# Patient Record
Sex: FEMALE | Race: WHITE | Employment: OTHER | URBAN - METROPOLITAN AREA
[De-identification: names, ages, dates, MRNs, and addresses within clinical notes are randomized per-mention and may not be internally consistent; named-entity substitution may affect disease eponyms.]

---

## 2017-04-11 PROBLEM — M48.062 LUMBAR STENOSIS WITH NEUROGENIC CLAUDICATION: Status: ACTIVE | Noted: 2017-04-11

## 2017-04-11 PROBLEM — F17.200 SMOKER: Status: ACTIVE | Noted: 2017-04-11

## 2017-04-11 PROBLEM — M43.16 SPONDYLOLISTHESIS AT L4-L5 LEVEL: Status: ACTIVE | Noted: 2017-04-11

## 2017-07-25 PROBLEM — M43.16 SPONDYLOLISTHESIS OF LUMBAR REGION: Status: ACTIVE | Noted: 2017-07-25

## 2020-07-02 PROBLEM — M43.17 SPONDYLOLISTHESIS AT L5-S1 LEVEL: Status: ACTIVE | Noted: 2020-07-02

## 2020-08-03 ENCOUNTER — PREP FOR PROCEDURE (OUTPATIENT)
Dept: SURGERY | Age: 65
End: 2020-08-03

## 2020-08-03 ENCOUNTER — OFFICE VISIT (OUTPATIENT)
Dept: SURGERY | Age: 65
End: 2020-08-03
Payer: MEDICARE

## 2020-08-03 VITALS
WEIGHT: 175 LBS | HEIGHT: 61 IN | TEMPERATURE: 98.3 F | DIASTOLIC BLOOD PRESSURE: 78 MMHG | SYSTOLIC BLOOD PRESSURE: 112 MMHG | BODY MASS INDEX: 33.04 KG/M2

## 2020-08-03 PROCEDURE — 99203 OFFICE O/P NEW LOW 30 MIN: CPT | Performed by: SURGERY

## 2020-08-03 PROCEDURE — 4040F PNEUMOC VAC/ADMIN/RCVD: CPT | Performed by: SURGERY

## 2020-08-03 PROCEDURE — G8427 DOCREV CUR MEDS BY ELIG CLIN: HCPCS | Performed by: SURGERY

## 2020-08-03 PROCEDURE — 1123F ACP DISCUSS/DSCN MKR DOCD: CPT | Performed by: SURGERY

## 2020-08-03 PROCEDURE — G8417 CALC BMI ABV UP PARAM F/U: HCPCS | Performed by: SURGERY

## 2020-08-03 PROCEDURE — 1090F PRES/ABSN URINE INCON ASSESS: CPT | Performed by: SURGERY

## 2020-08-03 PROCEDURE — 3017F COLORECTAL CA SCREEN DOC REV: CPT | Performed by: SURGERY

## 2020-08-03 PROCEDURE — G8400 PT W/DXA NO RESULTS DOC: HCPCS | Performed by: SURGERY

## 2020-08-03 PROCEDURE — 1036F TOBACCO NON-USER: CPT | Performed by: SURGERY

## 2020-08-03 RX ORDER — IPRATROPIUM BROMIDE 21 UG/1
SPRAY, METERED NASAL
COMMUNITY
Start: 2020-07-29

## 2020-08-03 RX ORDER — BUSPIRONE HYDROCHLORIDE 7.5 MG/1
15 TABLET ORAL 2 TIMES DAILY
COMMUNITY
Start: 2020-07-09

## 2020-08-03 RX ORDER — PRAMIPEXOLE DIHYDROCHLORIDE 0.25 MG/1
TABLET ORAL
COMMUNITY
Start: 2020-05-21

## 2020-08-03 RX ORDER — ALPRAZOLAM 0.25 MG/1
TABLET ORAL
COMMUNITY
Start: 2020-06-03 | End: 2022-01-12

## 2020-08-03 RX ORDER — SIMVASTATIN 40 MG
40 TABLET ORAL NIGHTLY
COMMUNITY
End: 2022-10-20

## 2020-08-03 ASSESSMENT — ENCOUNTER SYMPTOMS
RHINORRHEA: 0
BLOOD IN STOOL: 0
SHORTNESS OF BREATH: 0
ABDOMINAL DISTENTION: 0
CHEST TIGHTNESS: 0
ABDOMINAL PAIN: 0
ALLERGIC/IMMUNOLOGIC NEGATIVE: 1
COLOR CHANGE: 0
NAUSEA: 0
RECTAL PAIN: 0
BACK PAIN: 1

## 2020-08-03 NOTE — H&P (VIEW-ONLY)
RICARDO Gamez is a 76 y.o. female seen at the request of Dr. Laurent Ceballos for a anterior abdominal exposure for a L5-S1 spinal surgery. The patient has had chronic back pain for 3+ years. The patient has not had previous abdominal surgery. The patient is not on anticoagulants. All testing was done by the neurosurgeons. Review of Systems   Constitutional: Negative for activity change, appetite change and unexpected weight change. HENT: Negative for congestion, nosebleeds, rhinorrhea and sneezing. Eyes: Negative for visual disturbance. Respiratory: Negative for chest tightness and shortness of breath. Cardiovascular: Negative for chest pain and leg swelling. Gastrointestinal: Negative for abdominal distention, abdominal pain, blood in stool, nausea and rectal pain. Endocrine: Negative. Genitourinary: Negative for difficulty urinating. Musculoskeletal: Positive for back pain. Skin: Negative for color change. Allergic/Immunologic: Negative. Neurological: Negative for seizures, light-headedness, numbness and headaches. Hematological: Does not bruise/bleed easily. Psychiatric/Behavioral: Negative for sleep disturbance. Past Medical History:   Diagnosis Date    Anxiety     Hypertension     Osteoarthritis      No past surgical history on file. Social History     Tobacco Use    Smoking status: Former Smoker     Packs/day: 0.50     Types: Cigarettes    Smokeless tobacco: Never Used   Substance Use Topics    Alcohol use: No     Alcohol/week: 0.0 standard drinks    Drug use: Not on file     No family history on file. Patient has no known allergies.   No Known Allergies  Current Outpatient Medications   Medication Sig Dispense Refill    busPIRone (BUSPAR) 7.5 MG tablet TK 1 T PO BID      pramipexole (MIRAPEX) 0.25 MG tablet TK ONE OR TWO TS PO QHS      simvastatin (ZOCOR) 40 MG tablet Take by mouth      ipratropium (ATROVENT) 0.03 % nasal spray U 2 SPRAYS IEN BID      place, and time. Psychiatric:         Mood and Affect: Mood normal.         Judgment: Judgment normal.         Assessment:     L5-S1 spondylolisthesis     Plan:     Anterior exposure for L5-S1 anterior spinal fusion with Dr Dayday Escamilla    The exposure was discussed and the patient agrees to the above procedure. The procedure  as well as potential risks and complications including but not exclusive to infection, blood loss, damage to surrounding structures including blood vessels, nerves, the urteter and the intestine,  and that could require further surgery  were discussed. All questions are answered . The patient appears to understands and is agreeable to the surgery. The patient was counseled at length about the risks of maggi Covid-19 in the perioperative period and any recovery window from their procedure. The patient was made aware that maggi Covid-19  may worsen their prognosis for recovering from their procedure  and lend to a higher morbidity and/or mortality risk. The patient was given the options of postponing their procedure. All material risks, benefits, and alternatives were discussed. The patient does wish to proceed with the procedure at this time.         Zuri Majano MD

## 2020-08-03 NOTE — PROGRESS NOTES
(ATROVENT) 0.03 % nasal spray U 2 SPRAYS IEN BID      oxyCODONE-acetaminophen (PERCOCET) 7.5-325 MG per tablet Take 1 tablet by mouth every 8 hours as needed for Pain for up to 30 days. Intended supply: 30 days 90 tablet 0    lidocaine (LIDODERM) 5 % Place 1 patch onto the skin daily 12 hours on, 12 hours off. 30 patch 0    gabapentin (NEURONTIN) 300 MG capsule Take 1 capsule by mouth 3 times daily for 30 days. 90 capsule 1    diclofenac (VOLTAREN) 50 MG EC tablet Take 1 tablet by mouth 2 times daily 60 tablet 3    tiZANidine (ZANAFLEX) 4 MG tablet Take 1 tablet by mouth 2 times daily 180 tablet 2    sertraline (ZOLOFT) 25 MG tablet   1    ALPRAZolam (XANAX) 0.25 MG tablet TK 1 T PO BID PRN      naloxone (NARCAN) 4 MG/0.1ML LIQD nasal spray 1 spray by Nasal route as needed for Opioid Reversal (Patient not taking: Reported on 8/3/2020) 1 each 0     No current facility-administered medications for this visit. /78   Temp 98.3 °F (36.8 °C) (Temporal)   Ht 5' 1\" (1.549 m)   Wt 175 lb (79.4 kg)   LMP  (LMP Unknown)   BMI 33.07 kg/m²     Objective:   Physical Exam  Constitutional:       General: She is not in acute distress. Appearance: Normal appearance. HENT:      Mouth/Throat:      Mouth: Mucous membranes are moist.      Pharynx: Oropharynx is clear. Eyes:      Pupils: Pupils are equal, round, and reactive to light. Neck:      Comments: Neck is supple with out masses, no thyromegaly, trachea midline  Cardiovascular:      Rate and Rhythm: Normal rate and regular rhythm. Heart sounds: No murmur. Pulmonary:      Effort: Pulmonary effort is normal. No respiratory distress. Breath sounds: Normal breath sounds. Abdominal:      Palpations: There is no hepatomegaly or splenomegaly. Tenderness: There is no abdominal tenderness. Hernia: No hernia is present. Musculoskeletal:      Comments: Normal gait   Skin:     Findings: No bruising, lesion or rash.    Neurological: Mental Status: She is alert and oriented to person, place, and time. Psychiatric:         Mood and Affect: Mood normal.         Judgment: Judgment normal.         Assessment:     L5-S1 spondylolisthesis     Plan:     Anterior exposure for L5-S1 anterior spinal fusion with Dr Ángela Burton    The exposure was discussed and the patient agrees to the above procedure. The procedure  as well as potential risks and complications including but not exclusive to infection, blood loss, damage to surrounding structures including blood vessels, nerves, the urteter and the intestine,  and that could require further surgery  were discussed. All questions are answered . The patient appears to understands and is agreeable to the surgery. The patient was counseled at length about the risks of maggi Covid-19 in the perioperative period and any recovery window from their procedure. The patient was made aware that maggi Covid-19  may worsen their prognosis for recovering from their procedure  and lend to a higher morbidity and/or mortality risk. The patient was given the options of postponing their procedure. All material risks, benefits, and alternatives were discussed. The patient does wish to proceed with the procedure at this time.         Tu Schmitz MD

## 2020-08-06 ENCOUNTER — NURSE ONLY (OUTPATIENT)
Dept: PRIMARY CARE CLINIC | Age: 65
End: 2020-08-06

## 2020-08-06 ENCOUNTER — HOSPITAL ENCOUNTER (OUTPATIENT)
Dept: PREADMISSION TESTING | Age: 65
Discharge: HOME | End: 2020-08-10
Payer: MEDICARE

## 2020-08-06 VITALS
TEMPERATURE: 98.6 F | BODY MASS INDEX: 32.66 KG/M2 | RESPIRATION RATE: 16 BRPM | DIASTOLIC BLOOD PRESSURE: 75 MMHG | OXYGEN SATURATION: 99 % | HEIGHT: 61 IN | HEART RATE: 73 BPM | WEIGHT: 173 LBS | SYSTOLIC BLOOD PRESSURE: 153 MMHG

## 2020-08-06 PROBLEM — F41.0 PANIC ATTACKS: Chronic | Status: ACTIVE | Noted: 2020-08-06

## 2020-08-06 PROBLEM — M53.2X7 LUMBOSACRAL SPINE INSTABILITY: Status: ACTIVE | Noted: 2020-08-06

## 2020-08-06 PROBLEM — G25.81 RLS (RESTLESS LEGS SYNDROME): Chronic | Status: ACTIVE | Noted: 2020-08-06

## 2020-08-06 LAB
ABO/RH: NORMAL
ANION GAP SERPL CALCULATED.3IONS-SCNC: 12 MEQ/L (ref 9–15)
ANTIBODY SCREEN: NORMAL
BUN BLDV-MCNC: 24 MG/DL (ref 8–23)
CALCIUM SERPL-MCNC: 10 MG/DL (ref 8.5–9.9)
CHLORIDE BLD-SCNC: 105 MEQ/L (ref 95–107)
CO2: 26 MEQ/L (ref 20–31)
CREAT SERPL-MCNC: 0.5 MG/DL (ref 0.5–0.9)
EKG ATRIAL RATE: 70 BPM
EKG P AXIS: 18 DEGREES
EKG P-R INTERVAL: 154 MS
EKG Q-T INTERVAL: 388 MS
EKG QRS DURATION: 86 MS
EKG QTC CALCULATION (BAZETT): 419 MS
EKG R AXIS: 25 DEGREES
EKG T AXIS: 46 DEGREES
EKG VENTRICULAR RATE: 70 BPM
GFR AFRICAN AMERICAN: >60
GFR NON-AFRICAN AMERICAN: >60
GLUCOSE BLD-MCNC: 98 MG/DL (ref 70–99)
HCT VFR BLD CALC: 37.6 % (ref 37–47)
HEMOGLOBIN: 12.2 G/DL (ref 12–16)
INR BLD: 1
MCH RBC QN AUTO: 30.4 PG (ref 27–31.3)
MCHC RBC AUTO-ENTMCNC: 32.3 % (ref 33–37)
MCV RBC AUTO: 94 FL (ref 82–100)
PDW BLD-RTO: 13.5 % (ref 11.5–14.5)
PLATELET # BLD: 325 K/UL (ref 130–400)
POTASSIUM SERPL-SCNC: 5.2 MEQ/L (ref 3.4–4.9)
PROTHROMBIN TIME: 12.8 SEC (ref 12.3–14.9)
RBC # BLD: 4 M/UL (ref 4.2–5.4)
SODIUM BLD-SCNC: 143 MEQ/L (ref 135–144)
WBC # BLD: 5.3 K/UL (ref 4.8–10.8)

## 2020-08-06 PROCEDURE — 86900 BLOOD TYPING SEROLOGIC ABO: CPT

## 2020-08-06 PROCEDURE — 80048 BASIC METABOLIC PNL TOTAL CA: CPT

## 2020-08-06 PROCEDURE — 93005 ELECTROCARDIOGRAM TRACING: CPT | Performed by: NURSE PRACTITIONER

## 2020-08-06 PROCEDURE — 86850 RBC ANTIBODY SCREEN: CPT

## 2020-08-06 PROCEDURE — 85027 COMPLETE CBC AUTOMATED: CPT

## 2020-08-06 PROCEDURE — 85610 PROTHROMBIN TIME: CPT

## 2020-08-06 PROCEDURE — 86901 BLOOD TYPING SEROLOGIC RH(D): CPT

## 2020-08-06 RX ORDER — CALCIUM CARBONATE 500(1250)
500 TABLET ORAL DAILY
COMMUNITY

## 2020-08-06 RX ORDER — MILK THISTLE 150 MG
CAPSULE ORAL DAILY
COMMUNITY

## 2020-08-06 RX ORDER — LIDOCAINE HYDROCHLORIDE 10 MG/ML
1 INJECTION, SOLUTION EPIDURAL; INFILTRATION; INTRACAUDAL; PERINEURAL
Status: CANCELLED | OUTPATIENT
Start: 2020-08-12 | End: 2020-08-12

## 2020-08-06 RX ORDER — SODIUM CHLORIDE 0.9 % (FLUSH) 0.9 %
10 SYRINGE (ML) INJECTION PRN
Status: CANCELLED | OUTPATIENT
Start: 2020-08-12

## 2020-08-06 RX ORDER — PSEUDOEPHEDRINE HCL 60 MG/1
60 TABLET ORAL EVERY 4 HOURS PRN
COMMUNITY

## 2020-08-06 RX ORDER — ASCORBIC ACID 500 MG
500 TABLET ORAL DAILY
COMMUNITY

## 2020-08-06 RX ORDER — YOHIMBE BARK 500 MG
CAPSULE ORAL PRN
COMMUNITY

## 2020-08-06 RX ORDER — M-VIT,TX,IRON,MINS/CALC/FOLIC 27MG-0.4MG
1 TABLET ORAL DAILY
COMMUNITY

## 2020-08-06 RX ORDER — SODIUM CHLORIDE 0.9 % (FLUSH) 0.9 %
10 SYRINGE (ML) INJECTION EVERY 12 HOURS SCHEDULED
Status: CANCELLED | OUTPATIENT
Start: 2020-08-12

## 2020-08-06 RX ORDER — SODIUM CHLORIDE, SODIUM LACTATE, POTASSIUM CHLORIDE, CALCIUM CHLORIDE 600; 310; 30; 20 MG/100ML; MG/100ML; MG/100ML; MG/100ML
INJECTION, SOLUTION INTRAVENOUS CONTINUOUS
Status: CANCELLED | OUTPATIENT
Start: 2020-08-12

## 2020-08-06 RX ORDER — MAGNESIUM OXIDE 400 MG/1
400 TABLET ORAL DAILY
COMMUNITY

## 2020-08-06 RX ORDER — CEFAZOLIN SODIUM 2 G/50ML
2 SOLUTION INTRAVENOUS ONCE
Status: CANCELLED | OUTPATIENT
Start: 2020-08-12

## 2020-08-06 ASSESSMENT — ENCOUNTER SYMPTOMS
CONSTIPATION: 0
SORE THROAT: 0
ABDOMINAL PAIN: 0
EYES NEGATIVE: 1
DIARRHEA: 0
NAUSEA: 0
STRIDOR: 0
TROUBLE SWALLOWING: 0
SHORTNESS OF BREATH: 0
VOMITING: 0
WHEEZING: 0
ALLERGIC/IMMUNOLOGIC NEGATIVE: 1
BACK PAIN: 1
COUGH: 0
CHEST TIGHTNESS: 0

## 2020-08-06 NOTE — H&P (VIEW-ONLY)
Nurse Practitioner History and Physical      CHIEF COMPLAINT:  Back pain    HISTORY OF PRESENT ILLNESS:      The patient is a 76 y.o. female with significant past medical history of lumbar 5 - sacral 1 instability who presents for anterior left retro peritoneal lumbar 5 sacral 1 diskectomy interbody cage fusion. Chronic low back pain. S/p lumbar disc OR 2017. States low back pain radiates to bilateral thighs with left worse than right. Pain of thighs with numbness & radiates to bilateral calves. C/o cramps of both lower extremities. Has limited ambulation due to the severe low back pain. PT has worsened sx. Does do [place] ( lays flat on back ) which does alleviate sx. Scheduled for OR.     Past Medical History:        Diagnosis Date    Anxiety     Hyperlipidemia     meds > 5 yrs    Osteoarthritis      Past Surgical History:    Past Surgical History:   Procedure Laterality Date    BACK SURGERY  2017    lumbar disc OR    COLONOSCOPY      DILATION AND CURETTAGE OF UTERUS      at age 29s--miscarriage    TONSILLECTOMY      as child   [address]  2010    in [place]--EMT attempted to intubate patient due to trouble breathing & then needed repair         Medications Prior to Admission:    Current Outpatient Medications   Medication Sig Dispense Refill    Multiple Vitamins-Minerals (THERAPEUTIC MULTIVITAMIN-MINERALS) tablet Take 1 tablet by mouth daily      magnesium oxide (MAG-OX) 400 MG tablet Take 400 mg by mouth daily      Lactobacillus (ACIDOPHILUS) 100 MG CAPS Take by mouth as needed      calcium carbonate (OSCAL) 500 MG TABS tablet Take 500 mg by mouth daily      vitamin C (ASCORBIC ACID) 500 MG tablet Take 500 mg by mouth daily      Milk Thistle 150 MG CAPS Take by mouth daily      busPIRone (BUSPAR) 7.5 MG tablet Take 15 mg by mouth 2 times daily       pramipexole (MIRAPEX) 0.25 MG tablet TK ONE OR TWO TS PO QHS      simvastatin (ZOCOR) 40 MG tablet Take 40 mg by mouth nightly       ipratropium (ATROVENT) 0.03 % nasal spray U 2 SPRAYS IEN BID      ALPRAZolam (XANAX) 0.25 MG tablet TK 1 T PO BID PRN      oxyCODONE-acetaminophen (PERCOCET) 7.5-325 MG per tablet Take 1 tablet by mouth every 8 hours as needed for Pain for up to 30 days. Intended supply: 30 days 90 tablet 0    lidocaine (LIDODERM) 5 % Place 1 patch onto the skin daily 12 hours on, 12 hours off. 30 patch 0    gabapentin (NEURONTIN) 300 MG capsule Take 1 capsule by mouth 3 times daily for 30 days. 90 capsule 1    diclofenac (VOLTAREN) 50 MG EC tablet Take 1 tablet by mouth 2 times daily 60 tablet 3    tiZANidine (ZANAFLEX) 4 MG tablet Take 1 tablet by mouth 2 times daily (Patient taking differently: Take 4 mg by mouth nightly ) 180 tablet 2    naloxone (NARCAN) 4 MG/0.1ML LIQD nasal spray 1 spray by Nasal route as needed for Opioid Reversal (Patient not taking: Reported on 8/3/2020) 1 each 0    sertraline (ZOLOFT) 25 MG tablet   1     No current facility-administered medications for this encounter. Allergies:  Patient has no known allergies. Social History:   Social History     Socioeconomic History    Marital status: Unknown     Spouse name: Not on file    Number of children: Not on file    Years of education: Not on file    Highest education level: Not on file   Occupational History    Not on file   Social Needs    Financial resource strain: Not on file    Food insecurity     Worry: Not on file     Inability: Not on file    Transportation needs     Medical: Not on file     Non-medical: Not on file   Tobacco Use    Smoking status: Former Smoker     Packs/day: 1.00     Years: 30.00     Pack years: 30.00     Types: Cigarettes     Last attempt to quit: 2019     Years since quittin.3    Smokeless tobacco: Never Used    Tobacco comment: intermittent habit   Substance and Sexual Activity    Alcohol use:  Yes     Alcohol/week: 0.0 standard drinks     Comment: social    Drug use: Not on file    Sexual activity: Not on file   Lifestyle    Physical activity     Days per week: Not on file     Minutes per session: Not on file    Stress: Not on file   Relationships    Social connections     Talks on phone: Not on file     Gets together: Not on file     Attends [employer] service: Not on file     Active member of club or organization: Not on file     Attends meetings of clubs or organizations: Not on file     Relationship status: Not on file    Intimate partner violence     Fear of current or ex partner: Not on file     Emotionally abused: Not on file     Physically abused: Not on file     Forced sexual activity: Not on file   Other Topics Concern    Not on file   Social History Narrative    Not on file       Family History:       Problem Relation Age of Onset    Diabetes Mother     Obesity Mother     High Cholesterol Mother     High Cholesterol Father     Other Sister         GSW to spine & paralysis    No Known Problems Brother     No Known Problems Son     No Known Problems Daughter     Other Brother         as infant       Review of Systems   Constitutional: Negative. HENT: Positive for congestion (nasal). Negative for sore throat, tinnitus and trouble swallowing. Eyes: Negative. Negative for visual disturbance. Respiratory: Negative for cough, chest tightness, shortness of breath, wheezing and stridor. Cardiovascular: Negative for chest pain and palpitations. Gastrointestinal: Negative for abdominal pain, constipation, diarrhea, nausea and vomiting. Endocrine: Negative. Genitourinary: Negative for dysuria and frequency. Musculoskeletal: Positive for back pain. Negative for myalgias and neck pain. Skin: Negative. Allergic/Immunologic: Negative. Neurological: Negative. Negative for seizures and headaches. Hematological: Negative. Psychiatric/Behavioral: Negative.          Panic attacks       Vitals:  BP (!) 153/75   Pulse 73   Temp 98.6 °F (37 °C) (Temporal)   Resp 16   Ht 5' 0.5\" (1.537 m)   Wt 173 lb (78.5 kg)   LMP 08/06/2004   SpO2 99%   BMI 33.23 kg/m²     Physical Exam  Constitutional:       Appearance: She is well-developed. HENT:      Head: Normocephalic and atraumatic. Right Ear: Tympanic membrane, ear canal and external ear normal.      Left Ear: Tympanic membrane, ear canal and external ear normal.      Nose: No congestion. Mouth/Throat:      Mouth: Mucous membranes are moist.      Comments: Upper partial.  Eyes:      General: No scleral icterus. Extraocular Movements: Extraocular movements intact. Conjunctiva/sclera: Conjunctivae normal.      Pupils: Pupils are equal, round, and reactive to light. Neck:      Musculoskeletal: Normal range of motion. Thyroid: No thyromegaly. Vascular: No JVD. Trachea: No tracheal deviation. Cardiovascular:      Rate and Rhythm: Normal rate and regular rhythm. Heart sounds: Normal heart sounds. No murmur. No gallop. Pulmonary:      Effort: Pulmonary effort is normal. No respiratory distress. Breath sounds: Normal breath sounds. No wheezing or rales. Abdominal:      General: Bowel sounds are normal.      Palpations: Abdomen is soft. Tenderness: There is no abdominal tenderness. Comments: Obese abdomen. Genitourinary:     Comments: Deferred. Musculoskeletal: Normal range of motion. Right lower leg: No edema. Left lower leg: No edema. Comments: Lumbar spine OR scar. Lymphadenopathy:      Cervical: No cervical adenopathy. Skin:     General: Skin is warm and dry. Findings: No erythema. Neurological:      Mental Status: She is alert and oriented to person, place, and time. Gait: Gait abnormal (right leg limp.). Psychiatric:         Mood and Affect: Mood normal.         Behavior: Behavior normal.         Thought Content:  Thought content normal.         Judgment: Judgment normal.         [unfilled]    Assessment:  Patient Active Problem List Diagnosis    Lumbar radiculopathy    Other chronic pain    Sacroiliitis, not elsewhere classified ([facility] [address]    Spinal stenosis, lumbar region, without neurogenic claudication    Lumbar disc herniation    Lumbar stenosis with neurogenic claudication    Smoker    Spondylolisthesis at L4-L5 level    Spondylolisthesis of lumbar region    Spondylolisthesis at L5-S1 level    Lumbosacral spine instability    Hyperlipidemia         Plan:  Scheduled for anterior left retro peritoneal lumbar 5 sacral 1 diskectomy interbody cage fusion.     ABENA Nieves - CNP  8/6/2020  11:18 AM

## 2020-08-06 NOTE — H&P
(ATROVENT) 0.03 % nasal spray U 2 SPRAYS IEN BID      ALPRAZolam (XANAX) 0.25 MG tablet TK 1 T PO BID PRN      oxyCODONE-acetaminophen (PERCOCET) 7.5-325 MG per tablet Take 1 tablet by mouth every 8 hours as needed for Pain for up to 30 days. Intended supply: 30 days 90 tablet 0    lidocaine (LIDODERM) 5 % Place 1 patch onto the skin daily 12 hours on, 12 hours off. 30 patch 0    gabapentin (NEURONTIN) 300 MG capsule Take 1 capsule by mouth 3 times daily for 30 days. 90 capsule 1    diclofenac (VOLTAREN) 50 MG EC tablet Take 1 tablet by mouth 2 times daily 60 tablet 3    tiZANidine (ZANAFLEX) 4 MG tablet Take 1 tablet by mouth 2 times daily (Patient taking differently: Take 4 mg by mouth nightly ) 180 tablet 2    naloxone (NARCAN) 4 MG/0.1ML LIQD nasal spray 1 spray by Nasal route as needed for Opioid Reversal (Patient not taking: Reported on 8/3/2020) 1 each 0    sertraline (ZOLOFT) 25 MG tablet   1     No current facility-administered medications for this encounter. Allergies:  Patient has no known allergies. Social History:   Social History     Socioeconomic History    Marital status: Unknown     Spouse name: Not on file    Number of children: Not on file    Years of education: Not on file    Highest education level: Not on file   Occupational History    Not on file   Social Needs    Financial resource strain: Not on file    Food insecurity     Worry: Not on file     Inability: Not on file    Transportation needs     Medical: Not on file     Non-medical: Not on file   Tobacco Use    Smoking status: Former Smoker     Packs/day: 1.00     Years: 30.00     Pack years: 30.00     Types: Cigarettes     Last attempt to quit: 2019     Years since quittin.3    Smokeless tobacco: Never Used    Tobacco comment: intermittent habit   Substance and Sexual Activity    Alcohol use:  Yes     Alcohol/week: 0.0 standard drinks     Comment: social    Drug use: Not on file    Sexual activity: Not on file   Lifestyle    Physical activity     Days per week: Not on file     Minutes per session: Not on file    Stress: Not on file   Relationships    Social connections     Talks on phone: Not on file     Gets together: Not on file     Attends [employer] service: Not on file     Active member of club or organization: Not on file     Attends meetings of clubs or organizations: Not on file     Relationship status: Not on file    Intimate partner violence     Fear of current or ex partner: Not on file     Emotionally abused: Not on file     Physically abused: Not on file     Forced sexual activity: Not on file   Other Topics Concern    Not on file   Social History Narrative    Not on file       Family History:       Problem Relation Age of Onset    Diabetes Mother     Obesity Mother     High Cholesterol Mother     High Cholesterol Father     Other Sister         GSW to spine & paralysis    No Known Problems Brother     No Known Problems Son     No Known Problems Daughter     Other Brother         as infant       Review of Systems   Constitutional: Negative. HENT: Positive for congestion (nasal). Negative for sore throat, tinnitus and trouble swallowing. Eyes: Negative. Negative for visual disturbance. Respiratory: Negative for cough, chest tightness, shortness of breath, wheezing and stridor. Cardiovascular: Negative for chest pain and palpitations. Gastrointestinal: Negative for abdominal pain, constipation, diarrhea, nausea and vomiting. Endocrine: Negative. Genitourinary: Negative for dysuria and frequency. Musculoskeletal: Positive for back pain. Negative for myalgias and neck pain. Skin: Negative. Allergic/Immunologic: Negative. Neurological: Negative. Negative for seizures and headaches. Hematological: Negative. Psychiatric/Behavioral: Negative.          Panic attacks       Vitals:  BP (!) 153/75   Pulse 73   Temp 98.6 °F (37 °C) (Temporal)   Resp 16   Ht 5' Diagnosis    Lumbar radiculopathy    Other chronic pain    Sacroiliitis, not elsewhere classified ([facility] [address]    Spinal stenosis, lumbar region, without neurogenic claudication    Lumbar disc herniation    Lumbar stenosis with neurogenic claudication    Smoker    Spondylolisthesis at L4-L5 level    Spondylolisthesis of lumbar region    Spondylolisthesis at L5-S1 level    Lumbosacral spine instability    Hyperlipidemia         Plan:  Scheduled for anterior left retro peritoneal lumbar 5 sacral 1 diskectomy interbody cage fusion.     Aissatou Forrest, ABENA - CNP  8/6/2020  11:18 AM

## 2020-08-07 PROCEDURE — U0003 INFECTIOUS AGENT DETECTION BY NUCLEIC ACID (DNA OR RNA); SEVERE ACUTE RESPIRATORY SYNDROME CORONAVIRUS 2 (SARS-COV-2) (CORONAVIRUS DISEASE [COVID-19]), AMPLIFIED PROBE TECHNIQUE, MAKING USE OF HIGH THROUGHPUT TECHNOLOGIES AS DESCRIBED BY CMS-2020-01-R: HCPCS

## 2020-08-09 PROCEDURE — 93010 ELECTROCARDIOGRAM REPORT: CPT | Performed by: INTERNAL MEDICINE

## 2020-08-12 ENCOUNTER — HOSPITAL ENCOUNTER (INPATIENT)
Age: 65
LOS: 1 days | Discharge: NURSING, REHAB OR LONG-TERM CARE | DRG: 460 | End: 2020-08-13
Attending: NEUROLOGICAL SURGERY | Admitting: NEUROLOGICAL SURGERY
Payer: MEDICARE

## 2020-08-12 ENCOUNTER — ANESTHESIA EVENT (OUTPATIENT)
Dept: OPERATING ROOM | Age: 65
DRG: 460 | End: 2020-08-12
Payer: MEDICARE

## 2020-08-12 ENCOUNTER — APPOINTMENT (OUTPATIENT)
Dept: GENERAL RADIOLOGY | Age: 65
DRG: 460 | End: 2020-08-12
Attending: NEUROLOGICAL SURGERY
Payer: MEDICARE

## 2020-08-12 ENCOUNTER — ANESTHESIA (OUTPATIENT)
Dept: OPERATING ROOM | Age: 65
DRG: 460 | End: 2020-08-12
Payer: MEDICARE

## 2020-08-12 VITALS — DIASTOLIC BLOOD PRESSURE: 88 MMHG | SYSTOLIC BLOOD PRESSURE: 128 MMHG | OXYGEN SATURATION: 78 % | TEMPERATURE: 98.4 F

## 2020-08-12 PROCEDURE — 6360000002 HC RX W HCPCS: Performed by: NEUROLOGICAL SURGERY

## 2020-08-12 PROCEDURE — 1210000000 HC MED SURG R&B

## 2020-08-12 PROCEDURE — 7100000001 HC PACU RECOVERY - ADDTL 15 MIN: Performed by: NEUROLOGICAL SURGERY

## 2020-08-12 PROCEDURE — 6360000002 HC RX W HCPCS: Performed by: NURSE ANESTHETIST, CERTIFIED REGISTERED

## 2020-08-12 PROCEDURE — 2709999900 HC NON-CHARGEABLE SUPPLY: Performed by: NEUROLOGICAL SURGERY

## 2020-08-12 PROCEDURE — 2500000003 HC RX 250 WO HCPCS: Performed by: NURSE ANESTHETIST, CERTIFIED REGISTERED

## 2020-08-12 PROCEDURE — 2500000003 HC RX 250 WO HCPCS: Performed by: NEUROLOGICAL SURGERY

## 2020-08-12 PROCEDURE — 6370000000 HC RX 637 (ALT 250 FOR IP): Performed by: INTERNAL MEDICINE

## 2020-08-12 PROCEDURE — 0SB40ZZ EXCISION OF LUMBOSACRAL DISC, OPEN APPROACH: ICD-10-PCS | Performed by: NEUROLOGICAL SURGERY

## 2020-08-12 PROCEDURE — 2580000003 HC RX 258: Performed by: NURSE PRACTITIONER

## 2020-08-12 PROCEDURE — 3700000001 HC ADD 15 MINUTES (ANESTHESIA): Performed by: NEUROLOGICAL SURGERY

## 2020-08-12 PROCEDURE — 3600000014 HC SURGERY LEVEL 4 ADDTL 15MIN: Performed by: NEUROLOGICAL SURGERY

## 2020-08-12 PROCEDURE — 2780000010 HC IMPLANT OTHER: Performed by: NEUROLOGICAL SURGERY

## 2020-08-12 PROCEDURE — 6360000002 HC RX W HCPCS: Performed by: NURSE PRACTITIONER

## 2020-08-12 PROCEDURE — 6370000000 HC RX 637 (ALT 250 FOR IP): Performed by: NEUROLOGICAL SURGERY

## 2020-08-12 PROCEDURE — 22558 ARTHRD ANT NTRBD MIN DSC LUM: CPT | Performed by: SURGERY

## 2020-08-12 PROCEDURE — 2580000003 HC RX 258: Performed by: SURGERY

## 2020-08-12 PROCEDURE — 2720000010 HC SURG SUPPLY STERILE: Performed by: NEUROLOGICAL SURGERY

## 2020-08-12 PROCEDURE — 2580000003 HC RX 258: Performed by: NEUROLOGICAL SURGERY

## 2020-08-12 PROCEDURE — 3600000004 HC SURGERY LEVEL 4 BASE: Performed by: NEUROLOGICAL SURGERY

## 2020-08-12 PROCEDURE — C1713 ANCHOR/SCREW BN/BN,TIS/BN: HCPCS | Performed by: NEUROLOGICAL SURGERY

## 2020-08-12 PROCEDURE — 3209999900 FLUORO FOR SURGICAL PROCEDURES

## 2020-08-12 PROCEDURE — 7100000000 HC PACU RECOVERY - FIRST 15 MIN: Performed by: NEUROLOGICAL SURGERY

## 2020-08-12 PROCEDURE — 0SG30A0 FUSION OF LUMBOSACRAL JOINT WITH INTERBODY FUSION DEVICE, ANTERIOR APPROACH, ANTERIOR COLUMN, OPEN APPROACH: ICD-10-PCS | Performed by: NEUROLOGICAL SURGERY

## 2020-08-12 PROCEDURE — 6360000002 HC RX W HCPCS: Performed by: ANESTHESIOLOGY

## 2020-08-12 PROCEDURE — 3700000000 HC ANESTHESIA ATTENDED CARE: Performed by: NEUROLOGICAL SURGERY

## 2020-08-12 DEVICE — GRAFT BNE SUB 5ML MTRX CELLULAR OSTEOCEL +: Type: IMPLANTABLE DEVICE | Site: SPINE LUMBAR | Status: FUNCTIONAL

## 2020-08-12 DEVICE — IMPLANTABLE DEVICE: Type: IMPLANTABLE DEVICE | Site: SPINE LUMBAR | Status: FUNCTIONAL

## 2020-08-12 RX ORDER — PRAMIPEXOLE DIHYDROCHLORIDE 0.5 MG/1
0.25 TABLET ORAL NIGHTLY
Status: DISCONTINUED | OUTPATIENT
Start: 2020-08-12 | End: 2020-08-13 | Stop reason: HOSPADM

## 2020-08-12 RX ORDER — ONDANSETRON 2 MG/ML
4 INJECTION INTRAMUSCULAR; INTRAVENOUS
Status: DISCONTINUED | OUTPATIENT
Start: 2020-08-12 | End: 2020-08-12 | Stop reason: HOSPADM

## 2020-08-12 RX ORDER — SODIUM CHLORIDE 0.9 % (FLUSH) 0.9 %
10 SYRINGE (ML) INJECTION EVERY 12 HOURS SCHEDULED
Status: DISCONTINUED | OUTPATIENT
Start: 2020-08-12 | End: 2020-08-12 | Stop reason: HOSPADM

## 2020-08-12 RX ORDER — HYDROCODONE BITARTRATE AND ACETAMINOPHEN 5; 325 MG/1; MG/1
2 TABLET ORAL PRN
Status: DISCONTINUED | OUTPATIENT
Start: 2020-08-12 | End: 2020-08-12 | Stop reason: HOSPADM

## 2020-08-12 RX ORDER — VANCOMYCIN HYDROCHLORIDE 1 G/20ML
INJECTION, POWDER, LYOPHILIZED, FOR SOLUTION INTRAVENOUS PRN
Status: DISCONTINUED | OUTPATIENT
Start: 2020-08-12 | End: 2020-08-12 | Stop reason: ALTCHOICE

## 2020-08-12 RX ORDER — ROCURONIUM BROMIDE 10 MG/ML
INJECTION, SOLUTION INTRAVENOUS PRN
Status: DISCONTINUED | OUTPATIENT
Start: 2020-08-12 | End: 2020-08-12 | Stop reason: SDUPTHER

## 2020-08-12 RX ORDER — CEFAZOLIN SODIUM 2 G/50ML
2 SOLUTION INTRAVENOUS ONCE
Status: COMPLETED | OUTPATIENT
Start: 2020-08-12 | End: 2020-08-12

## 2020-08-12 RX ORDER — DEXAMETHASONE SODIUM PHOSPHATE 10 MG/ML
INJECTION INTRAMUSCULAR; INTRAVENOUS PRN
Status: DISCONTINUED | OUTPATIENT
Start: 2020-08-12 | End: 2020-08-12 | Stop reason: SDUPTHER

## 2020-08-12 RX ORDER — OXYCODONE HYDROCHLORIDE 5 MG/1
5 TABLET ORAL EVERY 4 HOURS PRN
Status: DISCONTINUED | OUTPATIENT
Start: 2020-08-12 | End: 2020-08-13 | Stop reason: HOSPADM

## 2020-08-12 RX ORDER — FENTANYL CITRATE 50 UG/ML
INJECTION, SOLUTION INTRAMUSCULAR; INTRAVENOUS PRN
Status: DISCONTINUED | OUTPATIENT
Start: 2020-08-12 | End: 2020-08-12 | Stop reason: SDUPTHER

## 2020-08-12 RX ORDER — MEPERIDINE HYDROCHLORIDE 25 MG/ML
12.5 INJECTION INTRAMUSCULAR; INTRAVENOUS; SUBCUTANEOUS EVERY 5 MIN PRN
Status: DISCONTINUED | OUTPATIENT
Start: 2020-08-12 | End: 2020-08-12 | Stop reason: HOSPADM

## 2020-08-12 RX ORDER — TIZANIDINE 4 MG/1
4 TABLET ORAL NIGHTLY
Status: DISCONTINUED | OUTPATIENT
Start: 2020-08-12 | End: 2020-08-13 | Stop reason: HOSPADM

## 2020-08-12 RX ORDER — ONDANSETRON 4 MG/1
4 TABLET, ORALLY DISINTEGRATING ORAL EVERY 8 HOURS PRN
Status: DISCONTINUED | OUTPATIENT
Start: 2020-08-12 | End: 2020-08-13 | Stop reason: HOSPADM

## 2020-08-12 RX ORDER — PROPOFOL 10 MG/ML
INJECTION, EMULSION INTRAVENOUS PRN
Status: DISCONTINUED | OUTPATIENT
Start: 2020-08-12 | End: 2020-08-12 | Stop reason: SDUPTHER

## 2020-08-12 RX ORDER — CEFAZOLIN SODIUM 2 G/50ML
2 SOLUTION INTRAVENOUS EVERY 8 HOURS
Status: COMPLETED | OUTPATIENT
Start: 2020-08-12 | End: 2020-08-12

## 2020-08-12 RX ORDER — METOCLOPRAMIDE HYDROCHLORIDE 5 MG/ML
10 INJECTION INTRAMUSCULAR; INTRAVENOUS
Status: DISCONTINUED | OUTPATIENT
Start: 2020-08-12 | End: 2020-08-12 | Stop reason: HOSPADM

## 2020-08-12 RX ORDER — DIPHENHYDRAMINE HYDROCHLORIDE 50 MG/ML
12.5 INJECTION INTRAMUSCULAR; INTRAVENOUS
Status: DISCONTINUED | OUTPATIENT
Start: 2020-08-12 | End: 2020-08-12 | Stop reason: HOSPADM

## 2020-08-12 RX ORDER — LABETALOL HYDROCHLORIDE 5 MG/ML
INJECTION, SOLUTION INTRAVENOUS PRN
Status: DISCONTINUED | OUTPATIENT
Start: 2020-08-12 | End: 2020-08-12 | Stop reason: SDUPTHER

## 2020-08-12 RX ORDER — ACETAMINOPHEN 325 MG/1
650 TABLET ORAL EVERY 4 HOURS PRN
Status: DISCONTINUED | OUTPATIENT
Start: 2020-08-12 | End: 2020-08-13 | Stop reason: HOSPADM

## 2020-08-12 RX ORDER — ONDANSETRON 2 MG/ML
4 INJECTION INTRAMUSCULAR; INTRAVENOUS EVERY 6 HOURS PRN
Status: DISCONTINUED | OUTPATIENT
Start: 2020-08-12 | End: 2020-08-13 | Stop reason: HOSPADM

## 2020-08-12 RX ORDER — HYDROCODONE BITARTRATE AND ACETAMINOPHEN 5; 325 MG/1; MG/1
1 TABLET ORAL PRN
Status: DISCONTINUED | OUTPATIENT
Start: 2020-08-12 | End: 2020-08-12 | Stop reason: HOSPADM

## 2020-08-12 RX ORDER — SODIUM CHLORIDE 0.9 % (FLUSH) 0.9 %
10 SYRINGE (ML) INJECTION PRN
Status: DISCONTINUED | OUTPATIENT
Start: 2020-08-12 | End: 2020-08-12 | Stop reason: HOSPADM

## 2020-08-12 RX ORDER — SODIUM CHLORIDE 9 MG/ML
INJECTION, SOLUTION INTRAVENOUS CONTINUOUS
Status: DISCONTINUED | OUTPATIENT
Start: 2020-08-12 | End: 2020-08-13 | Stop reason: HOSPADM

## 2020-08-12 RX ORDER — FENTANYL CITRATE 50 UG/ML
50 INJECTION, SOLUTION INTRAMUSCULAR; INTRAVENOUS EVERY 10 MIN PRN
Status: DISCONTINUED | OUTPATIENT
Start: 2020-08-12 | End: 2020-08-12 | Stop reason: HOSPADM

## 2020-08-12 RX ORDER — LIDOCAINE HYDROCHLORIDE 10 MG/ML
1 INJECTION, SOLUTION EPIDURAL; INFILTRATION; INTRACAUDAL; PERINEURAL
Status: DISCONTINUED | OUTPATIENT
Start: 2020-08-12 | End: 2020-08-12 | Stop reason: HOSPADM

## 2020-08-12 RX ORDER — BUSPIRONE HYDROCHLORIDE 7.5 MG/1
15 TABLET ORAL 2 TIMES DAILY
Status: DISCONTINUED | OUTPATIENT
Start: 2020-08-12 | End: 2020-08-13 | Stop reason: HOSPADM

## 2020-08-12 RX ORDER — GABAPENTIN 300 MG/1
300 CAPSULE ORAL 3 TIMES DAILY
Status: DISCONTINUED | OUTPATIENT
Start: 2020-08-12 | End: 2020-08-13 | Stop reason: HOSPADM

## 2020-08-12 RX ORDER — ALPRAZOLAM 0.5 MG/1
0.25 TABLET ORAL 2 TIMES DAILY PRN
Status: DISCONTINUED | OUTPATIENT
Start: 2020-08-12 | End: 2020-08-13 | Stop reason: HOSPADM

## 2020-08-12 RX ORDER — ATORVASTATIN CALCIUM 20 MG/1
20 TABLET, FILM COATED ORAL DAILY
Status: DISCONTINUED | OUTPATIENT
Start: 2020-08-12 | End: 2020-08-13 | Stop reason: HOSPADM

## 2020-08-12 RX ORDER — SODIUM CHLORIDE, SODIUM LACTATE, POTASSIUM CHLORIDE, CALCIUM CHLORIDE 600; 310; 30; 20 MG/100ML; MG/100ML; MG/100ML; MG/100ML
INJECTION, SOLUTION INTRAVENOUS CONTINUOUS
Status: DISCONTINUED | OUTPATIENT
Start: 2020-08-12 | End: 2020-08-12

## 2020-08-12 RX ORDER — BUPIVACAINE HYDROCHLORIDE AND EPINEPHRINE 2.5; 5 MG/ML; UG/ML
INJECTION, SOLUTION EPIDURAL; INFILTRATION; INTRACAUDAL; PERINEURAL PRN
Status: DISCONTINUED | OUTPATIENT
Start: 2020-08-12 | End: 2020-08-12 | Stop reason: ALTCHOICE

## 2020-08-12 RX ORDER — SODIUM CHLORIDE 0.9 % (FLUSH) 0.9 %
10 SYRINGE (ML) INJECTION PRN
Status: DISCONTINUED | OUTPATIENT
Start: 2020-08-12 | End: 2020-08-13 | Stop reason: HOSPADM

## 2020-08-12 RX ORDER — LIDOCAINE HYDROCHLORIDE 20 MG/ML
INJECTION, SOLUTION INTRAVENOUS PRN
Status: DISCONTINUED | OUTPATIENT
Start: 2020-08-12 | End: 2020-08-12 | Stop reason: SDUPTHER

## 2020-08-12 RX ORDER — MIDAZOLAM HYDROCHLORIDE 1 MG/ML
INJECTION INTRAMUSCULAR; INTRAVENOUS PRN
Status: DISCONTINUED | OUTPATIENT
Start: 2020-08-12 | End: 2020-08-12 | Stop reason: SDUPTHER

## 2020-08-12 RX ORDER — MAGNESIUM HYDROXIDE 1200 MG/15ML
LIQUID ORAL CONTINUOUS PRN
Status: COMPLETED | OUTPATIENT
Start: 2020-08-12 | End: 2020-08-12

## 2020-08-12 RX ORDER — ONDANSETRON 2 MG/ML
INJECTION INTRAMUSCULAR; INTRAVENOUS PRN
Status: DISCONTINUED | OUTPATIENT
Start: 2020-08-12 | End: 2020-08-12 | Stop reason: SDUPTHER

## 2020-08-12 RX ORDER — SODIUM CHLORIDE 0.9 % (FLUSH) 0.9 %
10 SYRINGE (ML) INJECTION EVERY 12 HOURS SCHEDULED
Status: DISCONTINUED | OUTPATIENT
Start: 2020-08-12 | End: 2020-08-13 | Stop reason: HOSPADM

## 2020-08-12 RX ADMIN — ROCURONIUM BROMIDE 10 MG: 10 INJECTION INTRAVENOUS at 09:03

## 2020-08-12 RX ADMIN — ATORVASTATIN CALCIUM 20 MG: 20 TABLET, FILM COATED ORAL at 21:35

## 2020-08-12 RX ADMIN — ALPRAZOLAM 0.25 MG: 0.5 TABLET ORAL at 15:33

## 2020-08-12 RX ADMIN — SUGAMMADEX 200 MG: 100 INJECTION, SOLUTION INTRAVENOUS at 11:00

## 2020-08-12 RX ADMIN — TIZANIDINE 4 MG: 4 TABLET ORAL at 21:27

## 2020-08-12 RX ADMIN — SODIUM CHLORIDE, POTASSIUM CHLORIDE, SODIUM LACTATE AND CALCIUM CHLORIDE: 600; 310; 30; 20 INJECTION, SOLUTION INTRAVENOUS at 11:09

## 2020-08-12 RX ADMIN — OXYCODONE 5 MG: 5 TABLET ORAL at 21:27

## 2020-08-12 RX ADMIN — HYDROMORPHONE HYDROCHLORIDE 0.5 MG: 1 INJECTION, SOLUTION INTRAMUSCULAR; INTRAVENOUS; SUBCUTANEOUS at 11:14

## 2020-08-12 RX ADMIN — FENTANYL CITRATE 25 MCG: 50 INJECTION, SOLUTION INTRAMUSCULAR; INTRAVENOUS at 11:10

## 2020-08-12 RX ADMIN — GABAPENTIN 300 MG: 300 CAPSULE ORAL at 15:32

## 2020-08-12 RX ADMIN — SODIUM CHLORIDE, POTASSIUM CHLORIDE, SODIUM LACTATE AND CALCIUM CHLORIDE: 600; 310; 30; 20 INJECTION, SOLUTION INTRAVENOUS at 08:35

## 2020-08-12 RX ADMIN — ROCURONIUM BROMIDE 30 MG: 10 INJECTION INTRAVENOUS at 10:04

## 2020-08-12 RX ADMIN — OXYCODONE 5 MG: 5 TABLET ORAL at 17:17

## 2020-08-12 RX ADMIN — ROCURONIUM BROMIDE 10 MG: 10 INJECTION INTRAVENOUS at 10:22

## 2020-08-12 RX ADMIN — HYDROMORPHONE HYDROCHLORIDE 0.5 MG: 1 INJECTION, SOLUTION INTRAMUSCULAR; INTRAVENOUS; SUBCUTANEOUS at 12:18

## 2020-08-12 RX ADMIN — HYDROMORPHONE HYDROCHLORIDE 0.5 MG: 1 INJECTION, SOLUTION INTRAMUSCULAR; INTRAVENOUS; SUBCUTANEOUS at 11:24

## 2020-08-12 RX ADMIN — HYDROMORPHONE HYDROCHLORIDE 0.5 MG: 1 INJECTION, SOLUTION INTRAMUSCULAR; INTRAVENOUS; SUBCUTANEOUS at 23:06

## 2020-08-12 RX ADMIN — ROCURONIUM BROMIDE 20 MG: 10 INJECTION INTRAVENOUS at 08:06

## 2020-08-12 RX ADMIN — FENTANYL CITRATE 50 MCG: 50 INJECTION, SOLUTION INTRAMUSCULAR; INTRAVENOUS at 08:18

## 2020-08-12 RX ADMIN — MIDAZOLAM HYDROCHLORIDE 2 MG: 2 INJECTION, SOLUTION INTRAMUSCULAR; INTRAVENOUS at 07:29

## 2020-08-12 RX ADMIN — LIDOCAINE HYDROCHLORIDE 60 MG: 20 INJECTION, SOLUTION INTRAVENOUS at 07:35

## 2020-08-12 RX ADMIN — SODIUM CHLORIDE, POTASSIUM CHLORIDE, SODIUM LACTATE AND CALCIUM CHLORIDE: 600; 310; 30; 20 INJECTION, SOLUTION INTRAVENOUS at 06:07

## 2020-08-12 RX ADMIN — HYDROMORPHONE HYDROCHLORIDE 0.5 MG: 1 INJECTION, SOLUTION INTRAMUSCULAR; INTRAVENOUS; SUBCUTANEOUS at 11:34

## 2020-08-12 RX ADMIN — ROCURONIUM BROMIDE 10 MG: 10 INJECTION INTRAVENOUS at 09:20

## 2020-08-12 RX ADMIN — DEXAMETHASONE SODIUM PHOSPHATE 10 MG: 10 INJECTION INTRAMUSCULAR; INTRAVENOUS at 08:08

## 2020-08-12 RX ADMIN — CEFAZOLIN SODIUM 2 G: 2 SOLUTION INTRAVENOUS at 15:33

## 2020-08-12 RX ADMIN — FENTANYL CITRATE 50 MCG: 50 INJECTION, SOLUTION INTRAMUSCULAR; INTRAVENOUS at 08:43

## 2020-08-12 RX ADMIN — SODIUM CHLORIDE: 9 INJECTION, SOLUTION INTRAVENOUS at 13:15

## 2020-08-12 RX ADMIN — OXYCODONE 5 MG: 5 TABLET ORAL at 13:14

## 2020-08-12 RX ADMIN — ACETAMINOPHEN 650 MG: 325 TABLET, FILM COATED ORAL at 15:32

## 2020-08-12 RX ADMIN — CEFAZOLIN SODIUM 2 G: 2 SOLUTION INTRAVENOUS at 07:40

## 2020-08-12 RX ADMIN — PROPOFOL 180 MG: 10 INJECTION, EMULSION INTRAVENOUS at 07:35

## 2020-08-12 RX ADMIN — LABETALOL HYDROCHLORIDE 5 MG: 5 INJECTION INTRAVENOUS at 08:55

## 2020-08-12 RX ADMIN — ROCURONIUM BROMIDE 30 MG: 10 INJECTION INTRAVENOUS at 08:36

## 2020-08-12 RX ADMIN — SODIUM CHLORIDE, POTASSIUM CHLORIDE, SODIUM LACTATE AND CALCIUM CHLORIDE: 600; 310; 30; 20 INJECTION, SOLUTION INTRAVENOUS at 11:44

## 2020-08-12 RX ADMIN — PRAMIPEXOLE DIHYDROCHLORIDE 0.25 MG: 0.5 TABLET ORAL at 21:27

## 2020-08-12 RX ADMIN — FENTANYL CITRATE 50 MCG: 50 INJECTION, SOLUTION INTRAMUSCULAR; INTRAVENOUS at 12:10

## 2020-08-12 RX ADMIN — BISACODYL 5 MG: 5 TABLET, COATED ORAL at 13:15

## 2020-08-12 RX ADMIN — SODIUM CHLORIDE, POTASSIUM CHLORIDE, SODIUM LACTATE AND CALCIUM CHLORIDE: 600; 310; 30; 20 INJECTION, SOLUTION INTRAVENOUS at 07:29

## 2020-08-12 RX ADMIN — LABETALOL HYDROCHLORIDE 2.5 MG: 5 INJECTION INTRAVENOUS at 08:32

## 2020-08-12 RX ADMIN — FENTANYL CITRATE 50 MCG: 50 INJECTION, SOLUTION INTRAMUSCULAR; INTRAVENOUS at 11:33

## 2020-08-12 RX ADMIN — FENTANYL CITRATE 25 MCG: 50 INJECTION, SOLUTION INTRAMUSCULAR; INTRAVENOUS at 11:12

## 2020-08-12 RX ADMIN — ROCURONIUM BROMIDE 10 MG: 10 INJECTION INTRAVENOUS at 10:31

## 2020-08-12 RX ADMIN — LABETALOL HYDROCHLORIDE 2.5 MG: 5 INJECTION INTRAVENOUS at 08:23

## 2020-08-12 RX ADMIN — GABAPENTIN 300 MG: 300 CAPSULE ORAL at 21:27

## 2020-08-12 RX ADMIN — FENTANYL CITRATE 50 MCG: 50 INJECTION, SOLUTION INTRAMUSCULAR; INTRAVENOUS at 08:13

## 2020-08-12 RX ADMIN — ROCURONIUM BROMIDE 50 MG: 10 INJECTION INTRAVENOUS at 07:35

## 2020-08-12 RX ADMIN — HYDROMORPHONE HYDROCHLORIDE 0.5 MG: 1 INJECTION, SOLUTION INTRAMUSCULAR; INTRAVENOUS; SUBCUTANEOUS at 18:26

## 2020-08-12 RX ADMIN — BUSPIRONE HYDROCHLORIDE 15 MG: 7.5 TABLET ORAL at 21:26

## 2020-08-12 RX ADMIN — FENTANYL CITRATE 50 MCG: 50 INJECTION, SOLUTION INTRAMUSCULAR; INTRAVENOUS at 07:53

## 2020-08-12 RX ADMIN — FENTANYL CITRATE 50 MCG: 50 INJECTION, SOLUTION INTRAMUSCULAR; INTRAVENOUS at 07:35

## 2020-08-12 RX ADMIN — SODIUM CHLORIDE: 9 INJECTION, SOLUTION INTRAVENOUS at 23:18

## 2020-08-12 RX ADMIN — CEFAZOLIN SODIUM 2 G: 2 SOLUTION INTRAVENOUS at 23:18

## 2020-08-12 RX ADMIN — ONDANSETRON 4 MG: 2 INJECTION INTRAMUSCULAR; INTRAVENOUS at 10:32

## 2020-08-12 ASSESSMENT — PULMONARY FUNCTION TESTS
PIF_VALUE: 23
PIF_VALUE: 22
PIF_VALUE: 20
PIF_VALUE: 23
PIF_VALUE: 21
PIF_VALUE: 22
PIF_VALUE: 24
PIF_VALUE: 23
PIF_VALUE: 20
PIF_VALUE: 23
PIF_VALUE: 18
PIF_VALUE: 0
PIF_VALUE: 21
PIF_VALUE: 23
PIF_VALUE: 23
PIF_VALUE: 21
PIF_VALUE: 21
PIF_VALUE: 22
PIF_VALUE: 23
PIF_VALUE: 24
PIF_VALUE: 23
PIF_VALUE: 22
PIF_VALUE: 21
PIF_VALUE: 24
PIF_VALUE: 23
PIF_VALUE: 23
PIF_VALUE: 11
PIF_VALUE: 3
PIF_VALUE: 27
PIF_VALUE: 21
PIF_VALUE: 24
PIF_VALUE: 22
PIF_VALUE: 15
PIF_VALUE: 0
PIF_VALUE: 21
PIF_VALUE: 24
PIF_VALUE: 23
PIF_VALUE: 23
PIF_VALUE: 24
PIF_VALUE: 23
PIF_VALUE: 24
PIF_VALUE: 1
PIF_VALUE: 23
PIF_VALUE: 23
PIF_VALUE: 0
PIF_VALUE: 23
PIF_VALUE: 24
PIF_VALUE: 23
PIF_VALUE: 24
PIF_VALUE: 23
PIF_VALUE: 21
PIF_VALUE: 22
PIF_VALUE: 23
PIF_VALUE: 22
PIF_VALUE: 21
PIF_VALUE: 23
PIF_VALUE: 21
PIF_VALUE: 21
PIF_VALUE: 24
PIF_VALUE: 23
PIF_VALUE: 3
PIF_VALUE: 21
PIF_VALUE: 21
PIF_VALUE: 7
PIF_VALUE: 23
PIF_VALUE: 24
PIF_VALUE: 21
PIF_VALUE: 24
PIF_VALUE: 21
PIF_VALUE: 21
PIF_VALUE: 24
PIF_VALUE: 4
PIF_VALUE: 20
PIF_VALUE: 24
PIF_VALUE: 24
PIF_VALUE: 22
PIF_VALUE: 23
PIF_VALUE: 23
PIF_VALUE: 21
PIF_VALUE: 27
PIF_VALUE: 23
PIF_VALUE: 27
PIF_VALUE: 18
PIF_VALUE: 22
PIF_VALUE: 23
PIF_VALUE: 24
PIF_VALUE: 24
PIF_VALUE: 23
PIF_VALUE: 23
PIF_VALUE: 19
PIF_VALUE: 24
PIF_VALUE: 21
PIF_VALUE: 24
PIF_VALUE: 21
PIF_VALUE: 25
PIF_VALUE: 23
PIF_VALUE: 21
PIF_VALUE: 23
PIF_VALUE: 25
PIF_VALUE: 22
PIF_VALUE: 17
PIF_VALUE: 24
PIF_VALUE: 23
PIF_VALUE: 23
PIF_VALUE: 22
PIF_VALUE: 20
PIF_VALUE: 22
PIF_VALUE: 22
PIF_VALUE: 23
PIF_VALUE: 22
PIF_VALUE: 24
PIF_VALUE: 23
PIF_VALUE: 21
PIF_VALUE: 23
PIF_VALUE: 24
PIF_VALUE: 27
PIF_VALUE: 23
PIF_VALUE: 23
PIF_VALUE: 24
PIF_VALUE: 23
PIF_VALUE: 21
PIF_VALUE: 22
PIF_VALUE: 21
PIF_VALUE: 18
PIF_VALUE: 23
PIF_VALUE: 24
PIF_VALUE: 21
PIF_VALUE: 24
PIF_VALUE: 23
PIF_VALUE: 21
PIF_VALUE: 21
PIF_VALUE: 20
PIF_VALUE: 23
PIF_VALUE: 23
PIF_VALUE: 24
PIF_VALUE: 23
PIF_VALUE: 24
PIF_VALUE: 23
PIF_VALUE: 21
PIF_VALUE: 23
PIF_VALUE: 3
PIF_VALUE: 24
PIF_VALUE: 21
PIF_VALUE: 22
PIF_VALUE: 23
PIF_VALUE: 23
PIF_VALUE: 21
PIF_VALUE: 23
PIF_VALUE: 23
PIF_VALUE: 22
PIF_VALUE: 22
PIF_VALUE: 23
PIF_VALUE: 25
PIF_VALUE: 21
PIF_VALUE: 1
PIF_VALUE: 26
PIF_VALUE: 0
PIF_VALUE: 24
PIF_VALUE: 23
PIF_VALUE: 19
PIF_VALUE: 23
PIF_VALUE: 24
PIF_VALUE: 21
PIF_VALUE: 23
PIF_VALUE: 21
PIF_VALUE: 26
PIF_VALUE: 23
PIF_VALUE: 21
PIF_VALUE: 23
PIF_VALUE: 19
PIF_VALUE: 23
PIF_VALUE: 22
PIF_VALUE: 23
PIF_VALUE: 24
PIF_VALUE: 23
PIF_VALUE: 25
PIF_VALUE: 23
PIF_VALUE: 18
PIF_VALUE: 21
PIF_VALUE: 18
PIF_VALUE: 23
PIF_VALUE: 21
PIF_VALUE: 22
PIF_VALUE: 17
PIF_VALUE: 24
PIF_VALUE: 22
PIF_VALUE: 23
PIF_VALUE: 21
PIF_VALUE: 23
PIF_VALUE: 24
PIF_VALUE: 21
PIF_VALUE: 23
PIF_VALUE: 5
PIF_VALUE: 20
PIF_VALUE: 21
PIF_VALUE: 21
PIF_VALUE: 24
PIF_VALUE: 22
PIF_VALUE: 23
PIF_VALUE: 22
PIF_VALUE: 4
PIF_VALUE: 21
PIF_VALUE: 19
PIF_VALUE: 24
PIF_VALUE: 21

## 2020-08-12 ASSESSMENT — PAIN SCALES - GENERAL
PAINLEVEL_OUTOF10: 7
PAINLEVEL_OUTOF10: 7
PAINLEVEL_OUTOF10: 6
PAINLEVEL_OUTOF10: 9
PAINLEVEL_OUTOF10: 9
PAINLEVEL_OUTOF10: 10
PAINLEVEL_OUTOF10: 5
PAINLEVEL_OUTOF10: 8
PAINLEVEL_OUTOF10: 10
PAINLEVEL_OUTOF10: 9
PAINLEVEL_OUTOF10: 9
PAINLEVEL_OUTOF10: 2
PAINLEVEL_OUTOF10: 8
PAINLEVEL_OUTOF10: 6
PAINLEVEL_OUTOF10: 8
PAINLEVEL_OUTOF10: 8
PAINLEVEL_OUTOF10: 6

## 2020-08-12 ASSESSMENT — PAIN DESCRIPTION - LOCATION
LOCATION: BACK;ABDOMEN;INCISION
LOCATION: BACK

## 2020-08-12 ASSESSMENT — PAIN DESCRIPTION - ORIENTATION: ORIENTATION: LEFT

## 2020-08-12 ASSESSMENT — PAIN DESCRIPTION - PAIN TYPE: TYPE: CHRONIC PAIN;ACUTE PAIN

## 2020-08-12 ASSESSMENT — PAIN - FUNCTIONAL ASSESSMENT: PAIN_FUNCTIONAL_ASSESSMENT: 0-10

## 2020-08-12 NOTE — CONSULTS
Department of Internal Medicine  General Internal Medicine  Attending Consult Note      Reason for Consult:  Medical management  Requesting Physician:  Dr. Greta Cherry:  Sp L5-S1 diskectomy and fusion    History Obtained From:  patient    HISTORY OF PRESENT ILLNESS:                The patient is a 76 y.o. female with significant past medical history of HLD and OA who is POD#0 sp L5-S1 diskectomy and fusion. Pt only complaint is pain. Denies nausea, vomiting, sob, cp. Past Medical History:        Diagnosis Date    Anxiety     Hyperlipidemia     meds > 5 yrs    Osteoarthritis      Past Surgical History:        Procedure Laterality Date    BACK SURGERY  2017    lumbar disc OR    COLONOSCOPY      DILATION AND CURETTAGE OF UTERUS      at age 29s--miscarriage    TONSILLECTOMY      as child    TRACHEAL SURGERY  2010    in [place]--EMT attempted to intubate patient due to trouble breathing & then needed repair     Current Medications:    Current Facility-Administered Medications: 0.9 % sodium chloride infusion, , Intravenous, Continuous  sodium chloride flush 0.9 % injection 10 mL, 10 mL, Intravenous, 2 times per day  sodium chloride flush 0.9 % injection 10 mL, 10 mL, Intravenous, PRN  ceFAZolin (ANCEF) 2 g in dextrose 3 % 50 mL IVPB (duplex), 2 g, Intravenous, Q8H  HYDROmorphone (DILAUDID) injection 0.25 mg, 0.25 mg, Intravenous, Q3H PRN **OR** HYDROmorphone (DILAUDID) injection 0.5 mg, 0.5 mg, Intravenous, Q3H PRN  acetaminophen (TYLENOL) tablet 650 mg, 650 mg, Oral, Q4H PRN  oxyCODONE (ROXICODONE) immediate release tablet 5 mg, 5 mg, Oral, Q4H PRN  bisacodyl (DULCOLAX) EC tablet 5 mg, 5 mg, Oral, Daily  ondansetron (ZOFRAN-ODT) disintegrating tablet 4 mg, 4 mg, Oral, Q8H PRN **OR** ondansetron (ZOFRAN) injection 4 mg, 4 mg, Intravenous, Q6H PRN  Allergies:  Patient has no known allergies.       Family History:       Problem Relation Age of Onset    Diabetes Mother     Obesity Mother    Olam Marcie High Cholesterol Mother     High Cholesterol Father     Other Sister         GSW to spine & paralysis    No Known Problems Brother     No Known Problems Son     No Known Problems Daughter     Other Brother         as infant     REVIEW OF SYSTEMS:    12 point ROS was negative unless otherwise noted in the HPI   PHYSICAL EXAM:      Vitals:    /80   Pulse 104   Temp 97.7 °F (36.5 °C) (Oral)   Resp 18   Ht 5' 0.5\" (1.537 m)   Wt 170 lb (77.1 kg)   LMP  (LMP Unknown)   SpO2 95%   BMI 32.65 kg/m²     Constitutional: Awake and alert in pain.  Lying in bed   Head: Normocephalic, atraumatic  Eyes: EOMI, PERRLA  ENT: moist mucous membranes  Neck: neck supple, trachea midline  Lungs: Good inspiratory effort, no wheeze, bibasilar rhonchi, no rales  Heart: RRR, normal S1 and S2, no murmurs  GI:  Diminished BS  MSK: no edema noted  Skin: warm, dry  Psych: appropriate affect       IMPRESSION/RECOMMENDATIONS:      # Sp L5-S1 diskectomy and fusion  - neurosurgery managing  - pain and nausea control  - PT/OT - anticipate will likely need rehab    # HLD, depression/anxiety  - cont home meds    Jesús Daly,   Internal Medicine

## 2020-08-12 NOTE — INTERVAL H&P NOTE
Update History & Physical    The patient's History and Physical of August 6, 2020 was reviewed with the patient and I examined the patient. There was no change. The surgical site was confirmed by the patient and me. Plan: The risks, benefits, expected outcome, and alternative to the recommended procedure have been discussed with the patient. Patient understands and wants to proceed with the procedure.      Electronically signed by Abhishek Morris MD on 8/12/2020 at 7:18 AM

## 2020-08-12 NOTE — OP NOTE
Alexandra Townsend [address]                      [address], [address] [facility]                                OPERATIVE REPORT    PATIENT NAME: Miranda Rivers                     :        1955  MED REC NO:   [id number]                            ROOM:  ACCOUNT NO:   [de-identified]                           ADMIT DATE: 2020  PROVIDER:     Dana Medrano MD    DATE OF PROCEDURE:  2020    PREOPERATIVE DIAGNOSIS:  L5-S1 acquired spondylolisthesis. POSTOPERATIVE DIAGNOSIS:  L5-S1 acquired spondylolisthesis. OPERATION PERFORMED:  Left anterior retroperitoneal L5-S1 diskectomy,  interbody cage fusion. SURGEONS:  Dana Medrano and Dr. Ruben Macedo. DESCRIPTION OF PROCEDURE:  The patient was given general endotracheal  anesthesia in the supine position. Bolster placed under the posterior  iliac wings of the back. Arms were externally rotated in anatomic  position. The anterior aspect of the abdomen was then prepped and  draped. Dr. Ruben aMcedo proceeded with the beginning of the procedure,  please see his dictation for details. A left inferior abdominal  retroperitoneal approach was made with excellent exposure by Dr. Ruben Macedo. With exposure of the anterior aspect of L5-S1, I proceeded  with the neurosurgical portion. I identified the anterior annulus of L5-S1. A marker was placed. AP  lateral x-rays obtained to confirm levels. This area was marked. With  very careful attention to gentle retraction of the bifurcation of the  vein, I was able to remove the anterior annulus on the inferior rim of  L5, superior rim of S1. Entered into the disc space removing the  degenerated disc material using a variety of Laureano, punches, curettes. The endplates were prepared with the high-speed bone dissector. The  posterior ridge was then removed with the high-speed bone dissector and  Kerrison rongeurs. The wound was thoroughly irrigated several times.    The intradiscal implant was then measured with trials and subsequently  dilated with increasing size of the trials. Once this was done, I was  then placed the final implant which was an 8 x 34 x 24 cage, number  [id number] from [facility] titanium MRI compatible filled with Osteocel  reorder [id number]. The implant was in excellent position. The three bone  screws were then placed, two inferiorly and one superiorly to keep  minimal retraction on the vein. These were placed under x-ray control. The awl was used. The screws were then placed in excellent position and  then the top portion was expanded to hold in place. The implant was in  excellent position. Vancomycin powder had been placed intradiscal  before the implant was placed. Once it was in satisfactory position,  the wound was thoroughly irrigated. Dr. Leyda Ramirez then proceeded with  closure of the wound. EBL was less than 50 mL. No blood transfused. Construct is MRI compatible.         Meryle Coombs, MD    D: 08/12/2020 10:43:56       T: 08/12/2020 10:51:17     [id number]  Job#: [id number]     Doc#: [id number]    CC:

## 2020-08-12 NOTE — ANESTHESIA PRE PROCEDURE
simvastatin (ZOCOR) 40 MG tablet Take 40 mg by mouth nightly     Historical Provider, MD   oxyCODONE-acetaminophen (PERCOCET) 7.5-325 MG per tablet Take 1 tablet by mouth every 8 hours as needed for Pain for up to 30 days. Intended supply: 30 days 7/29/20 8/28/20  ABENA Mckay CNP   gabapentin (NEURONTIN) 300 MG capsule Take 1 capsule by mouth 3 times daily for 30 days.  6/1/20 8/6/20  Boyd Morrow MD   diclofenac (VOLTAREN) 50 MG EC tablet Take 1 tablet by mouth 2 times daily 6/1/20   Boyd Morrow MD   naloxone [facility]) 4 MG/0.1ML LIQD nasal spray 1 spray by Nasal route as needed for Opioid Reversal  Patient not taking: Reported on 8/3/2020 6/13/19   Lizy Pettit MD       Current medications:    Current Facility-Administered Medications   Medication Dose Route Frequency Provider Last Rate Last Dose    lactated ringers infusion   Intravenous Continuous Ravi ABENA Anderson CNP        lidocaine PF 1 % injection 1 mL  1 mL Intradermal Once PRN Ravi ABENA Anderson - CNP        sodium chloride flush 0.9 % injection 10 mL  10 mL Intravenous 2 times per day Ravi ABENA Anderson - CNP        sodium chloride flush 0.9 % injection 10 mL  10 mL Intravenous PRN ABENA Ernandez CNP        ceFAZolin (ANCEF) 2 g in dextrose 3 % 50 mL IVPB (duplex)  2 g Intravenous Once Ravi ABENA Anderson CNP        lactated ringers infusion   Intravenous Continuous ABENA Ernandez CNP 50 mL/hr at 08/12/20 8297      fentaNYL (SUBLIMAZE) injection 50 mcg  50 mcg Intravenous Q10 Min PRN Demi Elizalde MD        HYDROmorphone (DILAUDID) injection 0.5 mg  0.5 mg Intravenous Q10 Min PRN Demi Elizalde MD        HYDROcodone-acetaminophen [facility]) 5-325 MG per tablet 1 tablet  1 tablet Oral PRN Demi Elizalde MD        Or    HYDROcodone-acetaminophen [facility]) 5-325 MG per tablet 2 tablet  2 tablet Oral PRN Demi Elizalde MD       [facility] diphenhydrAMINE (BENADRYL) injection 12.5 mg  12.5 mg Intravenous Once PRN Gema Buenrostro MD        ondansetron [facility]) injection 4 mg  4 mg Intravenous Once PRN Gema Buenrostro MD        metoclopramide [facility]) injection 10 mg  10 mg Intravenous Once PRN Gema Buenrostro MD        meperidine (DEMEROL) injection 12.5 mg  12.5 mg Intravenous Q5 Min PRN Gema Buenrostro MD           Allergies:  No Known Allergies    Problem List:    Patient Active Problem List   Diagnosis Code    Lumbar radiculopathy M54.16    Other chronic pain G89.29    Sacroiliitis, not elsewhere classified ([facility] [address] M46.1    Spinal stenosis, lumbar region, without neurogenic claudication M48.061    Lumbar disc herniation M51.26    Lumbar stenosis with neurogenic claudication M48.062    Smoker F17.200    Spondylolisthesis at L4-L5 level M43.16    Spondylolisthesis of lumbar region M43.16    Spondylolisthesis at L5-S1 level M43.17    Lumbosacral spine instability M53.2X7    Hyperlipidemia E78.5    Panic attacks F41.0    RLS (restless legs syndrome) G25.81       Past Medical History:        Diagnosis Date    Anxiety     Hyperlipidemia     meds > 5 yrs    Osteoarthritis        Past Surgical History:        Procedure Laterality Date    BACK SURGERY      lumbar disc OR    COLONOSCOPY      DILATION AND CURETTAGE OF UTERUS      at age 30s--miscarriage    TONSILLECTOMY      as child    TRACHEAL SURGERY      in [place]--EMT attempted to intubate patient due to trouble breathing & then needed repair       Social History:    Social History     Tobacco Use    Smoking status: Former Smoker     Packs/day: 1.00     Years: 30.00     Pack years: 30.00     Types: Cigarettes     Last attempt to quit: 2019     Years since quittin.3    Smokeless tobacco: Never Used    Tobacco comment: intermittent habit   Substance Use Topics    Alcohol use:  Yes     Alcohol/week: 0.0 standard drinks     Comment: social                                Counseling given: Not Answered  Comment: intermittent habit      Vital Signs (Current):   Vitals:    08/12/20 0554   BP: 120/73   Pulse: 90   Resp: 18   Temp: 98.7 °F (37.1 °C)   TempSrc: Temporal   SpO2: 95%   Weight: 170 lb (77.1 kg)   Height: 5' 0.5\" (1.537 m)                                              BP Readings from Last 3 Encounters:   08/12/20 120/73   08/06/20 (!) 153/75   08/03/20 112/78       NPO Status: Time of last liquid consumption: 2100                        Time of last solid consumption: 1800                        Date of last liquid consumption: 08/11/20                        Date of last solid food consumption: 08/11/20    BMI:   Wt Readings from Last 3 Encounters:   08/12/20 170 lb (77.1 kg)   08/06/20 173 lb (78.5 kg)   08/03/20 175 lb (79.4 kg)     Body mass index is 32.65 kg/m². CBC:   Lab Results   Component Value Date    WBC 5.3 08/06/2020    RBC 4.00 08/06/2020    HGB 12.2 08/06/2020    HCT 37.6 08/06/2020    MCV 94.0 08/06/2020    RDW 13.5 08/06/2020     08/06/2020       CMP:   Lab Results   Component Value Date     08/06/2020    K 5.2 08/06/2020     08/06/2020    CO2 26 08/06/2020    BUN 24 08/06/2020    CREATININE 0.50 08/06/2020    GFRAA >60.0 08/06/2020    LABGLOM >60.0 08/06/2020    GLUCOSE 98 08/06/2020    CALCIUM 10.0 08/06/2020       POC Tests: No results for input(s): POCGLU, POCNA, POCK, POCCL, POCBUN, POCHEMO, POCHCT in the last 72 hours.     Coags:   Lab Results   Component Value Date    PROTIME 12.8 08/06/2020    INR 1.0 08/06/2020       HCG (If Applicable): No results found for: PREGTESTUR, PREGSERUM, HCG, HCGQUANT     ABGs: No results found for: PHART, PO2ART, TGI8RVU, ZHY2QUY, BEART, G4GKBLDD     Type & Screen (If Applicable):  No results found for: LABABO, LABRH    Drug/Infectious Status (If Applicable):  No results found for: HIV, HEPCAB    COVID-19 Screening (If Applicable):   Lab Results   Component Value Date    COVID19 Not Detected 08/07/2020         Anesthesia Evaluation  Patient summary reviewed and Nursing notes reviewed no history of anesthetic complications:   Airway: Mallampati: II  TM distance: >3 FB   Neck ROM: full  Mouth opening: > = 3 FB Dental: normal exam         Pulmonary:Negative Pulmonary ROS and normal exam                               Cardiovascular:Negative CV ROS                      Neuro/Psych:   (+) neuromuscular disease:,             GI/Hepatic/Renal: Neg GI/Hepatic/Renal ROS            Endo/Other: Negative Endo/Other ROS                    Abdominal:           Vascular: negative vascular ROS. Anesthesia Plan      general     ASA 3       Induction: intravenous. MIPS: Prophylactic antiemetics administered. Anesthetic plan and risks discussed with patient. Plan discussed with CRNA.     Attending anesthesiologist reviewed and agrees with Pre Eval content              Wardell Essex, MD   8/12/2020

## 2020-08-12 NOTE — DISCHARGE INSTR - COC
Continuity of Care Form    Patient Name: Liv Milan   :  1955  MRN:  [id number]    [address] [facility] date:  2020  Discharge date:  2020    Code Status Order: No Order   Advance Directives:     Admitting Physician:  Fatuma Horne MD  PCP: Feliciano Carr MD    Discharging Nurse: WYATT ANDREW Unit/Room#: [address]  Discharging Unit Phone Number: [phone]    Emergency Contact:   Extended Emergency Contact Information  Primary Emergency Contact: Chris Ackerman [address] Phone: [phone]  Mobile Phone: [phone]  Relation: Parent    Past Surgical History:  Past Surgical History:   Procedure Laterality Date    BACK SURGERY  2017    lumbar disc OR    COLONOSCOPY      DILATION AND CURETTAGE OF UTERUS      at age 29s--miscarriage    TONSILLECTOMY      as child   [address]      in [place]--EMT attempted to intubate patient due to trouble breathing & then needed repair       Immunization History: There is no immunization history on file for this patient.     Active Problems:  Patient Active Problem List   Diagnosis Code    Lumbar radiculopathy M54.16    Other chronic pain G89.29    Sacroiliitis, not elsewhere classified ([facility] [address] M46.1    Spinal stenosis, lumbar region, without neurogenic claudication M48.061    Lumbar disc herniation M51.26    Lumbar stenosis with neurogenic claudication M48.062    Smoker F17.200    Spondylolisthesis at L4-L5 level M43.16    Spondylolisthesis of lumbar region M43.16    Spondylolisthesis at L5-S1 level M43.17    Lumbosacral spine instability M53.2X7    Hyperlipidemia E78.5    Panic attacks F41.0    RLS (restless legs syndrome) G25.81       Isolation/Infection:   Isolation          No Isolation        Patient Infection Status     Infection Onset Added Last Indicated Last Indicated By Review Planned Expiration Resolved Resolved By    None active    Resolved    COVID-19 Rule Out 20 08/06/20 COVID-19 (Ordered)   08/09/20 Rule-Out Test Resulted          Nurse Assessment:  Last Vital Signs: /73   Pulse 90   Temp 98.7 °F (37.1 °C) (Temporal)   Resp 18   Ht 5' 0.5\" (1.537 m)   Wt 170 lb (77.1 kg)   LMP  (LMP Unknown)   SpO2 95%   BMI 32.65 kg/m²     Last documented pain score (0-10 scale): Pain Level: 6  Last Weight:   Wt Readings from Last 1 Encounters:   08/12/20 170 lb (77.1 kg)     Mental Status:  oriented, alert, coherent, logical, thought processes intact and able to concentrate and follow conversation    IV Access:  - Peripheral IV - site  #20 right wrist, insertion date: 8/12/2020    Nursing Mobility/ADLs:  Walking   Assisted  Transfer  Assisted  Bathing  Assisted  Dressing  Assisted  Toileting  Assisted  Feeding  [address]  Independent  Med Delivery   whole    Wound Care Documentation and Therapy:        Elimination:  Continence:   · Bowel: Yes  · Bladder: Yes  Urinary Catheter: None   Colostomy/Ileostomy/Ileal Conduit: No       Date of Last BM: 8/11/2020    Intake/Output Summary (Last 24 hours) at 8/12/2020 1044  Last data filed at 8/12/2020 0835  Gross per 24 hour   Intake 1000 ml   Output --   Net 1000 ml     No intake/output data recorded. Safety Concerns: At Risk for Falls    Impairments/Disabilities:      impaired mobility    Nutrition Therapy:  Current Nutrition Therapy:   - Oral Diet:  General    Routes of Feeding: Oral  Liquids: Thin Liquids  Daily Fluid Restriction: no  Last Modified Barium Swallow with Video (Video Swallowing Test): not done    Treatments at the Time of Hospital Discharge:   Respiratory Treatments: incentive spirometer  Oxygen Therapy:  is not on home oxygen therapy.   Ventilator:    - No ventilator support    Rehab Therapies: Physical Therapy and Occupational Therapy  Weight Bearing Status/Restrictions: No weight bearing restirctions  Other Medical Equipment (for information only, NOT a DME order):  walker  Other Treatments: SCDs, Ice, Abdominal binder for comfort    Patient's personal belongings (please select all that are sent with patient):  all personal belongings sent with patient     RN SIGNATURE:  Electronically signed by Juana Pérez RN on 8/13/20 at 2:28 PM EDT    CASE MANAGEMENT/SOCIAL WORK SECTION    Inpatient Status Date: 08/12/2020    Readmission Risk Assessment Score:  Readmission Risk              Risk of Unplanned Readmission:        6           Discharging to Facility/ Agency   · Name: [facility]  · Address:  · Phone:  · Fax:    Dialysis Facility (if applicable)   · Name:  · Address:  · Dialysis Schedule:  · Phone:  · Fax:    / signature: Electronically signed by GERI Cabrera on 8/13/20 at 10:53 AM EDT    PHYSICIAN SECTION    Prognosis: {Prognosis:0243362317}    Condition at Discharge: Santy Bangura Patient Condition:[id number]}    Rehab Potential (if transferring to Rehab): {Prognosis:0913853105}    Recommended Labs or Other Treatments After Discharge: ***    Physician Certification: I certify the above information and transfer of Reji Yoo  is necessary for the continuing treatment of the diagnosis listed and that she requires {Admit to Appropriate Level of Care:85109} for {GREATER/LESS:170245466} 30 days.      Update Admission H&P: {CHP DME Changes in GBETU:987250354}    PHYSICIAN SIGNATURE:  Electronically signed by Annita Otero MD on 8/12/20 at 10:44 AM EDT

## 2020-08-12 NOTE — PROGRESS NOTES
1112  Admitted to PACU from OR for recovery. Report recieved from [address] [facility] [address] staff. Abd dressing d/i. Abd soft. Crying in pain  1114  Medicated with Dilaudid 0.5 mg for pain. 10/10  1124   Medicated with Dilaudid 0.5 mg for pain. 10/10  1133  Medicated with Fentanyl 50 mcg for pain. 9/10  1134  Medicated with Dilaudid 0.5 mg for pain. 9/10  1210  Medicated with Fentanyl 50 mcg for pain.  8/10  1218 pain improving 7/10  Medicated with Dilaudid 0.5 mg   1224  Report called to [facility] on [address] via bed

## 2020-08-12 NOTE — BRIEF OP NOTE
Brief Postoperative Note      Patient: Iza Beatty  YOB: 1955  MRN: [id number]    Date of Procedure: 8/12/2020    Pre-Op Diagnosis: L5-S1 INSTABILITY    Post-Op Diagnosis: Same       Procedure(s):  ANTERIOR LEFT RETROPERITONEAL L5-S1 DISKECTOMY INTERBODY CAGE FUSION    Surgeon(s):  MD Almita Guerra MD Cydne Fern, MD    Assistant:  First Assistant: Mickey Adorno    Anesthesia: General    Estimated Blood Loss (mL): less than 50     Complications: None      Implants:  Implant Name Type Inv.  Item Serial No.  Lot No. LRB No. Used Action   IVELISSE-CELLULAR BONE MATRIX 5C - [id number] Spine IVELISSE-CELLULAR BONE MATRIX 5C [id number] Wilbern Purl  Left 1 Implanted         Drains:   Urethral Catheter Non-latex 16 fr (Active)       Findings: L5-S1 spondylolisthesis    Electronically signed by Wood Palencia MD on 8/12/2020 at 10:37 AM

## 2020-08-12 NOTE — PROGRESS NOTES
Physical Therapy Missed Treatment   Facility/Department: [facility] MED SURG N225/[id number]    NAME: Rosalva Gramajo    : 1955 (76 y.o.)  MRN: [id number]    Account: [de-identified]  Gender: female      Pt requests eval  due to pain following surgery today- nursing agrees with delay   .        Yolanda Jenkins, PT, 20 at 1:44 PM

## 2020-08-12 NOTE — BRIEF OP NOTE
Brief Postoperative Note      Patient: Heide Rios  YOB: 1955  MRN: [id number]    Date of Procedure: 8/12/2020    Pre-Op Diagnosis: L5-S1 INSTABILITY    Post-Op Diagnosis: Same       Procedure(s):  ANTERIOR LEFT RETROPERITONEAL L5-S1 DISKECTOMY INTERBODY CAGE FUSION, ANTERIOR EXPOSURE FOR L5-S1    Surgeon(s):  MD Tayler Singh MD Vena Harps, MD    Assistant:  First Assistant: Hiram Flores    Anesthesia: General    Estimated Blood Loss (mL): less than 50     Complications: None    Specimens:   * No specimens in log *    Implants:  Implant Name Type Inv.  Item Serial No.  Lot No. LRB No. Used Action   IVELISSE-CELLULAR BONE MATRIX 5C - [id number] Spine IVELISSE-CELLULAR BONE MATRIX 5C [id number] Rozettirina Casino  Left 1 Implanted         Drains:   Urethral Catheter Non-latex 16 fr (Active)       Findings:     Electronically signed by Tayler Ledesma MD on 8/12/2020 at 10:43 AM

## 2020-08-12 NOTE — OP NOTE
Alexandra Townsend [address] [place] [address], [address] [facility]                                OPERATIVE REPORT    PATIENT NAME: Faye Kat                     :        1955  MED REC NO:   [id number]                            ROOM:  ACCOUNT NO:   [de-identified]                           ADMIT DATE: 2020  PROVIDER:     Angelito Martin MD    DATE OF PROCEDURE:  2020    PREOPERATIVE DIAGNOSIS:  L5-S1 instability. POSTOPERATIVE DIAGNOSIS:  L5-S1 instability. PROCEDURE:  Anterior exposure for L5-S1 diskectomy with interbody cage  fusion. SURGEON:  Angelito Martin MD    ANESTHESIA:  General.    COMPLICATIONS:  None. ESTIMATED BLOOD LOSS:  Less than 50 mL. HISTORY:  The patient is a 79-year-old female who Dr. Randy Adams has asked me  to do an anterior exposure for an L5-S1 diskectomy and interbody cage  fusion. The procedure as well as risks and complications were discussed  with the patient including, but not exclusive to infection, blood loss,  damage to surrounding structures and even the possibility of needing  further surgery in the future were all discussed. She understood and  was agreeable to the procedure. OPERATIVE PROCEDURE:  The patient was brought in the operative suite and  placed in the supine position where anesthesia was induced and  intubated. The patient was positioned by Dr. Randy Adams. The abdomen was  then prepped and draped in a sterile fashion with an Ioban drape over  the abdomen. Time-out called. The patient and procedure were properly  identified. A left paramedian incision was then made below the  umbilicus and carried down through the subcutaneous tissue to the rectus  sheath. The sheath was opened. The muscle was retracted laterally. I  was then able to identify the posterior sheath, which was extremely  thin.   There was a small hole made in the peritoneum which was closed  readily with a 3-0 Vicryl interrupted

## 2020-08-12 NOTE — ANESTHESIA POSTPROCEDURE EVALUATION
Department of Anesthesiology  Postprocedure Note    Patient: Shanika Fontaine  MRN: [id number]  YOB: 1955  Date of evaluation: 8/12/2020  Time:  11:14 AM     Procedure Summary     Date:  08/12/20 Room / Location:  [address]    Anesthesia Start:  2285 Anesthesia Stop:  1114    Procedure:  ANTERIOR LEFT RETROPERITONEAL L5-S1 DISKECTOMY INTERBODY CAGE FUSION (Left Abdomen) Diagnosis:  (L5-S1 INSTABILITY)    Surgeon:  Maria Elena Valadez MD Responsible Provider:  ABENA Downs CRNA    Anesthesia Type:  general ASA Status:  3          Anesthesia Type: general    Mayra Phase I: Mayra Score: 10    Mayra Phase II:      Last vitals: Reviewed and per EMR flowsheets.        Anesthesia Post Evaluation    Patient location during evaluation: PACU  Patient participation: complete - patient participated  Level of consciousness: awake  Pain score: 0  Airway patency: patent  Nausea & Vomiting: no nausea and no vomiting  Complications: no  Cardiovascular status: hemodynamically stable  Respiratory status: acceptable  Hydration status: euvolemic

## 2020-08-13 ENCOUNTER — HOSPITAL ENCOUNTER (INPATIENT)
Age: 65
LOS: 8 days | Discharge: HOME | DRG: 093 | End: 2020-08-21
Attending: PHYSICAL MEDICINE & REHABILITATION | Admitting: PHYSICAL MEDICINE & REHABILITATION
Payer: MEDICARE

## 2020-08-13 VITALS
TEMPERATURE: 98.1 F | HEIGHT: 61 IN | DIASTOLIC BLOOD PRESSURE: 69 MMHG | OXYGEN SATURATION: 100 % | HEART RATE: 98 BPM | WEIGHT: 170 LBS | BODY MASS INDEX: 32.1 KG/M2 | RESPIRATION RATE: 16 BRPM | SYSTOLIC BLOOD PRESSURE: 134 MMHG

## 2020-08-13 PROBLEM — Z74.09 IMPAIRED MOBILITY: Status: ACTIVE | Noted: 2020-08-13

## 2020-08-13 PROCEDURE — 6360000002 HC RX W HCPCS: Performed by: NEUROLOGICAL SURGERY

## 2020-08-13 PROCEDURE — 6370000000 HC RX 637 (ALT 250 FOR IP): Performed by: NEUROLOGICAL SURGERY

## 2020-08-13 PROCEDURE — 6370000000 HC RX 637 (ALT 250 FOR IP): Performed by: INTERNAL MEDICINE

## 2020-08-13 PROCEDURE — 6360000002 HC RX W HCPCS: Performed by: INTERNAL MEDICINE

## 2020-08-13 PROCEDURE — 97166 OT EVAL MOD COMPLEX 45 MIN: CPT

## 2020-08-13 PROCEDURE — 2580000003 HC RX 258: Performed by: NEUROLOGICAL SURGERY

## 2020-08-13 PROCEDURE — 1180000000 HC REHAB R&B

## 2020-08-13 PROCEDURE — 97162 PT EVAL MOD COMPLEX 30 MIN: CPT

## 2020-08-13 RX ORDER — CALCIUM CARBONATE 200(500)MG
500 TABLET,CHEWABLE ORAL 3 TIMES DAILY PRN
Status: DISCONTINUED | OUTPATIENT
Start: 2020-08-13 | End: 2020-08-21 | Stop reason: HOSPADM

## 2020-08-13 RX ORDER — ALPRAZOLAM 0.5 MG/1
0.25 TABLET ORAL 2 TIMES DAILY PRN
Status: CANCELLED | OUTPATIENT
Start: 2020-08-13

## 2020-08-13 RX ORDER — ALPRAZOLAM 0.25 MG/1
0.25 TABLET ORAL 2 TIMES DAILY PRN
Status: DISCONTINUED | OUTPATIENT
Start: 2020-08-13 | End: 2020-08-21 | Stop reason: HOSPADM

## 2020-08-13 RX ORDER — TIZANIDINE 4 MG/1
4 TABLET ORAL NIGHTLY
Status: DISCONTINUED | OUTPATIENT
Start: 2020-08-13 | End: 2020-08-18

## 2020-08-13 RX ORDER — PRAMIPEXOLE DIHYDROCHLORIDE 0.5 MG/1
0.25 TABLET ORAL NIGHTLY
Status: DISCONTINUED | OUTPATIENT
Start: 2020-08-13 | End: 2020-08-21 | Stop reason: HOSPADM

## 2020-08-13 RX ORDER — BUSPIRONE HYDROCHLORIDE 7.5 MG/1
15 TABLET ORAL 2 TIMES DAILY
Status: CANCELLED | OUTPATIENT
Start: 2020-08-13

## 2020-08-13 RX ORDER — ACETAMINOPHEN 325 MG/1
650 TABLET ORAL EVERY 4 HOURS PRN
Status: DISCONTINUED | OUTPATIENT
Start: 2020-08-13 | End: 2020-08-21 | Stop reason: HOSPADM

## 2020-08-13 RX ORDER — ACETAMINOPHEN 325 MG/1
650 TABLET ORAL EVERY 4 HOURS PRN
Status: CANCELLED | OUTPATIENT
Start: 2020-08-13

## 2020-08-13 RX ORDER — ATORVASTATIN CALCIUM 20 MG/1
20 TABLET, FILM COATED ORAL DAILY
Status: DISCONTINUED | OUTPATIENT
Start: 2020-08-14 | End: 2020-08-18

## 2020-08-13 RX ORDER — ONDANSETRON 2 MG/ML
4 INJECTION INTRAMUSCULAR; INTRAVENOUS EVERY 6 HOURS PRN
Status: DISCONTINUED | OUTPATIENT
Start: 2020-08-13 | End: 2020-08-21 | Stop reason: HOSPADM

## 2020-08-13 RX ORDER — GABAPENTIN 300 MG/1
300 CAPSULE ORAL 3 TIMES DAILY
Status: DISCONTINUED | OUTPATIENT
Start: 2020-08-13 | End: 2020-08-21 | Stop reason: HOSPADM

## 2020-08-13 RX ORDER — ATORVASTATIN CALCIUM 20 MG/1
20 TABLET, FILM COATED ORAL DAILY
Status: CANCELLED | OUTPATIENT
Start: 2020-08-14

## 2020-08-13 RX ORDER — PRAMIPEXOLE DIHYDROCHLORIDE 0.5 MG/1
0.25 TABLET ORAL NIGHTLY
Status: CANCELLED | OUTPATIENT
Start: 2020-08-13

## 2020-08-13 RX ORDER — CALCIUM CARBONATE 200(500)MG
500 TABLET,CHEWABLE ORAL 3 TIMES DAILY PRN
Status: DISCONTINUED | OUTPATIENT
Start: 2020-08-13 | End: 2020-08-13 | Stop reason: HOSPADM

## 2020-08-13 RX ORDER — ONDANSETRON 4 MG/1
4 TABLET, ORALLY DISINTEGRATING ORAL EVERY 8 HOURS PRN
Status: DISCONTINUED | OUTPATIENT
Start: 2020-08-13 | End: 2020-08-21 | Stop reason: HOSPADM

## 2020-08-13 RX ORDER — CALCIUM CARBONATE 200(500)MG
500 TABLET,CHEWABLE ORAL 3 TIMES DAILY PRN
Status: CANCELLED | OUTPATIENT
Start: 2020-08-13

## 2020-08-13 RX ORDER — ONDANSETRON 4 MG/1
4 TABLET, ORALLY DISINTEGRATING ORAL EVERY 8 HOURS PRN
Status: CANCELLED | OUTPATIENT
Start: 2020-08-13

## 2020-08-13 RX ORDER — ONDANSETRON 2 MG/ML
4 INJECTION INTRAMUSCULAR; INTRAVENOUS EVERY 6 HOURS PRN
Status: CANCELLED | OUTPATIENT
Start: 2020-08-13

## 2020-08-13 RX ORDER — TIZANIDINE 4 MG/1
4 TABLET ORAL NIGHTLY
Status: CANCELLED | OUTPATIENT
Start: 2020-08-13

## 2020-08-13 RX ORDER — OXYCODONE HYDROCHLORIDE 5 MG/1
5 TABLET ORAL EVERY 4 HOURS PRN
Status: DISCONTINUED | OUTPATIENT
Start: 2020-08-13 | End: 2020-08-14

## 2020-08-13 RX ORDER — OXYCODONE HYDROCHLORIDE 5 MG/1
5 TABLET ORAL EVERY 4 HOURS PRN
Status: CANCELLED | OUTPATIENT
Start: 2020-08-13

## 2020-08-13 RX ORDER — BUSPIRONE HYDROCHLORIDE 7.5 MG/1
15 TABLET ORAL 2 TIMES DAILY
Status: DISCONTINUED | OUTPATIENT
Start: 2020-08-13 | End: 2020-08-18

## 2020-08-13 RX ORDER — GABAPENTIN 300 MG/1
300 CAPSULE ORAL 3 TIMES DAILY
Status: CANCELLED | OUTPATIENT
Start: 2020-08-13

## 2020-08-13 RX ADMIN — OXYCODONE 5 MG: 5 TABLET ORAL at 01:34

## 2020-08-13 RX ADMIN — ACETAMINOPHEN 650 MG: 325 TABLET, FILM COATED ORAL at 12:19

## 2020-08-13 RX ADMIN — BISACODYL 5 MG: 5 TABLET, COATED ORAL at 07:46

## 2020-08-13 RX ADMIN — HYDROMORPHONE HYDROCHLORIDE 0.5 MG: 1 INJECTION, SOLUTION INTRAMUSCULAR; INTRAVENOUS; SUBCUTANEOUS at 12:19

## 2020-08-13 RX ADMIN — HYDROMORPHONE HYDROCHLORIDE 0.5 MG: 1 INJECTION, SOLUTION INTRAMUSCULAR; INTRAVENOUS; SUBCUTANEOUS at 21:10

## 2020-08-13 RX ADMIN — ALPRAZOLAM 0.25 MG: 0.5 TABLET ORAL at 01:34

## 2020-08-13 RX ADMIN — Medication 10 ML: at 08:10

## 2020-08-13 RX ADMIN — ANTACID TABLETS 500 MG: 500 TABLET, CHEWABLE ORAL at 01:33

## 2020-08-13 RX ADMIN — HYDROMORPHONE HYDROCHLORIDE 0.5 MG: 1 INJECTION, SOLUTION INTRAMUSCULAR; INTRAVENOUS; SUBCUTANEOUS at 07:48

## 2020-08-13 RX ADMIN — GABAPENTIN 300 MG: 300 CAPSULE ORAL at 21:10

## 2020-08-13 RX ADMIN — BUSPIRONE HYDROCHLORIDE 15 MG: 7.5 TABLET ORAL at 21:10

## 2020-08-13 RX ADMIN — OXYCODONE 5 MG: 5 TABLET ORAL at 09:41

## 2020-08-13 RX ADMIN — TIZANIDINE 4 MG: 4 TABLET ORAL at 21:09

## 2020-08-13 RX ADMIN — GABAPENTIN 300 MG: 300 CAPSULE ORAL at 07:46

## 2020-08-13 RX ADMIN — Medication 10 ML: at 12:21

## 2020-08-13 RX ADMIN — GABAPENTIN 300 MG: 300 CAPSULE ORAL at 14:10

## 2020-08-13 RX ADMIN — HYDROMORPHONE HYDROCHLORIDE 0.5 MG: 1 INJECTION, SOLUTION INTRAMUSCULAR; INTRAVENOUS; SUBCUTANEOUS at 17:41

## 2020-08-13 RX ADMIN — BUSPIRONE HYDROCHLORIDE 15 MG: 7.5 TABLET ORAL at 07:48

## 2020-08-13 RX ADMIN — OXYCODONE 5 MG: 5 TABLET ORAL at 05:39

## 2020-08-13 RX ADMIN — ACETAMINOPHEN 650 MG: 325 TABLET, FILM COATED ORAL at 07:47

## 2020-08-13 RX ADMIN — SODIUM CHLORIDE: 9 INJECTION, SOLUTION INTRAVENOUS at 09:41

## 2020-08-13 RX ADMIN — ALPRAZOLAM 0.25 MG: 0.5 TABLET ORAL at 12:19

## 2020-08-13 RX ADMIN — OXYCODONE 5 MG: 5 TABLET ORAL at 14:10

## 2020-08-13 RX ADMIN — PRAMIPEXOLE DIHYDROCHLORIDE 0.25 MG: 0.5 TABLET ORAL at 21:09

## 2020-08-13 ASSESSMENT — PAIN DESCRIPTION - LOCATION
LOCATION: ABDOMEN
LOCATION: BACK;ABDOMEN
LOCATION: ABDOMEN
LOCATION: ABDOMEN;BACK
LOCATION: BACK;ABDOMEN;INCISION
LOCATION: ABDOMEN;BACK
LOCATION: ABDOMEN;BACK

## 2020-08-13 ASSESSMENT — PAIN DESCRIPTION - DESCRIPTORS
DESCRIPTORS: SORE;SHARP

## 2020-08-13 ASSESSMENT — PAIN DESCRIPTION - PAIN TYPE
TYPE: SURGICAL PAIN
TYPE: CHRONIC PAIN
TYPE: SURGICAL PAIN
TYPE: CHRONIC PAIN

## 2020-08-13 ASSESSMENT — PAIN SCALES - GENERAL
PAINLEVEL_OUTOF10: 7
PAINLEVEL_OUTOF10: 0
PAINLEVEL_OUTOF10: 6
PAINLEVEL_OUTOF10: 6
PAINLEVEL_OUTOF10: 7
PAINLEVEL_OUTOF10: 7
PAINLEVEL_OUTOF10: 8
PAINLEVEL_OUTOF10: 8
PAINLEVEL_OUTOF10: 6
PAINLEVEL_OUTOF10: 7
PAINLEVEL_OUTOF10: 8
PAINLEVEL_OUTOF10: 7
PAINLEVEL_OUTOF10: 8

## 2020-08-13 ASSESSMENT — PAIN DESCRIPTION - ORIENTATION
ORIENTATION: LEFT

## 2020-08-13 ASSESSMENT — PAIN - FUNCTIONAL ASSESSMENT
PAIN_FUNCTIONAL_ASSESSMENT: PREVENTS OR INTERFERES SOME ACTIVE ACTIVITIES AND ADLS
PAIN_FUNCTIONAL_ASSESSMENT: PREVENTS OR INTERFERES WITH ALL ACTIVE AND SOME PASSIVE ACTIVITIES

## 2020-08-13 ASSESSMENT — PAIN DESCRIPTION - PROGRESSION
CLINICAL_PROGRESSION: NOT CHANGED
CLINICAL_PROGRESSION: NOT CHANGED

## 2020-08-13 ASSESSMENT — PAIN DESCRIPTION - ONSET
ONSET: ON-GOING
ONSET: ON-GOING

## 2020-08-13 ASSESSMENT — PAIN DESCRIPTION - FREQUENCY
FREQUENCY: CONTINUOUS
FREQUENCY: CONTINUOUS

## 2020-08-13 NOTE — FLOWSHEET NOTE
Shift assessment complete. Denies chest pain, nausea or weakness at this time. Vitals obtain and medicated for pain. Patient states pain is in abdomen at incision and in back. Ice applied, repositioning, abdominal binder and pillow pressure for comfort provided to patient. Lung sounds - End expiratory wheeze noted to right lobe, diminished left lobe. Will continue to monitor.  Electronically signed by Enrike Hogan RN on 8/13/2020 at 10:45 AM

## 2020-08-13 NOTE — PROGRESS NOTES
2020: Shift assessment complete. PERRLA. A&Ox4.  are moderate bilat and radial pulses strong bilat. Dorsi/plantar flexion with pedal pulses strong bilat. No adventitious heart sounds. L lung has pleural friction rub and R lung has expiratory wheezes. All bowel sounds active. Incision has aquacell dressing in place; is clean, dry, and intact. 2410:  and radial pulses strong bilat. Dorsi/plantar flexion with pedal pulses strong bilat. 0408:  and radial pulses strong bilat. Dorsi/plantar flexion with pedal pulses strong bilat. VS are as follows: T 97.5 F, P 86, /62, Resp 8, O2 97% on RA. Will continue to monitor. 0539: Resp 12. Pain medication given. Abdominal binder in place. Will continue to monitor.

## 2020-08-13 NOTE — PROGRESS NOTES
Quality of Movement: Tone RUE  RUE Tone: Normotonic  Tone LUE  LUE Tone: Normotonic  Coordination  Movements Are Fluid And Coordinated: Yes    Hand Dominance:  Hand Dominance  Hand Dominance: Right    ADL Status:  ADL  Feeding: Independent  Grooming: Setup  UE Bathing: Setup  LE Bathing: Minimal assistance  UE Dressing: Setup  LE Dressing: Minimal assistance  Toileting: Minimal assistance  Additional Comments: Simulated ADLs as above. Pt. limited by pain; educated on figure 4 technique with pt. reporting she has difficulty with this. Also educated on AE available and provided written handout for purchase.   Toilet Transfers  Toilet Transfer: Unable to assess  Toilet Transfers Comments: Anticipate CGA based on other mobility       Therapy key for assistance levels -   Independent = Pt. is able to perform task with no assistance but may require a device   Stand by assistance = Pt. does not perform task at an independent level but does not need physical assistance, requires verbal cues  Minimal, Moderate, Maximal Assistance = Pt. requires physical assistance (25%, 50%, 75% assist from helper) for task but is able to actively participate in task   Dependent = Pt. requires total assistance with task and is not able to actively participate with task completion     Functional Mobility:  Functional Mobility  Functional - Mobility Device: Rolling Walker  Activity: Other  Assist Level: Contact guard assistance  Functional Mobility Comments: SBA to CGA around bed to chair  Transfers  Sit to stand: Contact guard assistance  Stand to sit: Contact guard assistance  Transfer Comments: Difficulty with transfer due to pain    Bed Mobility  Bed mobility  Supine to Sit: Minimal assistance  Comment: Up to chair    Seated and Standing Balance:  Balance  Sitting Balance: Independent  Standing Balance: Supervision    Functional Endurance:  Activity Tolerance  Activity Tolerance: Patient Tolerated treatment well    D/C Recommendations:  OT D/C RECOMMENDATIONS  REQUIRES OT FOLLOW UP: Yes    Equipment Recommendations:  OT Equipment Recommendations  Other: Hip kit    OT Education:   OT Education  OT Education: OT Role, Plan of Care  Patient Education: Educated pt. on role of acute care OT  Barriers to Learning: none    OT Follow Up:  OT D/C RECOMMENDATIONS  REQUIRES OT FOLLOW UP: Yes       Assessment/Discharge Disposition:  Assessment: Pt. is a 76year old woman from home alone who presents to [facility] with the above deficits which impact her ability to perform ADLs and IADLs. Pt. would benefit from OT to maximize independence and safety with ADL tasks.   Performance deficits / Impairments: Decreased functional mobility , Decreased ADL status, Decreased strength, Decreased balance, Decreased endurance, Decreased high-level IADLs  Prognosis: Good  Discharge Recommendations: Continue to assess pending progress  Decision Making: Medium Complexity  History: Pt's medical history is moderately complex  Exam: Pt. has six performance deficits  Assistance / Modification: Pt. requires min A    Six Click Score   How much help for putting on and taking off regular lower body clothing?: A Little  How much help for Bathing?: A Little  How much help for Toileting?: A Little  How much help for putting on and taking off regular upper body clothing?: A Little  How much help for taking care of personal grooming?: A Little  How much help for eating meals?: None  AM-PAC Inpatient Daily Activity Raw Score: 19  AM-PAC Inpatient ADL T-Scale Score : 40.22  ADL Inpatient CMS 0-100% Score: 42.8    Plan:  Plan  Times per week: 1-3x  Plan weeks: Length of acute stay  Current Treatment Recommendations: Strengthening, Balance Training, Functional Mobility Training, Endurance Training, Safety Education & Training, Patient/Caregiver Education & Training, Self-Care / ADL, Equipment Evaluation, Education, & procurement, Home Management Training, Pain Management    Goals:

## 2020-08-13 NOTE — FLOWSHEET NOTE
Patient lives alone , she has 5 steps going into the house. No equipment at home. Patient is alert and oriented x 4. No skin issues other than an incision on her abdomen which has an aquacel on it. The aquacel should stay on until the post-op appointment.

## 2020-08-13 NOTE — PROGRESS NOTES
Significant pain postoperatively unable to ambulate or move. Has good strength sensation lower extremities. She may be a candidate for discharge transfer to rehabilitation.

## 2020-08-13 NOTE — PROGRESS NOTES
Physical Therapy Med Surg Initial Assessment  Facility/Department: [facility]  Room: N225/N225-       NAME: Liv Milan  : 1955 (76 y.o.)  MRN: [id number]  CODE STATUS: Full Code    Date of Service: 2020    Patient Diagnosis(es): Spondylolisthesis at L5-S1 level [M43.17]   No chief complaint on file.     Patient Active Problem List    Diagnosis Date Noted    Lumbosacral spine instability 2020    Panic attacks 2020    RLS (restless legs syndrome) 2020    Hyperlipidemia     Spondylolisthesis at L5-S1 level 2020    Spondylolisthesis of lumbar region 2017    Lumbar stenosis with neurogenic claudication 2017    Smoker 2017    Spondylolisthesis at L4-L5 level 2017    Lumbar disc herniation 2016    Lumbar radiculopathy 2015    Other chronic pain 2015    Sacroiliitis, not elsewhere classified ([facility] [address] 2015    Spinal stenosis, lumbar region, without neurogenic claudication 2015        Past Medical History:   Diagnosis Date    Anxiety     Hyperlipidemia     meds > 5 yrs    Osteoarthritis      Past Surgical History:   Procedure Laterality Date    BACK SURGERY  2017    lumbar disc OR    COLONOSCOPY      DILATION AND CURETTAGE OF UTERUS      at age 30s--miscarriage   [place] LUMBAR FUSION Left 2020    ANTERIOR LEFT RETROPERITONEAL L5-S1 DISKECTOMY INTERBODY CAGE FUSION performed by Fatuma Horne MD at [address] [place] [address]      as child   [address]      in [place]--EMT attempted to intubate patient due to trouble breathing & then needed repair       Chart Reviewed: Yes  Patient assessed for rehabilitation services?: Yes  Family / Caregiver Present: No    Restrictions:  Restrictions/Precautions: Fall Risk  Required Braces or Orthoses  Other: Abdominal Binder     SUBJECTIVE: Response To Previous Treatment: Not applicable    Pain  Pre Treatment Pain Screening  Pain at present: 7  Scale Used: Numeric Score  Intervention List: Patient able to continue with treatment  Comments / Details: RN states pt medicated ~40 min prior to PT eval    Post Treatment Pain Screening:   Pain Screening  Patient Currently in Pain: Yes  Pain Assessment  Pain Assessment: 0-10  Pain Level: 7  Pain Type: Surgical pain  Pain Location: Abdomen  Pain Descriptors: Sore;Sharp  Pain Frequency: Continuous  Pain Onset: On-going  Clinical Progression: Not changed  Functional Pain Assessment: Prevents or interferes with all active and some passive activities  Non-Pharmaceutical Pain Intervention(s): Repositioned    Prior Level of Function:  Social/Functional History  Lives With: Alone  Type of Home: House  Home Layout: One level  Home Access: Stairs to enter with rails  Entrance Stairs - Number of Steps: 4-5  Entrance Stairs - Rails: Both  Bathroom Shower/Tub: Walk-in shower  Bathroom Equipment: Built-in shower seat  ADL Assistance: Independent  Homemaking Assistance: Independent  Homemaking Responsibilities: Yes(including yard work)  Ambulation Assistance: Independent(no AD)  Transfer Assistance: Independent  Active : Yes  Occupation: Retired  Type of occupation: Homemaker    OBJECTIVE:   Vision: Within Functional Limits  Hearing: Within functional limits    Cognition:  Overall Orientation Status: Within Functional Limits  Follows Commands: Within Functional Limits    Observation/Palpation  Observation: laying in bed, abdominal binder melissa, appears anxious    ROM:  RLE General PROM: functionally able to sit 90/90 EOB, difficulty with supine d/t tightness in hip flexor  RLE General AROM: hip limited d/t pain  LLE General PROM: functionally able to sit 90/90 EOB, difficulty with supine d/t tightness in hip flexor  LLE General AROM: hip limited d/t pain    Strength:  Strength RLE  Comment: functionally >3+/5  Strength LLE  Comment: functionally >3+/5  Strength Other  Other: abdominal strength limited d/t surgical approach    Neuro:  Balance  Posture: Fair  Sitting - Static: Good  Sitting - Dynamic: Good  Standing - Static: Fair  Standing - Dynamic: Fair     Motor Control  Gross Motor?: WFL  Sensation  Overall Sensation Status: WFL(Pt reports numbness in B LE improved post op)    Bed mobility  Rolling to Right: Minimal assistance  Supine to Sit: Moderate assistance  Sit to Supine: Unable to assess  Comment: Pt instructed in spinal precautions and log roll technique. Transfers  Sit to Stand: Contact guard assistance  Stand to sit: Contact guard assistance    Ambulation  Ambulation?: Yes  Ambulation 1  Surface: level tile  Device: Rolling Walker  Quality of Gait: increased UE support on walker  Gait Deviations: Slow Alexa;Decreased step length;Decreased step height  Distance: 16ft  Comments: Pt instructed in safe 2ww sequencing, increased time for performance. VC for breathing technique to decrease subjective pain level with mobility        Activity Tolerance  Activity Tolerance: Patient limited by pain      PT Education  PT Education: PT Role;Plan of Care;Goals    ASSESSMENT:   Body structures, Functions, Activity limitations: Decreased functional mobility ; Decreased strength;Decreased posture;Decreased balance; Increased pain;Decreased ROM  Decision Making: Medium Complexity  History: med  Exam: med  Clinical Presentation: med    Prognosis: Good  Barriers to Learning: none    DISCHARGE RECOMMENDATIONS:  Discharge Recommendations: Continue to assess pending progress, Patient would benefit from continued therapy after discharge    Assessment: Pt demonstrates the above deficits and decline in functional mobility s/p lumbar sx. Pt would benefit from physical therapy to address above deficits and allow for safe return home at highest level of function, decrease risk for falls, and improve QOL.     REQUIRES PT FOLLOW UP: Yes      PLAN OF CARE:  Plan  Times per week: 5-7 QD  Current Treatment Recommendations: Strengthening, Transfer Training, Endurance Training, Neuromuscular Re-education, Patient/Caregiver Education & Training, Equipment Evaluation, Education, & procurement, Balance Training, Home Exercise Program, Modalities, Safety Education & Training, Positioning, Functional Mobility Training, Manual Therapy - Joint Manipulation, Gait Training, Stair training, ROM  Safety Devices  Type of devices: Left in chair, Chair alarm in place, Call light within reach  Restraints  Initially in place: No    Goals:  Patient goals : \"Be able to take care of myself\"  Short term goals  Short term goal 1: indep with HEP to improve LE strength  Long term goals  Long term goal 1: Bed mobility with indep, demonstrating log roll  Long term goal 2: Functional transfers with indep  Long term goal 3: Amb >50ft with indep, 2ww  Long term goal 4: 4 steps with handrail and SBA to enter/exit home    AMPAC (6 CLICK) BASIC MOBILITY  AM-PAC Inpatient Mobility Raw Score : 15   Therapy Time:   Individual   Time In [place]   Time Out 0859   Minutes [id number] [place], [place], 08/13/20 at 9:15 AM         Definitions for assistance levels  Independent = pt does not require any physical supervision or assistance from another person for activity completion. Device may be needed.   Stand by assistance = pt requires verbal cues or instructions from another person, close to but not touching, to perform the activity  Minimal assistance= pt performs 75% or more of the activity; assistance is required to complete the activity  Moderate assistance= pt performs 50% of the activity; assistance is required to complete the activity  Maximal assistance = pt performs 25% of the activity; assistance is required to complete the activity  Dependent = pt requires total physical assistance to accomplish the task

## 2020-08-13 NOTE — CARE COORDINATION
Verified with Janis Fong in [facility] that she recvd referral. States that patient pain management can be managed in Acute rehab.

## 2020-08-13 NOTE — H&P
discussed results with patient. The patient remains highly medically complex and continues to have severe problems with activities of daily living and mobility. The patient was assessed to be able to tolerate intensive rehabilitation and therefore was admitted to Rehabilitation to address these needs. Prior Function; everyday activities:     Social History     Socioeconomic History    Marital status: Single     Spouse name: Not on file    Number of children: Not on file    Years of education: Not on file    Highest education level: Not on file   Occupational History    Not on file   Social Needs    Financial resource strain: Not on file    Food insecurity     Worry: Not on file     Inability: Not on file    Transportation needs     Medical: Not on file     Non-medical: Not on file   Tobacco Use    Smoking status: Former Smoker     Packs/day: 1.00     Years: 30.00     Pack years: 30.00     Types: Cigarettes     Last attempt to quit: 2019     Years since quittin.3    Smokeless tobacco: Never Used    Tobacco comment: intermittent habit   Substance and Sexual Activity    Alcohol use:  Yes     Alcohol/week: 0.0 standard drinks     Comment: social    Drug use: Not on file    Sexual activity: Not on file   Lifestyle    Physical activity     Days per week: Not on file     Minutes per session: Not on file    Stress: Not on file   Relationships    Social connections     Talks on phone: Not on file     Gets together: Not on file     Attends Gnosticist service: Not on file     Active member of club or organization: Not on file     Attends meetings of clubs or organizations: Not on file     Relationship status: Not on file    Intimate partner violence     Fear of current or ex partner: Not on file     Emotionally abused: Not on file     Physically abused: Not on file     Forced sexual activity: Not on file   Other Topics Concern    Not on file   Social History Narrative    Not on file Social supports listed above. Prior Device(s) used:  As above    History of falls:  Rarely falls    In depth analysis of complex functional data; the patient has been:    Current Rehabilitation Assessments:    Physical therapy: FIMS:  Bed Mobility:      Transfers: ,  ,      FIMS:  , ,      Occupational therapy: FIMS:   ,  ,      OCCUPATIONAL THERAPY                   Speech therapy: FIMS:       Prior to admission patient was independent with all ADLs and mobilityand did not require any outside services.        Past Medical History:   Diagnosis Date    Anxiety     Hyperlipidemia     meds > 5 yrs    Osteoarthritis        Past Surgical History:   Procedure Laterality Date    BACK SURGERY  2017    lumbar disc OR    COLONOSCOPY      DILATION AND CURETTAGE OF UTERUS      at age 30s--miscarriage   [place] LUMBAR FUSION Left 8/12/2020    ANTERIOR LEFT RETROPERITONEAL L5-S1 DISKECTOMY INTERBODY CAGE FUSION performed by Mary Jessica MD at [address] [place] [address]      as child   [address]  2010    in [place]--EMT attempted to intubate patient due to trouble breathing & then needed repair       Current Facility-Administered Medications   Medication Dose Route Frequency Provider Last Rate Last Dose    acetaminophen (TYLENOL) tablet 650 mg  650 mg Oral Q4H PRN [place] Bel, DO        [START ON 8/14/2020] bisacodyl (DULCOLAX) EC tablet 5 mg  5 mg Oral Daily [place] Bel, DO        HYDROmorphone (DILAUDID) injection 0.25 mg  0.25 mg Intravenous Q3H PRN Renee Bel, DO        Or    HYDROmorphone (DILAUDID) injection 0.5 mg  0.5 mg Intravenous Q3H PRN Renee Bel, DO   0.5 mg at 08/13/20 1741    ondansetron (ZOFRAN-ODT) disintegrating tablet 4 mg  4 mg Oral Q8H PRN Renee Bel, DO        Or    ondansetron [facility]) injection 4 mg  4 mg Intravenous Q6H PRN [place] Bel, DO        oxyCODONE (ROXICODONE) immediate release tablet 5 mg  5 mg Oral Q4H PRN [place] Bel, DO        ALPRAZolam Leslie Diaz) violence     Fear of current or ex partner: Not on file     Emotionally abused: Not on file     Physically abused: Not on file     Forced sexual activity: Not on file   Other Topics Concern    Not on file   Social History Narrative    Not on file                 FAMILY HISTORY:  Does not pertain tochief complaint. Review of Systems       Objective  /77   Pulse 105   Temp 98 °F (36.7 °C) (Oral)   Resp 19   Ht 5' 0.5\" (1.537 m)   Wt 182 lb (82.6 kg)   LMP  (LMP Unknown)   SpO2 96%   BMI 34.96 kg/m² *    Physical Exam  Ortho Exam  Neurologic Exam       ROS x10: The patient also complains of severely impaired mobility and activities of daily living. Otherwise no new problems with vision, hearing, nose, mouth, throat, dermal, cardiovascular, GI, , pulmonary, musculoskeletal, psychiatric or neurological. See Rehab H&P on Rehab chart dated . Vital signs:  /77   Pulse 102   Temp 98.6 °F (37 °C) (Oral)   Resp 18   Ht 5' 0.5\" (1.537 m)   Wt 182 lb (82.6 kg)   LMP  (LMP Unknown)   SpO2 94%   BMI 34.96 kg/m²   I/O:   PO/Intake:  fair PO intake, no problems observed or reported. Bowel/Bladder:  continent, no problems noted. General:  Patient is well developed, adequately nourished, non-obese and     well kempt. HEENT:    PERRLA, hearing intact to loud voice, external inspection of ear     and nose benign. Inspection of lips, tongue and gums benign  Musculoskeletal: No significant change in strength or tone. All joints stable. Inspection and palpation of digits and nails show no clubbing,       cyanosis or inflammatory conditions. Neuro/Psychiatric: Affect: flat but pleasant. Alert and oriented to person, place and     situation. No significant change in deep tendon reflexes or     sensation  Lungs:  CTA-B. Respiration effort is normal at rest.     Heart:   S1 = S2, RRR. No loud murmurs.     Abdomen:  Soft, non-tender, no enlargement of liver or spleen. Extremities:  No significant lower extremity edema or tenderness. Skin:   Intact to general survey, no visualized or palpated problems. Rehabilitation:  Physical therapy: FIMS:  Bed Mobility:      Transfers: Sit to Stand: Stand by assistance  Stand to sit: Stand by assistance  Bed to Chair: Stand by assistance(with ww), Ambulation 1  Surface: level tile  Device: Rolling Walker  Assistance: Minimal assistance, Stand by assistance  Quality of Gait: variable assist due to increase lat sway in gait, heavy UE support required, slow pace, short step length  Distance: 25 feet  Comments: Pt able to take 3 steps sideways without UE support with mod assist with knee stability a concern,      FIMS:  ,  , Assessment: Pt demonstrates deficits as listed. She is requiring assistance with all mobility following surgery for spinal augmentation. Pt is in need of PT prior to safe return to home    Occupational therapy: FIMS:   ,  , Assessment: Pt. is a 76year old woman from home alone who presents to [address] [place] [address] with the above deficits which impact her ability to perform ADLs and IADLs. Pt. would benefit from OT to maximize independence and safety with ADL tasks. Speech therapy: FIMS:             After extensive review of the records and above physical exam, I have formulated the followingdiagnoses and plan:      DIAGNOSES:    1. The patient was admitted to the acute rehabilitation unit with the primary rehab diagnoses being severe abnormality of gait and mobility andimpaired self care and ADL's due to NTSCI s/p L5S1 discectomy and fusion    Compared to Pre-Admission Assessment, patients medical and functional status remain challengingly complex and patient continues to requireintensive therapeutic intervention from multiple therapies, therefore, initiate acute intensive comprehensive inpatient rehabilitation program including PT/OT to improve balance, ambulation, ADLs, and to improve the P/AROM.   Functional and medical status reassessed regarding patients ability to participate in therapies and patient found to be able to participate in acute intensivecomprehensive inpatient rehabilitation program.  Therapeutic modifications regarding activities in therapies, place, amount of time per day and intensity of therapy made daily. Enroll in acute course of therapy program to include 1 1/2 hours per day of PT 5 to 7days per week and 1 1/2 hours per day of OT 5 to 7 days per week, and  SLP and Rec T 1/2 hour per day 3-5 days per week. The patient is stable medically and physically on previous exam.       This patient present with significant new onset decreased mobility andinability to perform activities of daily living skills independently and is at significant risk for prolonged disability  For this reason they have been admitted to [facility]. Thepatient's current functional and medical status are highly complex but the patient is able to participate in intensive rehabilitation. A comprehensive inpatient rehabilitation program is appropriate. The patient [facility] initial evaluation by the rehabilitation team and be discussed at regular treatment team meetings to assess progress, mobility, self care, mood and discharge issues. Physical therapy will be consulted for mobilityand endurance issues and should be performed 1 to 2 times per day, 7 days per week for the length of stay. Occupational therapy will be consulted for activities of daily living and should be performed 1 to 2 times per day,7 days per week for the length of stay. Their capacity to participate at an acute level, decision to be treated in the gym, room or on the unit, their activity goals for the day and the number of minutes of activeparticipation will be reassessed and re-prescribed daily. Because this patient is medically complex, I will check a CBC, BMP, UA and orthostatic blood pressures.   They will be urinary tract infection, an admission urinalysis has been ordered. I will have the nurses check post void residual bladder volumes and place acatheter if excessive urine is retained in the bladder after voiding. The patient is risk for deep venous thromosis, complex deep venous thrombosis protocol prophylaxis has been ordered see mar. The patient is high risk for orthostasis and a hydration program and orthostatic blood pressure screening have been ordered. I will attempt to get old records from the patient's previous hospital stay. Care [facility] on [facility] was[facility]. 3.  Current and previous medications were reviewed and summarized and compared to old medication lists from home and from the acute floor. 4.  Complex labs and x-rays were reviewed. I will review patient's old EKG and labs. 5.  Will provide emotional support for this patient regarding adjustment to their disability. Cognition and mood will be screened daily and addressed by rehabilitationpsychology and/or speech therapy as appropriate. I have encouraged the patient to attend the Rehab Adjustment to Disability Support Group and recreational therapy. 6.  Estimated length of stay is 2-3 weeks. Discharge to home with help from family and home health PT, OT, RN, and aide. Patient should be independent at discharge. 7.  The patient's medical and rehab prognosis are good. [address] regarding the patient's back up to general medical needs. A welcome letter was presented with an explanation of my services, my specialty and what to expect during the rehabilitation process. Aswell as introducing myself, I also wrote my name on their bedside marker board with their name as well as the names of the other physicians with an explanation of our individual roles in their care, as well as the rehab process.       Drea Shabazz D.O., F.A.A.P.M.&LUIZA.

## 2020-08-13 NOTE — CARE COORDINATION
[facility] Case Management Initial Discharge Assessment    Met with Patient to discuss discharge plan. PCP: Arabella Soares MD                                Date of Last Visit: not long ago phone visit    If no PCP, list provided? N/A    Discharge Planning    Living Arrangements: independently at home    Who do you live with? Alone,     Who helps you with your care:  self    If lives at home:     Do you have any barriers navigating in your home? no    Patient can perform ADL? Yes    Current Services (outpatient and in home) :  None    Dialysis: No    Is transportation available to get to your appointments? Yes, drives self    DME Equipment:  no    Respiratory equipment: None    Respiratory provider:  no     Pharmacy:  yes - Alyson Giles with Medication Assistance Program?  No      Patient agreeable to mgMEDIA? N/A    Patient agreeable to SNF/Rehab? Yes, [facility]    Other discharge needs identified? N/A    Freedom of choice list provided with basic dialogue that supports the patient's individualized plan of care/goals and shares the quality data associated with the providers. Yes    Does Patient Have a High-Risk for Readmission Diagnosis (CHF, PN, MI, COPD)? No    The plan for Transition of Care is related to the following treatment goals: post surgery, stable post op    Initial Discharge Plan? (Note: please see concurrent daily documentation for any updates after initial note).     [facility]    The Patient and/or patient representative: patient was provided with choice of any post-acute providers for care and equipment and agrees with discharge plan  Yes    Low risk for readmission    Electronically signed by Barby Rivera RN on 8/13/2020 at 10:51 AM

## 2020-08-14 ENCOUNTER — APPOINTMENT (OUTPATIENT)
Dept: ULTRASOUND IMAGING | Age: 65
DRG: 093 | End: 2020-08-14
Attending: PHYSICAL MEDICINE & REHABILITATION
Payer: MEDICARE

## 2020-08-14 LAB
ANION GAP SERPL CALCULATED.3IONS-SCNC: 12 MEQ/L (ref 9–15)
BILIRUBIN URINE: NEGATIVE
BLOOD, URINE: ABNORMAL
BUN BLDV-MCNC: 9 MG/DL (ref 8–23)
CALCIUM SERPL-MCNC: 9.2 MG/DL (ref 8.5–9.9)
CHLORIDE BLD-SCNC: 103 MEQ/L (ref 95–107)
CLARITY: CLEAR
CO2: 23 MEQ/L (ref 20–31)
COLOR: YELLOW
CREAT SERPL-MCNC: 0.31 MG/DL (ref 0.5–0.9)
GFR AFRICAN AMERICAN: >60
GFR NON-AFRICAN AMERICAN: >60
GLUCOSE BLD-MCNC: 129 MG/DL (ref 70–99)
GLUCOSE URINE: NEGATIVE MG/DL
HCT VFR BLD CALC: 30.7 % (ref 37–47)
HEMOGLOBIN: 10 G/DL (ref 12–16)
KETONES, URINE: NEGATIVE MG/DL
LEUKOCYTE ESTERASE, URINE: NEGATIVE
MCH RBC QN AUTO: 30.7 PG (ref 27–31.3)
MCHC RBC AUTO-ENTMCNC: 32.6 % (ref 33–37)
MCV RBC AUTO: 94 FL (ref 82–100)
NITRITE, URINE: NEGATIVE
PDW BLD-RTO: 13.6 % (ref 11.5–14.5)
PH UA: 7 (ref 5–9)
PLATELET # BLD: 258 K/UL (ref 130–400)
POTASSIUM REFLEX MAGNESIUM: 4 MEQ/L (ref 3.4–4.9)
PROTEIN UA: NEGATIVE MG/DL
RBC # BLD: 3.27 M/UL (ref 4.2–5.4)
SODIUM BLD-SCNC: 138 MEQ/L (ref 135–144)
SPECIFIC GRAVITY UA: 1.01 (ref 1–1.03)
UROBILINOGEN, URINE: 0.2 E.U./DL
WBC # BLD: 11.9 K/UL (ref 4.8–10.8)

## 2020-08-14 PROCEDURE — 85027 COMPLETE CBC AUTOMATED: CPT

## 2020-08-14 PROCEDURE — 36415 COLL VENOUS BLD VENIPUNCTURE: CPT

## 2020-08-14 PROCEDURE — 1180000000 HC REHAB R&B

## 2020-08-14 PROCEDURE — 76705 ECHO EXAM OF ABDOMEN: CPT

## 2020-08-14 PROCEDURE — 87086 URINE CULTURE/COLONY COUNT: CPT

## 2020-08-14 PROCEDURE — 97166 OT EVAL MOD COMPLEX 45 MIN: CPT

## 2020-08-14 PROCEDURE — 97110 THERAPEUTIC EXERCISES: CPT

## 2020-08-14 PROCEDURE — 6370000000 HC RX 637 (ALT 250 FOR IP): Performed by: PHYSICAL MEDICINE & REHABILITATION

## 2020-08-14 PROCEDURE — 97162 PT EVAL MOD COMPLEX 30 MIN: CPT

## 2020-08-14 PROCEDURE — 97535 SELF CARE MNGMENT TRAINING: CPT

## 2020-08-14 PROCEDURE — 6360000002 HC RX W HCPCS: Performed by: INTERNAL MEDICINE

## 2020-08-14 PROCEDURE — 81001 URINALYSIS AUTO W/SCOPE: CPT

## 2020-08-14 PROCEDURE — 80048 BASIC METABOLIC PNL TOTAL CA: CPT

## 2020-08-14 PROCEDURE — 97530 THERAPEUTIC ACTIVITIES: CPT

## 2020-08-14 PROCEDURE — 97116 GAIT TRAINING THERAPY: CPT

## 2020-08-14 PROCEDURE — 6370000000 HC RX 637 (ALT 250 FOR IP): Performed by: INTERNAL MEDICINE

## 2020-08-14 RX ORDER — PSEUDOEPHEDRINE HYDROCHLORIDE 30 MG/1
60 TABLET ORAL DAILY
Status: DISPENSED | OUTPATIENT
Start: 2020-08-14 | End: 2020-08-19

## 2020-08-14 RX ORDER — PSEUDOEPHEDRINE HYDROCHLORIDE 30 MG/1
60 TABLET ORAL EVERY 6 HOURS PRN
Status: DISCONTINUED | OUTPATIENT
Start: 2020-08-14 | End: 2020-08-14

## 2020-08-14 RX ORDER — MORPHINE SULFATE 15 MG/1
15 TABLET, FILM COATED, EXTENDED RELEASE ORAL EVERY 12 HOURS SCHEDULED
Status: DISCONTINUED | OUTPATIENT
Start: 2020-08-14 | End: 2020-08-21 | Stop reason: ALTCHOICE

## 2020-08-14 RX ORDER — OXYCODONE HYDROCHLORIDE 5 MG/1
10 TABLET ORAL EVERY 4 HOURS PRN
Status: DISCONTINUED | OUTPATIENT
Start: 2020-08-14 | End: 2020-08-21 | Stop reason: ALTCHOICE

## 2020-08-14 RX ADMIN — BISACODYL 5 MG: 5 TABLET, COATED ORAL at 09:33

## 2020-08-14 RX ADMIN — ATORVASTATIN CALCIUM 20 MG: 20 TABLET, FILM COATED ORAL at 09:33

## 2020-08-14 RX ADMIN — OXYCODONE HYDROCHLORIDE 10 MG: 5 TABLET ORAL at 20:58

## 2020-08-14 RX ADMIN — TIZANIDINE 4 MG: 4 TABLET ORAL at 20:36

## 2020-08-14 RX ADMIN — PRAMIPEXOLE DIHYDROCHLORIDE 0.25 MG: 0.5 TABLET ORAL at 20:36

## 2020-08-14 RX ADMIN — ANTACID TABLETS 500 MG: 500 TABLET, CHEWABLE ORAL at 05:50

## 2020-08-14 RX ADMIN — MORPHINE SULFATE 15 MG: 15 TABLET, FILM COATED, EXTENDED RELEASE ORAL at 20:36

## 2020-08-14 RX ADMIN — ANTACID TABLETS 500 MG: 500 TABLET, CHEWABLE ORAL at 20:36

## 2020-08-14 RX ADMIN — HYDROMORPHONE HYDROCHLORIDE 0.5 MG: 1 INJECTION, SOLUTION INTRAMUSCULAR; INTRAVENOUS; SUBCUTANEOUS at 03:09

## 2020-08-14 RX ADMIN — OXYCODONE HYDROCHLORIDE 5 MG: 5 TABLET ORAL at 11:59

## 2020-08-14 RX ADMIN — BUSPIRONE HYDROCHLORIDE 15 MG: 7.5 TABLET ORAL at 09:33

## 2020-08-14 RX ADMIN — GABAPENTIN 300 MG: 300 CAPSULE ORAL at 20:36

## 2020-08-14 RX ADMIN — BUSPIRONE HYDROCHLORIDE 15 MG: 7.5 TABLET ORAL at 20:36

## 2020-08-14 RX ADMIN — OXYCODONE HYDROCHLORIDE 5 MG: 5 TABLET ORAL at 00:19

## 2020-08-14 RX ADMIN — HYDROMORPHONE HYDROCHLORIDE 0.5 MG: 1 INJECTION, SOLUTION INTRAMUSCULAR; INTRAVENOUS; SUBCUTANEOUS at 10:19

## 2020-08-14 RX ADMIN — ALPRAZOLAM 0.25 MG: 0.25 TABLET ORAL at 15:44

## 2020-08-14 RX ADMIN — GABAPENTIN 300 MG: 300 CAPSULE ORAL at 13:58

## 2020-08-14 RX ADMIN — ANTACID TABLETS 500 MG: 500 TABLET, CHEWABLE ORAL at 01:57

## 2020-08-14 RX ADMIN — GABAPENTIN 300 MG: 300 CAPSULE ORAL at 09:33

## 2020-08-14 RX ADMIN — OXYCODONE HYDROCHLORIDE 5 MG: 5 TABLET ORAL at 05:50

## 2020-08-14 RX ADMIN — HYDROMORPHONE HYDROCHLORIDE 0.5 MG: 1 INJECTION, SOLUTION INTRAMUSCULAR; INTRAVENOUS; SUBCUTANEOUS at 07:06

## 2020-08-14 RX ADMIN — ALPRAZOLAM 0.25 MG: 0.25 TABLET ORAL at 06:39

## 2020-08-14 RX ADMIN — OXYCODONE HYDROCHLORIDE 10 MG: 5 TABLET ORAL at 15:44

## 2020-08-14 ASSESSMENT — PAIN SCALES - GENERAL
PAINLEVEL_OUTOF10: 8
PAINLEVEL_OUTOF10: 8
PAINLEVEL_OUTOF10: 7
PAINLEVEL_OUTOF10: 8
PAINLEVEL_OUTOF10: 7
PAINLEVEL_OUTOF10: 6
PAINLEVEL_OUTOF10: 7
PAINLEVEL_OUTOF10: 0
PAINLEVEL_OUTOF10: 7
PAINLEVEL_OUTOF10: 7
PAINLEVEL_OUTOF10: 9
PAINLEVEL_OUTOF10: 8
PAINLEVEL_OUTOF10: 10
PAINLEVEL_OUTOF10: 7
PAINLEVEL_OUTOF10: 7

## 2020-08-14 ASSESSMENT — PAIN DESCRIPTION - PAIN TYPE
TYPE: SURGICAL PAIN

## 2020-08-14 ASSESSMENT — PAIN DESCRIPTION - ORIENTATION
ORIENTATION: MID;INNER
ORIENTATION: LOWER
ORIENTATION: LOWER;ANTERIOR;POSTERIOR

## 2020-08-14 ASSESSMENT — PAIN DESCRIPTION - LOCATION
LOCATION: ABDOMEN
LOCATION: ABDOMEN
LOCATION: ABDOMEN;BACK
LOCATION: ABDOMEN
LOCATION: ABDOMEN;BACK

## 2020-08-14 ASSESSMENT — PAIN DESCRIPTION - DESCRIPTORS
DESCRIPTORS: ACHING;SORE
DESCRIPTORS: CONSTANT;THROBBING
DESCRIPTORS: THROBBING;CONSTANT
DESCRIPTORS: CONSTANT;THROBBING
DESCRIPTORS: CONSTANT;THROBBING
DESCRIPTORS: CONSTANT;SHARP
DESCRIPTORS: CONSTANT

## 2020-08-14 ASSESSMENT — PAIN DESCRIPTION - FREQUENCY
FREQUENCY: CONTINUOUS
FREQUENCY: CONTINUOUS

## 2020-08-14 NOTE — PATIENT CARE CONFERENCE
INDIVIDUALIZED OVERALL REHAB PLAN OF CARE  ADDENDUM TO REHAB PROGRESS NOTE-for audit purposes must also refer to this day's clinical note and combine the information      Date: 2020  Patient Name: Turner Roach   Room: O255/J801-79    MRN: [id number]    : 1955  (76 y.o.)  Gender: female       Today 2020 during weekly team meeting, I reviewed the patient Turner Roach in detail with the therapists and nurses involved in patient's care gathering complex physiatric data regarding current medical issues, progress in therapies, factors limiting progress, social issues, psychological issues, ongoing therapeutic plans and discharge planning. Legend:  I= independent Im =Modified independent  S=Supervised SB=stand by ORTEGA=set up CG=contact wagner Min= minimal Mod=Moderate Max=maximal Max of 2 =maximal assist of 2 people      CURRENT FUNCTIONAL STATUS:    NURSING ISSUES:         Nursing will continue to focus on bowel and bladder continence transitioning toward independence by time of discharge. Monitoring post void residuals monitoring for severe constipation and bowel obstruction. Focus on achieving ADL goals with co-treating with OT when possible. PHYSICAL THERAPY  Bed mobility:  Supine to Sit: Moderate assistance (20 1145)  Sit to Supine:  Moderate assistance (20 1145)  Transfers:  Sit to Stand: Stand by assistance (20 1146)  Bed to Chair: Stand by assistance(with ww) (20 1146)  Gait:   Device: Rolling Walker (20 1146)  Assistance: Minimal assistance;Stand by assistance (20 1146)  Distance: 25 feet (20 114)  Quality of Gait: variable assist due to increase lat sway in gait, heavy UE support required, slow pace, short step length (20 1146)  Comments: Pt able to take 3 steps sideways without UE support with mod assist with knee stability a concern (20 114)  Stairs:     W/C mobility:         OCCUPATIONAL THERAPY  Hand Dominance: 15 sec or less  OT:Eating  Assistance Needed: Independent  CARE Score: 6  Discharge Goal: Independent, Oral Hygiene  Assistance Needed: Setup or clean-up assistance  CARE Score: 5  Discharge Goal: Independent, [address]  Reason if not Attempted: Not applicable(pt completed prior to OT arrival)  CARE Score: 9  Discharge Goal: Independent, Shower/Bathe Self  Assistance Needed: Substantial/maximal assistance  CARE Score: 2  Discharge Goal: Independent  Upper Body Dressing  Assistance Needed: Setup or clean-up assistance  CARE Score: 5  Discharge Goal: Independent, Lower Body Dressing  Assistance Needed: Partial/moderate assistance  CARE Score: 3  Discharge Goal: Independent, Putting On/Taking Off Footwear  Assistance Needed: Dependent  CARE Score: 1  Discharge Goal: Independent, Toilet Transfer  Reason if not Attempted: Not applicable(pt completed prior to OT arrival)  CARE Score: 9  Discharge Goal: Independent  SP:               From a cognitive standpoint they will need:        24 hr supervision  --progress to occasional           Significant problems/ barriers to functional progress include: Pt is at a high risk for functional loss,    [x]  Acute infection/UTI    []  Low BP's     []  COPD flare-up   []  Uncontrolled blood sugar     []  Progressive anemia         []  Severe pain exacerbation     []  Impaired mental status    []  Urinary incontinence    []  Bowel incontinence           Plan to correct barriers to functional progress: Add scheduled rest breaks, control pain by using ice Lidoderm rest and massage as well as pain medications prior to therapy. Based on a comprehensive evaluation of the above, the individualized therapy and Discharge plan will be:    -Times stated are an average that will be varied based on the patient's daily need.        PT ____2__hrs/day 5-7 days per week           OT ___1___hrs per day 5-7 days per week ST ____1/2___hrs /day 3-5 days per week       Estimated LOS 2 week(s)    - Overall functional prognosis:     [x]  Good    []  Fair    []  Poor -Medical Prognosis:   [x]  Good    []  Fair    []  Poor    This patient was made aware of the discussion of Plan of Care, their projected dicharge date and their projected function at discharge.      Gurjit Finn DO

## 2020-08-14 NOTE — PROGRESS NOTES
Occupational Therapy   Occupational Therapy Initial Assessment  Date: 2020   Patient Name: Chilo Granados  MRN: [id number]     : 1955    Date of Service: 2020    Discharge Recommendations:  Continue to assess pending progress  OT Equipment Recommendations  Equipment Needed: Yes  Mobility Devices: Walker  Other: Hip kit    Assessment   Performance deficits / Impairments: Decreased functional mobility ; Decreased ADL status; Decreased strength;Decreased balance;Decreased endurance;Decreased high-level IADLs  Assessment: Pt. is a 76year old woman from home alone who presents to [address] [place] [address] with the above deficits which impact her ability to perform ADLs and IADLs. Pt. would benefit from OT to maximize independence and safety with ADL tasks. Prognosis: Good  Decision Making: Medium Complexity  History: Pt's medical history is moderately complex  Exam: Pt. has six performance deficits  REQUIRES OT FOLLOW UP: Yes  Activity Tolerance  Activity Tolerance: Patient limited by pain  Activity Tolerance: fair  Safety Devices  Safety Devices in place: Yes  Type of devices: All fall risk precautions in place  Restraints  Initially in place: No           Patient Diagnosis(es): There were no encounter diagnoses. has a past medical history of Anxiety, Hyperlipidemia, and Osteoarthritis. has a past surgical history that includes back surgery (); Tracheal surgery (); Colonoscopy; Tonsillectomy; Dilation and curettage of uterus; and lumbar fusion (Left, 2020).            Restrictions  Restrictions/Precautions  Restrictions/Precautions: Fall Risk  Required Braces or Orthoses?: Yes  Required Braces or Orthoses  Other: Abdominal Binder  Position Activity Restriction  Other position/activity restrictions: pt okay to shower starting 8/15/20    Subjective   General  Chart Reviewed: Yes  Patient assessed for rehabilitation services?: Yes  Referring Practitioner: Dr. Samara Rodriguez  Diagnosis: Yamilet Tolliver with Impaired mobility & ADLs d/t L5-S1 instability s/p LT anterior retroperitoneal  L5-S1 diskectomy and fusion  Pain Assessment  Pain Assessment: 0-10  Pain Level: 7  Pain Location: Abdomen  Pain Descriptors: Constant; Throbbing  Social/Functional History  Social/Functional History  Lives With: Alone  Type of Home: House  Home Layout: One level  Home Access: Stairs to enter with rails  Entrance Stairs - Number of Steps: 4-5  Entrance Stairs - Rails: Both  Bathroom Shower/Tub: Walk-in shower, Tub/Shower unit(uses walk in)  [address] [employer] [address]: Built-in shower seat, Grab bars in shower  Home Equipment: (plans to purchase hip kit and borrow walker)  Receives Help From: Family(father and fathers wife can assist per pt, pt states \"doctor downplayed the sx\" so pt thought she would recover quickly)  ADL Assistance: Independent  Homemaking Assistance: Independent  Homemaking Responsibilities: Yes(yard work included)  Ambulation Assistance: Independent(no AD)  Transfer Assistance: Independent  Active : Yes  Mode of Transportation: Car  Occupation: Retired  Type of occupation: Homemaker  Additional Comments: Pt. states lately she has had to sit after only 20 minutes of standing before sx       Objective   Vision: Impaired  Vision Exceptions: Wears glasses for reading(does not use them much)  Hearing: Within functional limits    Orientation  Overall Orientation Status: Within Functional Limits  Observation/Palpation  Posture: Fair  Observation: laying in bed, abdominal binder donned, increased pain, mildy tearful  Balance  Sitting Balance: Independent  Standing Balance: Supervision  Functional Mobility  Functional - Mobility Device: Rolling Walker  Activity: To/from bathroom  Assist Level: Supervision  Toilet Transfers  Toilet Transfer: Unable to assess  Toilet Transfers Comments: pt completed toileting prior to OT arrival  [employer] Transfers: Not tested  [employer] Transfers Comments: Pt completed sponge bath d/t orders.  Pt OK to Self-Care / ADL, Equipment Evaluation, Education, & procurement, Home Management Training, Pain Management    G-Code     OutComes Score                                                  AM-PAC Score             Goals  Patient Goals   Patient goals : \"to be out of pain and be able to walk\"      [x]  Patient will complete self care as followed using the recommended adaptive equipment and/or adaptive techniques as instructed:  Feeding:Indpendent  Grooming: Independent  Bathing: Mod I  UE Dressing: Mod I  LE Dressing: Mod I  Toileting: Mod I  Toilet Transfers: Mod I  Tub Transfers: Mod I     []  Patient will sequence self-care routine with and verbal/tactile cues. []  Patient will improve UE sensation and/or utilize compensatory techniques for safe completion of self-care as projected. [x]  Patient will improve static and dynamic standing balance to complete pants management at Mod I level     []  Patient will improve UE Function (AROM, strength, motor control, tone normalization) to complete ADLs as projected. [x]  Patient will improve functional endurance to tolerate/complete 60 minutes of ADLs. [x]  Patient will improve B UE strength and endurance to 4/5 in order to participate in self-care activities as projected. []  Patient will improve hand fine motor coordination to in order to manage clothing fasteners/self-care containers in a timely manner     []  Patient will improve visual perception to in order to improve participation in self care and leisure activities     [x]  Patient will perform kitchen mobility at device level without episodes of LOB and good safety awareness      [x]  Patient will perform basic room mobility at Mod I level. [x]  Patient will access appropriate D/C site with as few architectural barriers as possible. []  Patient and/or caregiver will demonstrate understanding of hip precautions with verbal/tactile cues.       []  Patient and/or caregiver will demonstrate understanding of energy conservation techniques with verbal/tactile cues. [x]  Patient and/or caregiver will demonstrate understanding of recommended HEP for UE strengthening.         [x]  Patient's cognition will improve to safely perform ADLs:  Comprehension: Independent  Expression: Independent  Social Interaction: Independent  Problem Solving: Independent  Memory: Independent           Therapy Time   Individual Concurrent Group Co-treatment   Time In 0930         Time Out 1030         Minutes 60           Eval: 60 minutes     Chito Carver OT   Electronically signed by Chito Carver OT on 8/14/2020 at 12:58 PM

## 2020-08-14 NOTE — PROGRESS NOTES
Physical Therapy Rehab Treatment Note  Facility/Department: [facility]  [facility]       NAME: Demetri Cherry  : 1955 (76 y.o.)  MRN: [id number]  CODE STATUS: Full Code    Date of Service: 2020  Chart Reviewed: Yes  Family / Caregiver Present: No  Restrictions:  Restrictions/Precautions: Fall Risk  SUBJECTIVE: Subjective: Pt states she was expecting to get a shot for pain but just foung out she can no longer have the shot. Reports she is tired and in to much pain. Pre Treatment Pain Screening  Pain at present: 8  Intervention List: Patient able to continue with treatment;Nurse called to administer meds  Comments / Details: back and abdomen  Post Treatment Pain Screening:  Pain Assessment  Pain Level: 7  Pain Location: Abdomen;Back  Pain Orientation: Lower  Pain Descriptors: Constant; Throbbing  Non-Pharmaceutical Pain Intervention(s): Repositioned;Cold applied  OBJECTIVE:   Overall Orientation Status: Within Functional Limits  Follows Commands: Within Functional Limits          Bed mobility  Sit to Supine: Moderate assistance  Comment: Instructed pt on technique. HOB slightly elevated per pt request d/t pain. Transfers  Sit to Stand: Stand by assistance  Stand to sit: Stand by assistance  Comment: Vcs to improve technique    Ambulation  Ambulation?: Yes  Ambulation 1  Surface: level tile  Device: Rolling Walker  Assistance: Stand by assistance  Quality of Gait: heavy UE support required, slow pace, short step length  Distance: 25ftx2           Exercises  Straight Leg Raise: x10  Quad Sets: x20  Heelslides: x10  Gluteal Sets: x20  Hip Abduction: x10  Knee Short Arc Quad: x10  Ankle Pumps: x20  Comments: Initiated lE therex to increase strength. CP applied to low back and abdomen during therex for pain control. Instructed pt in breathing and relaxation techniques for pain control. ASSESSMENT/COMMENTS:  Assessment: Pt limitied by pain. PLAN OF CARE/Safety:   Plan Comment: Cont per POC. Assess TUG when able.   Safety Devices  Type of devices: Bed alarm in place;Call light within reach    Therapy Time:   Individual   Time In 1400   Time Out 1455   Minutes 55     Minutes:      Transfer/Bed mobility training:10      Gait training:10      Neuro re education:0     Therapeutic ex:35    Butch Restrepo PTA, 08/14/20 at 2:48 PM

## 2020-08-14 NOTE — CARE COORDINATION
shower  Home Equipment: (plans to purchase hip kit and borrow walker)  Receives Help From: Family(father and fathers wife can assist per pt, pt states \"doctor downplayed the sx\" so pt thought she would recover quickly)  ADL Assistance: [address]: Independent  Homemaking Responsibilities: Yes(yard work included)  Ambulation Assistance: Independent(no AD)  Transfer Assistance: Independent  Active : Yes  Mode of Transportation: Car  Occupation: Retired  Type of occupation: Homemaker  Additional Comments: Pt. states lately she has had to sit after only 20 minutes of standing before sx       Pts personal preferences: n/a    Pts assets/resources/support system: friends and family    COVERAGE INFORMATION:Payor: MEDICARE / Plan: MEDICARE PART A AND B / Product Type: *No Product type* /       NURSING  Weight: 182 lb (82.6 kg) / Body mass index is 34.96 kg/m². DIET GENERAL; Carb Control: 4 carbs/meal (approximate 1800 kcals/day)    SpO2: 94 % (08/14/20 0930)  O2 Flow Rate (L/min): 0 L/min (08/13/20 1708)  No active isolations    Skin Issues: Yes surgical area    Pain Managed: Yes and would like pain changed. Bladder continence: Yes    Bowel continence: Yes      Other:       PHYSICAL THERAPY  Bed mobility:  Supine to Sit: Moderate assistance (08/14/20 1145)  Sit to Supine:  Moderate assistance (08/14/20 1145)  Transfers:  Sit to Stand: Stand by assistance (08/14/20 1146)  Bed to Chair: Stand by assistance(with ww) (08/14/20 1146)  Gait:   Device: Rolling Walker (08/14/20 1146)  Assistance: Minimal assistance;Stand by assistance (08/14/20 1146)  Distance: 25 feet (08/14/20 1146)  Quality of Gait: variable assist due to increase lat sway in gait, heavy UE support required, slow pace, short step length (08/14/20 1146)  Comments: Pt able to take 3 steps sideways without UE support with mod assist with knee stability a concern (08/14/20 1146)  Stairs:     W/C mobility:     LTG:  Long term goal 1: Bed mobility with indep, demonstrating log roll  Long term goal 2: Bed and car transfers with indep  Long term goal 3: Amb 150ft  indep with or without device  Long term goal 4: indep 4 stairs with rail for home entry  Long term goal 5: indep performance of TUG in 15 sec or less  PT Treatment Time:  1.5 hrs      OCCUPATIONAL THERAPY  Hand Dominance: Right  ADL  Feeding: Independent (08/14/20 1058)  Grooming: Stand by assistance (08/14/20 1058)  UE Bathing: Stand by assistance (08/14/20 1058)  LE Bathing: Maximum assistance(shins, B LEs, and buttocks) (08/14/20 1058)  UE Dressing: Setup (08/14/20 1058)  LE Dressing: Moderate assistance(socks, and to pull up undergarments to knees for pt to reach) (08/14/20 1058)  Toileting: Unable to assess(comment)(pt completed prior to OT arrival) (08/14/20 1058)  Additional Comments: completed sponge bath ADL d/t orders: Pt OK to shower 8/15/20 (08/14/20 1058)  Toilet Transfers  Toilet Transfer: Unable to assess (08/14/20 1056)  Toilet Transfers Comments: pt completed toileting prior to OT arrival (08/14/20 1056)     Shower Transfers  Shower Transfers: Not tested (08/14/20 1056)  Shower Transfers Comments: Pt completed sponge bath d/t orders. Pt OK to shower 8/15/20 (08/14/20 1056)  LTG:   ,  ,  ,     ,  ,  ,    OT Treatment Time: 1.5 hrs      SPEECH THERAPY                 Diet/Swallow:                       LTG:                COGNITION  OT: Cognition Comment: Comp: Independent, Expression: Independent, Social: Mod I, Prob: Mod I, Mem: Mod I  SP:        RECREATIONAL THERAPY  Attendance to recreational therapy programs:    []  Pet Therapy  [] Music Therapy  [] Art Therapy    [] Recreation Therapy Group [] Support Group           Patient social interaction (mood, participation): good      Patient strengths: motivated    Patients goal: to go home    Problems/Barriers: stairs        1.  Safety:          - Intervention / Plan:    [x]  falls protocol     [x]  PT/OT    []  SP - Results:         2. Potential DME needs:         - Intervention / Plan:  [x]  PT/OT     [x]  Assess equipment needs/access       - Results:         3. Weakness:          - Intervention / Plan:  [x]  PT/OT      []  Other:         - Results:         4. Discharge planning needs:          - Intervention / Plan:  [x]  Weekly team conference      []  family training        - Results:         5.            - Intervention / Plan:          - Results:         6.            - Intervention / Plan:         - Results:         7.            - Intervention / Plan:         - Results:           Discharge Plan   Estimated Length of Stay: TBD    Tentative Discharge date: 8/23/20      Anticipated Discharge Destination:  Home      Team recommendations:    1. Follow up Therapy :    PT  OT    2. Outpatient vs HHC    Other:     Equipment needed at Discharge:  Other: TBD      Team Members Present at Conference:    Physician: Dr. Claire Amin  : Veronique Michele RN  RN: Jordy Parson RN  Physical Therapist: Yennifer Ibanez, PT  Occupational [employer]  Speech Therapist: Jono Bedolla, SLP  Nurse Manager: Balbir Montano, RN    Electronically signed by Garima Meyers RN on 8/14/2020 at 12:41 PM

## 2020-08-14 NOTE — PROGRESS NOTES
Abdominal Binder  Position Activity Restriction  Other position/activity restrictions: pt okay to shower starting 8/15/20       SUBJECTIVE:      Pre Treatment Pain Screening  Pain at present: 10  Intervention List: Patient able to continue with treatment  Comments / Details: Pt reports she has abdominal pain with no relief from pain previously taken    Post Treatment Pain Screening:  Pain Assessment  Pain Level: 7  Pain Location: Abdomen  Pain Descriptors: Constant; Sharp    Prior Level of Function:  Social/Functional History  Lives With: Alone  Type of Home: House  Home Layout: One level  Home Access: Stairs to enter with rails  Entrance Stairs - Number of Steps: 4-5  Entrance Stairs - Rails: Both  Bathroom Shower/Tub: Walk-in shower  Bathroom Equipment: Built-in shower seat  ADL Assistance: Independent  Homemaking Assistance: Independent  Homemaking Responsibilities: Yes(Including yard work)  Ambulation Assistance: Independent(No AD)  Transfer Assistance: Independent  Active : Yes  Occupation: Retired  Type of occupation: Homemaker  Additional Comments: Pt. states lately she has had to sit after only 20 minutes of standing    OBJECTIVE:   Vision/Hearing:  Vision: Impaired  Vision Exceptions: Wears glasses for reading  Hearing: Within functional limits    Cognition:  Overall Orientation Status: Within Functional Limits  Follows Commands: Within Functional Limits       ROM:  RLE PROM: WFL  LLE PROM: WFL    Strength:  Strength RLE  Comment: 4-/5  Strength LLE  Comment: 4-/5  Strength Other  Other: poor abdominal strength    Neuro:        Balance  Sitting - Static: Good  Sitting - Dynamic: Good(reaching limited by abdominal pain)  Standing - Static: Fair(pt able to stand without UE support 1 min with increased MIKAYLA and apprehension noted)  Standing - Dynamic: Poor(pt required BUE for safest mobility)       Bed mobility  Rolling to Left: Minimal assistance  Rolling to Right: Minimal assistance  Supine to Sit: Moderate assistance  Sit to Supine:  Moderate assistance  Comment: HOB elevated due to abdominal pain with attempt for bed flat    Transfers  Sit to Stand: Stand by assistance  Stand to sit: Stand by assistance  Bed to Chair: Stand by assistance(with ww)    Ambulation  Ambulation?: Yes  Ambulation 1  Surface: level tile  Device: Rolling Walker  Assistance: Minimal assistance;Stand by assistance  Quality of Gait: variable assist due to increase lat sway in gait, heavy UE support required, slow pace, short step length  Distance: 25 feet  Comments: Pt able to take 3 steps sideways without UE support with mod assist with knee stability a concern    Stairs/Curb  Stairs?: No         Activity Tolerance  Activity Tolerance: Patient limited by pain          Quality Indicators (IRF-BYRON):  Rolling L and R: Not attempted due to Medical Condition or Safety Concerns (I.e. unsafe or physician orders) - 88  Sit>Supine: Not attempted due to Medical Condition or Safety Concerns (I.e. unsafe or physician orders) - 88  Supine>Sit: Not attempted due to Medical Condition or Safety Concerns (I.e. unsafe or physician orders) - 88  Sit>Stand: Supervision or Touching Assistance - 4  Chair/Bed>Chair Transfer: Supervision or Touching Assistance - 4  Car Transfers: Not attempted due to Medical Condition or Safety Concerns (I.e. unsafe or physician orders) - 80  Walk 10 ft: Supervision or Touching Assistance - 4  Walk 50 ft with two 90 degree turns: Not attempted due to Medical Condition or Safety Concerns (I.e. unsafe or physician orders) - 80  Walk 150 ft in [address]: Not attempted due to Medical Condition or Safety Concerns (I.e. unsafe or physician orders) - 88  Walking 10 ft on Unlevel Surface: Not attempted due to Medical Condition or Safety Concerns (I.e. unsafe or physician orders) - 80  Picking up Objects from Standing Position: Not attempted due to Medical Condition or Safety Concerns (I.e. unsafe or physician orders) - 88  Stairs: No Not attempted due to medical condition or safety concerns (i.e. unsafe or physician order) - 88  WC Mobility: No Not Applicable (pt did not complete item prior to admission) - 9      ASSESSMENT:  Body structures, Functions, Activity limitations: Decreased functional mobility ; Decreased strength;Decreased posture;Decreased balance; Increased pain;Decreased ROM  Decision Making: Medium Complexity  History: med  Exam: med  Clinical Presentation: med    Prognosis: Good  PT Education: Goals;PT Role;Plan of Care;Disease Specific Education  Patient Education: cues for techniques for back protection required  Barriers to Learning: none    CLINICAL IMPRESSION: Pt demonstrates deficits as listed. She is requiring assistance with all mobility following surgery for spinal augmentation.  Pt is in need of PT prior to safe return to home    PLAN OF CARE:  Frequency: 1-2 treatment sessions per day, 5-7 days per week     Current Treatment Recommendations: Strengthening, Transfer Training, Endurance Training, Neuromuscular Re-education, Patient/Caregiver Education & Training, Equipment Evaluation, Education, & procurement, Balance Training, Home Exercise Program, Modalities, Safety Education & Training, Positioning, Functional Mobility Training, Manual Therapy - Joint Manipulation, Gait Training, Stair training, ROM    Patient's Goal:  \"Be able to take care of myself\"    GOALS:  Short term goals  Short term goal 1: indep with HEP to improve LE strength  Long term goals  Long term goal 1: Bed mobility with indep, demonstrating log roll  Long term goal 2: Bed and car transfers with indep  Long term goal 3: Amb 150ft  indep with or without device  Long term goal 4: indep 4 stairs with rail for home entry  Long term goal 5: indep performance of TUG in 15 sec or less    ELOS:   Plan weeks: 1-1 1/2    Therapy Time:    Individual   Time In 1100   Time Out 1135   Minutes [address], [place], 08/14/20 at 11:56 AM

## 2020-08-14 NOTE — PROGRESS NOTES
L5-S1 diskectomy and fusion    Subjective  Subjective: I thought I was done for today because I did it this morning. Pain Assessment  Pain Level: 7  Pain Type: Surgical pain  Pain Location: Abdomen  Pain Descriptors: Throbbing;Constant  Pre Treatment Pain Screening  Pain at present: 7  Intervention List: Patient able to continue with treatment;Patient declined any intervention     Orientation  Orientation  Overall Orientation Status: Within Functional Limits     Objective      Bed mobility  Supine to Sit: Minimal assistance    Wheelchair Bed Transfers  Wheelchair/Bed - Technique: Stand step  Equipment Used: Bed;Wheelchair  Level of Asssistance: Contact guard assistance  Wheelchair Transfers Comments: EOB -> w/c    ADL    LE Dressing: Other (Patient educated in AE for lower body dressing. Patient able to doff B socks with dressing stick with MIN difficulty requiring demonstration and 0 verbal cues. Patient able to  B socks from floor with reacher with MIN difficulty. Patient able to thread B socks onto sock aid with MIN difficulty requiring demonstration and 0 verbal cues. Patient able to don B socks with sock aid with MIN difficulty requiring demonstration and  0 verbal cues.)    Upper Extremity Function  UE Strengthing: Patient engaged in B UE ROM and strengthening to increase I with ADL's and transfers. Patient utilized 1 # hand wt 1 X 20 repetitions in various planes with RB's as needed. Patient required 0 verbal cues for proper technique.       Plan   Plan  Times per week: 5-7x  Times per day: Daily  Plan weeks: 10 days  Current Treatment Recommendations: Strengthening, Balance Training, Functional Mobility Training, Endurance Training, Safety Education & Training, Patient/Caregiver Education & Training, Self-Care / ADL, Equipment Evaluation, Education, & procurement, Home Management Training, Pain Management    Plan Comment: Continue per OT POC for planned d/c on 8-23-20     Goals  Patient Goals   Patient goals : \"to be out of pain and be able to walk\"       Therapy Time   Individual Concurrent Group Co-treatment   Time In 1300         Time Out 1330         Minutes 30             ADL trainin minutes  Therapeutic activities: 8 minutes       Electronically signed by ANEL Ceballos on 20 at 2:32 PM EDT      ANEL Ceballos

## 2020-08-14 NOTE — PROGRESS NOTES
Subjective: The patient complains of moderate  acute pain relieved by rest and exacerbated by activity. I am concerned about patients fatigue. ROS x10: The patient also complains of severely impaired mobility and activities of daily living. Otherwise no new problems with vision, hearing, nose, mouth, throat, dermal, cardiovascular, GI, , pulmonary, musculoskeletal, psychiatric or neurological. See Rehab H&P on Rehab chart dated . Vital signs:  /77   Pulse 102   Temp 98.6 °F (37 °C) (Oral)   Resp 18   Ht 5' 0.5\" (1.537 m)   Wt 182 lb (82.6 kg)   LMP  (LMP Unknown)   SpO2 94%   BMI 34.96 kg/m²   I/O:   PO/Intake:  fair PO intake, no problems observed or reported. Bowel/Bladder:  continent, no problems noted. General:  Patient is well developed, adequately nourished, non-obese and     well kempt. HEENT:    PERRLA, hearing intact to loud voice, external inspection of ear     and nose benign. Inspection of lips, tongue and gums benign  Musculoskeletal: No significant change in strength or tone. All joints stable. Inspection and palpation of digits and nails show no clubbing,       cyanosis or inflammatory conditions. Neuro/Psychiatric: Affect: flat but pleasant. Alert and oriented to person, place and     situation. No significant change in deep tendon reflexes or     sensation  Lungs:  CTA-B. Respiration effort is normal at rest.     Heart:   S1 = S2, RRR. No loud murmurs. Abdomen:  Soft, non-tender, no enlargement of liver or spleen. Extremities:  No significant lower extremity edema or tenderness. Skin:   Intact to general survey, no visualized or palpated problems.     Rehabilitation:  Physical therapy: FIMS:  Bed Mobility:      Transfers: Sit to Stand: Stand by assistance  Stand to sit: Stand by assistance  Bed to Chair: Stand by assistance(with ww), Ambulation 1  Surface: level tile  Device: Rolling Walker  Assistance: Minimal assistance, Stand by procedure. A total of 97.7 seconds of fluoroscopy was used, with 7 fluoroscopic stills saved. No diagnostic images were obtained. Please see Dr. Lacy Obregon surgical notes for completeness. Previous extensive, complex labs, notes and diagnostics reviewed and analyzed. ALLERGIES:    Allergies as of 08/13/2020    (No Known Allergies)      (please also verify by checking [facility])     Complex Physical Medicine & Rehab Issues Assess & Plan:   1. Severe abnormality of gait and mobility and impaired self-care and ADL's secondary to progressive NTSCI L5 S1 fusion . Functional and medical status reassessed regarding patients ability to participate in therapies and patient found to be able to participate in acute intensive comprehensive inpatient rehabilitation program including PT/OT to improve balance, ambulation, ADLs, and to improve the P/AROM. Therapeutic modifications regarding activities in therapies, place, amount of time per day and intensity of therapy made daily. In bed therapies or bedside therapies prn.   2. Bowel and Bladder dysfunction:  frequent toileting, ambulate to bathroom with assistance, check post void residuals. Check for C.difficile x1 if >2 loose stools in 24 hours, continue bowel & bladder program.  Monitor bowel and bladder function. Lactinex 2 PO every AC. MOM prn, Brown Bomb prn, Glycerin suppository prn, enema prn. 3. Moderate   pain as well as generalized OA pain: reassess pain every shift and prior to and after each therapy session, give prn Tylenol and see mar, modalities prn in therapy, Lidoderm, K-pad prn.   4. Skin healing and breakdown risk:  continue pressure relief program.  Daily skin exams and reports from nursing.   5. Severe fatigue due to nutritional and hydration deficiency: Add vitamin B12 vitamin D and CoQ10 continue to monitor I&Os, calorie counts prn, dietary consult prn.  6. Acute episodic insomnia with situational adjustment disorder:  prn Ambien, monitor for day time sedation. 7. Falls risk elevated:  patient to use call light to get nursing assistance to get up, bed and chair alarm. 8. Elevated DVT risk: progressive activities in PT, continue prophylaxis BOO hose, elevation and see mar . 9. Complex discharge planning:  Weekly team meeting every Monday to assess progress towards goals, discuss and address social, psychological and medical comorbidities and to address difficulties they may be having progressing in therapy. Patient and family education is in progress. The patient is to follow-up with their family physician after discharge. Complex Active General Medical Issues that complicate care Assess & Plan:    Patient Active Problem List   Diagnosis    Lumbar radiculopathy    Other chronic pain    Sacroiliitis, not elsewhere classified ([facility] [address]    Spinal stenosis, lumbar region, without neurogenic claudication    Lumbar disc herniation    Lumbar stenosis with neurogenic claudication    Smoker    Spondylolisthesis at L4-L5 level    Spondylolisthesis of lumbar region    Spondylolisthesis at L5-S1 level    Lumbosacral spine instability    Hyperlipidemia    Panic attacks    RLS (restless legs syndrome)    Impaired mobility   1.               LOTUS Chris MD.O., PM&R     Attending    [facility] Shayna Manuel

## 2020-08-14 NOTE — PROGRESS NOTES
Hospitalist Progress Note      PCP: Quita Pelaez MD    Date of Admission: 8/13/2020    Interval HPI:    Pt s/p L5-S1 lumbar diskectomy and fusion, she c/o pain in low back  and surgical area in lower abdomen  with some improvement with pain medication. She also c/o congestion and sinus pressure but denies cough, fever or chills. She denies any numbness, weakness or decreased sensation in extremities. Medications:  Reviewed    Infusion Medications   Scheduled Medications    bisacodyl  5 mg Oral Daily    atorvastatin  20 mg Oral Daily    busPIRone  15 mg Oral BID    gabapentin  300 mg Oral TID    pramipexole  0.25 mg Oral Nightly    tiZANidine  4 mg Oral Nightly     PRN Meds: pseudoephedrine, oxyCODONE, acetaminophen, ondansetron **OR** ondansetron, ALPRAZolam, calcium carbonate    No intake or output data in the 24 hours ending 08/14/20 1257    Exam:    /77   Pulse 102   Temp 98.6 °F (37 °C) (Oral)   Resp 18   Ht 5' 0.5\" (1.537 m)   Wt 182 lb (82.6 kg)   LMP  (LMP Unknown)   SpO2 94%   BMI 34.96 kg/m²     General appearance: No apparent distress, appears stated age and cooperative. HEENT: Pupils equal, round, and reactive to light. Conjunctivae/corneas clear. Neck: Supple, with full range of motion. No jugular venous distention. Trachea midline. Respiratory:  Normal respiratory effort. Clear to auscultation, bilaterally without Rales/Wheezes/Rhonchi. Cardiovascular: Regular rate and rhythm with normal S1/S2 without murmurs, rubs or gallops. Abdomen: Soft, non-tender, non-distended with normal bowel sounds. Musculoskeletal: No clubbing, cyanosis or edema bilaterally.     Skin: post op dressing CDI in mid lower abdomen  Neuro: N: Alert and oriented, thought content appropriate, normal insight  Capillary Refill: Brisk,< 3 seconds   Peripheral Pulses: +2 palpable, equal bilaterally       Labs:   Recent Labs     08/14/20  0630   WBC 11.9*   HGB 10.0*   HCT 30.7*    Recent Labs     08/14/20  0630      K 4.0      CO2 23   BUN 9   CREATININE 0.31*   CALCIUM 9.2     No results for input(s): AST, ALT, BILIDIR, BILITOT, ALKPHOS in the last 72 hours. No results for input(s): INR in the last 72 hours. No results for input(s): Cinda Caul in the last 72 hours. Urinalysis:    No results found for: Jocy Muñoz, [address], RBCUA, BLOODU, [address], [address]    Radiology:  No orders to display       Assessment/Plan:    L5-S1 spondylolisthesis:  Sp L5-S1 diskectomy and fusion, continue rehab POC, PT/OT, Pain control with narcotics, neurology following    HLD:continue daily statin     Sinusitis: start on daily  pseudafed       Additional work up or/and treatment plan may be added today or then after based on clinical progression. I am managing a portion of pt care. Some medical issues are handled by other specialists. Additional work up and treatment should be done in out pt setting by pt PCP and other out pt providers. In addition to examining and evaluating pt, I spent additional time explaining care, normal and abnormal findings, and treatment plan. All of pt questions were answered. Counseling, diet and education were  provided. Case will be discussed with nursing staff when appropriate. Family will be updated if and when appropriate. Diet: DIET GENERAL; Carb Control: 4 carbs/meal (approximate 1800 kcals/day)    Code Status: Full Code    PT/OT Eval           Electronically signed by ABENA Cruz CNP on 8/14/2020 at 12:57 PM     I personally obtained the key and critical portions of the history and physical exam and made additions where appropriate in the documentation.  I reviewed the mid level documentation and agree with assessment and plan that we come up with together    Garett Ordoñez DO  Internal Medicine

## 2020-08-14 NOTE — PATIENT CARE CONFERENCE
INDIVIDUALIZED OVERALL REHAB PLAN OF CARE  ADDENDUM TO REHAB PROGRESS NOTE-for audit purposes must also refer to this day's clinical note and combine the information      Date: 2020  Patient Name: Kayleigh Musa   Room: N829/J424-25    MRN: [id number]    : 1955  (76 y.o.)  Gender: female       Today 2020 during weekly team meeting, I reviewed the patient Kayleigh Musa in detail with the therapists and nurses involved in patient's care gathering complex physiatric data regarding current medical issues, progress in therapies, factors limiting progress, social issues, psychological issues, ongoing therapeutic plans and discharge planning. Legend:  I= independent Im =Modified independent  S=Supervised SB=stand by ORTEGA=set up CG=contact wagner Min= minimal Mod=Moderate Max=maximal Max of 2 =maximal assist of 2 people      CURRENT FUNCTIONAL STATUS:    NURSING ISSUES:         Nursing will continue to focus on bowel and bladder continence transitioning toward independence by time of discharge. Monitoring post void residuals monitoring for severe constipation and bowel obstruction. Focus on achieving ADL goals with co-treating with OT when possible. PHYSICAL THERAPY  Bed mobility:  Supine to Sit: Moderate assistance (20 1145)  Sit to Supine:  Moderate assistance (20 1145)  Transfers:  Sit to Stand: Stand by assistance (20 1146)  Bed to Chair: Stand by assistance(with ww) (20 1146)  Gait:   Device: Rolling Walker (20 1146)  Assistance: Minimal assistance;Stand by assistance (20 1146)  Distance: 25 feet (20 1146)  Quality of Gait: variable assist due to increase lat sway in gait, heavy UE support required, slow pace, short step length (20 1146)  Comments: Pt able to take 3 steps sideways without UE support with mod assist with knee stability a concern (20 114)  Stairs:     W/C mobility:         OCCUPATIONAL THERAPY  Hand Dominance: Right  ADL  Feeding: Independent (08/14/20 1058)  Grooming: Stand by assistance (08/14/20 1058)  UE Bathing: Stand by assistance (08/14/20 1058)  LE Bathing: Maximum assistance(shins, B LEs, and buttocks) (08/14/20 1058)  UE Dressing: Setup (08/14/20 1058)  LE Dressing: Moderate assistance(socks, and to pull up undergarments to knees for pt to reach) (08/14/20 1058)  Toileting: Unable to assess(comment)(pt completed prior to OT arrival) (08/14/20 1058)  Additional Comments: completed sponge bath ADL d/t orders: Pt OK to shower 8/15/20 (08/14/20 1058)  Toilet Transfers  Toilet Transfer: Unable to assess (08/14/20 1056)  Toilet Transfers Comments: pt completed toileting prior to OT arrival (08/14/20 1056)     Shower Transfers  Shower Transfers: Not tested (08/14/20 1056)  Shower Transfers Comments: Pt completed sponge bath d/t orders. Pt OK to shower 8/15/20 (08/14/20 1056)      SPEECH THERAPY               Diet/Swallow:                           COGNITION  OT: Cognition Comment: Comp: Independent, Expression: Independent, Social: Mod I, Prob: Mod I, Mem: Mod I  SP:            THERAPY, MEDICAL AND NURSING COORDINATION:    []  Pain medication before therapies     []  Check orthostatic BP      [x]  Ambulate to the bathroom in room    [x]  Add scheduled rest beaks     []  In room therapies      Discharge date set for:              [address] with[place]   family  with help from   therapy            And:      Home Health Care:     [x]  PT    []  OT    []  ST   [x]  Aide   []  SW    [x]  RN                    Outpatient Therapy:  []  PT    []  OT    []  ST   []  Rehab Psych                 Equipment:  [employer]      At D/C their function is goaled at:   PT:Long term goal 1: Bed mobility with indep, demonstrating log roll  Long term goal 2: Bed and car transfers with indep  Long term goal 3: Amb 150ft  indep with or without device  Long term goal 4: indep 4 stairs with rail for home entry  Long term goal 5: indep performance of TUG in 15 sec or less  OT: ,  ,  ,     ,  ,  ,    SP:               From a cognitive standpoint they will need:        24 hr supervision  --progress to occasional           Significant problems/ barriers to functional progress include: Pt is at a high risk for functional loss,    [x]  Acute infection/UTI    []  Low BP's     []  COPD flare-up   []  Uncontrolled blood sugar     []  Progressive anemia         []  Severe pain exacerbation     []  Impaired mental status    []  Urinary incontinence    []  Bowel incontinence           Plan to correct barriers to functional progress: Add scheduled rest breaks, control pain by using ice Lidoderm rest and massage as well as pain medications prior to therapy. Based on a comprehensive evaluation of the above, the individualized therapy and Discharge plan will be:    -Times stated are an average that will be varied based on the patient's daily need. PT ____2__hrs/day 5-7 days per week           OT ___1___hrs per day 5-7 days per week ST ____1/2___hrs /day 3-5 days per week       Estimated LOS 2 week(s)    - Overall functional prognosis:     [x]  Good    []  Fair    []  Poor -Medical Prognosis:   [x]  Good    [x]  Fair    []  Poor    This patient was made aware of the discussion of Plan of Care, their projected dicharge date and their projected function at discharge.    Lilly Baer DO

## 2020-08-14 NOTE — PROGRESS NOTES
Assessment completed earlier in the shift. VSS. LBM 8/11/2020. Pt has been requesting IV dilaudid for 7-8/10 abdomen and back pain. Ice packs have been applied in addition to receiving PO 5mg oxycodone. Pt states that the dilaudid \"just takes the edge off. \" Aquacell dressing to lower abdomen c/d/i  w/ no drainage. Tenderness present. Skin intact. Up x1 assist. No drainage noted. Call light within reach and bed alarm activated.   Electronically signed by Petrona Siddiqui RN on 8/14/2020 at 1:41 AM

## 2020-08-14 NOTE — PROGRESS NOTES
Assessment completed. A&O x4. Medicated with IV Dilaudid for 9/10 pain in abdomen and lower back by covering RN. Also had Oxycodone 5 mg 2 hours afetr Dilaudid. Ice applied to abdomen. Aquacell drsg to left lower abdomen is clean, dry and intact. Abdomen is distended and tender to touch. Abdominal binder on. Bed alarm activated. Call light in reach. Electronically signed by Dez Yanez LPN on 3/81/3383 at 8:88 PM      Dr Gregory Case d/c'd IV Dilaudid and increased Oxycodone from 5 mg to 10 mg PRN every 4 hours. Pt was upset and wanted to speak with Dr Gregory Case. Pt stated the Oxycodone does not help the pain. Told pt Dr Gregory Case would be up to round. Dr Gregory Case rounded and spoke with pt. Explained to pt why we don't use IV pain meds on rehab. Pt understood. Dr Gregory Case removed abdominal binder. Dr Gregory Case ordered US of abdomen stat. Also started MS Contin BID starting tonight at 2000. Medicated pt with Oxycodone and PRN Xanax before US per Dr Gregory Case. Pt was transported to [facility] by bed. No signs of distress. Electronically signed by Dez Yanez LPN on 0/52/3126 at 7:13 PM      Pt requested to removed IV hep lock from left hand due to not receiving IV Dilaudid any longer. Stated she keeps bumping her IV and it hurts. Removed IV hep lock. Catheter intact. Applied drsg.  Electronically signed by Dez Yanez LPN on 0/26/4406 at 0:30 PM

## 2020-08-14 NOTE — CONSULTS
Oral, TID  pramipexole (MIRAPEX) tablet 0.25 mg, 0.25 mg, Oral, Nightly  tiZANidine (ZANAFLEX) tablet 4 mg, 4 mg, Oral, Nightly  Allergies:  Patient has no known allergies. Family History:       Problem Relation Age of Onset    Diabetes Mother     Obesity Mother     High Cholesterol Mother     High Cholesterol Father     Other Sister         GSW to spine & paralysis    No Known Problems Brother     No Known Problems Son     No Known Problems Daughter     Other Brother         as infant     REVIEW OF SYSTEMS:    12 point ROS was negative unless otherwise noted in the HPI   PHYSICAL EXAM:      Vitals:    /77   Pulse 102   Temp 98.6 °F (37 °C) (Oral)   Resp 18   Ht 5' 0.5\" (1.537 m)   Wt 182 lb (82.6 kg)   LMP  (LMP Unknown)   SpO2 94%   BMI 34.96 kg/m²     Constitutional: Awake and alert in no acute distress.  Lying in bed comfortably  Head: Normocephalic, atraumatic  Eyes: EOMI, PERRLA  ENT: moist mucous membranes  Neck: neck supple, trachea midline  Lungs: Good inspiratory effort, CTABL, no wheeze, no rhonchi, no rales  Heart: RRR, normal S1 and S2, no murmurs  GI:  +BS  MSK: no edema noted  Skin: warm, dry  Psych: appropriate affect     DATA:    CBC:   Lab Results   Component Value Date    WBC 11.9 08/14/2020    RBC 3.27 08/14/2020    HGB 10.0 08/14/2020    HCT 30.7 08/14/2020    MCV 94.0 08/14/2020    MCH 30.7 08/14/2020    MCHC 32.6 08/14/2020    RDW 13.6 08/14/2020     08/14/2020     CMP:    Lab Results   Component Value Date     08/14/2020    K 4.0 08/14/2020     08/14/2020    CO2 23 08/14/2020    BUN 9 08/14/2020    CREATININE 0.31 08/14/2020    GFRAA >60.0 08/14/2020    LABGLOM >60.0 08/14/2020    GLUCOSE 129 08/14/2020    CALCIUM 9.2 08/14/2020       IMPRESSION/RECOMMENDATIONS:      # Sp L5-S1 diskectomy and fusion  - rehab  - pain and nausea control  - PT/OT      # HLD, depression/anxiety  - cont home meds    [place], DO  Internal Medicine

## 2020-08-15 ENCOUNTER — APPOINTMENT (OUTPATIENT)
Dept: GENERAL RADIOLOGY | Age: 65
DRG: 093 | End: 2020-08-15
Attending: PHYSICAL MEDICINE & REHABILITATION
Payer: MEDICARE

## 2020-08-15 LAB
ANION GAP SERPL CALCULATED.3IONS-SCNC: 10 MEQ/L (ref 9–15)
BACTERIA: ABNORMAL /HPF
BUN BLDV-MCNC: 10 MG/DL (ref 8–23)
CALCIUM SERPL-MCNC: 9.2 MG/DL (ref 8.5–9.9)
CHLORIDE BLD-SCNC: 97 MEQ/L (ref 95–107)
CO2: 27 MEQ/L (ref 20–31)
CREAT SERPL-MCNC: 0.36 MG/DL (ref 0.5–0.9)
GFR AFRICAN AMERICAN: >60
GFR NON-AFRICAN AMERICAN: >60
GLUCOSE BLD-MCNC: 112 MG/DL (ref 70–99)
HCT VFR BLD CALC: 29.2 % (ref 37–47)
HEMOGLOBIN: 9.8 G/DL (ref 12–16)
MCH RBC QN AUTO: 31.6 PG (ref 27–31.3)
MCHC RBC AUTO-ENTMCNC: 33.5 % (ref 33–37)
MCV RBC AUTO: 94.3 FL (ref 82–100)
PDW BLD-RTO: 13.5 % (ref 11.5–14.5)
PLATELET # BLD: 247 K/UL (ref 130–400)
POTASSIUM REFLEX MAGNESIUM: 4 MEQ/L (ref 3.4–4.9)
RBC # BLD: 3.1 M/UL (ref 4.2–5.4)
RBC UA: ABNORMAL /HPF (ref 0–2)
SODIUM BLD-SCNC: 134 MEQ/L (ref 135–144)
WBC # BLD: 9 K/UL (ref 4.8–10.8)
WBC UA: ABNORMAL /HPF (ref 0–5)

## 2020-08-15 PROCEDURE — 36415 COLL VENOUS BLD VENIPUNCTURE: CPT

## 2020-08-15 PROCEDURE — 6370000000 HC RX 637 (ALT 250 FOR IP): Performed by: INTERNAL MEDICINE

## 2020-08-15 PROCEDURE — 6370000000 HC RX 637 (ALT 250 FOR IP): Performed by: NURSE PRACTITIONER

## 2020-08-15 PROCEDURE — 97535 SELF CARE MNGMENT TRAINING: CPT

## 2020-08-15 PROCEDURE — 80048 BASIC METABOLIC PNL TOTAL CA: CPT

## 2020-08-15 PROCEDURE — 97110 THERAPEUTIC EXERCISES: CPT

## 2020-08-15 PROCEDURE — 97530 THERAPEUTIC ACTIVITIES: CPT

## 2020-08-15 PROCEDURE — 6370000000 HC RX 637 (ALT 250 FOR IP): Performed by: PHYSICAL MEDICINE & REHABILITATION

## 2020-08-15 PROCEDURE — 85027 COMPLETE CBC AUTOMATED: CPT

## 2020-08-15 PROCEDURE — 1180000000 HC REHAB R&B

## 2020-08-15 PROCEDURE — 74018 RADEX ABDOMEN 1 VIEW: CPT

## 2020-08-15 PROCEDURE — 97116 GAIT TRAINING THERAPY: CPT

## 2020-08-15 PROCEDURE — 97112 NEUROMUSCULAR REEDUCATION: CPT

## 2020-08-15 RX ORDER — BISACODYL 10 MG
10 SUPPOSITORY, RECTAL RECTAL DAILY
Status: DISCONTINUED | OUTPATIENT
Start: 2020-08-15 | End: 2020-08-16

## 2020-08-15 RX ORDER — LACTULOSE 10 G/15ML
20 SOLUTION ORAL 2 TIMES DAILY
Status: DISCONTINUED | OUTPATIENT
Start: 2020-08-15 | End: 2020-08-16

## 2020-08-15 RX ADMIN — ALPRAZOLAM 0.25 MG: 0.25 TABLET ORAL at 23:47

## 2020-08-15 RX ADMIN — OXYCODONE HYDROCHLORIDE 10 MG: 5 TABLET ORAL at 17:22

## 2020-08-15 RX ADMIN — PSEUDOEPHEDRINE HCL 60 MG: 30 TABLET, FILM COATED ORAL at 05:51

## 2020-08-15 RX ADMIN — MORPHINE SULFATE 15 MG: 15 TABLET, FILM COATED, EXTENDED RELEASE ORAL at 08:21

## 2020-08-15 RX ADMIN — OXYCODONE HYDROCHLORIDE 10 MG: 5 TABLET ORAL at 05:13

## 2020-08-15 RX ADMIN — PRAMIPEXOLE DIHYDROCHLORIDE 0.25 MG: 0.5 TABLET ORAL at 21:19

## 2020-08-15 RX ADMIN — MORPHINE SULFATE 15 MG: 15 TABLET, FILM COATED, EXTENDED RELEASE ORAL at 21:19

## 2020-08-15 RX ADMIN — BUSPIRONE HYDROCHLORIDE 15 MG: 7.5 TABLET ORAL at 21:20

## 2020-08-15 RX ADMIN — OXYCODONE HYDROCHLORIDE 10 MG: 5 TABLET ORAL at 09:12

## 2020-08-15 RX ADMIN — GABAPENTIN 300 MG: 300 CAPSULE ORAL at 08:21

## 2020-08-15 RX ADMIN — OXYCODONE HYDROCHLORIDE 10 MG: 5 TABLET ORAL at 13:22

## 2020-08-15 RX ADMIN — OXYCODONE HYDROCHLORIDE 10 MG: 5 TABLET ORAL at 22:34

## 2020-08-15 RX ADMIN — ATORVASTATIN CALCIUM 20 MG: 20 TABLET, FILM COATED ORAL at 08:21

## 2020-08-15 RX ADMIN — GABAPENTIN 300 MG: 300 CAPSULE ORAL at 13:22

## 2020-08-15 RX ADMIN — ACETAMINOPHEN 650 MG: 325 TABLET, FILM COATED ORAL at 08:21

## 2020-08-15 RX ADMIN — TIZANIDINE 4 MG: 4 TABLET ORAL at 21:19

## 2020-08-15 RX ADMIN — BISACODYL 5 MG: 5 TABLET, COATED ORAL at 08:21

## 2020-08-15 RX ADMIN — BUSPIRONE HYDROCHLORIDE 15 MG: 7.5 TABLET ORAL at 08:21

## 2020-08-15 RX ADMIN — GABAPENTIN 300 MG: 300 CAPSULE ORAL at 21:20

## 2020-08-15 RX ADMIN — LACTULOSE 20 G: 20 SOLUTION ORAL at 21:20

## 2020-08-15 RX ADMIN — ALPRAZOLAM 0.25 MG: 0.25 TABLET ORAL at 09:11

## 2020-08-15 RX ADMIN — OXYCODONE HYDROCHLORIDE 10 MG: 5 TABLET ORAL at 01:01

## 2020-08-15 ASSESSMENT — PAIN SCALES - GENERAL
PAINLEVEL_OUTOF10: 2
PAINLEVEL_OUTOF10: 7
PAINLEVEL_OUTOF10: 0
PAINLEVEL_OUTOF10: 8
PAINLEVEL_OUTOF10: 7
PAINLEVEL_OUTOF10: 7
PAINLEVEL_OUTOF10: 6
PAINLEVEL_OUTOF10: 8
PAINLEVEL_OUTOF10: 7
PAINLEVEL_OUTOF10: 8
PAINLEVEL_OUTOF10: 7
PAINLEVEL_OUTOF10: 10

## 2020-08-15 ASSESSMENT — PAIN DESCRIPTION - LOCATION
LOCATION: BACK;ABDOMEN
LOCATION: ABDOMEN
LOCATION: BACK;ABDOMEN
LOCATION: ABDOMEN;BACK
LOCATION: BACK;ABDOMEN

## 2020-08-15 ASSESSMENT — PAIN DESCRIPTION - PAIN TYPE
TYPE: ACUTE PAIN;SURGICAL PAIN
TYPE: ACUTE PAIN
TYPE: ACUTE PAIN;SURGICAL PAIN
TYPE: ACUTE PAIN

## 2020-08-15 ASSESSMENT — PAIN DESCRIPTION - FREQUENCY
FREQUENCY: CONTINUOUS

## 2020-08-15 ASSESSMENT — PAIN DESCRIPTION - ORIENTATION
ORIENTATION: POSTERIOR;ANTERIOR
ORIENTATION: POSTERIOR;ANTERIOR
ORIENTATION: ANTERIOR;POSTERIOR

## 2020-08-15 ASSESSMENT — PAIN DESCRIPTION - DESCRIPTORS
DESCRIPTORS: ACHING
DESCRIPTORS: CONSTANT

## 2020-08-15 NOTE — PROGRESS NOTES
training:10      Neuro re education:10     Therapeutic ex:20      Wilfrido Wang PTA, 08/15/20 at 9:48 AM

## 2020-08-15 NOTE — PLAN OF CARE
Problem: Pain:  Goal: Pain level will decrease  Description: Pain level will decrease  8/15/2020 0150 by Jemal Her RN  Outcome: Ongoing     Problem: Pain:  Goal: Control of acute pain  Description: Control of acute pain  8/15/2020 0150 by Jemal Her RN  Outcome: Ongoing     Problem: Pain:  Goal: Control of chronic pain  Description: Control of chronic pain  8/15/2020 0150 by Jemal Her RN  Outcome: Ongoing     Problem: Falls - Risk of:  Goal: Will remain free from falls  Description: Will remain free from falls  8/15/2020 0150 by Jemal Her RN  Outcome: Ongoing     Problem: Falls - Risk of:  Goal: Absence of physical injury  Description: Absence of physical injury  8/15/2020 0150 by Jemal Her RN  Outcome: Ongoing

## 2020-08-15 NOTE — PROGRESS NOTES
Subjective: The patient complains of moderate  acute pain relieved by rest and exacerbated by activity. I am concerned about patients fatigue. ROS x10: The patient also complains of severely impaired mobility and activities of daily living. Otherwise no new problems with vision, hearing, nose, mouth, throat, dermal, cardiovascular, GI, , pulmonary, musculoskeletal, psychiatric or neurological. See Rehab H&P on Rehab chart dated . Vital signs:  /70   Pulse 99   Temp 98 °F (36.7 °C) (Oral)   Resp 18   Ht 5' 0.5\" (1.537 m)   Wt 175 lb 9 oz (79.6 kg)   LMP  (LMP Unknown)   SpO2 95%   BMI 33.72 kg/m²   I/O:   PO/Intake:  fair PO intake, no problems observed or reported. Bowel/Bladder:  continent, no problems noted. General:  Patient is well developed, adequately nourished, non-obese and     well kempt. HEENT:    PERRLA, hearing intact to loud voice, external inspection of ear     and nose benign. Inspection of lips, tongue and gums benign  Musculoskeletal: No significant change in strength or tone. All joints stable. Inspection and palpation of digits and nails show no clubbing,       cyanosis or inflammatory conditions. Neuro/Psychiatric: Affect: flat but pleasant. Alert and oriented to person, place and     situation. No significant change in deep tendon reflexes or     sensation  Lungs:  CTA-B. Respiration effort is normal at rest.     Heart:   S1 = S2, RRR. No loud murmurs. Abdomen:  Soft, non-tender, no enlargement of liver or spleen. Extremities:  No significant lower extremity edema or tenderness. Skin:   Intact to general survey, no visualized or palpated problems.     Rehabilitation:  Physical therapy: FIMS:  Bed Mobility:      Transfers: Sit to Stand: Stand by assistance  Stand to sit: Stand by assistance  Bed to Chair: Stand by assistance, Ambulation 1  Surface: level tile  Device: Rolling Walker  Assistance: Stand by assistance  Quality of Gait: 4.8 - 10.8 K/uL    RBC 3.10 (L) 4.20 - 5.40 M/uL    Hemoglobin 9.8 (L) 12.0 - 16.0 g/dL    Hematocrit 29.2 (L) 37.0 - 47.0 %    MCV 94.3 82.0 - 100.0 fL    MCH 31.6 (H) 27.0 - 31.3 pg    MCHC 33.5 33.0 - 37.0 %    RDW 13.5 11.5 - 14.5 %    Platelets 463 459 - 609 K/uL       Fluoro For Surgical Procedures    Result Date: 8/12/2020  FLUORO FOR SURGICAL PROCEDURES : 8/12/2020 7:35 AM CLINICAL HISTORY:  Lumbar Fusion . COMPARISON: None available. Intraoperative fluoroscopy was provided for Dr. Rex Mauro procedure. A total of 97.7 seconds of fluoroscopy was used, with 7 fluoroscopic stills saved. No diagnostic images were obtained. Please see Dr. Rex Mauro surgical notes for completeness. Previous extensive, complex labs, notes and diagnostics reviewed and analyzed. ALLERGIES:    Allergies as of 08/13/2020    (No Known Allergies)      (please also verify by checking [facility])     Complex Physical Medicine & Rehab Issues Assess & Plan:   1. Severe abnormality of gait and mobility and impaired self-care and ADL's secondary to progressive NTSCI L5 S1 fusion . Functional and medical status reassessed regarding patients ability to participate in therapies and patient found to be able to participate in acute intensive comprehensive inpatient rehabilitation program including PT/OT to improve balance, ambulation, ADLs, and to improve the P/AROM. Therapeutic modifications regarding activities in therapies, place, amount of time per day and intensity of therapy made daily. In bed therapies or bedside therapies prn.   2. Bowel and Bladder dysfunction:  frequent toileting, ambulate to bathroom with assistance, check post void residuals. Check for C.difficile x1 if >2 loose stools in 24 hours, continue bowel & bladder program.  Monitor bowel and bladder function. Lactinex 2 PO every AC. MOM prn, Brown Bomb prn, Glycerin suppository prn, enema prn.   3. Moderate   pain as well as generalized OA pain: reassess pain every shift and prior to and after each therapy session, give prn Tylenol and see mar, modalities prn in therapy, Lidoderm, K-pad prn.   4. Skin healing and breakdown risk:  continue pressure relief program.  Daily skin exams and reports from nursing. 5. Severe fatigue due to nutritional and hydration deficiency: Add vitamin B12 vitamin D and CoQ10 continue to monitor I&Os, calorie counts prn, dietary consult prn.  6. Acute episodic insomnia with situational adjustment disorder:  prn Ambien, monitor for day time sedation. 7. Falls risk elevated:  patient to use call light to get nursing assistance to get up, bed and chair alarm. 8. Elevated DVT risk: progressive activities in PT, continue prophylaxis BOO hose, elevation and see mar . 9. Complex discharge planning:  Weekly team meeting every Monday to assess progress towards goals, discuss and address social, psychological and medical comorbidities and to address difficulties they may be having progressing in therapy. Patient and family education is in progress. The patient is to follow-up with their family physician after discharge.         Complex Active General Medical Issues that complicate care Assess & Plan:    Patient Active Problem List   Diagnosis    Lumbar radiculopathy    Other chronic pain    Sacroiliitis, not elsewhere classified ([facility] [address]    Spinal stenosis, lumbar region, without neurogenic claudication    Lumbar disc herniation    Lumbar stenosis with neurogenic claudication    Smoker    Spondylolisthesis at L4-L5 level    Spondylolisthesis of lumbar region    Spondylolisthesis at L5-S1 level    Lumbosacral spine instability    Hyperlipidemia    Panic attacks    RLS (restless legs syndrome)    Impaired mobility                Theresa Guevara D.O., PM&R     Attending    [facility] Shayna Manuel

## 2020-08-15 NOTE — PROGRESS NOTES
Physical Therapy Rehab Treatment Note  [facility]  [facility]       NAME: Kayleigh Musa  : 1955 (76 y.o.)  MRN: [id number]  CODE STATUS: Full Code    Date of Service: 8/15/2020  Chart Reviewed: Yes  Family / Caregiver Present: No  General Comment  Comments: agreeableto ambulation    Restrictions:  Restrictions/Precautions: Fall Risk       SUBJECTIVE: Subjective: \"i'm just waiting for pain meds\"  Pain Screening  Patient Currently in Pain: Yes  Pre Treatment Pain Screening  Pain at present: 7  Intervention List: Patient able to continue with treatment  Comments / Details: cold pack to abdomen during seated activities    Post Treatment Pain Screenin  Pain Assessment  Pain Assessment: 0-10  Pain Level: 7  Pain Type: Acute pain;Surgical pain  Pain Location: Back; Abdomen  Pain Orientation: Anterior;Posterior  Pain Descriptors: Aching  Pain Frequency: Continuous    OBJECTIVE:   Overall Orientation Status: Within Normal Limits    Neuromuscular Education  Neuromuscular Comments: standing weight shifts holding onto ww. Bed mobility  Sit to Supine: Stand by assistance  Comment: pt able to get legs into bed on her own. hob elevated slightly. Transfers  Sit to Stand: Stand by assistance  Stand to sit: Stand by assistance  Bed to Chair: Stand by assistance    Ambulation  Ambulation?: Yes  Ambulation 1  Surface: level tile  Device: Rolling Walker  Assistance: Stand by assistance  Quality of Gait: guarded, slow and steady with increased wb through upper extremities  Gait Deviations: Slow Alexa; Increased MIKAYLA; Decreased step length  Distance: 50 feet x 2 with turns  Comments: agreeable to use ww for safety at this time. Stairs/Curb  Stairs?: No    Therex: glute sets x 20 supine, hip and knee flexion with aarom. Abduction x 10. ASSESSMENT/COMMENTS:pt progressing well with her mobility despite her pain. Is hoping to go home next week.  Pt states she is going to borrow a ww from her father who has one but does not use. PLAN OF CARE/Safety: ongoing.      Therapy Time:   Individual   Time In 1300   Time Out 1330   Minutes 30     Minutes:30      Transfer/Bed mobility training:10      Gait training:10      Neuro re education:5     Therapeutic ex:Kerry Doss Asp, PTA, 08/15/20 at 1:49 PM

## 2020-08-15 NOTE — PROGRESS NOTES
Occupational Therapy  Facility/Department: [facility]  Daily Treatment Note  NAME: Mary Garrett  : 1955  MRN: [id number]    Date of Service: 8/15/2020    Discharge Recommendations:  Continue to assess pending progress       Assessment      Activity Tolerance  Activity Tolerance: Patient Tolerated treatment well  Safety Devices  Safety Devices in place: Yes  Type of devices: All fall risk precautions in place         Patient Diagnosis(es): There were no encounter diagnoses. has a past medical history of Anxiety, Hyperlipidemia, and Osteoarthritis. has a past surgical history that includes back surgery (); Tracheal surgery (); Colonoscopy; Tonsillectomy; Dilation and curettage of uterus; and lumbar fusion (Left, 2020). Restrictions  Restrictions/Precautions  Restrictions/Precautions: Fall Risk  Required Braces or Orthoses?: Yes  Required Braces or Orthoses  Other: Abdominal Binder  Position Activity Restriction  Other position/activity restrictions: pt okay to shower starting 8/15/20  Subjective   General  Chart Reviewed: Yes  Patient assessed for rehabilitation services?: Yes  Referring Practitioner: Dr. Gregory Case  Diagnosis: Pavithra Alvarenga with Impaired mobility & ADLs d/t L5-S1 instability s/p LT anterior retroperitoneal  L5-S1 diskectomy and fusion  Subjective  Subjective: I thought I was done for today because I did it this morning. Pain Assessment  Pain Assessment: 0-10  Pain Level: 7  Pain Type: Acute pain;Surgical pain  Pain Location: Back; Abdomen  Pain Orientation: Posterior; Anterior  Pain Descriptors: Aching  Pain Frequency: Continuous  Pre Treatment Pain Screening  Pain at present: 7  Scale Used: Numeric Score  Intervention List: Patient able to continue with treatment;Patient declined any intervention  Comments / Details: Pt. states she is not due for pain meds  Vital Signs  Patient Currently in Pain: Yes   Orientation     Objective    Pt. engaged in B UE strengthening and endurance tasks with 1 lb wrist weights on B wrists to promote independence and tolerance for ADLs. Pt. folded towels, washcloths, and pillow cases at the table top surface and then placed them in piles on the table. Pt. took a rest break after the items were folded. Pt. reached in the container with her L hand to grab wooden dowels and place them in a vertical post. Pt. then placed rings on top of the dowels with her R hand. Pt. was able to fill all 3 sides with no rest breaks. Pt. then reached to remove the dowels and rings together and separate them into their own containers. Pt. reached to  rings from the table and then place them on each pole. Pt. was able to reach all 4 poles on both sides with a mild stretch for the top pole. Pt. was returned to her room and completed toilet transfer at [facility] and toileting at [facility]. Pt. transferred back into her bed from the w/c at [facility] for her legs. Bed alarm on and call light beside her.       Plan   Plan  Times per week: 5-7x  Times per day: Daily  Plan weeks: 10 days  Current Treatment Recommendations: Strengthening, Balance Training, Functional Mobility Training, Endurance Training, Safety Education & Training, Patient/Caregiver Education & Training, Self-Care / ADL, Equipment Evaluation, Education, & procurement, Home Management Training, Pain Management  Plan Comment: Continue per OT POC for planned d/c on 8-23-20    Goals  Patient Goals   Patient goals : \"to be out of pain and be able to walk\"       Therapy Time   Individual Concurrent Group Co-treatment   Time In 1510         Time Out 1555         Minutes 45              ADL training: 15 minutes  Therapeutic activities: 45 minutes      ANEL Adam Electronically signed by ANEL Adam on 8/15/2020 at 4:00 PM

## 2020-08-15 NOTE — PROGRESS NOTES
Assessment completed earlier in the shift. VSS. Pt adamantly requested to be medication simultaneously w/ scheduled 15mg PO morphine and PRN 10mg Roxicodone. Spoke w/ pharmacist who evaluated pt and gave the OK to medicate accordingly. BP and respirations remains appropriate and no adverse events to note. Pt requesting to be medicated w/ PRN doses, per pt, \"to keep in her system,\" every four hours and to wake her up if she is sleeping. . Ice pack applied to abdominal region. Incision itself unable to be assessed but aquacell in place C/D/I and observed w/ no drainage. LBM 8/11/2020. No distress noted. Call light within reach and bed alarm activated.   Electronically signed by Mary Blair RN on 8/15/2020 at 2:15 AM

## 2020-08-15 NOTE — PROGRESS NOTES
Occupational Therapy  Facility/Department: [facility]  Daily Treatment Note  NAME: Chilo Granados  : 1955  MRN: [id number]    Date of Service: 8/15/2020    Discharge Recommendations:  Continue to assess pending progress       Assessment      Activity Tolerance  Activity Tolerance: Patient Tolerated treatment well  Safety Devices  Safety Devices in place: Yes  Type of devices: All fall risk precautions in place         Patient Diagnosis(es): There were no encounter diagnoses. has a past medical history of Anxiety, Hyperlipidemia, and Osteoarthritis. has a past surgical history that includes back surgery (); Tracheal surgery (); Colonoscopy; Tonsillectomy; Dilation and curettage of uterus; and lumbar fusion (Left, 2020). Restrictions  Restrictions/Precautions  Restrictions/Precautions: Fall Risk  Required Braces or Orthoses?: Yes  Required Braces or Orthoses  Other: Abdominal Binder  Position Activity Restriction  Other position/activity restrictions: pt okay to shower starting 8/15/20  Subjective   General  Chart Reviewed: Yes  Patient assessed for rehabilitation services?: Yes  Referring Practitioner: Dr. Samara Rodriguez  Diagnosis: Yamilet Tolliver with Impaired mobility & ADLs d/t L5-S1 instability s/p LT anterior retroperitoneal  L5-S1 diskectomy and fusion  Subjective  Subjective: I thought I was done for today because I did it this morning. Pain Assessment  Pain Assessment: 0-10  Pain Type: Acute pain  Pain Location: Back; Abdomen  Pain Orientation: Posterior; Anterior  Pain Descriptors: Aching  Pain Frequency: Continuous  Pre Treatment Pain Screening  Pain at present: 6  Scale Used: Numeric Score  Intervention List: Patient able to continue with treatment;Patient declined any intervention  Comments / Details: Pt. states she is not due for pain meds  Vital Signs  Patient Currently in Pain: Yes   Orientation     Objective    Pt. engaged in B UE reaching and endurance task with reaching to various height poles to tie ribbon and eggs on the poles. Pt. was able to reach all 4 poles to tie the eggs on and on both sides. Pt. was able to tie 2 eggs on each pole. Pt. then reached to remove the ribbons and eggs and place back in the container. Pt. went into the bathroom and doffed the hospital pants with a reacher and then donned new hospital pants with a reacher and verbal cues for technique and placement. Pt. was CGA for LB dressing with pants. Pt. was able to doff her socks and pavel them with a dressing stick and sock aid independently. Pt. engaged in B hand strengthening task with rolling out green theraputty into a snake like form on the table at a slow pace due to abdominal pain. Pt. then pushed beads into the putty. Therapist rolled the putty into a ball and then manipulated and pinched through the putty to find the beads. Therapist placed 1 lb wrist weights on B wrists for her to reach to grab a large peg and place it into the pegboard. Pt. alternated hands with each row. Pt. was able to fill all 100 pegs with no rest breaks and at a steady pace. Pt. then removed the pegs and placed them in the container. Pt. was able to reach forward to grab the pegs and tolerate the weights for the task.         Plan   Plan  Times per week: 5-7x  Times per day: Daily  Plan weeks: 10 days  Current Treatment Recommendations: Strengthening, Balance Training, Functional Mobility Training, Endurance Training, Safety Education & Training, Patient/Caregiver Education & Training, Self-Care / ADL, Equipment Evaluation, Education, & procurement, Home Management Training, Pain Management  Plan Comment: Continue per OT POC for planned d/c on 8-23-20    Goals  Patient Goals   Patient goals : \"to be out of pain and be able to walk\"       Therapy Time   Individual Concurrent Group Co-treatment   Time In 1100         Time Out 1200         Minutes 60              ADL training: 15 minutes  Therapeutic activities: 45 minutes      Rosemary

## 2020-08-16 LAB
ANION GAP SERPL CALCULATED.3IONS-SCNC: 10 MEQ/L (ref 9–15)
BUN BLDV-MCNC: 10 MG/DL (ref 8–23)
CALCIUM SERPL-MCNC: 9.1 MG/DL (ref 8.5–9.9)
CHLORIDE BLD-SCNC: 98 MEQ/L (ref 95–107)
CO2: 27 MEQ/L (ref 20–31)
CREAT SERPL-MCNC: 0.36 MG/DL (ref 0.5–0.9)
GFR AFRICAN AMERICAN: >60
GFR NON-AFRICAN AMERICAN: >60
GLUCOSE BLD-MCNC: 124 MG/DL (ref 70–99)
HCT VFR BLD CALC: 29.2 % (ref 37–47)
HEMOGLOBIN: 9.7 G/DL (ref 12–16)
MCH RBC QN AUTO: 31.2 PG (ref 27–31.3)
MCHC RBC AUTO-ENTMCNC: 33.2 % (ref 33–37)
MCV RBC AUTO: 94.1 FL (ref 82–100)
PDW BLD-RTO: 13 % (ref 11.5–14.5)
PLATELET # BLD: 270 K/UL (ref 130–400)
POTASSIUM REFLEX MAGNESIUM: 4.3 MEQ/L (ref 3.4–4.9)
RBC # BLD: 3.1 M/UL (ref 4.2–5.4)
SODIUM BLD-SCNC: 135 MEQ/L (ref 135–144)
URINE CULTURE, ROUTINE: NORMAL
WBC # BLD: 6.6 K/UL (ref 4.8–10.8)

## 2020-08-16 PROCEDURE — 36415 COLL VENOUS BLD VENIPUNCTURE: CPT

## 2020-08-16 PROCEDURE — 80048 BASIC METABOLIC PNL TOTAL CA: CPT

## 2020-08-16 PROCEDURE — 1180000000 HC REHAB R&B

## 2020-08-16 PROCEDURE — 97116 GAIT TRAINING THERAPY: CPT

## 2020-08-16 PROCEDURE — 6370000000 HC RX 637 (ALT 250 FOR IP): Performed by: PHYSICAL MEDICINE & REHABILITATION

## 2020-08-16 PROCEDURE — 85027 COMPLETE CBC AUTOMATED: CPT

## 2020-08-16 PROCEDURE — 6370000000 HC RX 637 (ALT 250 FOR IP): Performed by: NURSE PRACTITIONER

## 2020-08-16 PROCEDURE — 6370000000 HC RX 637 (ALT 250 FOR IP): Performed by: INTERNAL MEDICINE

## 2020-08-16 PROCEDURE — 97535 SELF CARE MNGMENT TRAINING: CPT

## 2020-08-16 RX ORDER — BISACODYL 10 MG
10 SUPPOSITORY, RECTAL RECTAL DAILY PRN
Status: DISCONTINUED | OUTPATIENT
Start: 2020-08-16 | End: 2020-08-21 | Stop reason: HOSPADM

## 2020-08-16 RX ORDER — SODIUM PHOSPHATE, DIBASIC AND SODIUM PHOSPHATE, MONOBASIC 7; 19 G/133ML; G/133ML
1 ENEMA RECTAL DAILY PRN
Status: DISCONTINUED | OUTPATIENT
Start: 2020-08-16 | End: 2020-08-21 | Stop reason: HOSPADM

## 2020-08-16 RX ORDER — LACTULOSE 10 G/15ML
20 SOLUTION ORAL 2 TIMES DAILY PRN
Status: DISCONTINUED | OUTPATIENT
Start: 2020-08-16 | End: 2020-08-21 | Stop reason: HOSPADM

## 2020-08-16 RX ORDER — AMOXICILLIN 250 MG
2 CAPSULE ORAL NIGHTLY
Status: DISCONTINUED | OUTPATIENT
Start: 2020-08-16 | End: 2020-08-21 | Stop reason: HOSPADM

## 2020-08-16 RX ADMIN — ALPRAZOLAM 0.25 MG: 0.25 TABLET ORAL at 15:49

## 2020-08-16 RX ADMIN — OXYCODONE HYDROCHLORIDE 10 MG: 5 TABLET ORAL at 03:02

## 2020-08-16 RX ADMIN — PRAMIPEXOLE DIHYDROCHLORIDE 0.25 MG: 0.5 TABLET ORAL at 20:12

## 2020-08-16 RX ADMIN — BUSPIRONE HYDROCHLORIDE 15 MG: 7.5 TABLET ORAL at 08:02

## 2020-08-16 RX ADMIN — BUSPIRONE HYDROCHLORIDE 15 MG: 7.5 TABLET ORAL at 20:11

## 2020-08-16 RX ADMIN — ACETAMINOPHEN 650 MG: 325 TABLET, FILM COATED ORAL at 13:04

## 2020-08-16 RX ADMIN — GABAPENTIN 300 MG: 300 CAPSULE ORAL at 13:04

## 2020-08-16 RX ADMIN — LACTULOSE 20 G: 20 SOLUTION ORAL at 08:02

## 2020-08-16 RX ADMIN — OXYCODONE HYDROCHLORIDE 10 MG: 5 TABLET ORAL at 11:06

## 2020-08-16 RX ADMIN — OXYCODONE HYDROCHLORIDE 10 MG: 5 TABLET ORAL at 07:03

## 2020-08-16 RX ADMIN — TIZANIDINE 4 MG: 4 TABLET ORAL at 20:12

## 2020-08-16 RX ADMIN — BISACODYL 5 MG: 5 TABLET, COATED ORAL at 08:02

## 2020-08-16 RX ADMIN — GABAPENTIN 300 MG: 300 CAPSULE ORAL at 08:03

## 2020-08-16 RX ADMIN — PSEUDOEPHEDRINE HCL 60 MG: 30 TABLET, FILM COATED ORAL at 06:07

## 2020-08-16 RX ADMIN — MORPHINE SULFATE 15 MG: 15 TABLET, FILM COATED, EXTENDED RELEASE ORAL at 08:02

## 2020-08-16 RX ADMIN — GABAPENTIN 300 MG: 300 CAPSULE ORAL at 20:12

## 2020-08-16 RX ADMIN — ACETAMINOPHEN 650 MG: 325 TABLET, FILM COATED ORAL at 06:08

## 2020-08-16 RX ADMIN — OXYCODONE HYDROCHLORIDE 10 MG: 5 TABLET ORAL at 15:49

## 2020-08-16 RX ADMIN — DOCUSATE SODIUM 50MG AND SENNOSIDES 8.6MG 2 TABLET: 8.6; 5 TABLET, FILM COATED ORAL at 20:12

## 2020-08-16 RX ADMIN — ATORVASTATIN CALCIUM 20 MG: 20 TABLET, FILM COATED ORAL at 08:02

## 2020-08-16 RX ADMIN — BISACODYL 10 MG: 10 SUPPOSITORY RECTAL at 11:06

## 2020-08-16 RX ADMIN — MORPHINE SULFATE 15 MG: 15 TABLET, FILM COATED, EXTENDED RELEASE ORAL at 21:40

## 2020-08-16 RX ADMIN — OXYCODONE HYDROCHLORIDE 10 MG: 5 TABLET ORAL at 20:12

## 2020-08-16 ASSESSMENT — PAIN SCALES - GENERAL
PAINLEVEL_OUTOF10: 8
PAINLEVEL_OUTOF10: 8
PAINLEVEL_OUTOF10: 7
PAINLEVEL_OUTOF10: 8
PAINLEVEL_OUTOF10: 7
PAINLEVEL_OUTOF10: 8
PAINLEVEL_OUTOF10: 7
PAINLEVEL_OUTOF10: 2
PAINLEVEL_OUTOF10: 6
PAINLEVEL_OUTOF10: 8

## 2020-08-16 NOTE — PROGRESS NOTES
Physical Therapy Rehab Treatment Note  [facility]/Department[facility]  [facility]       NAME: Kj Estrada  : 1955 (76 y.o.)  MRN: [id number]  CODE STATUS: Full Code    Date of Service: 2020  Chart Reviewed: Yes  Family / Caregiver Present: No    Restrictions:  Restrictions/Precautions: Fall Risk       SUBJECTIVE: Subjective: im ready to go home  Pain Screening  Patient Currently in Pain: Yes  Pre Treatment Pain Screening  Pain at present: 7  Scale Used: Numeric Score  Intervention List: Patient able to continue with treatment;Patient declined any intervention    Post Treatment Pain Screening:  Pain Assessment  Pain Assessment: 0-10  Pain Level: 7    OBJECTIVE:        Bed mobility  Rolling to Left: Stand by assistance  Rolling to Right: Stand by assistance  Supine to Sit: Stand by assistance  Sit to Supine: Stand by assistance  Scooting: Stand by assistance  Comment: pt able to perform SBA with increased time and effort. reports some pain with movemnt    Transfers  Sit to Stand: Stand by assistance  Stand to sit: Stand by assistance  Bed to Chair: Stand by assistance  Comment: good hand placement and safety awareness    Ambulation  Ambulation?: Yes  More Ambulation?: No  Ambulation 1  Surface: level tile  Device: Rolling Walker  Assistance: Stand by assistance  Quality of Gait: slow pace, step to pattern, no LOB, guarded posture  Distance: 79'    Stairs/Curb  Stairs?: No              Other exercises  Other exercises?: Yes  Other exercises 1: STS x5     ASSESSMENT/COMMENTS:  Pt demonstrated a slow jaclyn and step to pattern during gait for longer distance this session. PLAN OF CARE/Safety:   Safety Devices  Type of devices:  All fall risk precautions in place      Therapy Time:   Individual   Time In 930   Time Out 1000   Minutes 30     Minutes:30      Transfer/Bed mobility training:10      Gait training:15      Neuro re education:0     Therapeutic ex:5      Andres Linda PTA, 20 at 10:27 AM

## 2020-08-16 NOTE — PROGRESS NOTES
Pt had 2 BM after being medicated this morning for constipation, pt now back in bed and was medicated w/PRN tylenol as requested. Call light w/in reach and bed alarm on.  Electronically signed by Farhat Lui RN on 8/16/2020 at 1:07 PM

## 2020-08-16 NOTE — PROGRESS NOTES
Pt complaint of numbness in her LLE that was her original reason to have the surgery, pt states it's better than before the surgery but not completely resolved. Educated pt about swelling, recent surgery and that is takes time to heal after surgery.  Electronically signed by Vikram Zaldivar RN on 8/16/2020 at 8:09 AM

## 2020-08-16 NOTE — PROGRESS NOTES
Hospitalist Progress Note      PCP: Alejandro Tracey MD    Date of Admission: 8/13/2020    Interval HPI:    Pt express some pain relieve with pain medication, however now with c/o constipation, states no BM x   5 days and had a suppository earlier today. Medications:  Reviewed    Infusion Medications   Scheduled Medications    pseudoephedrine  60 mg Oral Daily    morphine  15 mg Oral 2 times per day    bisacodyl  5 mg Oral Daily    atorvastatin  20 mg Oral Daily    busPIRone  15 mg Oral BID    gabapentin  300 mg Oral TID    pramipexole  0.25 mg Oral Nightly    tiZANidine  4 mg Oral Nightly     PRN Meds: bisacodyl, lactulose, oxyCODONE, acetaminophen, ondansetron **OR** ondansetron, ALPRAZolam, calcium carbonate    No intake or output data in the 24 hours ending 08/16/20 1350    Exam:    /62   Pulse 85   Temp 97.9 °F (36.6 °C) (Oral)   Resp 16   Ht 5' 0.5\" (1.537 m)   Wt 175 lb 9 oz (79.6 kg)   LMP  (LMP Unknown)   SpO2 95%   BMI 33.72 kg/m²     General appearance: No apparent distress, appears stated age and cooperative. HEENT: Pupils equal, round, and reactive to light. Conjunctivae/corneas clear. Neck: Supple, with full range of motion. No jugular venous distention. Trachea midline. Respiratory:  Normal respiratory effort. Clear to auscultation, bilaterally without Rales/Wheezes/Rhonchi. Cardiovascular: Regular rate and rhythm with normal S1/S2 without murmurs, rubs or gallops. Abdomen: Soft, non-tender, non-distended with normal bowel sounds. Musculoskeletal: No clubbing, cyanosis or edema bilaterally.     Skin: post op dressing CDI in mid lower abdomen  Neuro:  Alert and oriented, thought content appropriate, normal insight  Capillary Refill: Brisk,< 3 seconds   Peripheral Pulses: +2 palpable, equal bilaterally       Labs:   Recent Labs     08/14/20  0630 08/15/20  0545 08/16/20  0604   WBC 11.9* 9.0 6.6   HGB 10.0* 9.8* 9.7*   HCT 30.7* 29.2* 29.2*    247 270 Recent Labs     08/14/20  0630 08/15/20  0545 08/16/20  0604    134* 135   K 4.0 4.0 4.3    97 98   CO2 23 27 27   BUN 9 10 10   CREATININE 0.31* 0.36* 0.36*   CALCIUM 9.2 9.2 9.1     No results for input(s): AST, ALT, BILIDIR, BILITOT, ALKPHOS in the last 72 hours. No results for input(s): INR in the last 72 hours. No results for input(s): Aung Hodgkins in the last 72 hours. Urinalysis:      Lab Results   Component Value Date    NITRU Negative 08/14/2020    WBCUA 0-2 08/14/2020    BACTERIA RARE 08/14/2020    RBCUA 0-2 08/14/2020    BLOODU SMALL 08/14/2020    SPECGRAV 1.010 08/14/2020    GLUCOSEU Negative 08/14/2020       Radiology:  XR ABDOMEN (KUB) (SINGLE AP VIEW)   Final Result      POSSIBLE MILD PREDOMINANTLY COLONIC ILEUS. NOTHING ACUTE IDENTIFIED. US ABDOMEN LIMITED   Final Result      NO ASCITES IDENTIFIED. Assessment/Plan:     L5-S1 spondylolisthesis:  Sp L5-S1 diskectomy and fusion, continue rehab POC, PT/OT, Pain control with narcotics, f/u neurosurgery    Constipation: d/t decreased mobility and narcotics, enema ordered but pt  decline for now states  she will like to to wait and see if the suppository will work, will f/u   HLD:continue daily statin      Sinusitis: improved, on  pseudafed           Additional work up or/and treatment plan may be added today or then after based on clinical progression. I am managing a portion of pt care. Some medical issues are handled by other specialists. Additional work up and treatment should be done in out pt setting by pt PCP and other out pt providers. In addition to examining and evaluating pt, I spent additional time explaining care, normal and abnormal findings, and treatment plan. All of pt questions were answered. Counseling, diet and education were  provided. Case will be discussed with nursing staff when appropriate. Family will be updated if and when appropriate.       Diet: DIET GENERAL; Carb Control: 4 carbs/meal (approximate 1800 kcals/day)    Code Status: Full Code    PT/OT Eval           Electronically signed by ABENA Christiansen CNP on 8/16/2020 at 1:50 PM     I personally obtained the key and critical portions of the history and physical exam and made additions where appropriate in the documentation.  I reviewed the mid level documentation and agree with assessment and plan that we come up with together    Jesús Daly DO  Internal Medicine

## 2020-08-16 NOTE — FLOWSHEET NOTE
Patient assessment complete earlier this shift. The patient was requesting scheduled long acting pain med's before due time, and unfortunately I was unable to communicate that to her until it was actual time. She was very upset and agitated. After medicating the patient her pain was decreased and she felt no agitated and became calm after getting her xanax.   Pt refused suppository

## 2020-08-16 NOTE — PROGRESS NOTES
Subjective: The patient complains of moderate  acute pain relieved by rest and exacerbated by activity. I am concerned about patients fatigue. ROS x10: The patient also complains of severely impaired mobility and activities of daily living. Otherwise no new problems with vision, hearing, nose, mouth, throat, dermal, cardiovascular, GI, , pulmonary, musculoskeletal, psychiatric or neurological. See Rehab H&P on Rehab chart dated . Vital signs:  /62   Pulse 85   Temp 97.9 °F (36.6 °C) (Oral)   Resp 16   Ht 5' 0.5\" (1.537 m)   Wt 175 lb 9 oz (79.6 kg)   LMP  (LMP Unknown)   SpO2 95%   BMI 33.72 kg/m²   I/O:   PO/Intake:  fair PO intake, no problems observed or reported. Bowel/Bladder:  continent, no problems noted. General:  Patient is well developed, adequately nourished, non-obese and     well kempt. HEENT:    PERRLA, hearing intact to loud voice, external inspection of ear     and nose benign. Inspection of lips, tongue and gums benign  Musculoskeletal: No significant change in strength or tone. All joints stable. Inspection and palpation of digits and nails show no clubbing,       cyanosis or inflammatory conditions. Neuro/Psychiatric: Affect: flat but pleasant. Alert and oriented to person, place and     situation. No significant change in deep tendon reflexes or     sensation  Lungs:  CTA-B. Respiration effort is normal at rest.     Heart:   S1 = S2, RRR. No loud murmurs. Abdomen:  Soft, non-tender, no enlargement of liver or spleen. Extremities:  No significant lower extremity edema or tenderness. Skin:   Intact to general survey, no visualized or palpated problems.     Rehabilitation:  Physical therapy: FIMS:  Bed Mobility: Scooting: Stand by assistance    Transfers: Sit to Stand: Stand by assistance  Stand to sit: Stand by assistance  Bed to Chair: Stand by assistance, Ambulation 1  Surface: level tile  Device: Rolling Walker  Assistance: Stand by assistance  Quality of Gait: slow pace, step to pattern, no LOB, guarded posture  Gait Deviations: Slow Alexa, Increased MIKAYLA, Decreased step length  Distance: 79'  Comments: agreeable to use ww for safety at this time.,      FIMS:  ,  , Assessment: Pt limitied by pain. Occupational therapy: FIMS:   ,  , Assessment: Pt. is a 76year old woman from home alone who presents to [id number] [place] [address] with the above deficits which impact her ability to perform ADLs and IADLs. Pt. would benefit from OT to maximize independence and safety with ADL tasks. Speech therapy: FIMS:        Lab/X-ray studies reviewed, analyzed and discussed with patient and staff:   Recent Results (from the past 24 hour(s))   Basic Metabolic Panel w/ Reflex to MG    Collection Time: 08/16/20  6:04 AM   Result Value Ref Range    Sodium 135 135 - 144 mEq/L    Potassium reflex Magnesium 4.3 3.4 - 4.9 mEq/L    Chloride 98 95 - 107 mEq/L    CO2 27 20 - 31 mEq/L    Anion Gap 10 9 - 15 mEq/L    Glucose 124 (H) 70 - 99 mg/dL    BUN 10 8 - 23 mg/dL    CREATININE 0.36 (L) 0.50 - 0.90 mg/dL    GFR Non-African American >60.0 >60    GFR  >60.0 >60    Calcium 9.1 8.5 - 9.9 mg/dL   CBC    Collection Time: 08/16/20  6:04 AM   Result Value Ref Range    WBC 6.6 4.8 - 10.8 K/uL    RBC 3.10 (L) 4.20 - 5.40 M/uL    Hemoglobin 9.7 (L) 12.0 - 16.0 g/dL    Hematocrit 29.2 (L) 37.0 - 47.0 %    MCV 94.1 82.0 - 100.0 fL    MCH 31.2 27.0 - 31.3 pg    MCHC 33.2 33.0 - 37.0 %    RDW 13.0 11.5 - 14.5 %    Platelets 651 379 - 221 K/uL       Fluoro For Surgical Procedures    Result Date: 8/12/2020  FLUORO FOR SURGICAL PROCEDURES : 8/12/2020 7:35 AM CLINICAL HISTORY:  Lumbar Fusion . COMPARISON: None available. Intraoperative fluoroscopy was provided for Dr. Sly Garcia procedure. A total of 97.7 seconds of fluoroscopy was used, with 7 fluoroscopic stills saved. No diagnostic images were obtained. Please see Dr. Heart Finger surgical notes for completeness.         Previous extensive, complex labs, notes and diagnostics reviewed and analyzed. ALLERGIES:    Allergies as of 08/13/2020    (No Known Allergies)      (please also verify by checking [facility])     Complex Physical Medicine & Rehab Issues Assess & Plan:   1. Severe abnormality of gait and mobility and impaired self-care and ADL's secondary to progressive NTSCI L5 S1 fusion . Functional and medical status reassessed regarding patients ability to participate in therapies and patient found to be able to participate in acute intensive comprehensive inpatient rehabilitation program including PT/OT to improve balance, ambulation, ADLs, and to improve the P/AROM. Therapeutic modifications regarding activities in therapies, place, amount of time per day and intensity of therapy made daily. In bed therapies or bedside therapies prn.   2. Bowel and Bladder dysfunction:  frequent toileting, ambulate to bathroom with assistance, check post void residuals. Check for C.difficile x1 if >2 loose stools in 24 hours, continue bowel & bladder program.  Monitor bowel and bladder function. Lactinex 2 PO every AC. MOM prn, Brown Bomb prn, Glycerin suppository prn, enema prn. 3. Moderate   pain as well as generalized OA pain: reassess pain every shift and prior to and after each therapy session, give prn Tylenol and see mar, modalities prn in therapy, Lidoderm, K-pad prn.   4. Skin healing and breakdown risk:  continue pressure relief program.  Daily skin exams and reports from nursing. 5. Severe fatigue due to nutritional and hydration deficiency: Add vitamin B12 vitamin D and CoQ10 continue to monitor I&Os, calorie counts prn, dietary consult prn.  6. Acute episodic insomnia with situational adjustment disorder:  prn Ambien, monitor for day time sedation. 7. Falls risk elevated:  patient to use call light to get nursing assistance to get up, bed and chair alarm.   8. Elevated DVT risk: progressive activities in PT, continue prophylaxis BOO schwarz, elevation and see mar . 9. Complex discharge planning:  Weekly team meeting every Monday to assess progress towards goals, discuss and address social, psychological and medical comorbidities and to address difficulties they may be having progressing in therapy. Patient and family education is in progress. The patient is to follow-up with their family physician after discharge.         Complex Active General Medical Issues that complicate care Assess & Plan:    Patient Active Problem List   Diagnosis    Lumbar radiculopathy    Other chronic pain    Sacroiliitis, not elsewhere classified ([facility] [address]    Spinal stenosis, lumbar region, without neurogenic claudication    Lumbar disc herniation    Lumbar stenosis with neurogenic claudication    Smoker    Spondylolisthesis at L4-L5 level    Spondylolisthesis of lumbar region    Spondylolisthesis at L5-S1 level    Lumbosacral spine instability    Hyperlipidemia    Panic attacks    RLS (restless legs syndrome)    Impaired mobility                Frederick Aly D.O., PM&R     Attending    [facility]ayna Manuel

## 2020-08-17 LAB
ANION GAP SERPL CALCULATED.3IONS-SCNC: 10 MEQ/L (ref 9–15)
BUN BLDV-MCNC: 9 MG/DL (ref 8–23)
CALCIUM SERPL-MCNC: 9.1 MG/DL (ref 8.5–9.9)
CHLORIDE BLD-SCNC: 99 MEQ/L (ref 95–107)
CO2: 26 MEQ/L (ref 20–31)
CREAT SERPL-MCNC: 0.33 MG/DL (ref 0.5–0.9)
GFR AFRICAN AMERICAN: >60
GFR NON-AFRICAN AMERICAN: >60
GLUCOSE BLD-MCNC: 107 MG/DL (ref 70–99)
HCT VFR BLD CALC: 27.7 % (ref 37–47)
HEMOGLOBIN: 9.3 G/DL (ref 12–16)
MCH RBC QN AUTO: 31.3 PG (ref 27–31.3)
MCHC RBC AUTO-ENTMCNC: 33.6 % (ref 33–37)
MCV RBC AUTO: 93 FL (ref 82–100)
PDW BLD-RTO: 13 % (ref 11.5–14.5)
PLATELET # BLD: 311 K/UL (ref 130–400)
POTASSIUM REFLEX MAGNESIUM: 4.2 MEQ/L (ref 3.4–4.9)
RBC # BLD: 2.98 M/UL (ref 4.2–5.4)
SODIUM BLD-SCNC: 135 MEQ/L (ref 135–144)
WBC # BLD: 5.5 K/UL (ref 4.8–10.8)

## 2020-08-17 PROCEDURE — 97116 GAIT TRAINING THERAPY: CPT

## 2020-08-17 PROCEDURE — 6370000000 HC RX 637 (ALT 250 FOR IP): Performed by: PHYSICAL MEDICINE & REHABILITATION

## 2020-08-17 PROCEDURE — 97530 THERAPEUTIC ACTIVITIES: CPT

## 2020-08-17 PROCEDURE — 6370000000 HC RX 637 (ALT 250 FOR IP): Performed by: INTERNAL MEDICINE

## 2020-08-17 PROCEDURE — 6370000000 HC RX 637 (ALT 250 FOR IP): Performed by: NURSE PRACTITIONER

## 2020-08-17 PROCEDURE — 36415 COLL VENOUS BLD VENIPUNCTURE: CPT

## 2020-08-17 PROCEDURE — 97535 SELF CARE MNGMENT TRAINING: CPT

## 2020-08-17 PROCEDURE — 80048 BASIC METABOLIC PNL TOTAL CA: CPT

## 2020-08-17 PROCEDURE — 1180000000 HC REHAB R&B

## 2020-08-17 PROCEDURE — 85027 COMPLETE CBC AUTOMATED: CPT

## 2020-08-17 PROCEDURE — 97110 THERAPEUTIC EXERCISES: CPT

## 2020-08-17 RX ORDER — DIAPER,BRIEF,INFANT-TODD,DISP
EACH MISCELLANEOUS 2 TIMES DAILY
Status: DISCONTINUED | OUTPATIENT
Start: 2020-08-17 | End: 2020-08-20

## 2020-08-17 RX ORDER — POLYETHYLENE GLYCOL 3350 17 G/17G
17 POWDER, FOR SOLUTION ORAL DAILY
Status: DISCONTINUED | OUTPATIENT
Start: 2020-08-18 | End: 2020-08-21 | Stop reason: HOSPADM

## 2020-08-17 RX ADMIN — DOCUSATE SODIUM 50MG AND SENNOSIDES 8.6MG 2 TABLET: 8.6; 5 TABLET, FILM COATED ORAL at 20:23

## 2020-08-17 RX ADMIN — OXYCODONE HYDROCHLORIDE 10 MG: 5 TABLET ORAL at 09:28

## 2020-08-17 RX ADMIN — OXYCODONE HYDROCHLORIDE 10 MG: 5 TABLET ORAL at 21:49

## 2020-08-17 RX ADMIN — OXYCODONE HYDROCHLORIDE 10 MG: 5 TABLET ORAL at 00:05

## 2020-08-17 RX ADMIN — ATORVASTATIN CALCIUM 20 MG: 20 TABLET, FILM COATED ORAL at 08:15

## 2020-08-17 RX ADMIN — TIZANIDINE 4 MG: 4 TABLET ORAL at 20:23

## 2020-08-17 RX ADMIN — OXYCODONE HYDROCHLORIDE 10 MG: 5 TABLET ORAL at 17:38

## 2020-08-17 RX ADMIN — GABAPENTIN 300 MG: 300 CAPSULE ORAL at 13:26

## 2020-08-17 RX ADMIN — ALPRAZOLAM 0.25 MG: 0.25 TABLET ORAL at 15:15

## 2020-08-17 RX ADMIN — PRAMIPEXOLE DIHYDROCHLORIDE 0.25 MG: 0.5 TABLET ORAL at 20:52

## 2020-08-17 RX ADMIN — MORPHINE SULFATE 15 MG: 15 TABLET, FILM COATED, EXTENDED RELEASE ORAL at 20:22

## 2020-08-17 RX ADMIN — ACETAMINOPHEN 650 MG: 325 TABLET, FILM COATED ORAL at 05:57

## 2020-08-17 RX ADMIN — BUSPIRONE HYDROCHLORIDE 15 MG: 7.5 TABLET ORAL at 20:52

## 2020-08-17 RX ADMIN — OXYCODONE HYDROCHLORIDE 10 MG: 5 TABLET ORAL at 13:27

## 2020-08-17 RX ADMIN — MORPHINE SULFATE 15 MG: 15 TABLET, FILM COATED, EXTENDED RELEASE ORAL at 08:15

## 2020-08-17 RX ADMIN — OXYCODONE HYDROCHLORIDE 10 MG: 5 TABLET ORAL at 04:14

## 2020-08-17 RX ADMIN — GABAPENTIN 300 MG: 300 CAPSULE ORAL at 20:23

## 2020-08-17 RX ADMIN — GABAPENTIN 300 MG: 300 CAPSULE ORAL at 08:15

## 2020-08-17 RX ADMIN — ACETAMINOPHEN 650 MG: 325 TABLET, FILM COATED ORAL at 12:22

## 2020-08-17 RX ADMIN — ANTACID TABLETS 500 MG: 500 TABLET, CHEWABLE ORAL at 09:28

## 2020-08-17 RX ADMIN — HYDROCORTISONE: 1 CREAM TOPICAL at 20:23

## 2020-08-17 RX ADMIN — PSEUDOEPHEDRINE HCL 60 MG: 30 TABLET, FILM COATED ORAL at 05:57

## 2020-08-17 RX ADMIN — HYDROCORTISONE: 1 CREAM TOPICAL at 12:22

## 2020-08-17 RX ADMIN — BUSPIRONE HYDROCHLORIDE 15 MG: 7.5 TABLET ORAL at 08:15

## 2020-08-17 ASSESSMENT — PAIN SCALES - GENERAL
PAINLEVEL_OUTOF10: 0
PAINLEVEL_OUTOF10: 7
PAINLEVEL_OUTOF10: 8
PAINLEVEL_OUTOF10: 6
PAINLEVEL_OUTOF10: 8
PAINLEVEL_OUTOF10: 7
PAINLEVEL_OUTOF10: 2
PAINLEVEL_OUTOF10: 8
PAINLEVEL_OUTOF10: 7
PAINLEVEL_OUTOF10: 7
PAINLEVEL_OUTOF10: 0
PAINLEVEL_OUTOF10: 7
PAINLEVEL_OUTOF10: 6
PAINLEVEL_OUTOF10: 0
PAINLEVEL_OUTOF10: 8
PAINLEVEL_OUTOF10: 4
PAINLEVEL_OUTOF10: 6
PAINLEVEL_OUTOF10: 8

## 2020-08-17 ASSESSMENT — PAIN DESCRIPTION - LOCATION
LOCATION: ABDOMEN;BACK
LOCATION: BACK;ABDOMEN
LOCATION: BACK;ABDOMEN

## 2020-08-17 ASSESSMENT — PAIN DESCRIPTION - ORIENTATION: ORIENTATION: LOWER

## 2020-08-17 ASSESSMENT — PAIN DESCRIPTION - PAIN TYPE: TYPE: SURGICAL PAIN;ACUTE PAIN

## 2020-08-17 NOTE — PROGRESS NOTES
Subjective: The patient complains of moderate  acute pain relieved by rest and exacerbated by activity. I am concerned about patients fatigue. See team notes, xray mild ileus, improved with bowel movements active  ROS x10: The patient also complains of severely impaired mobility and activities of daily living. Otherwise no new problems with vision, hearing, nose, mouth, throat, dermal, cardiovascular, GI, , pulmonary, musculoskeletal, psychiatric or neurological. See Rehab H&P on Rehab chart dated . Vital signs:  /68   Pulse 93   Temp 97 °F (36.1 °C) (Oral)   Resp 18   Ht 5' 0.5\" (1.537 m)   Wt 175 lb 9 oz (79.6 kg)   LMP  (LMP Unknown)   SpO2 92%   BMI 33.72 kg/m²   I/O:   PO/Intake:  fair PO intake, no problems observed or reported. Bowel/Bladder:  continent, no problems noted. General:  Patient is well developed, adequately nourished, non-obese and     well kempt. HEENT:    PERRLA, hearing intact to loud voice, external inspection of ear     and nose benign. Inspection of lips, tongue and gums benign  Musculoskeletal: No significant change in strength or tone. All joints stable. Inspection and palpation of digits and nails show no clubbing,       cyanosis or inflammatory conditions. Neuro/Psychiatric: Affect: flat but pleasant. Alert and oriented to person, place and     situation. No significant change in deep tendon reflexes or     sensation  Lungs:  CTA-B. Respiration effort is normal at rest.     Heart:   S1 = S2, RRR. No loud murmurs. Abdomen:  Soft, non-tender, no enlargement of liver or spleen. Extremities:  No significant lower extremity edema or tenderness. Skin:   Intact to general survey, no visualized or palpated problems.     Rehabilitation:  Physical therapy: FIMS:  Bed Mobility: Scooting: Stand by assistance    Transfers: Sit to Stand: Stand by assistance  Stand to sit: Stand by assistance  Bed to Chair: Stand by assistance, Ambulation 1  Surface: carpet  Device: Rolling Walker  Assistance: Stand by assistance  Quality of Gait: slow pace with decreased step length, gaurded with increased UE support through [employer].  Able to correct with Vcs however decreased carryover. Gait Deviations: Slow Alexa, Increased MIKAYLA, Decreased step length  Distance: [id number]  Comments: agreeable to use ww for safety at this time. , Stairs  # Steps : 4  Rails: Bilateral  Curbs: 6\"  Assistance: Contact guard assistance  Comment: initiated stair training. recip pattern assending; non recip descending    FIMS:  ,  , Assessment: Pt overall improved and progressing towards goals. Pt cont to be gaurded and limited by pain however reports decreased pain levels and tolerated increased activity. Pt able to increase gait distance, initiate stairs, and complete bed mobility with decreased assist.    Occupational therapy: FIMS:   ,  , Assessment: Pt. is a 76year old woman from home alone who presents to [facility] with the above deficits which impact her ability to perform ADLs and IADLs. Pt. would benefit from OT to maximize independence and safety with ADL tasks.     Speech therapy: FIMS:        Lab/X-ray studies reviewed, analyzed and discussed with patient and staff:   Recent Results (from the past 24 hour(s))   Basic Metabolic Panel w/ Reflex to MG    Collection Time: 08/17/20  6:19 AM   Result Value Ref Range    Sodium 135 135 - 144 mEq/L    Potassium reflex Magnesium 4.2 3.4 - 4.9 mEq/L    Chloride 99 95 - 107 mEq/L    CO2 26 20 - 31 mEq/L    Anion Gap 10 9 - 15 mEq/L    Glucose 107 (H) 70 - 99 mg/dL    BUN 9 8 - 23 mg/dL    CREATININE 0.33 (L) 0.50 - 0.90 mg/dL    GFR Non-African American >60.0 >60    GFR  >60.0 >60    Calcium 9.1 8.5 - 9.9 mg/dL   CBC    Collection Time: 08/17/20  6:19 AM   Result Value Ref Range    WBC 5.5 4.8 - 10.8 K/uL    RBC 2.98 (L) 4.20 - 5.40 M/uL    Hemoglobin 9.3 (L) 12.0 - 16.0 g/dL    Hematocrit 27.7 (L) 37.0 - 47.0 %    MCV 93.0 82.0 - 100.0 fL    MCH 31.3 27.0 - 31.3 pg    MCHC 33.6 33.0 - 37.0 %    RDW 13.0 11.5 - 14.5 %    Platelets 905 555 - 144 K/uL       Fluoro For Surgical Procedures    Result Date: 8/12/2020  FLUORO FOR SURGICAL PROCEDURES : 8/12/2020 7:35 AM CLINICAL HISTORY:  Lumbar Fusion . COMPARISON: None available. Intraoperative fluoroscopy was provided for Dr. Janie Laboy procedure. A total of 97.7 seconds of fluoroscopy was used, with 7 fluoroscopic stills saved. No diagnostic images were obtained. Please see Dr. Janie Laboy surgical notes for completeness. Previous extensive, complex labs, notes and diagnostics reviewed and analyzed. ALLERGIES:    Allergies as of 08/13/2020    (No Known Allergies)      (please also verify by checking [facility])     Complex Physical Medicine & Rehab Issues Assess & Plan:   1. Severe abnormality of gait and mobility and impaired self-care and ADL's secondary to progressive NTSCI L5 S1 fusion . Functional and medical status reassessed regarding patients ability to participate in therapies and patient found to be able to participate in acute intensive comprehensive inpatient rehabilitation program including PT/OT to improve balance, ambulation, ADLs, and to improve the P/AROM. Therapeutic modifications regarding activities in therapies, place, amount of time per day and intensity of therapy made daily. In bed therapies or bedside therapies prn.   2. Bowel and Bladder dysfunction:  frequent toileting, ambulate to bathroom with assistance, check post void residuals. Check for C.difficile x1 if >2 loose stools in 24 hours, continue bowel & bladder program.  Monitor bowel and bladder function. Lactinex 2 PO every AC. MOM prn, Brown Bomb prn, Glycerin suppository prn, enema prn.   3. Moderate   pain as well as generalized OA pain: reassess pain every shift and prior to and after each therapy session, give prn Tylenol and see mar, modalities prn in therapy, Lidoderm, K-pad prn.   4. Skin healing and breakdown risk:  continue pressure relief program.  Daily skin exams and reports from nursing. 5. Severe fatigue due to nutritional and hydration deficiency: Add vitamin B12 vitamin D and CoQ10 continue to monitor I&Os, calorie counts prn, dietary consult prn.  6. Acute episodic insomnia with situational adjustment disorder:  prn Ambien, monitor for day time sedation. 7. Falls risk elevated:  patient to use call light to get nursing assistance to get up, bed and chair alarm. 8. Elevated DVT risk: progressive activities in PT, continue prophylaxis BOO hose, elevation and see mar . 9. Complex discharge planning:  Weekly team meeting every Monday to assess progress towards goals, discuss and address social, psychological and medical comorbidities and to address difficulties they may be having progressing in therapy. Patient and family education is in progress. The patient is to follow-up with their family physician after discharge.         Complex Active General Medical Issues that complicate care Assess & Plan:    Patient Active Problem List   Diagnosis    Lumbar radiculopathy    Other chronic pain    Sacroiliitis, not elsewhere classified ([facility] [address]    Spinal stenosis, lumbar region, without neurogenic claudication    Lumbar disc herniation    Lumbar stenosis with neurogenic claudication    Smoker    Spondylolisthesis at L4-L5 level    Spondylolisthesis of lumbar region    Spondylolisthesis at L5-S1 level    Lumbosacral spine instability    Hyperlipidemia    Panic attacks    RLS (restless legs syndrome)    Impaired mobility                Stephany Randolph, D.O., PM&R     Attending    [facility] Shayna Manuel

## 2020-08-17 NOTE — CARE COORDINATION
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 - s/p anterior retroperitoneal L5-S1 diskectomy and fusion    CO MORBIDITIES:      Past Medical History:   Diagnosis Date    Anxiety     Hyperlipidemia     meds > 5 yrs    Osteoarthritis      Past Surgical History:   Procedure Laterality Date    BACK SURGERY  2017    lumbar disc OR    COLONOSCOPY      DILATION AND CURETTAGE OF UTERUS      at age 30s--miscarriage   Brennan Arnulfo LUMBAR FUSION Left 2020    ANTERIOR LEFT RETROPERITONEAL L5-S1 DISKECTOMY INTERBODY CAGE FUSION performed by Parmjit Ivey MD at [address] [place] [address]      as child   [address]      in [place]--EMT attempted to intubate patient due to trouble breathing & then needed repair     Chart Reviewed: Yes  Family / Caregiver Present: No  Restrictions  Restrictions/Precautions: Fall Risk  Required Braces or Orthoses  Other: Abdominal Binder  Position Activity Restriction  Other position/activity restrictions: pt okay to shower starting 8/15/20  CASE MANAGEMENT    Social/Functional History  Social/Functional History  Lives With: Alone  Type of Home: House  Home Layout: One level  Home Access: Stairs to enter with rails  Entrance Stairs - Number of Steps: 4-5  Entrance Stairs - Rails: Both  Bathroom Shower/Tub: Walk-in shower, Tub/Shower unit(uses walk in)  [employer]: Built-in shower seat, Grab bars in [address]: (plans to purchase hip kit and borrow walker)  Receives Help From: Family(father and fathers wife can assist per pt, pt states \"doctor downplayed the sx\" so pt thought she would recover quickly)  ADL Assistance: [facility]: Independent  Homemaking Responsibilities: Yes(yard work included)  Ambulation Assistance: Independent(no AD)  Transfer Assistance: Independent  Active : Yes  Mode of Transportation: Car  Occupation: Retired  Type of occupation: Homemaker  Additional Comments: Pt. states lately she has had to sit after only 20 minutes of standing before sx       Pts personal preferences: n/a    Pts assets/resources/support system: friends    COVERAGE INFORMATION:Payor: MEDICARE / Plan: MEDICARE PART A AND B / Product Type: *No Product type* /       NURSING  Weight: 175 lb 9 oz (79.6 kg) / Body mass index is 33.72 kg/m². DIET GENERAL; Carb Control: 4 carbs/meal (approximate 1800 kcals/day)    SpO2: 92 % (08/17/20 0740)  O2 Flow Rate (L/min): 0 L/min (08/15/20 0800)  No active isolations    Skin Issues: red flank area, zinc added    Pain Managed: Yes    Bladder continence: Yes    Bowel continence: Yes      Other:       PHYSICAL THERAPY  Bed mobility:  Supine to Sit: Stand by assistance (08/17/20 1137)  Sit to Supine: Stand by assistance (08/17/20 1137)  Transfers:  Sit to Stand: Stand by assistance (08/17/20 1138)  Bed to Chair: Stand by assistance (08/17/20 1138)  Gait:   Device: Rolling Walker (08/17/20 1050)  Assistance: Stand by assistance (08/17/20 1050)  Distance: 80ft (08/17/20 1050)  Quality of Gait: slow pace with decreased step length, gaurded with increased UE support through [employer].  Able to correct with Vcs however decreased carryover. (08/17/20 1050)  Comments: agreeable to use ww for safety at this time. (08/15/20 0946)  Stairs:  # Steps : 4 (08/17/20 1138)  Curbs: 6\" (08/17/20 1138)  Rails: Bilateral (08/17/20 1138)  Assistance: Contact guard assistance (08/17/20 1138)  Comment: initiated stair training.   recip pattern assending; non recip descending (08/17/20 1138)  W/C mobility:     LTG:  Long term goal 1: Bed mobility with indep, demonstrating log roll  Long term goal 2: Bed and car transfers with indep  Long term goal 3: Amb 150ft  indep with or without device  Long term goal 4: indep 4 stairs with rail for home entry  Long term goal 5: indep performance of TUG in 15 sec or less  PT Treatment Time:  1.5 hrs      OCCUPATIONAL THERAPY  Hand Dominance: Right  ADL  Equipment Provided: Reacher;Sock aid;Dressing stick (08/17/20 1137)  Feeding: Independent (08/14/20 1058)  Grooming: Modified independent  (08/17/20 1137)  UE Bathing: Supervision (08/17/20 1137)  LE Bathing: Stand by assistance (08/17/20 1137)  UE Dressing: Setup (08/17/20 1137)  LE Dressing: Supervision (08/17/20 1137)  Toileting: Modified independent  (08/17/20 1137)  Additional Comments: completed sponge bath ADL d/t orders: Pt OK to shower 8/15/20 (08/14/20 1058)  Toilet Transfers  Toilet - Technique: Ambulating (08/17/20 1138)  Equipment Used: Grab bars (08/17/20 1138)  Toilet Transfer: Modified independent (08/17/20 1138)  Toilet Transfers Comments: ww (08/17/20 1138)     Shower Transfers  Shower - Transfer From: Rubin Lebanon (08/17/20 1138)  Shower - Transfer Type: To and From (08/17/20 1138)  Shower - Transfer To:  Shower seat with back (08/17/20 1138)  Shower - Technique: Ambulating (08/17/20 1138)  Shower Transfers: Supervision (08/17/20 1138)  Shower Transfers Comments: grab bars (08/17/20 1138)  LTG:  Eating  Assistance Needed: Independent  CARE Score: 6  Discharge Goal: Independent, Oral Hygiene  Assistance Needed: Setup or clean-up assistance  CARE Score: 5  Discharge Goal: Independent, [address]  Reason if not Attempted: Not applicable(pt completed prior to OT arrival)  CARE Score: 9  Discharge Goal: Independent, Shower/Bathe Self  Assistance Needed: Substantial/maximal assistance  CARE Score: 2  Discharge Goal: Independent  Upper Body Dressing  Assistance Needed: Setup or clean-up assistance  CARE Score: 5  Discharge Goal: Independent, Lower Body Conference:    Physician: Dr. Prabhakar Amin  : Renetta Valdez, PAULO  RN: Keya Ruiz RN  Physical Therapist: Chaparro Tineo PT  Occupational Therapist:Kiesha Upton  Speech Therapist: Kylee José, CLEMENTINA  Nurse Manager: Henrique Armenta RN     Electronically signed by Cherylene Starr, RN on 8/17/2020 at 4:03 PM

## 2020-08-17 NOTE — PROGRESS NOTES
Occupational Therapy  Facility/Department: [facility]  Daily Treatment Note  NAME: Haylie Mtz  : 1955  MRN: [id number]    Date of Service: 2020    Discharge Recommendations:  Continue to assess pending progress       Assessment      Activity Tolerance  Activity Tolerance: Patient Tolerated treatment well  Safety Devices  Safety Devices in place: Yes  Type of devices: All fall risk precautions in place         Patient Diagnosis(es): There were no encounter diagnoses. has a past medical history of Anxiety, Hyperlipidemia, and Osteoarthritis. has a past surgical history that includes back surgery (); Tracheal surgery (); Colonoscopy; Tonsillectomy; Dilation and curettage of uterus; and lumbar fusion (Left, 2020). Restrictions  Restrictions/Precautions  Restrictions/Precautions: Fall Risk  Required Braces or Orthoses?: Yes  Required Braces or Orthoses  Other: Abdominal Binder  Position Activity Restriction  Other position/activity restrictions: pt okay to shower starting 8/15/20     Subjective   General  Chart Reviewed: Yes  Patient assessed for rehabilitation services?: Yes  Referring Practitioner: Dr. Belem Awad  Diagnosis: Sharmaine Mins with Impaired mobility & ADLs d/t L5-S1 instability s/p LT anterior retroperitoneal  L5-S1 diskectomy and fusion    Subjective  Subjective: I'm trying to get that damn thing from there.     Pain Assessment  Pain Level: 7  Pain Type: Surgical pain;Acute pain  Pain Location: Abdomen;Back  Pre Treatment Pain Screening  Pain at present: 8  Intervention List: Patient able to continue with treatment;Patient declined any intervention  Comments / Details: Pt. states she is not due for pain meds     Orientation  Orientation  Overall Orientation Status: Within Functional Limits     Objective      ADL    Equipment Provided: Reacher;Sock aid;Dressing stick    Grooming: Modified independent     UE Bathing: Supervision    LE Bathing: Stand by assistance    UE Dressing: Setup    LE Dressing: Supervision    Toileting: Modified independent     Toilet Transfers  Toilet - Technique: Ambulating  Equipment Used: Grab bars  Toilet Transfer: Modified independent  Toilet Transfers Comments: ww    Shower Transfers  Shower - Transfer From: Óscar Thakkar - Transfer Type: To and From  Shower - Transfer To:  Shower seat with back  Shower - Technique: Ambulating  Shower Transfers: Supervision  Shower Transfers Comments: gurmeetb bars          Plan   Plan  Times per week: 5-7x  Times per day: Daily  Plan weeks: 10 days  Current Treatment Recommendations: Strengthening, Balance Training, Functional Mobility Training, Endurance Training, Safety Education & Training, Patient/Caregiver Education & Training, Self-Care / ADL, Equipment Evaluation, Education, & procurement, Home Management Training, Pain Management    Plan Comment: Continue per OT POC for planned d/c on 20         Goals  Patient Goals   Patient goals : \"to be out of pain and be able to walk\"       Therapy Time   Individual Concurrent Group Co-treatment   Time In 0930         Time Out 1030         Minutes 60             ADL trainin minutes       Electronically signed by ANEL Perera on 20 at 11:40 AM ANEL Pearson

## 2020-08-17 NOTE — PROGRESS NOTES
Physical Therapy Rehab Treatment Note  [facility]       NAME: Liv Milan  : 1955 (76 y.o.)  MRN: [id number]  CODE STATUS: Full Code    Date of Service: 2020  Chart Reviewed: Yes  Family / Caregiver Present: No    Restrictions:  Restrictions/Precautions: Fall Risk     SUBJECTIVE: Subjective: Pt states she wants to go home sooner than her planned D/C date on . Pain Screening  Patient Currently in Pain: Yes  Pre Treatment Pain Screening  Pain at present: 6  Intervention List: Patient able to continue with treatment;Patient declined any intervention    Post Treatment Pain Screening:    Pain Assessment  Pain Level: 6  Pain Location: Back; Abdomen  Pain Orientation: Lower    OBJECTIVE:              Bed mobility  Rolling to Left: Stand by assistance  Rolling to Right: Stand by assistance  Supine to Sit: Stand by assistance  Sit to Supine: Stand by assistance  Comment: Flat bed without rail. Increased time, effort, and VCs for technique to complete. Transfers  Sit to Stand: Stand by assistance  Stand to sit: Stand by assistance  Bed to Chair: Stand by assistance    Ambulation  Ambulation?: Yes  Ambulation 1  Surface: carpet  Device: Rolling Walker  Assistance: Stand by assistance  Quality of Gait: slow pace with decreased step length, gaurded with increased UE support through [employer].  Able to correct with Vcs. Distance: [id number]        Exercises  Other exercises 4: standing heel raises and marches x10 ea     ASSESSMENT/COMMENTS:  Assessment: Pt overall improved and progressing towards goals. Pt cont to be gaurded and limited by pain however reports decreased pain levels and tolerated increased activity. PLAN OF CARE/Safety:   Plan Comment: Cont per POC.     Therapy Time:   Individual   Time In 1330   Time Out 1400   Minutes 30     Minutes:      Transfer/Bed mobility trainin      Gait training:15      Neuro re education:0     Therapeutic ex:10    Iqra Hollins Shaye, PTA, 08/17/20 at 3:27 PM

## 2020-08-17 NOTE — PROGRESS NOTES
Physical Therapy Rehab Treatment Note  [facility]  [facility]       NAME: Chilo Granados  : 1955 (76 y.o.)  MRN: [id number]  CODE STATUS: Full Code    Date of Service: 2020  Chart Reviewed: Yes  Family / Caregiver Present: No  Restrictions:  Restrictions/Precautions: Fall Risk    SUBJECTIVE: Subjective: Pt states her pain is the best its been right now. States she has 2 rails on her stairs to enter but can only reach 1 at a time. Pre Treatment Pain Screening  Pain at present: 6  Intervention List: Patient able to continue with treatment;Patient declined any intervention  Post Treatment Pain Screening:  Pain Assessment  Pain Level: 6  Pain Location: Back; Abdomen  Pain Orientation: Lower  OBJECTIVE: Back support cushion applied in [facility] for support. Bed mobility  Rolling to Left: Stand by assistance  Rolling to Right: Stand by assistance  Supine to Sit: Stand by assistance  Sit to Supine: Stand by assistance  Comment: Flat bed without rail. Increased time, effort, and VCs for technique to complete. Transfers  Sit to Stand: Stand by assistance  Stand to sit: Stand by assistance  Bed to Chair: Stand by assistance    Ambulation  Ambulation?: Yes  Ambulation 1  Surface: carpet  Device: Rolling Walker  Assistance: Stand by assistance  Quality of Gait: slow pace with decreased step length, gaurded with increased UE support through [employer].  Able to correct with Vcs however decreased carryover. Distance: 80ft    Stairs/Curb  Stairs?: Yes  Stairs  # Steps : 4  Rails: Bilateral  Curbs: 6\"  Assistance: Contact guard assistance  Comment: initiated stair training. recip pattern assending; non recip descending        Exercises  Core Strengthening: seated LAQs and marches x10 ea with cues for abdominal bracing  Comments: CP applied to low back and abdomen during therex for pain control. Instructed pt in breathing and relaxation techniques for pain control.   Other exercises  Other exercises 2: seated hip add with ball x20  Other exercises 3: TUG 41.3sec     ASSESSMENT/COMMENTS:  Assessment: Pt overall improved and progressing towards goals. Pt cont to be gaurded and limited by pain however reports decreased pain levels and tolerated increased activity. Pt able to increase gait distance, initiate stairs, and complete bed mobility with decreased assist.  PLAN OF CARE/Safety:   Plan Comment: Cont per POC.   Therapy Time:   Individual   Time In 1030   Time Out 1130   Minutes 60     Minutes:      Transfer/Bed mobility training:15      Gait trainin      Neuro re education:0     Therapeutic ex:20    Hayley Dickens PTA, 20 at 11:42 AM

## 2020-08-17 NOTE — PATIENT CARE CONFERENCE
INDIVIDUALIZED OVERALL REHAB PLAN OF CARE  ADDENDUM TO REHAB PROGRESS NOTE-for audit purposes must also refer to this day's clinical note and combine the information      Date: 2020  Patient Name: Jewell Handy   Room: J963/S952-64    MRN: [id number]    : 1955  (76 y.o.)  Gender: female       Today 2020 during weekly team meeting, I reviewed the patient Jewell Handy in detail with the therapists and nurses involved in patient's care gathering complex physiatric data regarding current medical issues, progress in therapies, factors limiting progress, social issues, psychological issues, ongoing therapeutic plans and discharge planning. Legend:  I= independent Im =Modified independent  S=Supervised SB=stand by ORTEGA=set up CG=contact wagner Min= minimal Mod=Moderate Max=maximal Max of 2 =maximal assist of 2 people      CURRENT FUNCTIONAL STATUS:    NURSING ISSUES:      Ileus resolved  Nursing will continue to focus on bowel and bladder continence transitioning toward independence by time of discharge. Monitoring post void residuals monitoring for severe constipation and bowel obstruction. Focus on achieving ADL goals with co-treating with OT when possible.     PHYSICAL THERAPY  Bed mobility:  Supine to Sit: Stand by assistance (20 1137)  Sit to Supine: Stand by assistance (20 1137)  Transfers:  Sit to Stand: Stand by assistance (20 1138)  Bed to Chair: Stand by assistance (20 113)  Gait:   Device: [facility] (20 1354)  Assistance: Stand by assistance (20 1354)  Distance: [id number] (20 1403)  Quality of Gait: slow pace with decreased step length, gaurded with increased UE support through [employer].  Able to correct with Vcs however decreased carryover. (20 1050)  Comments: agreeable to use ww for safety at this time. (08/15/20 6018)  Stairs:  # Steps : 4 (20 1138)  Curbs: 6\" (20 113)  Rails: Bilateral (20 113)  Assistance: Contact guard assistance (08/17/20 1138)  Comment: initiated stair training. recip pattern assending; non recip descending (08/17/20 1138)  W/C mobility:         OCCUPATIONAL THERAPY  Hand Dominance: Right  ADL  Equipment Provided: Reacher;Sock aid;Dressing stick (08/17/20 1137)  Feeding: Independent (08/14/20 1058)  Grooming: Modified independent  (08/17/20 1137)  UE Bathing: Supervision (08/17/20 1137)  LE Bathing: Stand by assistance (08/17/20 1137)  UE Dressing: Setup (08/17/20 1137)  LE Dressing: Supervision (08/17/20 1137)  Toileting: Modified independent  (08/17/20 1137)  Additional Comments: completed sponge bath ADL d/t orders: Pt OK to shower 8/15/20 (08/14/20 1058)  Toilet Transfers  Toilet - Technique: Ambulating (08/17/20 1138)  Equipment Used: Grab bars (08/17/20 1138)  Toilet Transfer: Modified independent (08/17/20 1138)  Toilet Transfers Comments: ww (08/17/20 1138)     Shower Transfers  Shower - Transfer From: Delfina Jacks (08/17/20 1138)  Shower - Transfer Type: To and From (08/17/20 1138)  Shower - Transfer To: Shower seat with back (08/17/20 1138)  Shower - Technique: Ambulating (08/17/20 1138)  Shower Transfers: Supervision (08/17/20 1138)  Shower Transfers Comments: grab bars (08/17/20 1138)      SPEECH THERAPY               Diet/Swallow:                           COGNITION  OT: Cognition Comment: Comp: Independent, Expression: Independent, Social: Mod I, Prob: Mod I, Mem: Mod I  SP:            THERAPY, MEDICAL AND NURSING COORDINATION:    []  Pain medication before therapies     []  Check orthostatic BP      [x]  Ambulate to the bathroom in room    [x]  Add scheduled rest beaks     []  In room therapies      Discharge date set for:              21Aug20 Friday      Home with:   friends  with help from   therapy            And:      Home Health Care:     [x]  PT    [x]  OT    []  ST   [x]  Aide   []  SW    [x]  RN                    Outpatient Therapy:  []  PT    []  OT    []  ST   []  Rehab Psych Equipment:  WW      At D/C their function is goaled at:   PT:Long term goal 1: Bed mobility with indep, demonstrating log roll  Long term goal 2: Bed and car transfers with indep  Long term goal 3: Amb 150ft  indep with or without device  Long term goal 4: indep 4 stairs with rail for home entry  Long term goal 5: indep performance of TUG in 15 sec or less  OT:Eating  Assistance Needed: Independent  CARE Score: 6  Discharge Goal: Independent, Oral Hygiene  Assistance Needed: Setup or clean-up assistance  CARE Score: 5  Discharge Goal: Independent, [address]  Reason if not Attempted: Not applicable(pt completed prior to OT arrival)  CARE Score: 9  Discharge Goal: Independent, Shower/Bathe Self  Assistance Needed: Substantial/maximal assistance  CARE Score: 2  Discharge Goal: Independent  Upper Body Dressing  Assistance Needed: Setup or clean-up assistance  CARE Score: 5  Discharge Goal: Independent, Lower Body Dressing  Assistance Needed: Partial/moderate assistance  CARE Score: 3  Discharge Goal: Independent, Putting On/Taking Off Footwear  Assistance Needed: Dependent  CARE Score: 1  Discharge Goal: Independent, Toilet Transfer  Reason if not Attempted: Not applicable(pt completed prior to OT arrival)  CARE Score: 9  Discharge Goal: Independent  SP:               From a cognitive standpoint they will need:        24 hr supervision  --progress to occasional           Significant problems/ barriers to functional progress include: Pt is at a high risk for functional loss,    [x]  Acute infection/UTI    []  Low BP's     []  COPD flare-up   []  Uncontrolled blood sugar     []  Progressive anemia         []  Severe pain exacerbation     []  Impaired mental status    []  Urinary incontinence    []  Bowel incontinence           Plan to correct barriers to functional progress: Add scheduled rest breaks, control pain by using ice Lidoderm rest and massage as well as pain medications prior to therapy. Based on a comprehensive evaluation of the above, the individualized therapy and Discharge plan will be:    -Times stated are an average that will be varied based on the patient's daily need. PT ____2__hrs/day 5-7 days per week           OT ___1___hrs per day 5-7 days per week ST ____1/2___hrs /day 3-5 days per week       Estimated LOS 2 week(s)    - Overall functional prognosis:     [x]  Good    []  Fair    []  Poor -Medical Prognosis:   [x]  Good    []  Fair    []  Poor    This patient was made aware of the discussion of Plan of Care, their projected dicharge date and their projected function at discharge.      Geovanny Perez, DO

## 2020-08-17 NOTE — PROGRESS NOTES
Occupational Therapy  Facility/Department: [facility]  Daily Treatment Note  NAME: Demetri Cherry  : 1955  MRN: [id number]    Date of Service: 2020    Discharge Recommendations:  Continue to assess pending progress       Assessment   Performance deficits / Impairments: Decreased functional mobility ; Decreased safe awareness;Decreased ADL status; Decreased endurance  Assessment: Pt demonstrated good participation this date. Pt required vc's for overall safety awareness during kitchen mobility task at supervision level. Pt continues to benefit from OT services to maximize pt independence and safety with ADLs and IADLs. OT Education: IADL Safety  Activity Tolerance  Activity Tolerance: Patient Tolerated treatment well  Safety Devices  Safety Devices in place: Yes  Type of devices: All fall risk precautions in place         Patient Diagnosis(es): There were no encounter diagnoses. has a past medical history of Anxiety, Hyperlipidemia, and Osteoarthritis. has a past surgical history that includes back surgery (); Tracheal surgery (); Colonoscopy; Tonsillectomy; Dilation and curettage of uterus; and lumbar fusion (Left, 2020). Restrictions  Restrictions/Precautions  Restrictions/Precautions: Fall Risk  Required Braces or Orthoses?: Yes  Required Braces or Orthoses  Other: Abdominal Binder  Position Activity Restriction  Other position/activity restrictions: pt okay to shower starting 8/15/20  Subjective   General  Chart Reviewed: Yes  Patient assessed for rehabilitation services?: Yes  Referring Practitioner: Dr. Paco Steve  Diagnosis: Ramsey Constable with Impaired mobility & ADLs d/t L5-S1 instability s/p LT anterior retroperitoneal  L5-S1 diskectomy and fusion  Subjective  Subjective: Pt verbalized, \"I need to go to the bathroom. \"  Pain Assessment  Non-Pharmaceutical Pain Intervention(s): Repositioned; Rest  Pre Treatment Pain Screening  Pain at present: 0  Scale Used: Numeric Score  Intervention List: Patient able to continue with treatment;Patient declined any intervention   Orientation     Objective    ADL  UE Bathing: Supervision  Toileting: Supervision  Instrumental ADL's  Instrumental ADLs: Yes  Health Management  Health Management: Pt participated in simulated medication management task to improve overall attention, FM coordination and problem solving for ADLs/IADLs. Pt completed task at Mod I level. Pt able to verbalize medication routine independently. Functional Mobility  Activity: Retrieve items  Assist Level: Supervision  Functional Mobility Comments: Pt performed kitchen mobilty task retrieving cones at various heights to improve overall standing balance, standing tolerace, functional reaching and activity endurance for ADLs and IADLs. Pt completed task using FWW at supervision level. Pt required intermittent cues for safe FWW management and hand placement when reaching outside [facility]. Rest break as needed.   Toilet Transfers  Toilet - Technique: Ambulating  Toilet Transfer: Supervision  Wheelchair [employer] Transfers  Wheelchair/Bed - Technique: Ambulating  Level of Asssistance: Supervision  Bed mobility  Bridging: Supervision  Rolling to Left: Supervision  Rolling to Right: Supervision  Supine to Sit: Supervision  Sit to Supine: Supervision  Scooting: Supervision        Plan   Plan  Times per week: 5-7x  Times per day: Daily  Plan weeks: 10 days  Current Treatment Recommendations: Endurance Training, Patient/Caregiver Education & Training, Self-Care / ADL, Balance Training, Functional Mobility Training, Safety Education & Training  Plan Comment: Continue per OT POC for planned d/c on 8-23-20               Goals  Patient Goals   Patient goals : \"to be out of pain and be able to walk\"       Therapy Time   Individual Concurrent Group Co-treatment   Time In 1530         Time Out 1600         Minutes [address], OTR/L

## 2020-08-18 PROBLEM — F41.8 MIXED ANXIETY AND DEPRESSIVE DISORDER: Status: ACTIVE | Noted: 2020-08-18

## 2020-08-18 PROBLEM — Z98.890 S/P DISKECTOMY: Status: ACTIVE | Noted: 2020-08-12

## 2020-08-18 LAB
ANION GAP SERPL CALCULATED.3IONS-SCNC: 7 MEQ/L (ref 9–15)
BUN BLDV-MCNC: 10 MG/DL (ref 8–23)
CALCIUM SERPL-MCNC: 9.4 MG/DL (ref 8.5–9.9)
CHLORIDE BLD-SCNC: 99 MEQ/L (ref 95–107)
CO2: 30 MEQ/L (ref 20–31)
CREAT SERPL-MCNC: 0.38 MG/DL (ref 0.5–0.9)
GFR AFRICAN AMERICAN: >60
GFR NON-AFRICAN AMERICAN: >60
GLUCOSE BLD-MCNC: 106 MG/DL (ref 70–99)
HCT VFR BLD CALC: 28.4 % (ref 37–47)
HEMOGLOBIN: 9.5 G/DL (ref 12–16)
MCH RBC QN AUTO: 31.1 PG (ref 27–31.3)
MCHC RBC AUTO-ENTMCNC: 33.6 % (ref 33–37)
MCV RBC AUTO: 92.7 FL (ref 82–100)
PDW BLD-RTO: 13.2 % (ref 11.5–14.5)
PLATELET # BLD: 365 K/UL (ref 130–400)
POTASSIUM REFLEX MAGNESIUM: 4.5 MEQ/L (ref 3.4–4.9)
RBC # BLD: 3.06 M/UL (ref 4.2–5.4)
SODIUM BLD-SCNC: 136 MEQ/L (ref 135–144)
WBC # BLD: 5.3 K/UL (ref 4.8–10.8)

## 2020-08-18 PROCEDURE — 97116 GAIT TRAINING THERAPY: CPT

## 2020-08-18 PROCEDURE — 97110 THERAPEUTIC EXERCISES: CPT

## 2020-08-18 PROCEDURE — 97535 SELF CARE MNGMENT TRAINING: CPT

## 2020-08-18 PROCEDURE — 6370000000 HC RX 637 (ALT 250 FOR IP): Performed by: NEUROLOGICAL SURGERY

## 2020-08-18 PROCEDURE — 6370000000 HC RX 637 (ALT 250 FOR IP): Performed by: PHYSICAL MEDICINE & REHABILITATION

## 2020-08-18 PROCEDURE — 97112 NEUROMUSCULAR REEDUCATION: CPT

## 2020-08-18 PROCEDURE — 6370000000 HC RX 637 (ALT 250 FOR IP): Performed by: INTERNAL MEDICINE

## 2020-08-18 PROCEDURE — 36415 COLL VENOUS BLD VENIPUNCTURE: CPT

## 2020-08-18 PROCEDURE — 6370000000 HC RX 637 (ALT 250 FOR IP): Performed by: NURSE PRACTITIONER

## 2020-08-18 PROCEDURE — 85027 COMPLETE CBC AUTOMATED: CPT

## 2020-08-18 PROCEDURE — 97530 THERAPEUTIC ACTIVITIES: CPT

## 2020-08-18 PROCEDURE — 1180000000 HC REHAB R&B

## 2020-08-18 PROCEDURE — 80048 BASIC METABOLIC PNL TOTAL CA: CPT

## 2020-08-18 RX ORDER — DIAZEPAM 5 MG/1
5 TABLET ORAL EVERY 8 HOURS PRN
Status: DISCONTINUED | OUTPATIENT
Start: 2020-08-18 | End: 2020-08-21 | Stop reason: HOSPADM

## 2020-08-18 RX ORDER — FLUCONAZOLE 100 MG/1
200 TABLET ORAL DAILY
Status: DISCONTINUED | OUTPATIENT
Start: 2020-08-18 | End: 2020-08-21 | Stop reason: HOSPADM

## 2020-08-18 RX ORDER — BUSPIRONE HYDROCHLORIDE 7.5 MG/1
7.5 TABLET ORAL 2 TIMES DAILY
Status: DISCONTINUED | OUTPATIENT
Start: 2020-08-18 | End: 2020-08-21 | Stop reason: HOSPADM

## 2020-08-18 RX ADMIN — OXYCODONE HYDROCHLORIDE 10 MG: 5 TABLET ORAL at 10:25

## 2020-08-18 RX ADMIN — OXYCODONE HYDROCHLORIDE 10 MG: 5 TABLET ORAL at 18:37

## 2020-08-18 RX ADMIN — DIAZEPAM 5 MG: 5 TABLET ORAL at 18:41

## 2020-08-18 RX ADMIN — DOCUSATE SODIUM 50MG AND SENNOSIDES 8.6MG 2 TABLET: 8.6; 5 TABLET, FILM COATED ORAL at 20:56

## 2020-08-18 RX ADMIN — MORPHINE SULFATE 15 MG: 15 TABLET, FILM COATED, EXTENDED RELEASE ORAL at 20:55

## 2020-08-18 RX ADMIN — BUSPIRONE HYDROCHLORIDE 7.5 MG: 7.5 TABLET ORAL at 20:55

## 2020-08-18 RX ADMIN — PRAMIPEXOLE DIHYDROCHLORIDE 0.25 MG: 0.5 TABLET ORAL at 20:55

## 2020-08-18 RX ADMIN — GABAPENTIN 300 MG: 300 CAPSULE ORAL at 20:55

## 2020-08-18 RX ADMIN — HYDROCORTISONE: 1 CREAM TOPICAL at 20:57

## 2020-08-18 RX ADMIN — POLYETHYLENE GLYCOL 3350 17 G: 17 POWDER, FOR SOLUTION ORAL at 08:51

## 2020-08-18 RX ADMIN — ATORVASTATIN CALCIUM 20 MG: 20 TABLET, FILM COATED ORAL at 08:45

## 2020-08-18 RX ADMIN — GABAPENTIN 300 MG: 300 CAPSULE ORAL at 14:36

## 2020-08-18 RX ADMIN — OXYCODONE HYDROCHLORIDE 10 MG: 5 TABLET ORAL at 06:18

## 2020-08-18 RX ADMIN — FLUCONAZOLE 200 MG: 100 TABLET ORAL at 18:37

## 2020-08-18 RX ADMIN — MORPHINE SULFATE 15 MG: 15 TABLET, FILM COATED, EXTENDED RELEASE ORAL at 08:46

## 2020-08-18 RX ADMIN — BISACODYL 10 MG: 10 SUPPOSITORY RECTAL at 18:40

## 2020-08-18 RX ADMIN — ACETAMINOPHEN 650 MG: 325 TABLET, FILM COATED ORAL at 05:08

## 2020-08-18 RX ADMIN — GABAPENTIN 300 MG: 300 CAPSULE ORAL at 08:46

## 2020-08-18 RX ADMIN — OXYCODONE HYDROCHLORIDE 10 MG: 5 TABLET ORAL at 01:52

## 2020-08-18 RX ADMIN — OXYCODONE HYDROCHLORIDE 10 MG: 5 TABLET ORAL at 14:28

## 2020-08-18 RX ADMIN — HYDROCORTISONE: 1 CREAM TOPICAL at 09:04

## 2020-08-18 RX ADMIN — ALPRAZOLAM 0.25 MG: 0.25 TABLET ORAL at 09:31

## 2020-08-18 RX ADMIN — PSEUDOEPHEDRINE HCL 60 MG: 30 TABLET, FILM COATED ORAL at 05:30

## 2020-08-18 RX ADMIN — BUSPIRONE HYDROCHLORIDE 15 MG: 7.5 TABLET ORAL at 09:31

## 2020-08-18 ASSESSMENT — PAIN DESCRIPTION - DESCRIPTORS
DESCRIPTORS: ACHING;DISCOMFORT
DESCRIPTORS: CONSTANT
DESCRIPTORS: CONSTANT

## 2020-08-18 ASSESSMENT — PAIN SCALES - GENERAL
PAINLEVEL_OUTOF10: 8
PAINLEVEL_OUTOF10: 7
PAINLEVEL_OUTOF10: 7
PAINLEVEL_OUTOF10: 8
PAINLEVEL_OUTOF10: 0
PAINLEVEL_OUTOF10: 8
PAINLEVEL_OUTOF10: 7
PAINLEVEL_OUTOF10: 8
PAINLEVEL_OUTOF10: 8
PAINLEVEL_OUTOF10: 7
PAINLEVEL_OUTOF10: 9
PAINLEVEL_OUTOF10: 8

## 2020-08-18 ASSESSMENT — PAIN DESCRIPTION - LOCATION
LOCATION: BACK;ABDOMEN
LOCATION: BACK;ABDOMEN
LOCATION: ABDOMEN;BACK
LOCATION: ABDOMEN;BACK

## 2020-08-18 ASSESSMENT — PAIN DESCRIPTION - ORIENTATION
ORIENTATION: LOWER

## 2020-08-18 ASSESSMENT — PAIN DESCRIPTION - FREQUENCY
FREQUENCY: CONTINUOUS

## 2020-08-18 ASSESSMENT — PAIN DESCRIPTION - PAIN TYPE
TYPE: SURGICAL PAIN;ACUTE PAIN
TYPE: ACUTE PAIN;SURGICAL PAIN
TYPE: ACUTE PAIN;SURGICAL PAIN

## 2020-08-18 NOTE — PROGRESS NOTES
Physical Therapy Rehab Treatment Note  Facility/Department: [facility]  Room: B465/D447-08       NAME: Etta Vu  : 1955 (76 y.o.)  MRN: [id number]  CODE STATUS: Full Code    Date of Service: 2020  Chart Reviewed: Yes  Family / Caregiver Present: No  General Comment  Comments: Nsg requests therapy to come to room d/t increased anxiety at this time. Pt agreeable to participate after encouragement. Restrictions:  Restrictions/Precautions: Fall Risk     SUBJECTIVE: Subjective: Pt states she is having a bad day and just wants to rest.  States she isn't sleeping well because she is uncomfortable. Pre Treatment Pain Screening  Pain at present: 7  Intervention List: Patient able to continue with treatment;Patient declined any intervention  Comments / Details: back and abdomen    Post Treatment Pain Screening:  Pain Assessment  Pain Level: 8  Pain Location: Abdomen;Back  Pain Orientation: Lower  Pain Descriptors: Constant   Rest, repositioning, CP applied to abdomen after session    OBJECTIVE:              Bed mobility  Rolling to Left: Stand by assistance  Rolling to Right: Stand by assistance  Supine to Sit: Stand by assistance  Sit to Supine: Stand by assistance  Comment: Instructed pt on s/l with pillow placement for comfort. Increased time and effort to complete. Transfers  Sit to Stand: Supervision  Stand to sit: Supervision  Ambulation  Ambulation?: Yes  Ambulation 1  Surface: level tile  Device: Rolling Walker  Assistance: Supervision  Quality of Gait: slow pace with decreased step length, gaurded with increased UE support [facility].  Able to correct with Vcs. Distance: 100ft           Exercises  Quad Sets: x20  Heelslides: x15  Gluteal Sets: x20  Hip Abduction: x15 supine  Knee Short Arc Quad: x15  Ankle Pumps: x20  Comments: VCs for breathing and relaxation techniques for pain control. Increased reps for increased strength.      ASSESSMENT/COMMENTS:  Assessment: Pt participated with encouragement. Pt gaurded and reports increased pain today however able to complete functional mobility with Vcs and increased time and without physical assist.  PLAN OF CARE/Safety:   Plan Comment: Cont per POC.   Therapy Time:   Individual   Time In 1030   Time Out 1130   Minutes 60     Minutes:      Transfer/Bed mobility training:10      Gait training:15      Neuro re education:0     Therapeutic ex:35    Mathieu Chan PTA, 08/18/20 at 11:24 AM

## 2020-08-18 NOTE — PROGRESS NOTES
Occupational Therapy  Facility/Department: [facility]  Daily Treatment Note  NAME: Turner Roach  : 1955  MRN: [id number]    Date of Service: 2020    Discharge Recommendations:  Continue to assess pending progress       Assessment      REQUIRES OT FOLLOW UP: Yes  Activity Tolerance  Activity Tolerance: Patient Tolerated treatment well  Safety Devices  Safety Devices in place: Yes  Type of devices: All fall risk precautions in place         Patient Diagnosis(es): There were no encounter diagnoses. has a past medical history of Anxiety, Hyperlipidemia, and Osteoarthritis. has a past surgical history that includes back surgery (); Tracheal surgery (); Colonoscopy; Tonsillectomy; Dilation and curettage of uterus; and lumbar fusion (Left, 2020). Restrictions  Restrictions/Precautions  Restrictions/Precautions: Fall Risk  Required Braces or Orthoses?: No  Required Braces or Orthoses  Other: Abdominal Binder  Position Activity Restriction  Other position/activity restrictions: pt okay to shower starting 8/15/20  Subjective   General  Chart Reviewed: Yes  Patient assessed for rehabilitation services?: Yes  Referring Practitioner: Dr. Margaret Jacob  Diagnosis: Theotis Arbour with Impaired mobility & ADLs d/t L5-S1 instability s/p LT anterior retroperitoneal  L5-S1 diskectomy and fusion  Subjective  Subjective: I need my pain pill. (Patient crying upon therapists arrival). Pain Assessment  Pain Assessment: 0-10  Pain Level: 7  Pain Type: Surgical pain;Acute pain  Pain Location: Back; Abdomen  Pain Orientation: Lower  Pain Descriptors: Aching;Discomfort  Pain Frequency: Continuous  Pre Treatment Pain Screening  Pain at present: 7  Scale Used: Numeric Score  Intervention List: Patient able to continue with treatment;Patient declined any intervention  Vital Signs  Patient Currently in Pain: Yes   Orientation  Orientation  Overall Orientation Status: Within Functional Limits  Objective minutes  Neuromuscular reeducation: 14 minutes     Warren Ceron OT   Electronically signed by Warren Ceron OT on 8/18/2020 at 1:46 PM

## 2020-08-18 NOTE — PROGRESS NOTES
Occupational Therapy  Facility/Department: [facility]  Daily Treatment Note  NAME: Chilo Granados  : 1955  MRN: [id number]    Date of Service: 2020    Discharge Recommendations:  Continue to assess pending progress       Assessment      Activity Tolerance  Activity Tolerance: Patient Tolerated treatment well  Safety Devices  Safety Devices in place: Yes  Type of devices: All fall risk precautions in place         Patient Diagnosis(es): There were no encounter diagnoses. has a past medical history of Anxiety, Hyperlipidemia, and Osteoarthritis. has a past surgical history that includes back surgery (); Tracheal surgery (); Colonoscopy; Tonsillectomy; Dilation and curettage of uterus; and lumbar fusion (Left, 2020). Restrictions  Restrictions/Precautions  Restrictions/Precautions: Fall Risk  Required Braces or Orthoses?: Yes  Required Braces or Orthoses  Other: Abdominal Binder  Position Activity Restriction  Other position/activity restrictions: pt okay to shower starting 8/15/20     Subjective   General  Chart Reviewed: Yes  Patient assessed for rehabilitation services?: Yes  Referring Practitioner: Dr. Samara Rodriguez  Diagnosis: Yamilet Tolliver with Impaired mobility & ADLs d/t L5-S1 instability s/p LT anterior retroperitoneal  L5-S1 diskectomy and fusion    Subjective  Subjective: I need my pain pill. (Patient crying upon therapists arrival). Pain Assessment  Pain Level: 7  Pain Type: Acute pain;Surgical pain  Pain Location: Abdomen;Back  Pain Descriptors: Constant  Pre Treatment Pain Screening  Pain at present: 9  Intervention List: Patient able to continue with treatment;Nurse called to administer meds  Comments / Details: Alona Ng LPN administered pain medication prior to treatment session.      Orientation  Orientation  Overall Orientation Status: Within Functional Limits     Objective      ADL    Equipment Provided: Reacher;Sock aid;Dressing stick    Grooming: Modified independent     UE Bathing: Modified independent     LE Bathing: Modified independent     UE Dressing: Setup    LE Dressing: Setup    Toileting: Modified independent     Functional Mobility  Functional - Mobility Device: Rolling Walker  Activity: To/from bathroom  Assist Level: Modified independent   Functional Mobility Comments: Patient completed sponge bath seated in bathroom chair at sink. Toilet Transfers  Toilet - Technique: Ambulating  Equipment Used: Grab bars  Toilet Transfer: Modified independent  Toilet Transfers Comments: ww    Upper Extremity Function  UE Strengthing: Patient engaged in B UE ROM/strengthening, B FM coordination and cognition to increase I with ADL's, IADL's and transfers. Patient donned B 1 # wrist weights. Patient able to reach in lateral planes with 0 difficulty. Patient able to reach in forward planes with MIN difficulty. Patient able to  100 large mushroom pegs with 0 difficulty 1 at a time. Patient able to place large pegs 1 at a time in pegboard with 0 difficulty. Patient alternated UE's after every 10th peg. Patient able to remove large pegs by color 1 at a time from pegboard on incline with MIN difficulty with vertical reaching. Patient with RB's as needed. Plan   Plan Pt's plan of care was discussed by team OTs and OTAs at Monday POC review meeting.      Times per week: 5-7x  Times per day: Daily  Plan weeks: 10 days  Current Treatment Recommendations: Endurance Training, Patient/Caregiver Education & Training, Self-Care / ADL, Balance Training, Functional Mobility Training, Safety Education & Training    Plan Comment: Continue per OT POC for updated planned d/c on 20         Goals  Patient Goals   Patient goals : \"to be out of pain and be able to walk\"       Therapy Time   Individual Concurrent Group Co-treatment   Time In 0900         Time Out 1000         Minutes 60             ADL trainin minutes  Therapeutic activities: 15 minutes       Electronically signed by ANEL Gonzalez on 8/18/20 at 10:22 AM ANEL Gonzalez

## 2020-08-18 NOTE — PROGRESS NOTES
Call out to surgeon per MD. Patient states pain regimen is ineffective but is already on multiple medications. Awaiting return call. Continue POC.  Electronically signed by Nikki Wood RN on 8/18/20 at 3:46 PM EDT

## 2020-08-18 NOTE — PLAN OF CARE
Problem: Pain:  Goal: Pain level will decrease  Description: Pain level will decrease  8/18/2020 1141 by Brooke Stock RN  Outcome: Ongoing  8/18/2020 0202 by Marielle Carranza RN  Outcome: Ongoing  Goal: Control of acute pain  Description: Control of acute pain  8/18/2020 1141 by Brooke Stock RN  Outcome: Ongoing  8/18/2020 0202 by Marielle Carranza RN  Outcome: Ongoing  Goal: Control of chronic pain  Description: Control of chronic pain  8/18/2020 1141 by Brooke Stock RN  Outcome: Ongoing  8/18/2020 0202 by Marielle Carranza RN  Outcome: Ongoing     Problem: Falls - Risk of:  Goal: Will remain free from falls  Description: Will remain free from falls  8/18/2020 1141 by Brooke Stock RN  Outcome: Ongoing  8/18/2020 0202 by Marielle Carranza RN  Outcome: Ongoing  Goal: Absence of physical injury  Description: Absence of physical injury  8/18/2020 1141 by Brooke Stock RN  Outcome: Ongoing  8/18/2020 0202 by Marielle Carranza RN  Outcome: Ongoing

## 2020-08-18 NOTE — PROGRESS NOTES
Subjective: The patient complains of moderate  acute pain relieved by rest and exacerbated by activity. I am concerned about patients fatigue. See team notes, xray mild ileus, improved with bowel movements active  ROS x10: The patient also complains of severely impaired mobility and activities of daily living. Otherwise no new problems with vision, hearing, nose, mouth, throat, dermal, cardiovascular, GI, , pulmonary, musculoskeletal, psychiatric or neurological. See Rehab H&P on Rehab chart dated . Vital signs:  /71   Pulse 108   Temp 97 °F (36.1 °C) (Oral)   Resp 20   Ht 5' 0.5\" (1.537 m)   Wt 175 lb 9 oz (79.6 kg)   LMP  (LMP Unknown)   SpO2 94%   BMI 33.72 kg/m²   I/O:   PO/Intake:  fair PO intake, no problems observed or reported. Bowel/Bladder:  continent, no problems noted. General:  Patient is well developed, adequately nourished, non-obese and     well kempt. HEENT:    PERRLA, hearing intact to loud voice, external inspection of ear     and nose benign. Inspection of lips, tongue and gums benign  Musculoskeletal: No significant change in strength or tone. All joints stable. Inspection and palpation of digits and nails show no clubbing,       cyanosis or inflammatory conditions. Neuro/Psychiatric: Affect: flat but pleasant. Alert and oriented to person, place and     situation. No significant change in deep tendon reflexes or     sensation  Lungs:  CTA-B. Respiration effort is normal at rest.     Heart:   S1 = S2, RRR. No loud murmurs. Abdomen:  Soft, non-tender, no enlargement of liver or spleen. Extremities:  No significant lower extremity edema or tenderness. Skin:   Intact to general survey, no visualized or palpated problems.     Rehabilitation:  Physical therapy: FIMS:  Bed Mobility: Scooting: Supervision    Transfers: Sit to Stand: Supervision  Stand to sit: Supervision  Bed to Chair: Stand by assistance, Ambulation 1  Surface: level tile, fatigue due to nutritional and hydration deficiency: Add vitamin B12 vitamin D and CoQ10 continue to monitor I&Os, calorie counts prn, dietary consult prn.  6. Acute episodic insomnia with situational adjustment disorder:  prn Ambien, monitor for day time sedation. 7. Falls risk elevated:  patient to use call light to get nursing assistance to get up, bed and chair alarm. 8. Elevated DVT risk: progressive activities in PT, continue prophylaxis BOO hose, elevation and see mar . 9. Complex discharge planning:  Weekly team meeting every Monday to assess progress towards goals, discuss and address social, psychological and medical comorbidities and to address difficulties they may be having progressing in therapy. Patient and family education is in progress. The patient is to follow-up with their family physician after discharge. Complex Active General Medical Issues that complicate care Assess & Plan:    Patient Active Problem List   Diagnosis    Lumbar radiculopathy    Other chronic pain    Sacroiliitis, not elsewhere classified ([facility] [address]    Spinal stenosis, lumbar region, without neurogenic claudication    Lumbar disc herniation    Lumbar stenosis with neurogenic claudication    Smoker    Spondylolisthesis at L4-L5 level    Spondylolisthesis of lumbar region    Spondylolisthesis at L5-S1 level    Lumbosacral spine instability    Hyperlipidemia    Panic attacks    RLS (restless legs syndrome)    Impaired mobility due to L5-S1 instability s/p Left Anterior Retroperitoneal L5-S1 disketomy and fusion. [facility] admit 08/13/20.  Anxiety    Mixed anxiety and depressive disorder    Obesity (BMI 30-39. 9)    OA (osteoarthritis)    History of lumbar surgery    S/P diskectomy and interbody cage fusion L5-S1                Yee Quiroz D.O., PM&R     Attending    [facility] Shayna Manuel

## 2020-08-18 NOTE — PROGRESS NOTES
Assessment completed earlier in the shift. VSS. Medicated w/ scheduled MS contin and pt taking PRN roxicodone every four hrs. Pain does not seem to ever be below 7.. Declined ice pack offer. LBM 8/16/2020. Aquacell in place to abdomen, C/D/I- no drainage present. Rash/redness to lower back (left side greater than right) and buttocks. Pt questioning if the lipitor is causing this as her statin at home is zocor- making liptor a newer medication for her. Hydrocortisone cream/ micon powder applied. Pt wanting to be independent in room- will pass on to have PT/OT evaluate. Pt exhibits appropriate safety measures from nursing standpoint. No distress noted. Call light within reach and bed alarm activated.   Electronically signed by Kayli Simpson RN on 8/18/2020 at 2:51 AM    BMP and CBC to be drawn in AM.  Electronically signed by Kayli Simpson RN on 8/18/2020 at 2:59 AM

## 2020-08-18 NOTE — PROGRESS NOTES
Pt complains of pain near incision area. Morphine given at [address] for 8/10 pain. Xanax given at 0931 because pt is feeling anxious and crying. HR was 112 and is now 108. Oxycodone given at this time for continuous pain. Pt is laying in bed at this time. Electronically signed by Pallavi Rodriguez LPN on 3/82/7448 at 58:03 AM    Pt complaining about rash bothering her and also about her pain meds lasting. Message sent to surgeon. Zanaflex dc'd, buspar dose lowered, PRN valium added q8. At this time pt is talking on the phone in bed and when asked about her pain says \"it's ok. \" Electronically signed by Pallavi Rodriguez LPN on 0/46/8396 at 2:32 PM

## 2020-08-18 NOTE — PROGRESS NOTES
Physical Therapy Rehab Treatment Note  Facility/Department: [facility]  Room: Y873/B045-75       NAME: Arlet Ashby  : 1955 (76 y.o.)  MRN: [id number]  CODE STATUS: Full Code    Date of Service: 2020  Chart Reviewed: Yes  Family / Caregiver Present: No  General Comment  Comments: Nsg requests therapy to come to room d/t increased anxiety at this time. Pt agreeable to participate after encouragement. Restrictions:  Restrictions/Precautions: Fall Risk    SUBJECTIVE: Subjective: Pt sttes she is a little better . States she needs a day to rest.     Pre Treatment Pain Screening  Pain at present: 7  Intervention List: Patient able to continue with treatment;Patient declined any intervention  Comments / Details: back and abdomen    Post Treatment Pain Screening:  Pain Assessment  Pain Level: 7  Pain Location: Abdomen;Back  Pain Orientation: Lower  Pain Descriptors: Constant    OBJECTIVE:              Transfers  Sit to Stand: Supervision  Stand to sit: Supervision    Ambulation  Ambulation?: Yes  Ambulation 1  Surface: level tile;carpet  Device: Rolling Walker  Assistance: Supervision  Quality of Gait: Improved pace and step length. Vcs to relax shlds and decrease UE support through [employer] as able. Distance: 125ft           Exercises  seated LAQs and marches x10 ea with cues for abdominal bracing  Ankle Pumps: x20    ASSESSMENT/COMMENTS:  Assessment: Pt participated with encouragement. Pt gaurded and reports increased pain today however able to complete functional mobility with Vcs and increased time and without physical assist.    PLAN OF CARE/Safety:   Plan Comment: Cont per POC.     Therapy Time:   Individual   Time In 1330   Time Out 1400   Minutes 30     Minutes:      Transfer/Bed mobility trainin      Gait training:10      Neuro re education:0     Therapeutic ex:15    Steven Miller PTA, 20 at 2:02 PM

## 2020-08-19 ENCOUNTER — APPOINTMENT (OUTPATIENT)
Dept: GENERAL RADIOLOGY | Age: 65
DRG: 093 | End: 2020-08-19
Attending: PHYSICAL MEDICINE & REHABILITATION
Payer: MEDICARE

## 2020-08-19 LAB
ANION GAP SERPL CALCULATED.3IONS-SCNC: 10 MEQ/L (ref 9–15)
BUN BLDV-MCNC: 14 MG/DL (ref 8–23)
CALCIUM SERPL-MCNC: 9.4 MG/DL (ref 8.5–9.9)
CHLORIDE BLD-SCNC: 99 MEQ/L (ref 95–107)
CO2: 26 MEQ/L (ref 20–31)
CREAT SERPL-MCNC: 0.44 MG/DL (ref 0.5–0.9)
GFR AFRICAN AMERICAN: >60
GFR NON-AFRICAN AMERICAN: >60
GLUCOSE BLD-MCNC: 105 MG/DL (ref 70–99)
HCT VFR BLD CALC: 28.2 % (ref 37–47)
HEMOGLOBIN: 9.4 G/DL (ref 12–16)
MCH RBC QN AUTO: 31.1 PG (ref 27–31.3)
MCHC RBC AUTO-ENTMCNC: 33.5 % (ref 33–37)
MCV RBC AUTO: 92.8 FL (ref 82–100)
PDW BLD-RTO: 13.3 % (ref 11.5–14.5)
PLATELET # BLD: 394 K/UL (ref 130–400)
POTASSIUM REFLEX MAGNESIUM: 4.6 MEQ/L (ref 3.4–4.9)
RBC # BLD: 3.04 M/UL (ref 4.2–5.4)
SODIUM BLD-SCNC: 135 MEQ/L (ref 135–144)
WBC # BLD: 5.4 K/UL (ref 4.8–10.8)

## 2020-08-19 PROCEDURE — 80048 BASIC METABOLIC PNL TOTAL CA: CPT

## 2020-08-19 PROCEDURE — 97535 SELF CARE MNGMENT TRAINING: CPT

## 2020-08-19 PROCEDURE — 36415 COLL VENOUS BLD VENIPUNCTURE: CPT

## 2020-08-19 PROCEDURE — 97530 THERAPEUTIC ACTIVITIES: CPT

## 2020-08-19 PROCEDURE — 6370000000 HC RX 637 (ALT 250 FOR IP): Performed by: INTERNAL MEDICINE

## 2020-08-19 PROCEDURE — 6370000000 HC RX 637 (ALT 250 FOR IP): Performed by: NEUROLOGICAL SURGERY

## 2020-08-19 PROCEDURE — 97110 THERAPEUTIC EXERCISES: CPT

## 2020-08-19 PROCEDURE — 97116 GAIT TRAINING THERAPY: CPT

## 2020-08-19 PROCEDURE — 2500000003 HC RX 250 WO HCPCS: Performed by: PHYSICAL MEDICINE & REHABILITATION

## 2020-08-19 PROCEDURE — 6370000000 HC RX 637 (ALT 250 FOR IP): Performed by: NURSE PRACTITIONER

## 2020-08-19 PROCEDURE — 6370000000 HC RX 637 (ALT 250 FOR IP): Performed by: PHYSICAL MEDICINE & REHABILITATION

## 2020-08-19 PROCEDURE — 85027 COMPLETE CBC AUTOMATED: CPT

## 2020-08-19 PROCEDURE — 74018 RADEX ABDOMEN 1 VIEW: CPT

## 2020-08-19 PROCEDURE — 1180000000 HC REHAB R&B

## 2020-08-19 PROCEDURE — 97112 NEUROMUSCULAR REEDUCATION: CPT

## 2020-08-19 RX ADMIN — PRAMIPEXOLE DIHYDROCHLORIDE 0.25 MG: 0.5 TABLET ORAL at 21:38

## 2020-08-19 RX ADMIN — OXYCODONE HYDROCHLORIDE 10 MG: 5 TABLET ORAL at 05:09

## 2020-08-19 RX ADMIN — GABAPENTIN 300 MG: 300 CAPSULE ORAL at 14:04

## 2020-08-19 RX ADMIN — OXYCODONE HYDROCHLORIDE 10 MG: 5 TABLET ORAL at 14:04

## 2020-08-19 RX ADMIN — DIAZEPAM 5 MG: 5 TABLET ORAL at 17:05

## 2020-08-19 RX ADMIN — GABAPENTIN 300 MG: 300 CAPSULE ORAL at 09:46

## 2020-08-19 RX ADMIN — LACTULOSE 20 G: 20 SOLUTION ORAL at 17:05

## 2020-08-19 RX ADMIN — BISACODYL 10 MG: 10 SUPPOSITORY RECTAL at 12:15

## 2020-08-19 RX ADMIN — BUSPIRONE HYDROCHLORIDE 7.5 MG: 7.5 TABLET ORAL at 09:46

## 2020-08-19 RX ADMIN — HYDROCORTISONE: 1 CREAM TOPICAL at 22:15

## 2020-08-19 RX ADMIN — MORPHINE SULFATE 15 MG: 15 TABLET, FILM COATED, EXTENDED RELEASE ORAL at 19:38

## 2020-08-19 RX ADMIN — Medication: at 09:46

## 2020-08-19 RX ADMIN — OXYCODONE HYDROCHLORIDE 10 MG: 5 TABLET ORAL at 00:05

## 2020-08-19 RX ADMIN — OXYCODONE HYDROCHLORIDE 10 MG: 5 TABLET ORAL at 09:46

## 2020-08-19 RX ADMIN — FLUCONAZOLE 200 MG: 100 TABLET ORAL at 09:46

## 2020-08-19 RX ADMIN — Medication: at 00:05

## 2020-08-19 RX ADMIN — POLYETHYLENE GLYCOL 3350 17 G: 17 POWDER, FOR SOLUTION ORAL at 09:47

## 2020-08-19 RX ADMIN — DOCUSATE SODIUM 50MG AND SENNOSIDES 8.6MG 2 TABLET: 8.6; 5 TABLET, FILM COATED ORAL at 21:38

## 2020-08-19 RX ADMIN — OXYCODONE HYDROCHLORIDE 10 MG: 5 TABLET ORAL at 22:15

## 2020-08-19 RX ADMIN — OXYCODONE HYDROCHLORIDE 10 MG: 5 TABLET ORAL at 18:04

## 2020-08-19 RX ADMIN — ALPRAZOLAM 0.25 MG: 0.25 TABLET ORAL at 10:39

## 2020-08-19 RX ADMIN — HYDROCORTISONE: 1 CREAM TOPICAL at 09:46

## 2020-08-19 RX ADMIN — BUSPIRONE HYDROCHLORIDE 7.5 MG: 7.5 TABLET ORAL at 21:38

## 2020-08-19 RX ADMIN — ALPRAZOLAM 0.25 MG: 0.25 TABLET ORAL at 21:49

## 2020-08-19 RX ADMIN — GABAPENTIN 300 MG: 300 CAPSULE ORAL at 21:38

## 2020-08-19 RX ADMIN — MORPHINE SULFATE 15 MG: 15 TABLET, FILM COATED, EXTENDED RELEASE ORAL at 06:34

## 2020-08-19 ASSESSMENT — PAIN SCALES - GENERAL
PAINLEVEL_OUTOF10: 7
PAINLEVEL_OUTOF10: 0
PAINLEVEL_OUTOF10: 8
PAINLEVEL_OUTOF10: 7
PAINLEVEL_OUTOF10: 0
PAINLEVEL_OUTOF10: 7
PAINLEVEL_OUTOF10: 6
PAINLEVEL_OUTOF10: 8
PAINLEVEL_OUTOF10: 8
PAINLEVEL_OUTOF10: 6

## 2020-08-19 ASSESSMENT — PAIN DESCRIPTION - LOCATION
LOCATION: ABDOMEN;BACK
LOCATION: BACK
LOCATION: BACK;ABDOMEN
LOCATION: BACK;ABDOMEN
LOCATION: ABDOMEN
LOCATION: ABDOMEN;BACK
LOCATION: BACK;ABDOMEN
LOCATION: ABDOMEN

## 2020-08-19 ASSESSMENT — PAIN DESCRIPTION - PROGRESSION
CLINICAL_PROGRESSION: NOT CHANGED

## 2020-08-19 ASSESSMENT — PAIN DESCRIPTION - ORIENTATION
ORIENTATION: LOWER

## 2020-08-19 ASSESSMENT — PAIN DESCRIPTION - DESCRIPTORS
DESCRIPTORS: SORE
DESCRIPTORS: ACHING;DISCOMFORT
DESCRIPTORS: SORE
DESCRIPTORS: SORE

## 2020-08-19 ASSESSMENT — PAIN DESCRIPTION - FREQUENCY
FREQUENCY: CONTINUOUS
FREQUENCY: CONTINUOUS
FREQUENCY: INTERMITTENT

## 2020-08-19 ASSESSMENT — PAIN DESCRIPTION - ONSET: ONSET: ON-GOING

## 2020-08-19 ASSESSMENT — PAIN DESCRIPTION - PAIN TYPE
TYPE: SURGICAL PAIN
TYPE: SURGICAL PAIN;ACUTE PAIN
TYPE: SURGICAL PAIN

## 2020-08-19 NOTE — PROGRESS NOTES
Occupational Therapy  Facility/Department: [facility]  Daily Treatment Note  NAME: Reji Yoo  : 1955  MRN: [id number]    Date of Service: 2020    Discharge Recommendations:  Continue to assess pending progress       Assessment      Activity Tolerance  Activity Tolerance: Patient Tolerated treatment well  Safety Devices  Safety Devices in place: Yes  Type of devices: All fall risk precautions in place         Patient Diagnosis(es): There were no encounter diagnoses. has a past medical history of Anxiety, Hyperlipidemia, and Osteoarthritis. has a past surgical history that includes back surgery (); Tracheal surgery (); Colonoscopy; Tonsillectomy; Dilation and curettage of uterus; and lumbar fusion (Left, 2020). Restrictions  Restrictions/Precautions  Restrictions/Precautions: Fall Risk  Required Braces or Orthoses?: No  Required Braces or Orthoses  Other: Abdominal Binder  Position Activity Restriction  Other position/activity restrictions: pt okay to shower starting 8/15/20     Subjective   General  Chart Reviewed: Yes  Patient assessed for rehabilitation services?: Yes  Referring Practitioner: Dr. Katherine Schneider  Diagnosis: Yanick Merritt with Impaired mobility & ADLs d/t L5-S1 instability s/p LT anterior retroperitoneal  L5-S1 diskectomy and fusion    Subjective  Subjective: I was supposed to get my pain pill a half ago so can you give my nurse a jingle. Pain Assessment  Pain Level: 7  Pain Type: Surgical pain  Pain Location: Abdomen;Back  Pain Orientation: Lower  Pain Descriptors: Sore  Pre Treatment Pain Screening  Pain at present: 8  Intervention List: Patient able to continue with treatment;Nurse called to administer meds  Comments / Details: Bhavesh Polo RN administered pain medication during treatment session.      Orientation  Orientation  Overall Orientation Status: Within Functional Limits     Objective      ADL    Equipment Provided: Reacher;Sock aid;Dressing stick    Grooming: Modified independent     UE Bathing: Modified independent     LE Bathing: Modified independent     UE Dressing: Modified independent     LE Dressing: Modified independent     Toileting: Modified independent     Toilet Transfers  Toilet - Technique: Ambulating  Equipment Used: Grab bars  Toilet Transfer: Modified independent  Toilet Transfers Comments: ww    Shower Transfers  Shower - Transfer From: [employer] - Transfer Type: To and From  Shower - Transfer To: Shower seat with back  Shower - Technique: Ambulating  Shower Transfers: Modified independence  Shower Transfers Comments: gurmeetb bars    Patient assessed for independence in room. Patient able to open and close doors. Patient able to turn light switch off and on. Patient completed multiple transfers at I level. Patient completed toilet transfer at I level. Patient unable to  item from floor level without AE 2° back precautions. Patient will use call light and ask for assist if needed. Patient able to remove and replace items in closet. Patient able to move table tray with MIN difficulty. Patient appropriate to be I in room.         Plan   Plan  Times per week: 5-7x  Times per day: Daily  Plan weeks: 10 days  Current Treatment Recommendations: Endurance Training, Patient/Caregiver Education & Training, Self-Care / ADL, Balance Training, Functional Mobility Training, Safety Education & Training    Plan Comment: Continue per OT POC for updated planned d/c on 20         Goals  Patient Goals   Patient goals : \"to be out of pain and be able to walk\"       Therapy Time   Individual Concurrent Group Co-treatment   Time In 930         Time Out 1030         Minutes 60             ADL trainin minutes       Electronically signed by ANEL Augustin on 20 at 10:41 AM ANEL Jim

## 2020-08-19 NOTE — PROGRESS NOTES
Occupational Therapy  Facility/Department: [facility]  Daily Treatment Note  NAME: Lucky Harada  : 1955  MRN: [id number]    Date of Service: 2020    Discharge Recommendations:  Continue to assess pending progress    Assessment  Activity Tolerance  Activity Tolerance: Patient Tolerated treatment well  Safety Devices  Safety Devices in place: Yes  Type of devices: All fall risk precautions in place         Patient Diagnosis(es): There were no encounter diagnoses. has a past medical history of Anxiety, Hyperlipidemia, and Osteoarthritis. has a past surgical history that includes back surgery (); Tracheal surgery (); Colonoscopy; Tonsillectomy; Dilation and curettage of uterus; and lumbar fusion (Left, 2020).     Restrictions  Restrictions/Precautions  Restrictions/Precautions: Fall Risk  Required Braces or Orthoses?: No  Required Braces or Orthoses  Other: Abdominal Binder  Position Activity Restriction  Other position/activity restrictions: pt okay to shower starting 8/15/20     Subjective   General  Chart Reviewed: Yes  Patient assessed for rehabilitation services?: Yes  Response to previous treatment: Patient with no complaints from previous session  Family / Caregiver Present: No  Referring Practitioner: Dr. Chico Dove  Diagnosis: [facility] with Impaired mobility & ADLs d/t L5-S1 instability s/p LT anterior retroperitoneal  L5-S1 diskectomy and fusion  Subjective  Pain Assessment  Pain Assessment: 0-10  Pain Level: 6  Pain Type: Surgical pain;Acute pain  Pain Location: Abdomen  Pain Orientation: Lower  Pain Descriptors: Sore  Pain Frequency: Intermittent(with movement)  Clinical Progression: Not changed  Pre Treatment Pain Screening  Pain at present: 6  Scale Used: Numeric Score  Intervention List: Patient able to continue with treatment  Comments / Details: Cold applied to abdomen x 20 minutes  Vital Signs  Patient Currently in Pain: Yes     Objective  ADL  Grooming: Modified independent (To perform hand hygiene while standing at sink)  Toileting: Modified independent      Functional Mobility  Functional - Mobility Device: Rolling Walker  Activity: To/from bathroom  Assist Level: Modified independent     Toilet Transfers  Toilet - Technique: Ambulating  Equipment Used: Grab bars  Toilet Transfer: Modified independent  Toilet Transfers Comments: FWW     Transfers  Sit to stand: Modified independent  Stand to sit: Modified independent      Reacher Activity: Pt used alternating hands (depending on ring location) to manipulate a reacher in order to pick various sized rings up from the floor that were spread out around her. Pt then used the reacher to don rings onto horizontal rods at graded heights and distances. (x 75 rings). Pt had Min difficulty with activity and worked at a steady pace with occasional rest breaks. To improve reacher skills for use during ADL/IADL completion.         Plan  Plan  Times per week: 5-7x  Times per day: Daily  Plan weeks: 10 days  Current Treatment Recommendations: Endurance Training, Patient/Caregiver Education & Training, Self-Care / ADL, Balance Training, Functional Mobility Training, Safety Education & Training  Plan Comment: Continue per OT POC for updated planned d/c on 8-21-20    Goals  Patient Goals   Patient goals : \"to be out of pain and be able to walk\"    Therapy Time   Individual Concurrent Group Co-treatment   Time In 1300         Time Out 1330         Minutes 30         ADL training: 15 minutes  Therapeutic activities: 15 minutes    Electronically signed by ANEL Saravia on 8/19/2020 at 1:39 PM  ANEL Saravia

## 2020-08-19 NOTE — PROGRESS NOTES
Subjective: The patient complains of moderate  acute pain relieved by rest and exacerbated by activity. I am concerned about patients fatigue. See team notes, xray mild ileus, improved with bowel movements active  ROS x10: The patient also complains of severely impaired mobility and activities of daily living. Otherwise no new problems with vision, hearing, nose, mouth, throat, dermal, cardiovascular, GI, , pulmonary, musculoskeletal, psychiatric or neurological. See Rehab H&P on Rehab chart dated . Vital signs:  /66   Pulse 87   Temp 97 °F (36.1 °C) (Oral)   Resp 18   Ht 5' 0.5\" (1.537 m)   Wt 175 lb 9 oz (79.6 kg)   LMP  (LMP Unknown)   SpO2 90%   BMI 33.72 kg/m²   I/O:   PO/Intake:  fair PO intake, no problems observed or reported. Bowel/Bladder:  continent, no problems noted. General:  Patient is well developed, adequately nourished, non-obese and     well kempt. HEENT:    PERRLA, hearing intact to loud voice, external inspection of ear     and nose benign. Inspection of lips, tongue and gums benign  Musculoskeletal: No significant change in strength or tone. All joints stable. Inspection and palpation of digits and nails show no clubbing,       cyanosis or inflammatory conditions. Neuro/Psychiatric: Affect: flat but pleasant. Alert and oriented to person, place and     situation. No significant change in deep tendon reflexes or     sensation  Lungs:  CTA-B. Respiration effort is normal at rest.     Heart:   S1 = S2, RRR. No loud murmurs. Abdomen:  Soft, non-tender, no enlargement of liver or spleen. Extremities:  No significant lower extremity edema or tenderness. Skin:   Intact to general survey, no visualized or palpated problems.     Rehabilitation:  Physical therapy: FIMS:  Bed Mobility: Scooting: Supervision    Transfers: Sit to Stand: Supervision, Modified independent  Stand to sit: Supervision, Modified independent  Bed to Chair: Supervision, and hydration deficiency: Add vitamin B12 vitamin D and CoQ10 continue to monitor I&Os, calorie counts prn, dietary consult prn.  6. Acute episodic insomnia with situational adjustment disorder:  prn Ambien, monitor for day time sedation. 7. Falls risk elevated:  patient to use call light to get nursing assistance to get up, bed and chair alarm. 8. Elevated DVT risk: progressive activities in PT, continue prophylaxis BOO hose, elevation and see mar . 9. Complex discharge planning:  Weekly team meeting every Monday to assess progress towards goals, discuss and address social, psychological and medical comorbidities and to address difficulties they may be having progressing in therapy. Patient and family education is in progress. The patient is to follow-up with their family physician after discharge. Complex Active General Medical Issues that complicate care Assess & Plan:    Patient Active Problem List   Diagnosis    Lumbar radiculopathy    Other chronic pain    Sacroiliitis, not elsewhere classified ([facility] [address]    Spinal stenosis, lumbar region, without neurogenic claudication    Lumbar disc herniation    Lumbar stenosis with neurogenic claudication    Smoker    Spondylolisthesis at L4-L5 level    Spondylolisthesis of lumbar region    Spondylolisthesis at L5-S1 level    Lumbosacral spine instability    Hyperlipidemia    Panic attacks    RLS (restless legs syndrome)    Impaired mobility due to L5-S1 instability s/p Left Anterior Retroperitoneal L5-S1 disketomy and fusion. [facility] admit 08/13/20.  Anxiety    Mixed anxiety and depressive disorder    Obesity (BMI 30-39. 9)    OA (osteoarthritis)    History of lumbar surgery    S/P diskectomy and interbody cage fusion L5-S1                Lynnette Perez D.O., PM&R     Attending    [address]

## 2020-08-19 NOTE — CARE COORDINATION
No   Comment:     2. Pt demonstrates cognitive ability to be independent in room/suite:  [x] Yes    []   No   Comment:    3. Pt demonstrates emotional ability to be independent in room/suite:  [x] Yes    []   No   Comment:    4. Special Considerations/Equipment if needed: [x] NA   Comment:    Recommend independent in room/suite:  [x] Yes    []   No       Signature: Electronically signed by Maria Elena Munoz PTA on 8/19/2020 at 2:17 PM    Physician: The recommendations and comments have been reviewed.           Physician Signature:Dr Juanita Wyatt   Electronically signed by Mary Yuan RN on 8/20/2020 at 12:37 PM

## 2020-08-19 NOTE — PROGRESS NOTES
Physical Therapy Rehab Treatment Note  Facility/Department: [facility]  Room: M115/U037-99       NAME: Tegan Holt  : 1955 (76 y.o.)  MRN: [id number]  CODE STATUS: Full Code    Date of Service: 2020  Chart Reviewed: Yes  Family / Caregiver Present: No  General Comment  Comments: has home health set up now for dc. Restrictions:  Restrictions/Precautions: Fall Risk       SUBJECTIVE: Subjective: going home friday  Pain Screening  Patient Currently in Pain: Yes  Pre Treatment Pain Screening  Pain at present: 7  Scale Used: Numeric Score    Post Treatment Pain Screenin  Pain Assessment  Pain Assessment: 0-10  Pain Level: 7  Patient's Stated Pain Goal: 7  Pain Type: Surgical pain  Pain Location: Abdomen  Pain Orientation: Lower  Pain Frequency: Continuous    OBJECTIVE:   Overall Orientation Status: Within Normal Limits  Follows Commands: Within Functional Limits           Neuromuscular Education  Neuromuscular Comments: static standing and dynamic    Bed mobility  Bridging: Independent  Rolling to Left: Independent  Rolling to Right: Independent  Supine to Sit: Independent  Sit to Supine: Independent  Scooting: Independent    Transfers  Sit to Stand: Modified independent  Stand to sit: Modified independent  Stand Pivot Transfers: Modified independent    Ambulation  Ambulation?: Yes  Ambulation 1  Surface: level tile;carpet  Device: Rollator  Assistance: Modified Independent  Quality of Gait: good safety management with rollator in crowded area and room mild trunk  Gait Deviations: Slow Alexa; Increased MIKAYLA  Distance: 250 feet with turns  Comments: has availability to use rollator or ww at home. Additional gt 200 feet x 2  Steps 4-6x 1 with double grasp on left rail. Supervision     Exercises  Physio/Swiss Ball: lateral motions and knee flexions  Comments: gastroc stretches bilaterally to decrease tightness     ASSESSMENT/COMMENTS:pt demonstrated Wright in the room.  rollator used at this time for long distance. Pt able to ambulate in room without ad. PLAN OF CARE/Safety:   Safety Devices  Type of devices: All fall risk precautions in place      Therapy Time:   Individual   Time In 1330   Time Out 1415   Minutes 45   15 minutes missed due to toilet need. Time made up this session. All minutes accounted for.    Therapy Time   Individual   Time In 1435   Time Out 1450   Minutes 15   15      Minutes:60      Transfer/Bed mobility training:15      Gait trainin      Neuro re education:10     Therapeutic ex:10      Dominga Garcia PTA, 20 at 2:22 PM

## 2020-08-19 NOTE — PROGRESS NOTES
Physical Therapy Rehab Treatment Note  [facility]  [facility]       NAME: Lul Rodriguez  : 1955 (76 y.o.)  MRN: [id number]  CODE STATUS: Full Code    Date of Service: 2020  Chart Reviewed: Yes  Patient assessed for rehabilitation services?: Yes  Family / Caregiver Present: No  Diagnosis: [employer] with impaired mobility due to L5-S1 instability - s/p anterior retroperitoneal L5-S1 diskectomy and fusion    Restrictions:  Restrictions/Precautions: Fall Risk       SUBJECTIVE: Subjective: I always have pain  Response To Previous Treatment: Not applicable  Pain Screening  Patient Currently in Pain: Yes  Pre Treatment Pain Screening  Pain at present: 7  Scale Used: Numeric Score  Intervention List: Patient able to continue with treatment    Post Treatment Pain Screening:  Pain Assessment  Pain Assessment: 0-10  Pain Level: 7  Pain Type: Surgical pain  Pain Location: Abdomen;Back  Pain Orientation: Lower  Pain Descriptors: Aching;Discomfort  Clinical Progression: Not changed    OBJECTIVE:   Follows Commands: Within Functional Limits    Bed mobility  Bridging: Supervision  Rolling to Left: Supervision  Rolling to Right: Supervision  Supine to Sit: Supervision  Sit to Supine: Supervision  Scooting: Supervision    Transfers  Sit to Stand: Supervision;Modified independent  Stand to sit: Supervision;Modified independent  Bed to Chair: Supervision    Ambulation  More Ambulation?: No  Ambulation 1  Surface: level tile;uneven;carpet;ramp  Device: Rolling Walker  Assistance: Supervision  Quality of Gait: Slow steady pace improved ability to maintain walker proximity and relax shoulders  Distance: 175'    Stairs/Curb  Stairs?: Yes  Stairs  # Steps : 4  Stairs Height: 6\"  Rails: Left ascending  Assistance: Stand by assistance  Comment: Non reciprocal pattern ascending/ descending visual cues for technique    ASSESSMENT/COMMENTS:  Body structures, Functions, Activity limitations: Decreased

## 2020-08-19 NOTE — FLOWSHEET NOTE
Spiritual Care Services     Summary of Visit:   provided spiritual care, patient shared that her ability to walk is amazing and so thankful, hoping to go home Friday, had conversation about POA and patient was given a copy     Spiritual Assessment/Intervention/Outcomes:    Encounter Summary  Services provided to[de-identified] Patient  Referral/Consult From[de-identified] Rounding  Support System: Parent  Place of Sikhism: [facility]  Continue Visiting: No  Complexity of Encounter: Moderate  Length of Encounter: 15 minutes  Spiritual Assessment Completed: Yes  Advance Care Planning: Yes  Routine  Type: Initial     Spiritual/Mormon  Type: Spiritual support  Assessment: Concerns with suffering, Approachable  Intervention: Nurtured hope, Prayer, Active listening, Sustaining presence/ Ministry of presence, Discussed relationship with God, Discussed illness/injury and it's impact  Outcome: Receptive, Encouraged, Expressed gratitude, Comfort, Engaged in conversation        Advance Directives (For Healthcare)  Pre-existing DNR Comfort Care/DNR Arrest/DNI Order: No  Healthcare Directive: No, patient does not have an advance directive for healthcare treatment  Information on Healthcare Directives Requested: Yes  Patient Requests Assistance: Yes, will do independently  Advance Directives: Documents given           Values / Beliefs  Do you have any ethnic, cultural, sacramental, or spiritual Oriental orthodox needs you would like us to be aware of while you are in the hospital?: No    Care Plan:        [id number] [address]   Electronically signed by Tanner Pino on 8/19/20 at 4:11 PM EDT     To reach a  for emotional and spiritual support, place an [facility] [place] consult request.   If a  is needed immediately, dial 0 and ask to page the on-call .

## 2020-08-19 NOTE — PROGRESS NOTES
Assessment completed earlier in the shift. VSS. LBM 8/18/2020. Medicated w/ scheduled MS contin and PRN roxicodone at various times throughout the night. Pt never reports pain level less than a 7. Aquacell in place to abdomen. Aquacell C/D/I- no drainage present. Hydrocortisone cream and ET mix applied to lower back/ buttocks. Rash/ redness does not look any worse but at this time, does not appear to be improving. No distress noted. Call light within reach and bed alarm activated.   Electronically signed by Inna Finn RN on 8/19/2020 at 3:40 AM

## 2020-08-20 LAB
ANION GAP SERPL CALCULATED.3IONS-SCNC: 11 MEQ/L (ref 9–15)
BUN BLDV-MCNC: 13 MG/DL (ref 8–23)
CALCIUM SERPL-MCNC: 9.5 MG/DL (ref 8.5–9.9)
CHLORIDE BLD-SCNC: 98 MEQ/L (ref 95–107)
CO2: 27 MEQ/L (ref 20–31)
CREAT SERPL-MCNC: 0.45 MG/DL (ref 0.5–0.9)
GFR AFRICAN AMERICAN: >60
GFR NON-AFRICAN AMERICAN: >60
GLUCOSE BLD-MCNC: 96 MG/DL (ref 70–99)
HCT VFR BLD CALC: 30.4 % (ref 37–47)
HEMOGLOBIN: 10.2 G/DL (ref 12–16)
MCH RBC QN AUTO: 31.1 PG (ref 27–31.3)
MCHC RBC AUTO-ENTMCNC: 33.4 % (ref 33–37)
MCV RBC AUTO: 93.1 FL (ref 82–100)
PDW BLD-RTO: 13.2 % (ref 11.5–14.5)
PLATELET # BLD: 457 K/UL (ref 130–400)
POTASSIUM REFLEX MAGNESIUM: 4.3 MEQ/L (ref 3.4–4.9)
RBC # BLD: 3.26 M/UL (ref 4.2–5.4)
SODIUM BLD-SCNC: 136 MEQ/L (ref 135–144)
WBC # BLD: 5.8 K/UL (ref 4.8–10.8)

## 2020-08-20 PROCEDURE — 85027 COMPLETE CBC AUTOMATED: CPT

## 2020-08-20 PROCEDURE — 97530 THERAPEUTIC ACTIVITIES: CPT

## 2020-08-20 PROCEDURE — 97112 NEUROMUSCULAR REEDUCATION: CPT

## 2020-08-20 PROCEDURE — 99232 SBSQ HOSP IP/OBS MODERATE 35: CPT | Performed by: PHYSICAL MEDICINE & REHABILITATION

## 2020-08-20 PROCEDURE — 6370000000 HC RX 637 (ALT 250 FOR IP): Performed by: NEUROLOGICAL SURGERY

## 2020-08-20 PROCEDURE — 2500000003 HC RX 250 WO HCPCS: Performed by: PHYSICAL MEDICINE & REHABILITATION

## 2020-08-20 PROCEDURE — 80048 BASIC METABOLIC PNL TOTAL CA: CPT

## 2020-08-20 PROCEDURE — 97110 THERAPEUTIC EXERCISES: CPT

## 2020-08-20 PROCEDURE — 6370000000 HC RX 637 (ALT 250 FOR IP): Performed by: PHYSICAL MEDICINE & REHABILITATION

## 2020-08-20 PROCEDURE — 97535 SELF CARE MNGMENT TRAINING: CPT

## 2020-08-20 PROCEDURE — 6370000000 HC RX 637 (ALT 250 FOR IP): Performed by: INTERNAL MEDICINE

## 2020-08-20 PROCEDURE — 1180000000 HC REHAB R&B

## 2020-08-20 PROCEDURE — 36415 COLL VENOUS BLD VENIPUNCTURE: CPT

## 2020-08-20 PROCEDURE — 97116 GAIT TRAINING THERAPY: CPT

## 2020-08-20 RX ADMIN — BISACODYL 10 MG: 10 SUPPOSITORY RECTAL at 15:21

## 2020-08-20 RX ADMIN — PRAMIPEXOLE DIHYDROCHLORIDE 0.25 MG: 0.5 TABLET ORAL at 20:10

## 2020-08-20 RX ADMIN — OXYCODONE HYDROCHLORIDE 10 MG: 5 TABLET ORAL at 02:17

## 2020-08-20 RX ADMIN — MORPHINE SULFATE 15 MG: 15 TABLET, FILM COATED, EXTENDED RELEASE ORAL at 18:54

## 2020-08-20 RX ADMIN — OXYCODONE HYDROCHLORIDE 10 MG: 5 TABLET ORAL at 10:33

## 2020-08-20 RX ADMIN — LACTULOSE 20 G: 20 SOLUTION ORAL at 15:21

## 2020-08-20 RX ADMIN — Medication: at 09:52

## 2020-08-20 RX ADMIN — OXYCODONE HYDROCHLORIDE 10 MG: 5 TABLET ORAL at 20:10

## 2020-08-20 RX ADMIN — BUSPIRONE HYDROCHLORIDE 7.5 MG: 7.5 TABLET ORAL at 20:10

## 2020-08-20 RX ADMIN — FLUCONAZOLE 200 MG: 100 TABLET ORAL at 09:51

## 2020-08-20 RX ADMIN — OXYCODONE HYDROCHLORIDE 10 MG: 5 TABLET ORAL at 06:19

## 2020-08-20 RX ADMIN — GABAPENTIN 300 MG: 300 CAPSULE ORAL at 09:51

## 2020-08-20 RX ADMIN — BUSPIRONE HYDROCHLORIDE 7.5 MG: 7.5 TABLET ORAL at 09:51

## 2020-08-20 RX ADMIN — POLYETHYLENE GLYCOL 3350 17 G: 17 POWDER, FOR SOLUTION ORAL at 09:52

## 2020-08-20 RX ADMIN — OXYCODONE HYDROCHLORIDE 10 MG: 5 TABLET ORAL at 15:08

## 2020-08-20 RX ADMIN — Medication: at 15:21

## 2020-08-20 RX ADMIN — GABAPENTIN 300 MG: 300 CAPSULE ORAL at 20:10

## 2020-08-20 RX ADMIN — HYDROCORTISONE: 1 CREAM TOPICAL at 09:52

## 2020-08-20 RX ADMIN — GABAPENTIN 300 MG: 300 CAPSULE ORAL at 13:57

## 2020-08-20 RX ADMIN — ALPRAZOLAM 0.25 MG: 0.25 TABLET ORAL at 13:08

## 2020-08-20 RX ADMIN — MORPHINE SULFATE 15 MG: 15 TABLET, FILM COATED, EXTENDED RELEASE ORAL at 07:18

## 2020-08-20 ASSESSMENT — PAIN SCALES - GENERAL
PAINLEVEL_OUTOF10: 6
PAINLEVEL_OUTOF10: 7
PAINLEVEL_OUTOF10: 7
PAINLEVEL_OUTOF10: 6
PAINLEVEL_OUTOF10: 4
PAINLEVEL_OUTOF10: 6
PAINLEVEL_OUTOF10: 4
PAINLEVEL_OUTOF10: 0
PAINLEVEL_OUTOF10: 7
PAINLEVEL_OUTOF10: 6
PAINLEVEL_OUTOF10: 7
PAINLEVEL_OUTOF10: 6

## 2020-08-20 ASSESSMENT — PAIN DESCRIPTION - PAIN TYPE
TYPE: SURGICAL PAIN

## 2020-08-20 ASSESSMENT — PAIN DESCRIPTION - FREQUENCY: FREQUENCY: CONTINUOUS

## 2020-08-20 ASSESSMENT — PAIN DESCRIPTION - DESCRIPTORS
DESCRIPTORS: ACHING;SORE
DESCRIPTORS: SHARP
DESCRIPTORS: ACHING
DESCRIPTORS: ACHING;SORE

## 2020-08-20 ASSESSMENT — PAIN DESCRIPTION - LOCATION
LOCATION: ABDOMEN;BACK
LOCATION: ABDOMEN

## 2020-08-20 ASSESSMENT — PAIN DESCRIPTION - ORIENTATION
ORIENTATION: LOWER

## 2020-08-20 ASSESSMENT — PAIN DESCRIPTION - PROGRESSION: CLINICAL_PROGRESSION: NOT CHANGED

## 2020-08-20 NOTE — DISCHARGE INSTR - COC
Continuity of Care Form    Patient Name: Rizwana Kong   :  1955  MRN:  [id number]    [facility] date:  2020  Discharge date:  20    Code Status Order: Full Code   Advance Directives:   Advance Care Flowsheet Documentation     Date/Time Healthcare Directive Type of Healthcare Directive Copy in [address] Agent's Name Healthcare Agent's Phone Number    20 1605  No, patient does not have an advance directive for healthcare treatment -- -- -- -- --    20 1717  No, patient does not have an advance directive for healthcare treatment -- -- -- -- --          Admitting Physician:  Gabrielle Santillan MD  PCP: Louis Anderson MD    Discharging Nurse: [facility] Unit/Room#: X415/M622-80  Discharging Unit Phone Number: ***    Emergency Contact:   Extended Emergency Contact Information  Primary Emergency Contact: [address] Phone: [phone]  Mobile Phone: [phone]  Relation: Parent    Past Surgical History:  Past Surgical History:   Procedure Laterality Date    BACK SURGERY  2017    lumbar disc OR    COLONOSCOPY      DILATION AND CURETTAGE OF UTERUS      at age 30s--miscarriage   Serene Murry LUMBAR FUSION Left 2020    ANTERIOR LEFT RETROPERITONEAL L5-S1 DISKECTOMY INTERBODY CAGE FUSION performed by Dana Medrano MD at [address] [place] [address]      as child   [address]      in [place]--EMT attempted to intubate patient due to trouble breathing & then needed repair       Immunization History: There is no immunization history on file for this patient.     Active Problems:  Patient Active Problem List   Diagnosis Code    Lumbar radiculopathy M54.16    Other chronic pain G89.29    Sacroiliitis, not elsewhere classified ([facility] [address] M46.1    Spinal stenosis, lumbar region, without neurogenic claudication M48.061    Lumbar disc herniation M51.26    Lumbar stenosis with neurogenic claudication Therapy:   - Oral Diet:  General    Routes of Feeding: Oral  Liquids: Thin Liquids  Daily Fluid Restriction: no  Last Modified Barium Swallow with Video (Video Swallowing Test): not done    Treatments at the Time of Hospital Discharge:   Respiratory Treatments:   Oxygen Therapy:  is not on home oxygen therapy. Ventilator:    - No ventilator support    Rehab Therapies: Physical Therapy, Occupational Therapy and Speech/Language Therapy  Weight Bearing Status/Restrictions: No weight bearing restirctions  Other Medical Equipment (for information only, NOT a DME order):  Patient is going to purchase grabber, and ADL KIT  Other Treatments:     Patient's personal belongings (please select all that are sent with patient):  all personal items sent home with patient. RN SIGNATURE:  Electronically signed by Marc Orourke RN on 8/21/20 at 9:43 AM EDT    CASE MANAGEMENT/SOCIAL WORK SECTION    Inpatient Status Date: Patient admitted to acute inpatient rehab on 8/13/20    Readmission Risk Assessment Score:  Readmission Risk              Risk of Unplanned Readmission:        8           Discharging to Facility/ Agency   · Name: [facility]   · Address:  · Phone:[phone]  · Fax:    Dialysis Facility (if applicable)   · Name:  · Address:  · Dialysis Schedule:  · Phone:  · Fax:    / signature: Electronically signed by Senait Nair RN on 8/20/20 at 4:37 PM EDT    PHYSICIAN SECTION    Prognosis: Good    Condition at Discharge: Stable    Rehab Potential (if transferring to Rehab): Good    Recommended Labs or Other Treatments After Discharge:     Physician Certification: I certify the above information and transfer of Kevin Buitrago  is necessary for the continuing treatment of the diagnosis listed and that she requires Home Care for  30 days.      Update Admission H&P: No change in H&P    PHYSICIAN SIGNATURE:Dr Pierce Ramachandran DO    Electronically signed by Senait Nair RN on 8/20/20 at 4:38 PM

## 2020-08-20 NOTE — CARE COORDINATION
Spoke with patient regarding discharge plans. Patient is requesting HHC due to not being able to drive at discharge. Per patient, given freedom of choice, has chosen [facility], Orders placed. Patient verified that she has a ww.  Electronically signed by Snow Farr RN on 8/20/2020 at 4:34 PM

## 2020-08-20 NOTE — PROGRESS NOTES
Independent, Ambulation 1  Surface: level tile, carpet  Device: Rollator  Assistance: Modified Independent  Quality of Gait: good safety management with rollator in crowded area and room mild trunk  Gait Deviations: Slow Alexa, Increased MIKAYLA  Distance: 250 feet with turns  Comments: has availability to use rollator or ww at home., Stairs  # Steps : 4  Stairs Height: 6\"  Rails: Left ascending  Curbs: 6\"  Assistance: Supervision, Modified independent   Comment: Non reciprocal pattern ascending/ descending visual cues for technique    FIMS:  ,  , Assessment: Patient shows improved safety with all transfers and gait. No increase in pain noted throughout therapy session    Occupational therapy: FIMS:   ,  , Assessment: Pt demonstrated good participation this date. Pt required vc's for overall safety awareness during kitchen mobility task at supervision level. Pt continues to benefit from OT services to maximize pt independence and safety with ADLs and IADLs.     Speech therapy: FIMS:        Lab/X-ray studies reviewed, analyzed and discussed with patient and staff:   Recent Results (from the past 24 hour(s))   Basic Metabolic Panel w/ Reflex to MG    Collection Time: 08/20/20  5:53 AM   Result Value Ref Range    Sodium 136 135 - 144 mEq/L    Potassium reflex Magnesium 4.3 3.4 - 4.9 mEq/L    Chloride 98 95 - 107 mEq/L    CO2 27 20 - 31 mEq/L    Anion Gap 11 9 - 15 mEq/L    Glucose 96 70 - 99 mg/dL    BUN 13 8 - 23 mg/dL    CREATININE 0.45 (L) 0.50 - 0.90 mg/dL    GFR Non-African American >60.0 >60    GFR  >60.0 >60    Calcium 9.5 8.5 - 9.9 mg/dL   CBC    Collection Time: 08/20/20  5:53 AM   Result Value Ref Range    WBC 5.8 4.8 - 10.8 K/uL    RBC 3.26 (L) 4.20 - 5.40 M/uL    Hemoglobin 10.2 (L) 12.0 - 16.0 g/dL    Hematocrit 30.4 (L) 37.0 - 47.0 %    MCV 93.1 82.0 - 100.0 fL    MCH 31.1 27.0 - 31.3 pg    MCHC 33.4 33.0 - 37.0 %    RDW 13.2 11.5 - 14.5 %    Platelets 420 (H) 644 - 400 K/uL       Previous and chair alarm. 8. Elevated DVT risk: progressive activities in PT, continue prophylaxis BOO hose, elevation . 9. Complex discharge planning: Discharge 8/21/2020 home alone. Weekly team meeting every Monday to assess progress towards goals, discuss and address social, psychological and medical comorbidities and to address difficulties they may be having progressing in therapy. Patient and family education is in progress. The patient is to follow-up with their family physician after discharge. Complex Active General Medical Issues that complicate care Assess & Plan:     1. Principal Problem:    Impaired mobility due to L5-S1 instability s/p Left Anterior Retroperitoneal L5-S1 disketomy and fusion. [facility] admit 08/13/20. Active Problems:  1. Spondylolisthesis at L4-L5 level  Lumbosacral spine instability  2. Hyperlipidemia  3. RLS (restless legs syndrome)  4. Mixed anxiety and depressive disorder,   Panic attacks  5. Obesity (BMI 30-39.9)  6.   OA (osteoarthritis)  7.    History of lumbar surgery S/P diskectomy and interbody cage fusion L5-S1           Roosevelt Shah D.O., PM&R     Attending    [address]

## 2020-08-20 NOTE — PROGRESS NOTES
Patient noted with a deep red rash to her buttocks and going up to her back. Patient has hydrocortisone cream as well as diflucan scheduled. Orders changed to ET mix. Stop the  Hydrocortisone. Patient educated. Accept new treatment. Patient up on her bedside chair. Pain medicated as scheduled. Aqua cell in place to left abdomen. Dr. Mary Neil evaluated patient. Due to the KUB results patient is to have another dose of chonulac and a suppository. Both given.

## 2020-08-20 NOTE — PROGRESS NOTES
Occupational Therapy  Facility/Department: [facility]  Daily Treatment Note  NAME: Shanika Fontaine  : 1955  MRN: [id number]    Date of Service: 2020    Discharge Recommendations:  Continue to assess pending progress       Assessment      REQUIRES OT FOLLOW UP: Yes  Activity Tolerance  Activity Tolerance: Patient Tolerated treatment well  Safety Devices  Safety Devices in place: Yes  Type of devices: All fall risk precautions in place         Patient Diagnosis(es): There were no encounter diagnoses. has a past medical history of Anxiety, Hyperlipidemia, and Osteoarthritis. has a past surgical history that includes back surgery (); Tracheal surgery (); Colonoscopy; Tonsillectomy; Dilation and curettage of uterus; and lumbar fusion (Left, 2020). Restrictions  Restrictions/Precautions  Restrictions/Precautions: Fall Risk  Required Braces or Orthoses?: No  Required Braces or Orthoses  Other: Abdominal Binder  Position Activity Restriction  Other position/activity restrictions: pt okay to shower starting 8/15/20     Subjective   General  Chart Reviewed: Yes  Patient assessed for rehabilitation services?: Yes  Response to previous treatment: Patient with no complaints from previous session  Family / Caregiver Present: No  Referring Practitioner: Dr. Deion Cabral  Diagnosis: Jorge Lard with Impaired mobility & ADLs d/t L5-S1 instability s/p LT anterior retroperitoneal  L5-S1 diskectomy and fusion    Subjective  Subjective: It's time already.     Pain Assessment  Pain Level: 6  Pain Type: Surgical pain  Pain Location: Abdomen;Back  Pain Orientation: Lower  Pain Descriptors: Sharp  Pre Treatment Pain Screening  Pain at present: 6  Intervention List: Patient able to continue with treatment;Patient declined any intervention     Orientation  Orientation  Overall Orientation Status: Within Normal Limits     Objective      ADL    Grooming: Modified independent     Toileting: Modified independent     Toilet Transfers  Toilet - Technique: Ambulating  Equipment Used: Standard toilet  Toilet Transfer: Modified independent  Toilet Transfers Comments: ignacia morocho      Upper Extremity Function  UE Strengthing: UE Strengthening: Patient engaged in B UE ROM/strengthening to increase I with ADL's and transfers. Patient provided written HEP. Patient able to utilize 2 # hand weight 1 X 10 repetitions in various planes. Patient required MIN verbal cues for proper technique. Patient required MOD verbal cues to keep correct count. RB's as needed.            Plan   Plan  Times per week: 5-7x  Times per day: Daily  Plan weeks: 10 days  Current Treatment Recommendations: Endurance Training, Patient/Caregiver Education & Training, Self-Care / ADL, Balance Training, Functional Mobility Training, Safety Education & Training    Plan Comment: Continue per OT POC for updated planned d/c on 8-21-20         Goals  Patient Goals   Patient goals : \"to be out of pain and be able to walk\"       Therapy Time   Individual Concurrent Group Co-treatment   Time In 1305         Time Out 1330         Minutes 25             ADL training: 10 minutes  Therapeutic activities: 15 minutes  Missed 5 minutes due to with Dr. Balbir Zavala, will attempt makeup as able       Electronically signed by ANEL Crockett on 8/20/20 at 2:09 PM EDT      ANEL Crockett

## 2020-08-20 NOTE — FLOWSHEET NOTE
Patient assessment completed earlier this shift. The patient complains of back and abdominal pain and she has been medicated with her PRN Roxicodone twice so far this shift with good results. No acute distress is noted. She has remained continent of urine and denies constipation as she was continent of a BM 8/20/2020.

## 2020-08-20 NOTE — PROGRESS NOTES
descending    ASSESSMENT/COMMENTS:  PT Education: Goals; Energy Conservation  Patient Education: Educated patient on energy conservation and ambulating extended distances with AD    PLAN OF CARE/Safety:   Safety Devices  Type of devices:  All fall risk precautions in place      Therapy Time:   Individual   Time In 900   Time Out 930   Minutes 30     Minutes: 30      Transfer/Bed mobility training:15      Gait trainin Saint Ayanna RAO Shaffer, 20 at 12:24 PM

## 2020-08-20 NOTE — PROGRESS NOTES
Occupational Therapy  Facility/Department: [facility]  Daily Treatment Note  NAME: Merary Bales  : 1955  MRN: [id number]    Date of Service: 2020    Discharge Recommendations:  Continue to assess pending progress       Assessment      Activity Tolerance  Activity Tolerance: Patient Tolerated treatment well  Safety Devices  Safety Devices in place: Yes  Type of devices: All fall risk precautions in place         Patient Diagnosis(es): There were no encounter diagnoses. has a past medical history of Anxiety, Hyperlipidemia, and Osteoarthritis. has a past surgical history that includes back surgery (); Tracheal surgery (); Colonoscopy; Tonsillectomy; Dilation and curettage of uterus; and lumbar fusion (Left, 2020). Restrictions  Restrictions/Precautions  Restrictions/Precautions: Fall Risk  Required Braces or Orthoses?: No  Required Braces or Orthoses  Other: Abdominal Binder  Position Activity Restriction  Other position/activity restrictions: pt okay to shower starting 8/15/20     Subjective   General  Chart Reviewed: Yes  Patient assessed for rehabilitation services?: Yes  Response to previous treatment: Patient with no complaints from previous session  Family / Caregiver Present: No  Referring Practitioner: Dr. Joan Sibley  Diagnosis: [place] with Impaired mobility & ADLs d/t L5-S1 instability s/p LT anterior retroperitoneal  L5-S1 diskectomy and fusion    Subjective  Subjective: Take your time. Pain Assessment  Pain Level: 6  Pain Type: Surgical pain  Pain Location: Abdomen;Back  Pain Descriptors: Aching; Sore  Pre Treatment Pain Screening  Pain at present: 6  Intervention List: Patient able to continue with treatment;Patient declined any intervention     Orientation  Orientation  Overall Orientation Status: Within Functional Limits     Objective      ADL    Equipment Provided: Reacher;Sock aid;Dressing stick    Feeding: Independent    Grooming: Modified independent     UE Bathing: Modified independent     LE Bathing: Modified independent     UE Dressing: Independent    LE Dressing: Modified independent     Toileting: Modified independent     Toilet Transfers  Toilet - Technique: Ambulating  Equipment Used: Grab bars  Toilet Transfer: Modified independent  Toilet Transfers Comments: FWW    Shower Transfers  Shower - Transfer From: Ghazal Khanna - Transfer Type: To and From  Shower - Transfer To:  Shower seat with back  Shower - Technique: Ambulating  Shower Transfers: Modified independence  Shower Transfers Comments: gurmeetb bars          Plan   Plan  Times per week: 5-7x  Times per day: Daily  Plan weeks: 10 days  Current Treatment Recommendations: Endurance Training, Patient/Caregiver Education & Training, Self-Care / ADL, Balance Training, Functional Mobility Training, Safety Education & Training    Plan Comment: Continue per OT POC for updated planned d/c on 20         Goals  Patient Goals   Patient goals : \"to be out of pain and be able to walk\"       Therapy Time   Individual Concurrent Group Co-treatment   Time In 930         Time Out 1030         Minutes 60             ADL trainin minutes       Electronically signed by ANEL Christian on 20 at 10:41 AM EDT      ANEL Christian

## 2020-08-20 NOTE — PROGRESS NOTES
chest pain, SOB, N/V, fevers or chills. Objective:     Vitals:    08/18/20 1028 08/18/20 2121 08/19/20 0633 08/19/20 1838   BP:  121/70 103/66 121/76   Pulse: 108 99 87 125   Resp:  17 18 18   Temp:  98 °F (36.7 °C) 97 °F (36.1 °C) 97 °F (36.1 °C)   TempSrc:  Oral Oral Oral   SpO2:  93% 90% 95%   Weight:       Height:         General appearance: no acute distress, A + O x 3. No conversational dyspnea noted. Dentition intact. Answers questions appropriately  Lungs: CTAB  no exp wheezes, No rales No retractions; No use of accessory muscles  Heart:  S1, S2 normal, RRR, no MRG appreciated  Abdomen: (+) BS, soft, non-tender; non distended no guarding or rigidity. Extremities:  no cyanosis,  no edema bilat lower exts, no calf tenderness bilaterally.  Dry skin noted       Medications:      busPIRone  7.5 mg Oral BID    fluconazole  200 mg Oral Daily    hydrocortisone   Topical BID    polyethylene glycol  17 g Oral Daily    senna-docusate  2 tablet Oral Nightly    morphine  15 mg Oral 2 times per day    gabapentin  300 mg Oral TID    pramipexole  0.25 mg Oral Nightly       LABS Reviewed    IMAGING Reviewed    Irasema Keenan CNP  Rounding Hospitalist

## 2020-08-20 NOTE — PROGRESS NOTES
Physical Therapy Rehab Treatment Note  Facility/Department: [facility]  Room: R252/R252-01       NAME: Turner Roach  : 1955 (76 y.o.)  MRN: [id number]  CODE STATUS: Full Code    Date of Service: 2020  Chart Reviewed: Yes  Family / Caregiver Present: No  General Comment  Comments: looking fwd to going home    Restrictions:  Restrictions/Precautions: Fall Risk       SUBJECTIVE: Subjective: \"i am feeling a little better\"  Pain Screening  Patient Currently in Pain: Yes  Pre Treatment Pain Screening  Pain at present: 6  Scale Used: Numeric Score    Post Treatment Pain Screenin  Pain Assessment  Pain Assessment: 0-10  Pain Level: 6  Patient's Stated Pain Goal: 6  Pain Type: Surgical pain  Pain Location: Abdomen;Back  Pain Orientation: Lower  Pain Descriptors: Aching  Pain Frequency: Continuous    OBJECTIVE:   Overall Orientation Status: Within Normal Limits  Follows Commands: Within Functional Limits  Transfers  Car Transfer: Modified independent    Ambulation  Ambulation?: Yes  Ambulation 1  Surface: ramp;carpet;level tile  Device: Rollator  Assistance: Modified Independent  Quality of Gait: slow and steady, no lob with change in direction or terrain  Gait Deviations: Slow Alexa; Increased MIKAYLA  Distance: 225'  Ambulation without ad, guarded and slow with increased mikayla. 150 feet modified ind. Stairs/Curb  Stairs?: Yes  Stairs  # Steps : 12  Stairs Height: 6\"  Rails: Left ascending  Assistance: Independent; Modified independent   Comment: double grasp on left rail  Neuro: around bolsters without ad. Slow and steady. TU seconds   ASSESSMENT/COMMENTS:pt has met all goals and is dc to home tomorrow. Issue pt hep for bilateral lower extremities in supine, seated, and standing position. Issue green tband for additional strengthening for pt. Pt will have rollator and ww for home use. PLAN OF CARE/Safety:   Safety Devices  Type of devices:  All fall risk precautions in place      Therapy Time: Individual   Time In 1400   Time Out 1500   Minutes 60   Therapy Time   Individual   Time In 1622   Time Out 1630   Minutes 8   Pt agreeable for make up time missed from this am.       Minutes:68      Transfer/Bed mobility trainin      Gait trainin      Neuro re education:10     Therapeutic ex:18      Mariajose Barnett PTA, 20 at 2:49 PM

## 2020-08-21 VITALS
DIASTOLIC BLOOD PRESSURE: 73 MMHG | HEIGHT: 61 IN | WEIGHT: 175.56 LBS | OXYGEN SATURATION: 98 % | TEMPERATURE: 97 F | RESPIRATION RATE: 14 BRPM | HEART RATE: 98 BPM | BODY MASS INDEX: 33.14 KG/M2 | SYSTOLIC BLOOD PRESSURE: 123 MMHG

## 2020-08-21 PROBLEM — Z79.899 HIGH RISK MEDICATION USE: Chronic | Status: ACTIVE | Noted: 2020-08-21

## 2020-08-21 PROBLEM — K22.9 ESOPHAGEAL DISORDER: Status: ACTIVE | Noted: 2018-11-30

## 2020-08-21 PROBLEM — F17.200 NICOTINE DEPENDENCE, UNSPECIFIED, UNCOMPLICATED: Status: ACTIVE | Noted: 2018-12-11

## 2020-08-21 PROBLEM — F13.20 BENZODIAZEPINE DEPENDENCE (HCC): Status: ACTIVE | Noted: 2020-08-21

## 2020-08-21 PROBLEM — I25.10 CORONARY ARTERY DISEASE INVOLVING NATIVE CORONARY ARTERY OF NATIVE HEART WITHOUT ANGINA PECTORIS: Status: ACTIVE | Noted: 2018-12-11

## 2020-08-21 PROBLEM — R49.0 DYSPHONIA: Status: ACTIVE | Noted: 2020-08-21

## 2020-08-21 PROCEDURE — 6370000000 HC RX 637 (ALT 250 FOR IP): Performed by: PHYSICAL MEDICINE & REHABILITATION

## 2020-08-21 PROCEDURE — 99239 HOSP IP/OBS DSCHRG MGMT >30: CPT | Performed by: PHYSICAL MEDICINE & REHABILITATION

## 2020-08-21 PROCEDURE — 97116 GAIT TRAINING THERAPY: CPT

## 2020-08-21 PROCEDURE — 6370000000 HC RX 637 (ALT 250 FOR IP): Performed by: NEUROLOGICAL SURGERY

## 2020-08-21 PROCEDURE — 6370000000 HC RX 637 (ALT 250 FOR IP): Performed by: INTERNAL MEDICINE

## 2020-08-21 RX ORDER — OXYCODONE AND ACETAMINOPHEN 10; 325 MG/1; MG/1
1 TABLET ORAL EVERY 4 HOURS PRN
Status: DISCONTINUED | OUTPATIENT
Start: 2020-08-21 | End: 2020-08-21 | Stop reason: HOSPADM

## 2020-08-21 RX ORDER — OXYCODONE AND ACETAMINOPHEN 10; 325 MG/1; MG/1
1 TABLET ORAL EVERY 4 HOURS PRN
Qty: 20 TABLET | Refills: 0 | Status: SHIPPED | OUTPATIENT
Start: 2020-08-21 | End: 2020-08-25 | Stop reason: SDUPTHER

## 2020-08-21 RX ORDER — FLUCONAZOLE 200 MG/1
200 TABLET ORAL DAILY
Qty: 7 TABLET | Refills: 0 | Status: SHIPPED | OUTPATIENT
Start: 2020-08-22 | End: 2020-08-29

## 2020-08-21 RX ORDER — OXYCODONE AND ACETAMINOPHEN 7.5; 325 MG/1; MG/1
1 TABLET ORAL EVERY 6 HOURS PRN
Qty: 30 TABLET | Refills: 0 | Status: SHIPPED | OUTPATIENT
Start: 2020-08-21 | End: 2020-08-21 | Stop reason: HOSPADM

## 2020-08-21 RX ADMIN — GABAPENTIN 300 MG: 300 CAPSULE ORAL at 08:20

## 2020-08-21 RX ADMIN — BUSPIRONE HYDROCHLORIDE 7.5 MG: 7.5 TABLET ORAL at 08:21

## 2020-08-21 RX ADMIN — OXYCODONE HYDROCHLORIDE 10 MG: 5 TABLET ORAL at 10:48

## 2020-08-21 RX ADMIN — POLYETHYLENE GLYCOL 3350 17 G: 17 POWDER, FOR SOLUTION ORAL at 08:22

## 2020-08-21 RX ADMIN — ALPRAZOLAM 0.25 MG: 0.25 TABLET ORAL at 08:20

## 2020-08-21 RX ADMIN — MORPHINE SULFATE 15 MG: 15 TABLET, FILM COATED, EXTENDED RELEASE ORAL at 07:00

## 2020-08-21 RX ADMIN — OXYCODONE HYDROCHLORIDE 10 MG: 5 TABLET ORAL at 05:23

## 2020-08-21 RX ADMIN — Medication: at 10:48

## 2020-08-21 RX ADMIN — OXYCODONE HYDROCHLORIDE 10 MG: 5 TABLET ORAL at 00:18

## 2020-08-21 RX ADMIN — Medication: at 08:23

## 2020-08-21 RX ADMIN — FLUCONAZOLE 200 MG: 100 TABLET ORAL at 08:21

## 2020-08-21 ASSESSMENT — PAIN SCALES - GENERAL
PAINLEVEL_OUTOF10: 0
PAINLEVEL_OUTOF10: 6
PAINLEVEL_OUTOF10: 5
PAINLEVEL_OUTOF10: 5
PAINLEVEL_OUTOF10: 6
PAINLEVEL_OUTOF10: 5

## 2020-08-21 NOTE — PROGRESS NOTES
Physical Therapy Rehab Treatment Note  Facility/Department: [facility]  Room: [facility]/H062-66       NAME: Kayleigh Musa  : 1955 (76 y.o.)  MRN: [id number]  CODE STATUS: Full Code    Date of Service: 2020  Chart Reviewed: Yes  Family / Caregiver Present: No    Restrictions:  Restrictions/Precautions: Fall Risk     SUBJECTIVE: Subjective: Pt she is ready to go home. Pre Treatment Pain Screening  Pain at present: 5  Intervention List: Patient able to continue with treatment;Patient declined any intervention  Comments / Details: back and abdomen    Post Treatment Pain Screening:same as above. OBJECTIVE:                 Transfers  Sit to Stand: Independent  Stand to sit: Independent    Ambulation  Ambulation?: Yes  Ambulation 1  Surface: level tile;uneven;carpet  Device: Rollator  Assistance: Modified Independent  Distance: 200ft  Comments: Progressed gait training to 1 UE support at ma rail then to without devices 50ftx2 indep. Stairs/Curb  Stairs?: Yes  Stairs  # Steps : 12  Stairs Height: 6\"  Rails: Left ascending  Assistance: Modified independent   Comment: Theodore grasp on left rail        Exercises  Comments: Standing reaching with reacher. Pt able to utilize indep. Other exercises  Other exercises 3: TUG 11sec     ASSESSMENT/COMMENTS:  Assessment: Goals met. Pt improved gait speed with TUG going from 41sec to 11sec. Able to progress gait to without devices indep without LOB. Patient Education: Pt was issued HEP on  for LE strengthening. PLAN OF CARE/Safety:   Plan Comment: Pt to D/C home today.   Therapy Time:   Individual   Time In 1000   Time Out 1030   Minutes 30     Minutes:        Transfer/Bed mobility trainin      Gait trainin      Neuro re education:5     Therapeutic ex:0    Warren Jacob PTA, 20 at 10:27 AM

## 2020-08-21 NOTE — DISCHARGE SUMMARY
Subjective: The patient complains of severe  acute on chronic progressive mid and low back pain partially relieved by opiate medications, rest, ice, recent surgery and exacerbated by activity. I am concerned about patients diabetes and multiple medical problems. According to recent nursing notes, \"Patient assessment completed earlier this shift. The patient is alert and oriented. She complains of severe pain to her back and abdomen she has been medicated with scheduled pain med's and also 10 mg Roxicodone PRN. She has had ice bags applied to the abdomen also to alleviate pain. She has complained of severe diarrhea after many medications and a suppository was used earlier in the day. She refused to take her softeners tonight so they were held. \"    [address] Course: The patient was admitted to the Rehabilitation Unit to address ADL and mobility deficits. The patient was enrolled in acute PT, OT program.  Weekly team meetings were held to assess functional progress toward their goals. The patient's medical issues were addressed. The patient progressed in the rehab program and is now ready for discharge. Refer to FIM scores summary report for detailed functional status. Greater than 35 minutes was spent on coordinating patients discharge including follow-up care, medications and patient/family education. Additional time needed because of patient's use of high-dose opiates at discharge. ROS x10: The patient also complains of severely impaired mobility and activities of daily living. Otherwise no new problems with vision, hearing, nose, mouth, throat, dermal, cardiovascular, GI, , pulmonary, musculoskeletal, psychiatric or neurological. See Rehab H&P on Rehab chart dated .        Vital signs:  /73   Pulse 98   Temp 97 °F (36.1 °C) (Oral)   Resp 14   Ht 5' 0.5\" (1.537 m)   Wt 175 lb 9 oz (79.6 kg)   LMP  (LMP Unknown)   SpO2 98%   BMI 33.72 kg/m² I/O:   PO/Intake:  fair PO intake, no problems observed or reported. Bowel/Bladder:  continent, constipation and urinary urgency. General:  Patient is well developed, adequately nourished, non-obese and     well kempt. HEENT:    PERRLA, hearing intact to loud voice, external inspection of ear     and nose benign. Inspection of lips, tongue and gums benign  Musculoskeletal: No significant change in strength or tone. All joints stable. Inspection and palpation of digits and nails show no clubbing,       cyanosis or inflammatory conditions. Neuro/Psychiatric: Affect: flat. Alert and oriented to person, place and     situation. No significant change in deep tendon reflexes or     sensation  Lungs:  Diminished, CTA-B. Respiration effort is normal at rest.     Heart:   S1 = S2, RRR. No loud murmurs. Abdomen:  Soft, non-tender, no enlargement of liver or spleen. Extremities:  No significant lower extremity edema or tenderness. Skin:   Intact to general survey, healing lumbar spine incision    Rehabilitation:  Physical therapy: FIMS:  Bed Mobility: Scooting: Independent    Transfers: Sit to Stand: Independent  Stand to sit: Independent  Bed to Chair: Independent, Ambulation 1  Surface: ramp, carpet, level tile  Device: Rollator  Assistance: Modified Independent  Quality of Gait: slow and steady, no lob with change in direction or terrain  Gait Deviations: Slow Alexa, Increased MIKAYLA  Distance: 225'  Comments: has availability to use rollator or ww at home., Stairs  # Steps : 12  Stairs Height: 6\"  Rails: Left ascending  Curbs: 6\"  Device: No Device  Assistance: Independent, Modified independent   Comment: double grasp on left rail    FIMS:  ,  , Assessment: Patient shows improved safety with all transfers and gait. No increase in pain noted throughout therapy session    Occupational therapy: FIMS:   ,  , Assessment: Pt demonstrated good participation this date.  Pt required vc's for overall safety vitamin D and CoQ10 continue to monitor I&Os, calorie counts prn, dietary consult prn.  6. Acute episodic insomnia with situational adjustment disorder:  prn Ambien, monitor for day time sedation. 7. Falls risk elevated:  patient to use call light to get nursing assistance to get up, bed and chair alarm. 8. Elevated DVT risk: progressive activities in PT, continue prophylaxis BOO hose, elevation . 9. Complex discharge planning: Discharge 8/21/2020 home alone. Weekly team meeting every Monday to assess progress towards goals, discuss and address social, psychological and medical comorbidities and to address difficulties they may be having progressing in therapy. Patient and family education is in progress. The patient is to follow-up with their family physician after discharge. Complex Active General Medical Issues that complicated care Assess & Plan:     1. Principal Problem:    Impaired mobility due to L5-S1 instability s/p Left Anterior Retroperitoneal L5-S1 disketomy and fusion. [facility] admit 08/13/20. Active Problems:  1. Spondylolisthesis at L4-L5 level  Lumbosacral spine instability  2. Hyperlipidemia  3. RLS (restless legs syndrome)  4. Mixed anxiety and depressive disorder,   Panic attacks  5. Obesity (BMI 30-39.9)  6.   OA (osteoarthritis)  7.    History of lumbar surgery S/P diskectomy and interbody cage fusion L5-S1           Kana Gutierrez D.O., PM&R     Attending    [facility]

## 2020-08-21 NOTE — PROGRESS NOTES
[facility]CCUPATIONAL THERAPY DISCHARGE SUMMARY- REHAB     Date: 2020  Patient Name: Kevin Buitrago        MRN: [id number]  Account: [de-identified]   : 1955  (76 y.o.)  Room: [place] [address]    Diagnosis:  NTSCI with Impaired mobility & ADLs d/t L5-S1 instability s/p LT anterior retroperitoneal  L5-S1 diskectomy and fusion    Past Medical History:   Diagnosis Date    Anxiety     Hyperlipidemia     meds > 5 yrs    Osteoarthritis      Past Surgical History:   Procedure Laterality Date    BACK SURGERY  2017    lumbar disc OR    COLONOSCOPY      DILATION AND CURETTAGE OF UTERUS      at age 30s--miscarriage   [facility] LUMBAR FUSION Left 2020    ANTERIOR LEFT RETROPERITONEAL L5-S1 DISKECTOMY INTERBODY CAGE FUSION performed by Rex Mauro MD at [address] [place] [address]      as child   [address]      in [place]--EMT attempted to intubate patient due to trouble breathing & then needed repair       Precautions:   Restrictions/Precautions: Fall Risk  Other position/activity restrictions: pt okay to shower starting 8/15/20  Required Braces or Orthoses  Other: Abdominal Binder     Social/Functional History:  Social/Functional History  Lives With: Alone  Type of Home: House  Home Layout: One level  Home Access: Stairs to enter with rails  Entrance Stairs - Number of Steps: 4-5  Entrance Stairs - Rails: Both  Bathroom Shower/Tub: Walk-in shower, Tub/Shower unit(uses walk in)  [employer]: Built-in shower seat, Grab bars in [facility]: (plans to purchase hip kit and borrow walker)  Receives Help From: Family(father and fathers wife can assist per pt, pt states \"doctor downplayed the sx\" so pt thought she would recover quickly)  ADL Assistance: [facility]: Independent  Homemaking Responsibilities: Yes(yard work included)  Ambulation Assistance: Independent(no AD)  Transfer Assistance: Independent  Active : Yes  Mode of Transportation: Car  Occupation: Retired  Type of occupation: Homemaker  Additional Comments: Pt. states lately she has had to sit after only 20 minutes of standing before sx    Current Functional Status:  ADL  Equipment Provided: Reacher, Sock aid, Dressing stick  Feeding: Independent  Grooming: Modified independent   UE Bathing: Modified independent   LE Bathing: Modified independent   UE Dressing: Independent  LE Dressing: Modified independent   Toileting: Modified independent   Toilet Transfers  Toilet - Technique: Ambulating  Equipment Used: Standard toilet  Toilet Transfer: Modified independent  Toilet Transfers Comments: ignacia morocho     Shower Transfers  Shower - Transfer From: Walker  Shower - Transfer Type: To and From  Shower - Transfer To: Shower seat with back  Shower - Technique: Ambulating  Shower Transfers: Modified independence  Shower Transfers Comments: ignacia morocho    Orientation Status:  Orientation  Overall Orientation Status: Within Normal Limits    Cognition Status:  Cognition  Overall Cognitive Status: WFL  Cognition Comment: Comp: Independent, Expression: Independent, Social: Mod I, Prob: Mod I, Mem: Mod I    Perception Status:  Perception  Overall Perceptual Status: WFL    Sensation Status:  Sensation  Overall Sensation Status: [facility]    Vision and Hearing Status:  Vision  Vision: Impaired  Vision Exceptions: Wears glasses for reading  Hearing  Hearing: Within functional limits     UE Function Status:    ROM:   LUE AROM (degrees)  LUE AROM : WFL  Left Hand AROM (degrees)  Left Hand AROM: WFL  RUE AROM (degrees)  RUE AROM : WFL  Right Hand AROM (degrees)  Right Hand AROM: WFL    Strength:  LUE Strength  Gross LUE Strength: WFL  L Hand General: 3+/5  LUE Strength Comment: 3/5 all planes  RUE Strength  Gross RUE Strength: WFL  R Hand General: 3+/5  RUE Strength Comment: 3/5 all planes    Coordination, Tone, Quality of Movement:    Tone RUE  RUE Tone: Normotonic  Tone LUE  LUE Tone: Normotonic  Coordination  Movements Are Fluid And

## 2020-08-21 NOTE — FLOWSHEET NOTE
Patient noted with left anterior retroperitoneal L 5_S1 diskectomy and fusion. Incision to left abdomen. Aquacell in place until evaluated by Dr. Elizabeth Crawford 8/28, in his office. Dr Ravi Hoffmann has given patient medication prior to leaving the medical floor, as well as a CD, . Spoke with Dr. Ravi Hoffmann he is not ordering any further pain medications. Patient states she called his office. However he was not available. Patient was reeducated as to the conversation I had with Dr. Ravi Hoffmann earlier. Patient ok with these answers.

## 2020-08-21 NOTE — PROGRESS NOTES
terrain  Gait Deviations: Slow Alexa, Increased MIKAYLA  Distance: 200ft  Comments: Progressed gait training to 1 UE support at ma rail then to without devices 50ftx2 indep. Stairs/Curb  Stairs?: Yes  Stairs  # Steps : 12  Stairs Height: 6\"  Rails: Left ascending  Curbs: 6\"  Device: No Device  Assistance: Modified independent   Comment: Theodore grasp on left rail      Outcomes Measures:  Timed Up and Go: 11       Pt/ family education/training: Pt was educated in safe mobility and HEP. She demonstrated good follow thru and safety throughout    Assessment: Pt has made excellent gains.  Her pain is more in control and pt is indep in mobility      LTG established:  Long term goal 1: Bed mobility with indep, demonstrating log roll- Met  Long term goal 2: Bed and car transfers with indep- Met  Long term goal 3: Amb 150ft  indep with or without device- Met  Long term goal 4: indep 4 stairs with rail for home entry-met  Long term goal 5: indep performance of TUG in 15 sec or less- met 11sec    Discharge Plan: d/c to home with follow up PT recommended        Electronically signed by Ryanne Hassan PT on 8/21/2020 at 2:37 PM

## 2020-08-21 NOTE — FLOWSHEET NOTE
Patient assessment completed earlier this shift. The patient is alert and oriented. She complains of severe pain to her back and abdomen she has been medicated with scheduled pain med's and also 10 mg Roxicodone PRN. She has had ice bags applied to the abdomen also to alleviate pain. She has complained of severe diarrhea after many medications and a suppository was used earlier in the day. She refused to take her softeners tonight so they were held.

## 2020-08-28 ENCOUNTER — OFFICE VISIT (OUTPATIENT)
Dept: SURGERY | Age: 65
End: 2020-08-28

## 2020-08-28 VITALS
SYSTOLIC BLOOD PRESSURE: 126 MMHG | BODY MASS INDEX: 33.57 KG/M2 | DIASTOLIC BLOOD PRESSURE: 74 MMHG | TEMPERATURE: 98.3 F | WEIGHT: 171 LBS | HEIGHT: 60 IN

## 2020-08-28 PROCEDURE — 99024 POSTOP FOLLOW-UP VISIT: CPT | Performed by: SURGERY

## 2020-08-28 NOTE — PROGRESS NOTES
Subjective:      Patient ID: Kevin Buitrago is a 76 y.o. female. HPI Lazarus Leatherwood is a 76 y.o. female who under went a L5-S1 anterior spinal fusion with Dr Roshan Contreras on August 12, 2020. Her abdominal pain is 3/10. The patient  Denies wound drainage. The patient denies fever, chills or sweats. She is wearing no binder or brace. Review of Systems    Objective:   Physical Exam  Constitutional:       General: She is not in acute distress. Appearance: Normal appearance. HENT:      Mouth/Throat:      Mouth: Mucous membranes are moist.      Pharynx: Oropharynx is clear. Eyes:      Pupils: Pupils are equal, round, and reactive to light. Neck:      Comments: Neck is supple with out masses, no thyromegaly, trachea midline  Abdominal:          Comments: Incision is well approximated, erythema is not present, swelling is minimal, no evidence of hernia, mild tenderness, no drainage present, skin glue/sutures are present. Aquacel dressing intact   Musculoskeletal:      Comments: Normal gait   Skin:     Findings: No bruising, lesion or rash. Neurological:      Mental Status: She is alert and oriented to person, place, and time.    Psychiatric:         Mood and Affect: Mood normal.         Judgment: Judgment normal.       /74   Temp 98.3 °F (36.8 °C) (Temporal)   Ht 5' (1.524 m)   Wt 171 lb (77.6 kg)   LMP  (LMP Unknown)   BMI 33.40 kg/m²   Assessment:      Satisfactory course      Plan:       Removed her Aquacel dressing  Return to see me as needed  I recommend she wears her binder at all times unless she is wearing a brace        Parth Bates MD

## 2020-09-01 PROBLEM — G89.29 CHRONIC BACK PAIN GREATER THAN 3 MONTHS DURATION: Status: ACTIVE | Noted: 2020-09-01

## 2020-09-01 PROBLEM — M54.9 CHRONIC BACK PAIN GREATER THAN 3 MONTHS DURATION: Status: ACTIVE | Noted: 2020-09-01

## 2020-12-10 PROBLEM — M96.1 POSTLAMINECTOMY SYNDROME OF LUMBOSACRAL REGION: Status: ACTIVE | Noted: 2020-12-10

## 2021-03-02 PROBLEM — G57.12 MERALGIA PARESTHETICA OF LEFT SIDE: Status: ACTIVE | Noted: 2021-03-02

## 2021-04-01 NOTE — PLAN OF CARE
Date of Service: 04/01/2021    PREOPERATIVE DIAGNOSIS:  History of colon polyps, screening for colon cancer.    POSTOPERATIVE DIAGNOSIS:  Inadequate bowel prep.    PROCEDURE:  Colonoscopy.    SURGEON:  Fer Rojas MD.    ASSISTANT(S):  None.    ANESTHESIA:  Monitored anesthesia care.    COMPLICATIONS:  None apparent.    CONDITION:  Stable.    FINDINGS:  A nonvisualization of the entire right colon and hepatic flexure secondary to retained formed stool and poor bowel prep throughout the remainder of the colon.    ESTIMATED BLOOD LOSS:  Minimal.    COMPLICATIONS:  None apparent.    INDICATIONS FOR PROCEDURE:  The patient is an 81-year-old male with a history of colorectal polyps and a history of abdominal pain, who presents today for EGD, colonoscopy.    DETAILS OF PROCEDURE:  The patient was brought to the endoscopy suite.  He was already asleep from his EGD.  Digital rectal exam was performed.  No lesions were noted.  Colonoscope was introduced.  There was considerable amount of retained stool.  We were able to get to the proximal transverse colon; however, there was so much stool the there was complete nonvisualization of the hepatic flexure or the descending colon.  The transverse colon, although filled with a considerable amount of stool, did not have any obvious masses, lesions or abnormalities throughout the transverse colon, splenic flexure, descending colon, sigmoid colon or rectum.  Colonoscope was retroflexed.  Some internal hemorrhoids were noted, none of which were banded.  Colonoscope was straightened, air was evacuated.  The patient tolerated the procedure well.  He was awake in endoscopy suite, transferred to recovery room in stable condition.        Dictated By: Fer Rojas MD  Signing Provider: MD YONNY Goodman/COY ([id number])  DD: 04/01/2021 12:09:52 TD: 04/01/2021 12:28:08    Copy Sent To:    Problem: Pain:  Goal: Pain level will decrease  Description: Pain level will decrease  Outcome: Ongoing  Goal: Control of acute pain  Description: Control of acute pain  Outcome: Ongoing  Goal: Control of chronic pain  Description: Control of chronic pain  Outcome: Ongoing     Problem: Falls - Risk of:  Goal: Will remain free from falls  Description: Will remain free from falls  Outcome: Ongoing  Goal: Absence of physical injury  Description: Absence of physical injury  Outcome: Ongoing

## 2021-06-01 DIAGNOSIS — Z98.1 S/P LUMBAR AND LUMBOSACRAL FUSION BY ANTERIOR TECHNIQUE: ICD-10-CM

## 2021-06-01 DIAGNOSIS — G89.4 CHRONIC PAIN SYNDROME: ICD-10-CM

## 2021-06-01 DIAGNOSIS — M54.16 LUMBAR RADICULOPATHY: ICD-10-CM

## 2021-06-01 DIAGNOSIS — M47.817 LUMBOSACRAL SPONDYLOSIS WITHOUT MYELOPATHY: ICD-10-CM

## 2021-06-01 DIAGNOSIS — M48.061 SPINAL STENOSIS, LUMBAR REGION, WITHOUT NEUROGENIC CLAUDICATION: ICD-10-CM

## 2021-06-01 RX ORDER — OXYCODONE AND ACETAMINOPHEN 7.5; 325 MG/1; MG/1
1 TABLET ORAL EVERY 6 HOURS PRN
Qty: 20 TABLET | Refills: 0 | Status: SHIPPED | OUTPATIENT
Start: 2021-06-05 | End: 2021-06-30

## 2021-06-01 RX ORDER — MELOXICAM 15 MG/1
15 TABLET ORAL DAILY
Qty: 90 TABLET | Refills: 0 | Status: SHIPPED | OUTPATIENT
Start: 2021-06-01 | End: 2021-09-02 | Stop reason: SDUPTHER

## 2021-06-08 DIAGNOSIS — G89.4 CHRONIC PAIN SYNDROME: ICD-10-CM

## 2021-06-08 DIAGNOSIS — M62.838 MUSCLE SPASM: ICD-10-CM

## 2021-06-08 DIAGNOSIS — M48.061 SPINAL STENOSIS, LUMBAR REGION, WITHOUT NEUROGENIC CLAUDICATION: ICD-10-CM

## 2021-06-08 DIAGNOSIS — M54.16 LUMBAR RADICULOPATHY: ICD-10-CM

## 2021-06-08 DIAGNOSIS — M47.817 LUMBOSACRAL SPONDYLOSIS WITHOUT MYELOPATHY: ICD-10-CM

## 2021-06-08 DIAGNOSIS — Z98.1 S/P LUMBAR AND LUMBOSACRAL FUSION BY ANTERIOR TECHNIQUE: ICD-10-CM

## 2021-06-08 RX ORDER — TIZANIDINE 4 MG/1
4 TABLET ORAL NIGHTLY PRN
Qty: 30 TABLET | Refills: 0 | Status: SHIPPED | OUTPATIENT
Start: 2021-06-08 | End: 2021-06-30 | Stop reason: SDUPTHER

## 2021-06-08 RX ORDER — OXYCODONE AND ACETAMINOPHEN 7.5; 325 MG/1; MG/1
1 TABLET ORAL EVERY 6 HOURS PRN
Qty: 120 TABLET | Refills: 0 | Status: SHIPPED | OUTPATIENT
Start: 2021-06-08 | End: 2021-06-30 | Stop reason: SDUPTHER

## 2021-06-30 ENCOUNTER — VIRTUAL VISIT (OUTPATIENT)
Dept: PAIN MANAGEMENT | Age: 66
End: 2021-06-30
Payer: MEDICARE

## 2021-06-30 DIAGNOSIS — G89.4 CHRONIC PAIN SYNDROME: ICD-10-CM

## 2021-06-30 DIAGNOSIS — Z98.1 S/P LUMBAR AND LUMBOSACRAL FUSION BY ANTERIOR TECHNIQUE: ICD-10-CM

## 2021-06-30 DIAGNOSIS — M47.817 LUMBOSACRAL SPONDYLOSIS WITHOUT MYELOPATHY: Primary | ICD-10-CM

## 2021-06-30 DIAGNOSIS — M48.061 SPINAL STENOSIS, LUMBAR REGION, WITHOUT NEUROGENIC CLAUDICATION: ICD-10-CM

## 2021-06-30 DIAGNOSIS — Z79.891 ENCOUNTER FOR LONG-TERM OPIATE ANALGESIC USE: ICD-10-CM

## 2021-06-30 DIAGNOSIS — M54.16 LUMBAR RADICULOPATHY: ICD-10-CM

## 2021-06-30 DIAGNOSIS — M62.838 MUSCLE SPASM: ICD-10-CM

## 2021-06-30 PROCEDURE — 99442 PR PHYS/QHP TELEPHONE EVALUATION 11-20 MIN: CPT | Performed by: NURSE PRACTITIONER

## 2021-06-30 RX ORDER — TIZANIDINE 4 MG/1
4 TABLET ORAL NIGHTLY PRN
Qty: 30 TABLET | Refills: 0 | Status: SHIPPED | OUTPATIENT
Start: 2021-06-30 | End: 2021-07-29 | Stop reason: SDUPTHER

## 2021-06-30 RX ORDER — GABAPENTIN 600 MG/1
600 TABLET ORAL 3 TIMES DAILY
Qty: 90 TABLET | Refills: 0 | Status: SHIPPED | OUTPATIENT
Start: 2021-06-30 | End: 2021-09-02 | Stop reason: SDUPTHER

## 2021-06-30 RX ORDER — OXYCODONE AND ACETAMINOPHEN 7.5; 325 MG/1; MG/1
1 TABLET ORAL EVERY 6 HOURS PRN
Qty: 120 TABLET | Refills: 0 | Status: SHIPPED | OUTPATIENT
Start: 2021-07-08 | End: 2021-07-29 | Stop reason: SDUPTHER

## 2021-06-30 RX ORDER — NALOXONE HYDROCHLORIDE 4 MG/.1ML
1 SPRAY NASAL PRN
Qty: 1 EACH | Refills: 1 | Status: SHIPPED | OUTPATIENT
Start: 2021-06-30

## 2021-06-30 RX ORDER — LISINOPRIL 5 MG/1
TABLET ORAL
COMMUNITY
Start: 2021-05-05

## 2021-06-30 ASSESSMENT — ENCOUNTER SYMPTOMS
BACK PAIN: 1
TROUBLE SWALLOWING: 0
EYES NEGATIVE: 1
SHORTNESS OF BREATH: 0
GASTROINTESTINAL NEGATIVE: 1
COUGH: 0
CONSTIPATION: 0
DIARRHEA: 0

## 2021-06-30 NOTE — PROGRESS NOTES
Kj Estrada  (1955)    6/30/2021        TELEHEALTH EVALUATION -- Audio Only (During RJLYF-49 public health emergency)     Due to COVID 19 outbreak, patient's office visit was converted to a virtual visit. Patient was contacted and agreed to proceed with a audio only telehealth service. Patient understands that this encounter is a billable visit and that insurance coverage and co-pays are up to their individual insurance plans.     At the beginning of this telehealth encounter, I verified with the patient their name and date of birth. Verbal consent for telehealth encounter was obtained.       Subjective:     Kj Estrada is 76 y.o. female who complains today of:    Chief Complaint   Patient presents with    Back Pain    Chronic Pain         Allergies:  Patient has no known allergies.     Past Medical History:   Diagnosis Date    Anxiety     Coronary artery disease involving native coronary artery of native heart without angina pectoris 12/11/2018    Hyperlipidemia     meds > 5 yrs    Osteoarthritis      Past Surgical History:   Procedure Laterality Date    BACK SURGERY  2017    lumbar disc OR    COLONOSCOPY      DILATION AND CURETTAGE OF UTERUS      at age 29s--miscarriage   [facility] LUMBAR FUSION Left 8/12/2020    ANTERIOR LEFT RETROPERITONEAL L5-S1 DISKECTOMY INTERBODY CAGE FUSION performed by Mary Jessica MD at [address] [place] [address]      as child   [address]  [address]    in [place]--EMT attempted to intubate patient due to trouble breathing & then needed repair     Family History   Problem Relation Age of Onset    Diabetes Mother     Obesity Mother     High Cholesterol Mother     High Cholesterol Father     Other Sister         GSW to spine & paralysis    No Known Problems Brother     No Known Problems Son     No Known Problems Daughter     Other Brother         as infant     Social History     Socioeconomic History    Marital status: Single     Spouse name: Not on file   [facility] Transportation (Non-Medical):    Physical Activity:     Days of Exercise per Week:     Minutes of Exercise per Session:    Stress:     Feeling of Stress :    Social Connections:     Frequency of Communication with Friends and Family:     Frequency of Social Gatherings with Friends and Family:     Attends Hindu Services:     Active Member of Clubs or Organizations:     Attends Club or Organization Meetings:     Marital Status:    Intimate Partner Violence:     Fear of Current or Ex-Partner:     Emotionally Abused:     Physically Abused:     Sexually Abused:        Current Outpatient Medications on File Prior to Visit   Medication Sig Dispense Refill    lisinopril (PRINIVIL;ZESTRIL) 5 MG tablet TAKE 1 TABLET BY MOUTH DAILY      meloxicam (MOBIC) 15 MG tablet TAKE 1 TABLET BY MOUTH DAILY 90 tablet 0    lidocaine (LIDODERM) 5 % Place 1 patch onto the skin daily 12 hours on, 12 hours off.  30 patch 0    predniSONE (DELTASONE) 10 MG tablet Take 1 tablet by mouth daily 1 tab TID x 3 days, 1 tab BID x 3 days, 1 tab QD x 3 days 18 tablet 0    Multiple Vitamins-Minerals (THERAPEUTIC MULTIVITAMIN-MINERALS) tablet Take 1 tablet by mouth daily      magnesium oxide (MAG-OX) 400 MG tablet Take 400 mg by mouth daily      Lactobacillus (ACIDOPHILUS) 100 MG CAPS Take by mouth as needed      calcium carbonate (OSCAL) 500 MG TABS tablet Take 500 mg by mouth daily      vitamin C (ASCORBIC ACID) 500 MG tablet Take 500 mg by mouth daily      Milk Thistle 150 MG CAPS Take by mouth daily      pseudoephedrine (SUDAFED) 60 MG tablet Take 60 mg by mouth every 4 hours as needed for Congestion      busPIRone (BUSPAR) 7.5 MG tablet Take 15 mg by mouth 2 times daily       pramipexole (MIRAPEX) 0.25 MG tablet TK ONE OR TWO TS PO QHS      simvastatin (ZOCOR) 40 MG tablet Take 40 mg by mouth nightly       ipratropium (ATROVENT) 0.03 % nasal spray U 2 SPRAYS IEN BID      ALPRAZolam (XANAX) 0.25 MG tablet TK 1 T PO BID PRN      naloxone (NARCAN) 4 MG/0.1ML LIQD nasal spray 1 spray by Nasal route as needed for Opioid Reversal 1 each 0     No current facility-administered medications on file prior to visit. Pt presents today for a f/u of her pain. PCP is Dr. Anibal Veliz MD.  Pt last saw Dr. Suly Arias for her chronic low back pain and Lt LE pain. She says she gets burning pain in Lt leg. She says she has lost 10 pounds with some relief she reports. She is frustrated with her pain and disappointed in lack of relief from her fusion she had done in August of 2020. Had B/L SI joint injection on 2/15/21 which didn't offer much relief and Lt L3-4 ashley on 1/5/21 didn't offer much relief as well. Had LE EMG with  and report showed diagnosis is consistent with Meralgia Paraesthetica on Lt. Advised weight loss and stretching and says she is \"really working on this\". Seen Dr. Chris Zamorano recently for consult and no further surgery recommended - noted Lt meralgia paraesthetica, Lt L3 radiculopathy and spondylosis in note. She is not interested in SCS. She is frustrated with the pain and says doesn't feel fusion helped her Sx.      Past Surgical Hx:   06/23/2017 right and bilateral L4-5 microdissection, discectomy, interbody posterolateral fusion, pedicle screws.   EM.    8/12/2020 left anterior retroperitoneal L5-S1 discectomy interbody cage fusion.      She takes Percocet 7.5mg Q6hrs PRN pain and Gabapentin 600mg TID and zanaflex 4mg QHS and lidoderm patch. Also uses mobic 15mg daily PRN    Pt feels pain level with her medication is \"better\", and \"worse\" without medication. Pt feels that too much activity, AM makes the pain worse, and weight loss, medication, sitting makes the pain better. Pt feels her medication helps   her function and improve her quality of life, specifically says allows her to be more active. Pt admits to Lt LE radiating numbness and tingling. Denies recent falls, injuries or trauma.   Pt denies new weakness. Pt reports PT has been done in past.         Review of Systems   Constitutional: Negative. Negative for fatigue. HENT: Negative. Negative for trouble swallowing. Eyes: Negative. Respiratory: Negative for cough and shortness of breath. Cardiovascular: Negative for chest pain. On BP medication   Gastrointestinal: Negative. Negative for constipation and diarrhea. Endocrine: Negative. Genitourinary: Negative. Musculoskeletal: Positive for back pain and neck pain. Skin: Negative. Neurological: Positive for numbness. Negative for dizziness, weakness and headaches. Hematological: Negative. Psychiatric/Behavioral: Negative. Objective:     Vitals:  LMP  (LMP Unknown)          PHYSICAL EXAMINATION:     [x]? Alert  [x]? Oriented to person/place/time  [x]? No apparent distress       [x]? Mood and affect normal  [x]? Recent and remote memory intact  [x]? Judgement and insight normal      []? OTHER:       Due to this being a TeleHealth encounter, evaluation of the following organ systems is limited: Vitals/Constitutional/EENT/Resp/CV/GI//MS/Neuro/Skin/Heme-Lymph-Imm. Assessment:      Diagnosis Orders   1. Lumbosacral spondylosis without myelopathy  oxyCODONE-acetaminophen (PERCOCET) 7.5-325 MG per tablet   2. Chronic pain syndrome  oxyCODONE-acetaminophen (PERCOCET) 7.5-325 MG per tablet   3. Spinal stenosis, lumbar region, without neurogenic claudication  oxyCODONE-acetaminophen (PERCOCET) 7.5-325 MG per tablet    gabapentin (NEURONTIN) 600 MG tablet   4. S/P lumbar and lumbosacral fusion by anterior technique  oxyCODONE-acetaminophen (PERCOCET) 7.5-325 MG per tablet    gabapentin (NEURONTIN) 600 MG tablet   5. Lumbar radiculopathy  oxyCODONE-acetaminophen (PERCOCET) 7.5-325 MG per tablet    gabapentin (NEURONTIN) 600 MG tablet   6. Muscle spasm  tiZANidine (ZANAFLEX) 4 MG tablet   7.  Encounter for long-term opiate analgesic use  oxyCODONE-acetaminophen (PERCOCET) couple days ago\" prior to the Utox - Dr. Perry Garcia continued medication at the time - recommend retest UDS in future. ORT score 2 - low risk. Narcan Rx sent today as precaution and discussed use. Will continue medications for chronic pain that has been previously directed as they do help pt function with ADL and improve quality of life. Pt is aware goal is to use the least amount of medication possible to allow pt to function and help with quality of life as discussed in detail. Discussed ideal to keep MME below 50 which it is. Discussed proper disposal of narcotic medication. Advised to have medications in safe place and locked up/in lock box. Discussed possible risks of opiate medication with pt, including, but not limited to possible constipation, nausea or vomiting, sedation, urinary retention, dependence and possible addiction. Pt agrees to use medication as prescribed/as directed. Pt advised to not use opiates while driving or operating heavy equipment, or in situations where pt may harm him/herself or others. Pt is aware that while on narcotics, pt needs to be seen to reassess pain and reassess need for continued medication at that time. NDP reviewed. OARRS was reviewed. This NP saw pt under direct supervision of Dr. Perry Garcia. Follow up:  Return in about 4 weeks (around 7/28/2021) for review meds and reassess pain.     ABENA Mahmood - CNP

## 2021-07-06 ENCOUNTER — TELEPHONE (OUTPATIENT)
Dept: PAIN MANAGEMENT | Age: 66
End: 2021-07-06

## 2021-07-06 NOTE — TELEPHONE ENCOUNTER
Patient is requesting to fill her percocet tomorrow 7/7, but her pharmacy is saying she is not due until 7/8. She is asking for us to call the pharmacy to okay to fill 7/7.

## 2021-07-11 NOTE — TELEPHONE ENCOUNTER
Requested Prescriptions     Pending Prescriptions Disp Refills    tiZANidine (ZANAFLEX) 4 MG tablet [Pharmacy Med Name: TIZANIDINE 4MG TABLETS] 30 tablet 0     Sig: Take 1 tablet by mouth every 8 hours as needed

## 2021-07-12 RX ORDER — TIZANIDINE 4 MG/1
4 TABLET ORAL EVERY 8 HOURS PRN
Qty: 30 TABLET | Refills: 0 | Status: SHIPPED | OUTPATIENT
Start: 2021-07-12 | End: 2021-07-29

## 2021-07-29 ENCOUNTER — VIRTUAL VISIT (OUTPATIENT)
Dept: PAIN MANAGEMENT | Age: 66
End: 2021-07-29
Payer: MEDICARE

## 2021-07-29 DIAGNOSIS — Z79.891 ENCOUNTER FOR LONG-TERM OPIATE ANALGESIC USE: ICD-10-CM

## 2021-07-29 DIAGNOSIS — M54.16 LUMBAR RADICULOPATHY: ICD-10-CM

## 2021-07-29 DIAGNOSIS — G89.4 CHRONIC PAIN SYNDROME: ICD-10-CM

## 2021-07-29 DIAGNOSIS — Z98.1 S/P LUMBAR AND LUMBOSACRAL FUSION BY ANTERIOR TECHNIQUE: ICD-10-CM

## 2021-07-29 DIAGNOSIS — M47.817 LUMBOSACRAL SPONDYLOSIS WITHOUT MYELOPATHY: ICD-10-CM

## 2021-07-29 DIAGNOSIS — M62.838 MUSCLE SPASM: ICD-10-CM

## 2021-07-29 DIAGNOSIS — M48.061 SPINAL STENOSIS, LUMBAR REGION, WITHOUT NEUROGENIC CLAUDICATION: ICD-10-CM

## 2021-07-29 PROCEDURE — 99442 PR PHYS/QHP TELEPHONE EVALUATION 11-20 MIN: CPT | Performed by: NURSE PRACTITIONER

## 2021-07-29 RX ORDER — OXYCODONE AND ACETAMINOPHEN 7.5; 325 MG/1; MG/1
1 TABLET ORAL EVERY 6 HOURS PRN
Qty: 120 TABLET | Refills: 0 | Status: SHIPPED | OUTPATIENT
Start: 2021-08-06 | End: 2021-09-02 | Stop reason: SDUPTHER

## 2021-07-29 RX ORDER — TIZANIDINE 4 MG/1
4 TABLET ORAL NIGHTLY PRN
Qty: 30 TABLET | Refills: 0 | Status: SHIPPED | OUTPATIENT
Start: 2021-07-29 | End: 2021-08-28

## 2021-07-29 ASSESSMENT — ENCOUNTER SYMPTOMS
BACK PAIN: 1
SHORTNESS OF BREATH: 0
SORE THROAT: 0
ABDOMINAL PAIN: 0

## 2021-07-29 NOTE — PROGRESS NOTES
Mariluz Meyers  (1955)    7/29/2021    TELEHEALTH EVALUATION -- Audio Only (During UEFPM-21 public health emergency)    Due to [place] 19 outbreak, patient's office visit was converted to a virtual visit. Patient was contacted and agreed to proceed with a audio only telehealth service. Patient understands that this encounter is a billable visit and that insurance coverage and co-pays are up to their individual insurance plans. At the beginning of this telehealth encounter, I verified with the patient their name and date of birth. Verbal consent for telehealth encounter was obtained. Subjective:       Chief Complaint   Patient presents with    Back Pain     Lower    Medication Refill       Mariluz Meyers is 78 y.o. female who complains today of: Allergies:  Patient has no known allergies.     History:  Past Medical History:   Diagnosis Date    Anxiety     Coronary artery disease involving native coronary artery of native heart without angina pectoris 12/11/2018    Hyperlipidemia     meds > 5 yrs    Osteoarthritis      Past Surgical History:   Procedure Laterality Date    BACK SURGERY  2017    lumbar disc OR    COLONOSCOPY      DILATION AND CURETTAGE OF UTERUS      at age 29s--miscarriage   [facility] LUMBAR FUSION Left 8/12/2020    ANTERIOR LEFT RETROPERITONEAL L5-S1 DISKECTOMY INTERBODY CAGE FUSION performed by Sergio Ballard MD at [address] [place] [address]      as child   [address]  [address]    in [place]--EMT attempted to intubate patient due to trouble breathing & then needed repair     Family History   Problem Relation Age of Onset    Diabetes Mother     Obesity Mother     High Cholesterol Mother     High Cholesterol Father     Other Sister         GSW to spine & paralysis    No Known Problems Brother     No Known Problems Son     No Known Problems Daughter     Other Brother         as infant     Social History     Socioeconomic History    Marital status: Single     Spouse name: Not on file    Number of children: Not on file    Years of education: Not on file    Highest education level: Not on file   Occupational History    Not on file   Tobacco Use    Smoking status: Former Smoker     Packs/day: 1.00     Years: 30.00     Pack years: 30.00     Types: Cigarettes     Quit date: 2019     Years since quittin.3    Smokeless tobacco: Never Used    Tobacco comment: intermittent habit   Vaping Use    Vaping Use: Never used   Substance and Sexual Activity    Alcohol use:  Yes     Alcohol/week: 0.0 standard drinks     Comment: social    Drug use: Not on file    Sexual activity: Not on file   Other Topics Concern    Not on file   Social History Narrative         Lives With: Alone    Type of Home: House    Home Layout: One level    Home Access: Stairs to enter with rails    Entrance Stairs - Number of Steps: 4-5    Entrance Stairs - Rails: Both    Bathroom Shower/Tub: Walk-in shower, Tub/Shower unit(uses walk in)    [employer]: Built-in shower seat, Grab bars in shower    Home Equipment: (plans to purchase hip kit and borrow walker)    Receives Help From: Family(father and fathers wife can assist per pt, pt states \"doctor downplayed the sx\" so pt thought she would recover quickly)    ADL Assistance: [facility]: Independent    Homemaking Responsibilities: Yes(yard work included)    Ambulation Assistance: Independent(no AD)    Transfer Assistance: Independent    Active : Yes    Mode of Transportation: Car    Occupation: Retired    Type of occupation: Homemaker    Additional Comments: Pt. states lately she has had to sit after only 20 minutes of standing before sx     Social Determinants of Health     Financial Resource Strain:     Difficulty of Paying Living Expenses:    Food Insecurity:     Worried About [address] in the Last Year:     [address] [facility] in the Last Year:    Transportation Needs:     Lack of Transportation (Medical):    Wilfrid Contreras Lack of Transportation (Non-Medical):    Physical Activity:     Days of Exercise per Week:     Minutes of Exercise per Session:    Stress:     Feeling of Stress :    Social Connections:     Frequency of Communication with Friends and Family:     Frequency of Social Gatherings with Friends and Family:     Attends Zoroastrianism Services:     Active Member of Clubs or Organizations:     Attends Club or Organization Meetings:     Marital Status:    Intimate Partner Violence:     Fear of Current or Ex-Partner:     Emotionally Abused:     Physically Abused:     Sexually Abused:        Current Outpatient Medications on File Prior to Visit   Medication Sig Dispense Refill    lisinopril (PRINIVIL;ZESTRIL) 5 MG tablet TAKE 1 TABLET BY MOUTH DAILY      gabapentin (NEURONTIN) 600 MG tablet Take 1 tablet by mouth 3 times daily for 30 days. 90 tablet 0    naloxone 4 MG/0.1ML LIQD nasal spray 1 spray by Nasal route as needed for Opioid Reversal 1 each 1    meloxicam (MOBIC) 15 MG tablet TAKE 1 TABLET BY MOUTH DAILY 90 tablet 0    lidocaine (LIDODERM) 5 % Place 1 patch onto the skin daily 12 hours on, 12 hours off.  30 patch 0    predniSONE (DELTASONE) 10 MG tablet Take 1 tablet by mouth daily 1 tab TID x 3 days, 1 tab BID x 3 days, 1 tab QD x 3 days 18 tablet 0    Multiple Vitamins-Minerals (THERAPEUTIC MULTIVITAMIN-MINERALS) tablet Take 1 tablet by mouth daily      magnesium oxide (MAG-OX) 400 MG tablet Take 400 mg by mouth daily      Lactobacillus (ACIDOPHILUS) 100 MG CAPS Take by mouth as needed      calcium carbonate (OSCAL) 500 MG TABS tablet Take 500 mg by mouth daily      vitamin C (ASCORBIC ACID) 500 MG tablet Take 500 mg by mouth daily      Milk Thistle 150 MG CAPS Take by mouth daily      pseudoephedrine (SUDAFED) 60 MG tablet Take 60 mg by mouth every 4 hours as needed for Congestion      busPIRone (BUSPAR) 7.5 MG tablet Take 15 mg by mouth 2 times daily       pramipexole (MIRAPEX) 0.25 MG tablet TK ONE OR TWO TS PO QHS      simvastatin (ZOCOR) 40 MG tablet Take 40 mg by mouth nightly       ipratropium (ATROVENT) 0.03 % nasal spray U 2 SPRAYS IEN BID      ALPRAZolam (XANAX) 0.25 MG tablet TK 1 T PO BID PRN      naloxone (NARCAN) 4 MG/0.1ML LIQD nasal spray 1 spray by Nasal route as needed for Opioid Reversal 1 each 0     No current facility-administered medications on file prior to visit. Pt presents today for a f/u of chronic low back and LLE pain. Pt feels pain level and functioning improves with prescribed medications and is able to perform ADLs. Pt feels that twisting aggravates the pain. Pt c/o LLE \"burning pain\" and N/T down to the thigh. She says she has lost 10 pounds with some relief she reports. Previous SI injection and MIKAELA did not help. Had LE EMG with  and report showed diagnosis is consistent with Meralgia Paraesthetica on Lt. Seen Dr. Sola Millard March 2021 for consult and no further surgery recommended - noted Lt meralgia paraesthetica, Lt L3 radiculopathy and spondylosis in note. She is not interested in SCS.     Past Surgical Hx:   06/23/2017 right and bilateral L4-5 microdissection, discectomy, interbody posterolateral fusion, pedicle screws.   [facility].    8/12/2020 left anterior retroperitoneal L5-S1 discectomy interbody cage fusion.      She takes Percocet 7.5mg Q6hrs PRN pain and Gabapentin 600mg TID and zanaflex 4mg QHS and lidoderm patch. Also uses mobic 15mg daily PRN              Review of Systems   Constitutional: Negative for fever. HENT: Negative for congestion and sore throat. Respiratory: Negative for shortness of breath. Gastrointestinal: Negative for abdominal pain. Genitourinary: Negative for difficulty urinating. Musculoskeletal: Positive for back pain. Neurological: Negative for speech difficulty and headaches. Hematological: Negative for adenopathy. Psychiatric/Behavioral: Negative for agitation.    All other systems reviewed and are negative. Objective:     Vitals:  LMP  (LMP Unknown)      PHYSICAL EXAMINATION:    [x] Alert  [x] Oriented to person/place/time  [x] No apparent distress      [x] Mood and affect normal  [x] Recent and remote memory intact  [x] Judgement and insight normal     [] OTHER:      Due to this being a TeleHealth encounter, evaluation of the following organ systems is limited: Vitals/Constitutional/EENT/Resp/CV/GI//MS/Neuro/Skin/Heme-Lymph-Imm. Assessment:      Diagnosis Orders   1. Muscle spasm  tiZANidine (ZANAFLEX) 4 MG tablet   2. Chronic pain syndrome  oxyCODONE-acetaminophen (PERCOCET) 7.5-325 MG per tablet   3. Lumbosacral spondylosis without myelopathy  oxyCODONE-acetaminophen (PERCOCET) 7.5-325 MG per tablet   4. Spinal stenosis, lumbar region, without neurogenic claudication  oxyCODONE-acetaminophen (PERCOCET) 7.5-325 MG per tablet   5. S/P lumbar and lumbosacral fusion by anterior technique  oxyCODONE-acetaminophen (PERCOCET) 7.5-325 MG per tablet   6. Lumbar radiculopathy  oxyCODONE-acetaminophen (PERCOCET) 7.5-325 MG per tablet   7. Encounter for long-term opiate analgesic use  oxyCODONE-acetaminophen (PERCOCET) 7.5-325 MG per tablet       Plan:     Periodic Controlled Substance Monitoring: Possible medication side effects, risk of tolerance/dependence & alternative treatments discussed., No signs of potential drug abuse or diversion identified. , Assessed functional status., Obtaining appropriate analgesic effect of treatment. (Monica George, APRN - CNP)    Orders Placed This Encounter   Medications    oxyCODONE-acetaminophen (PERCOCET) 7.5-325 MG per tablet     Sig: Take 1 tablet by mouth every 6 hours as needed for Pain for up to 30 days.      Dispense:  120 tablet     Refill:  0     Reduce doses taken as pain becomes manageable    tiZANidine (ZANAFLEX) 4 MG tablet     Sig: Take 1 tablet by mouth nightly as needed (spasm)     Dispense:  30 tablet     Refill:  0       No orders of the defined types were placed in this encounter. Discussed options with the patient today. Continue tizanidine, gabapentin, Percocet, and Mobic. Not interested in injections or PT. will not allow early refill this month. Patient was advised she can try increasing her gabapentin to 4 times a day to see if that helps with her nerve pain. Does not need gabapentin or Mobic this month. All questions were answered. Pt verbalized understanding and agrees with above plan. Utox reviewed from November 2020 with high levels of oxycodone on despite reporting having run out of her medications. Consider retest.  Narcan sent 6/30/2021. Will continue medications for chronic pain as they help pt function with ADL and improve quality of life. Discussed possible risks of opiate medication with pt, including but not limited to, constipation, nausea or vomiting, sedation, urinary retention, dependence and possible addiction. Pt agrees to use medication as directed. Pt advised to not use opiates while driving or operating heavy equipment, or in situations where pt may harm him/herself or others. Pt is aware that while on narcotics, pt needs to be seen monthly to reassess pain and need for continued medication. NDP reviewed. OARRS was reviewed. This NP saw pt under direct supervision of Dr. Yassine Jurado. Follow up:  Return in about 4 weeks (around 8/26/2021) for review meds and reassess pain. Gar Lanes, APRN - CNP      >  [id number]}    Pursuant to the emergency declaration under the [facility], [facility] waiver authority and the [employer] Act, this Virtual  Visit was conducted, with patient's consent, to reduce the patient's risk of exposure to COVID-19 and provide continuity of care for an established patient. Services were provided through an audio discussion to substitute for in-person clinic visit.

## 2021-08-04 ENCOUNTER — TELEPHONE (OUTPATIENT)
Dept: PAIN MANAGEMENT | Age: 66
End: 2021-08-04

## 2021-08-04 NOTE — TELEPHONE ENCOUNTER
Patient called asking if we could contact the pharmacy to  her percocet tomorrow 8/5? She says she is running out and will not have enough until Friday.

## 2021-09-02 ENCOUNTER — OFFICE VISIT (OUTPATIENT)
Dept: PAIN MANAGEMENT | Age: 66
End: 2021-09-02
Payer: MEDICARE

## 2021-09-02 VITALS
DIASTOLIC BLOOD PRESSURE: 72 MMHG | SYSTOLIC BLOOD PRESSURE: 128 MMHG | HEIGHT: 60 IN | TEMPERATURE: 96.9 F | BODY MASS INDEX: 31.41 KG/M2 | WEIGHT: 160 LBS

## 2021-09-02 DIAGNOSIS — G89.4 CHRONIC PAIN SYNDROME: ICD-10-CM

## 2021-09-02 DIAGNOSIS — Z79.891 ENCOUNTER FOR LONG-TERM OPIATE ANALGESIC USE: ICD-10-CM

## 2021-09-02 DIAGNOSIS — M48.061 SPINAL STENOSIS, LUMBAR REGION, WITHOUT NEUROGENIC CLAUDICATION: ICD-10-CM

## 2021-09-02 DIAGNOSIS — M47.817 LUMBOSACRAL SPONDYLOSIS WITHOUT MYELOPATHY: ICD-10-CM

## 2021-09-02 DIAGNOSIS — Z98.1 S/P LUMBAR AND LUMBOSACRAL FUSION BY ANTERIOR TECHNIQUE: ICD-10-CM

## 2021-09-02 DIAGNOSIS — M54.16 LUMBAR RADICULOPATHY: ICD-10-CM

## 2021-09-02 PROCEDURE — 4040F PNEUMOC VAC/ADMIN/RCVD: CPT | Performed by: NURSE PRACTITIONER

## 2021-09-02 PROCEDURE — G8417 CALC BMI ABV UP PARAM F/U: HCPCS | Performed by: NURSE PRACTITIONER

## 2021-09-02 PROCEDURE — 1123F ACP DISCUSS/DSCN MKR DOCD: CPT | Performed by: NURSE PRACTITIONER

## 2021-09-02 PROCEDURE — G8427 DOCREV CUR MEDS BY ELIG CLIN: HCPCS | Performed by: NURSE PRACTITIONER

## 2021-09-02 PROCEDURE — 1036F TOBACCO NON-USER: CPT | Performed by: NURSE PRACTITIONER

## 2021-09-02 PROCEDURE — 3017F COLORECTAL CA SCREEN DOC REV: CPT | Performed by: NURSE PRACTITIONER

## 2021-09-02 PROCEDURE — 1090F PRES/ABSN URINE INCON ASSESS: CPT | Performed by: NURSE PRACTITIONER

## 2021-09-02 PROCEDURE — G8400 PT W/DXA NO RESULTS DOC: HCPCS | Performed by: NURSE PRACTITIONER

## 2021-09-02 PROCEDURE — 99214 OFFICE O/P EST MOD 30 MIN: CPT | Performed by: NURSE PRACTITIONER

## 2021-09-02 RX ORDER — OXYCODONE AND ACETAMINOPHEN 7.5; 325 MG/1; MG/1
1 TABLET ORAL EVERY 6 HOURS PRN
Qty: 120 TABLET | Refills: 0 | Status: SHIPPED | OUTPATIENT
Start: 2021-09-04 | End: 2021-09-27 | Stop reason: SDUPTHER

## 2021-09-02 RX ORDER — MELOXICAM 15 MG/1
15 TABLET ORAL DAILY
Qty: 90 TABLET | Refills: 0 | Status: SHIPPED | OUTPATIENT
Start: 2021-09-02 | End: 2022-02-09 | Stop reason: SDUPTHER

## 2021-09-02 RX ORDER — GABAPENTIN 600 MG/1
600 TABLET ORAL 3 TIMES DAILY
Qty: 90 TABLET | Refills: 0 | Status: SHIPPED | OUTPATIENT
Start: 2021-09-02 | End: 2021-10-05 | Stop reason: SDUPTHER

## 2021-09-02 RX ORDER — LIDOCAINE 50 MG/G
1 PATCH TOPICAL DAILY
Qty: 30 PATCH | Refills: 0 | Status: SHIPPED | OUTPATIENT
Start: 2021-09-02 | End: 2021-11-01 | Stop reason: SDUPTHER

## 2021-09-02 ASSESSMENT — ENCOUNTER SYMPTOMS
SORE THROAT: 0
BACK PAIN: 1
SHORTNESS OF BREATH: 0
ABDOMINAL PAIN: 0

## 2021-09-02 NOTE — PROGRESS NOTES
Nelson Client  (1955)    2021    Subjective:     Nelson Client is 78 y.o. female who complains today of:    Chief Complaint   Patient presents with    Back Pain     LUMBAR         Allergies:  Patient has no known allergies. Past Medical History:   Diagnosis Date    Anxiety     Coronary artery disease involving native coronary artery of native heart without angina pectoris 2018    Hyperlipidemia     meds > 5 yrs    Osteoarthritis      Past Surgical History:   Procedure Laterality Date    BACK SURGERY  2017    lumbar disc OR    COLONOSCOPY      DILATION AND CURETTAGE OF UTERUS      at age 30s--miscarriage   [place] LUMBAR FUSION Left 2020    ANTERIOR LEFT RETROPERITONEAL L5-S1 DISKECTOMY INTERBODY CAGE FUSION performed by Amandeep Abarca MD at [address] [place] [address]      as child   [address]      in [place]--EMT attempted to intubate patient due to trouble breathing & then needed repair     Family History   Problem Relation Age of Onset    Diabetes Mother     Obesity Mother     High Cholesterol Mother     High Cholesterol Father     Other Sister         GSW to spine & paralysis    No Known Problems Brother     No Known Problems Son     No Known Problems Daughter     Other Brother         as infant     Social History     Socioeconomic History    Marital status: Single     Spouse name: Not on file    Number of children: Not on file    Years of education: Not on file    Highest education level: Not on file   Occupational History    Not on file   Tobacco Use    Smoking status: Former Smoker     Packs/day: 1.00     Years: 30.00     Pack years: 30.00     Types: Cigarettes     Quit date: 2019     Years since quittin.4    Smokeless tobacco: Never Used    Tobacco comment: intermittent habit   Vaping Use    Vaping Use: Never used   Substance and Sexual Activity    Alcohol use:  Yes     Alcohol/week: 0.0 standard drinks     Comment: social    Drug use: Not on file    Sexual activity: Not on file   Other Topics Concern    Not on file   Social History Narrative         Lives With: Alone    Type of Home: House    Home Layout: One level    Home Access: Stairs to enter with rails    Entrance Stairs - Number of Steps: 4-5    Entrance Stairs - Rails: Both    Bathroom Shower/Tub: Walk-in shower, Tub/Shower unit(uses walk in)    [employer]: Built-in shower seat, Grab bars in shower    Home Equipment: (plans to purchase hip kit and borrow walker)    Receives Help From: Family(father and fathers wife can assist per pt, pt states \"doctor downplayed the sx\" so pt thought she would recover quickly)    ADL Assistance: [facility]: Independent    Homemaking Responsibilities: Yes(yard work included)    Ambulation Assistance: Independent(no AD)    Transfer Assistance: Independent    Active : Yes    Mode of Transportation: Car    Occupation: Retired    Type of occupation: Homemaker    Additional Comments: Pt. states lately she has had to sit after only 20 minutes of standing before sx     Social Determinants of Health     Financial Resource Strain:     Difficulty of Paying Living Expenses:    Food Insecurity:     Worried About [address] [facility] in the Last Year:     [facility] in the Last Year:    Transportation Needs:     Lack of Transportation (Medical):      Lack of Transportation (Non-Medical):    Physical Activity:     Days of Exercise per Week:     Minutes of Exercise per Session:    Stress:     Feeling of Stress :    Social Connections:     Frequency of Communication with Friends and Family:     Frequency of Social Gatherings with Friends and Family:     Attends Christianity Services:     Active Member of Clubs or Organizations:     Attends Club or Organization Meetings:     Marital Status:    Intimate Partner Violence:     Fear of Current or Ex-Partner:     Emotionally Abused:     Physically Abused:     Sexually Abused: Current Outpatient Medications on File Prior to Visit   Medication Sig Dispense Refill    lisinopril (PRINIVIL;ZESTRIL) 5 MG tablet TAKE 1 TABLET BY MOUTH DAILY      naloxone 4 MG/0.1ML LIQD nasal spray 1 spray by Nasal route as needed for Opioid Reversal 1 each 1    predniSONE (DELTASONE) 10 MG tablet Take 1 tablet by mouth daily 1 tab TID x 3 days, 1 tab BID x 3 days, 1 tab QD x 3 days 18 tablet 0    Multiple Vitamins-Minerals (THERAPEUTIC MULTIVITAMIN-MINERALS) tablet Take 1 tablet by mouth daily      magnesium oxide (MAG-OX) 400 MG tablet Take 400 mg by mouth daily      Lactobacillus (ACIDOPHILUS) 100 MG CAPS Take by mouth as needed      calcium carbonate (OSCAL) 500 MG TABS tablet Take 500 mg by mouth daily      vitamin C (ASCORBIC ACID) 500 MG tablet Take 500 mg by mouth daily      Milk Thistle 150 MG CAPS Take by mouth daily      pseudoephedrine (SUDAFED) 60 MG tablet Take 60 mg by mouth every 4 hours as needed for Congestion      busPIRone (BUSPAR) 7.5 MG tablet Take 15 mg by mouth 2 times daily       pramipexole (MIRAPEX) 0.25 MG tablet TK ONE OR TWO TS PO QHS      simvastatin (ZOCOR) 40 MG tablet Take 40 mg by mouth nightly       ipratropium (ATROVENT) 0.03 % nasal spray U 2 SPRAYS IEN BID      ALPRAZolam (XANAX) 0.25 MG tablet TK 1 T PO BID PRN      naloxone (NARCAN) 4 MG/0.1ML LIQD nasal spray 1 spray by Nasal route as needed for Opioid Reversal 1 each 0     No current facility-administered medications on file prior to visit. Pt presents today for a f/u of chronic low back and LLE pain. Patient had consult with  pain management/neurosurgeon. Note reviewed in Care Everywhere. Recommended aqua therapy, EMG, medrol pack given. They recommended she see Dr. Graciela Martinez for injections but patient declines for now. She is now taking the Gabapentin more carefully TID, believes she was not taking it regularly.   Pt feels pain level and functioning improves with prescribed medications and is able to perform ADLs. Pt feels that twisting aggravates the pain. Pt c/o LLE \"burning pain\" and N/T down to the thigh. She says she has lost 10 pounds with some relief. Previous SI injection and MIKAELA did not help. Had LE EMG with  and report showed diagnosis is consistent with Meralgia Paraesthetica on Lt. Seen Dr. Tammy Henning March 2021 for consult and no further surgery recommended - noted Lt meralgia paraesthetica, Lt L3 radiculopathy and spondylosis in note. She is not interested in SCS.     Past Surgical Hx:   06/23/2017 right and bilateral L4-5 microdissection, discectomy, interbody posterolateral fusion, pedicle screws.   [facility].    8/12/2020 left anterior retroperitoneal L5-S1 discectomy interbody cage fusion.      She takes Percocet 7.5mg Q6hrs PRN pain and Gabapentin 600mg TID and zanaflex 4mg QHS and lidoderm patch. Also uses mobic 15mg daily PRN    2/15/21 BL SI inj          Review of Systems   Constitutional: Negative for fever. HENT: Negative for congestion and sore throat. Respiratory: Negative for shortness of breath. Gastrointestinal: Negative for abdominal pain. Genitourinary: Negative for difficulty urinating. Musculoskeletal: Positive for back pain. Neurological: Negative for speech difficulty and headaches. Hematological: Negative for adenopathy. Psychiatric/Behavioral: Negative for agitation. All other systems reviewed and are negative. Objective:     Vitals:  /72   Temp 96.9 °F (36.1 °C)   Ht 5' (1.524 m)   Wt 160 lb (72.6 kg)   LMP  (LMP Unknown)   BMI 31.25 kg/m² Pain Score:   7      Physical Exam  Vitals and nursing note reviewed. Pt is alert and oriented x 3. Recent and remote memory is intact. Mood, judgement and affect are normal.  No signs of distress or SOB noted. Visualized skin intact. Sensation intact to light touch. Decreased ROM with flexion and extension of low back.   Tender with palpation to bilateral lumbar spine with positive provacative maneuvers noted. Negative SLR. Pt is able to briefly heel walk and toe walk. Strength, balance, and coordination are functional for ambulation. TTP over BL SI joints. Assessment:      Diagnosis Orders   1. Spinal stenosis, lumbar region, without neurogenic claudication  Urine Drug Screen    gabapentin (NEURONTIN) 600 MG tablet    oxyCODONE-acetaminophen (PERCOCET) 7.5-325 MG per tablet   2. S/P lumbar and lumbosacral fusion by anterior technique  Urine Drug Screen    gabapentin (NEURONTIN) 600 MG tablet    oxyCODONE-acetaminophen (PERCOCET) 7.5-325 MG per tablet   3. Lumbar radiculopathy  Urine Drug Screen    gabapentin (NEURONTIN) 600 MG tablet    oxyCODONE-acetaminophen (PERCOCET) 7.5-325 MG per tablet   4. Chronic pain syndrome  Urine Drug Screen    oxyCODONE-acetaminophen (PERCOCET) 7.5-325 MG per tablet   5. Lumbosacral spondylosis without myelopathy  Urine Drug Screen    meloxicam (MOBIC) 15 MG tablet    lidocaine (LIDODERM) 5 %    oxyCODONE-acetaminophen (PERCOCET) 7.5-325 MG per tablet   6. Encounter for long-term opiate analgesic use  Urine Drug Screen    oxyCODONE-acetaminophen (PERCOCET) 7.5-325 MG per tablet       Plan:     Periodic Controlled Substance Monitoring: Possible medication side effects, risk of tolerance/dependence & alternative treatments discussed., No signs of potential drug abuse or diversion identified. , Assessed functional status., Obtaining appropriate analgesic effect of treatment. , Random urine drug screen sent today. ABENA Abebe - CNP)    Orders Placed This Encounter   Medications    meloxicam (MOBIC) 15 MG tablet     Sig: Take 1 tablet by mouth daily     Dispense:  90 tablet     Refill:  0     **Patient requests 90 days supply**    lidocaine (LIDODERM) 5 %     Sig: Place 1 patch onto the skin daily 12 hours on, 12 hours off.      Dispense:  30 patch     Refill:  0    gabapentin (NEURONTIN) 600 MG tablet     Sig: Take 1 tablet by mouth 3 times daily for 30 days. Dispense:  90 tablet     Refill:  0    oxyCODONE-acetaminophen (PERCOCET) 7.5-325 MG per tablet     Sig: Take 1 tablet by mouth every 6 hours as needed for Pain for up to 30 days. Dispense:  120 tablet     Refill:  0     DO NOT FILL PRIOR TO 9/4. Orders Placed This Encounter   Procedures    Urine Drug Screen     Chronic pain/illicits     Discussed options with the patient today. Continue tizanidine, gabapentin, Percocet, and Mobic. Not interested in injections or PT. patient requests early refill again, declined. Ok to continue to see  pain management for options if she wishes. Routine UDS today. No percocet today but took 2 yesterday. All questions were answered. Pt verbalized understanding and agrees with above plan. Cathy reviewed from November 2020 with high levels of oxycodone on despite reporting having run out of her medications. Consider retest.  Narcan sent 6/30/2021.     Will continue medications for chronic pain as they help pt function with ADL and improve quality of life.  Discussed possible risks of opiate medication with pt, including but not limited to, constipation, nausea or vomiting, sedation, urinary retention, dependence and possible addiction. Pt agrees to use medication as directed. Pt advised to not use opiates while driving or operating heavy equipment, or in situations where pt may harm him/herself or others.  Pt is aware that while on narcotics, pt needs to be seen monthly to reassess pain and need for continued medication. NDP reviewed. OARRS was reviewed. This NP saw pt under direct supervision of Dr. Kj Colby. Follow up:  Return in about 4 weeks (around 9/30/2021) for review meds and reassess pain.     Patrick Small, APRN - CNP

## 2021-09-27 DIAGNOSIS — M47.817 LUMBOSACRAL SPONDYLOSIS WITHOUT MYELOPATHY: ICD-10-CM

## 2021-09-27 DIAGNOSIS — M48.061 SPINAL STENOSIS, LUMBAR REGION, WITHOUT NEUROGENIC CLAUDICATION: ICD-10-CM

## 2021-09-27 DIAGNOSIS — Z79.891 ENCOUNTER FOR LONG-TERM OPIATE ANALGESIC USE: ICD-10-CM

## 2021-09-27 DIAGNOSIS — G89.4 CHRONIC PAIN SYNDROME: ICD-10-CM

## 2021-09-27 DIAGNOSIS — Z98.1 S/P LUMBAR AND LUMBOSACRAL FUSION BY ANTERIOR TECHNIQUE: ICD-10-CM

## 2021-09-27 DIAGNOSIS — M54.16 LUMBAR RADICULOPATHY: ICD-10-CM

## 2021-09-27 RX ORDER — OXYCODONE AND ACETAMINOPHEN 7.5; 325 MG/1; MG/1
1 TABLET ORAL EVERY 6 HOURS PRN
Qty: 4 TABLET | Refills: 0 | Status: SHIPPED | OUTPATIENT
Start: 2021-10-04 | End: 2021-10-05 | Stop reason: SDUPTHER

## 2021-09-27 NOTE — TELEPHONE ENCOUNTER
Requested Prescriptions     Pending Prescriptions Disp Refills    oxyCODONE-acetaminophen (PERCOCET) 7.5-325 MG per tablet 4 tablet 0     Sig: Take 1 tablet by mouth every 6 hours as needed for Pain for up to 30 days. Patient last seen on:  9/2  Date of last surgery:  n/a  Date of last refill:  9/4  Pain level:  n/a  Patient complaining of:  PT appointment not until 10/5, she says she will be out on 10/4. Asking for refill, no sooner appointments.    Future appts: 10/5

## 2021-10-05 ENCOUNTER — OFFICE VISIT (OUTPATIENT)
Dept: PAIN MANAGEMENT | Age: 66
End: 2021-10-05
Payer: MEDICARE

## 2021-10-05 VITALS
WEIGHT: 160 LBS | DIASTOLIC BLOOD PRESSURE: 80 MMHG | TEMPERATURE: 97.1 F | SYSTOLIC BLOOD PRESSURE: 120 MMHG | BODY MASS INDEX: 31.41 KG/M2 | HEIGHT: 60 IN

## 2021-10-05 DIAGNOSIS — Z98.1 S/P LUMBAR AND LUMBOSACRAL FUSION BY ANTERIOR TECHNIQUE: ICD-10-CM

## 2021-10-05 DIAGNOSIS — M47.817 LUMBOSACRAL SPONDYLOSIS WITHOUT MYELOPATHY: ICD-10-CM

## 2021-10-05 DIAGNOSIS — M54.16 LUMBAR RADICULOPATHY: ICD-10-CM

## 2021-10-05 DIAGNOSIS — G89.4 CHRONIC PAIN SYNDROME: ICD-10-CM

## 2021-10-05 DIAGNOSIS — Z79.891 ENCOUNTER FOR LONG-TERM OPIATE ANALGESIC USE: ICD-10-CM

## 2021-10-05 DIAGNOSIS — M62.838 MUSCLE SPASM: ICD-10-CM

## 2021-10-05 DIAGNOSIS — M48.061 SPINAL STENOSIS, LUMBAR REGION, WITHOUT NEUROGENIC CLAUDICATION: ICD-10-CM

## 2021-10-05 PROCEDURE — 1036F TOBACCO NON-USER: CPT | Performed by: NURSE PRACTITIONER

## 2021-10-05 PROCEDURE — 99213 OFFICE O/P EST LOW 20 MIN: CPT | Performed by: NURSE PRACTITIONER

## 2021-10-05 PROCEDURE — G8427 DOCREV CUR MEDS BY ELIG CLIN: HCPCS | Performed by: NURSE PRACTITIONER

## 2021-10-05 PROCEDURE — G8417 CALC BMI ABV UP PARAM F/U: HCPCS | Performed by: NURSE PRACTITIONER

## 2021-10-05 PROCEDURE — G8400 PT W/DXA NO RESULTS DOC: HCPCS | Performed by: NURSE PRACTITIONER

## 2021-10-05 PROCEDURE — 3017F COLORECTAL CA SCREEN DOC REV: CPT | Performed by: NURSE PRACTITIONER

## 2021-10-05 PROCEDURE — 1090F PRES/ABSN URINE INCON ASSESS: CPT | Performed by: NURSE PRACTITIONER

## 2021-10-05 PROCEDURE — G8484 FLU IMMUNIZE NO ADMIN: HCPCS | Performed by: NURSE PRACTITIONER

## 2021-10-05 PROCEDURE — 1123F ACP DISCUSS/DSCN MKR DOCD: CPT | Performed by: NURSE PRACTITIONER

## 2021-10-05 PROCEDURE — 4040F PNEUMOC VAC/ADMIN/RCVD: CPT | Performed by: NURSE PRACTITIONER

## 2021-10-05 RX ORDER — OXYCODONE AND ACETAMINOPHEN 7.5; 325 MG/1; MG/1
1 TABLET ORAL EVERY 6 HOURS PRN
Qty: 4 TABLET | Refills: 0 | Status: SHIPPED | OUTPATIENT
Start: 2021-10-05 | End: 2021-10-05 | Stop reason: SDUPTHER

## 2021-10-05 RX ORDER — OXYCODONE AND ACETAMINOPHEN 7.5; 325 MG/1; MG/1
1 TABLET ORAL EVERY 6 HOURS PRN
Qty: 120 TABLET | Refills: 0 | Status: SHIPPED | OUTPATIENT
Start: 2021-10-05 | End: 2021-10-25 | Stop reason: SDUPTHER

## 2021-10-05 RX ORDER — GABAPENTIN 600 MG/1
600 TABLET ORAL 3 TIMES DAILY
Qty: 90 TABLET | Refills: 0 | Status: SHIPPED | OUTPATIENT
Start: 2021-10-05 | End: 2021-10-25 | Stop reason: SDUPTHER

## 2021-10-05 RX ORDER — TIZANIDINE 2 MG/1
2 TABLET ORAL NIGHTLY PRN
Qty: 30 TABLET | Refills: 0 | Status: SHIPPED | OUTPATIENT
Start: 2021-10-05 | End: 2021-10-25 | Stop reason: SDUPTHER

## 2021-10-05 ASSESSMENT — ENCOUNTER SYMPTOMS
SHORTNESS OF BREATH: 0
SORE THROAT: 0
BACK PAIN: 1
ABDOMINAL PAIN: 0

## 2021-10-05 NOTE — TELEPHONE ENCOUNTER
Requested Prescriptions     Pending Prescriptions Disp Refills    oxyCODONE-acetaminophen (PERCOCET) 7.5-325 MG per tablet 4 tablet 0     Sig: Take 1 tablet by mouth every 6 hours as needed for Pain for up to 30 days. Patient last seen on:  10/5  Date of last surgery:  n/a  Date of last refill:  10/5  Pain level: n/a  Patient complaining of:  Refill sent in 10/5 only had quantity for one day. Pt requesting to be resent for 30 day supply.    Future appts: 11/2

## 2021-10-05 NOTE — PROGRESS NOTES
Flaco Matthew  (1955)    10/5/2021    Subjective:     Flaco Matthew is 78 y.o. female who complains today of:    Chief Complaint   Patient presents with    Follow-up    Back Pain         Allergies:  Patient has no known allergies. Past Medical History:   Diagnosis Date    Anxiety     Coronary artery disease involving native coronary artery of native heart without angina pectoris 2018    Hyperlipidemia     meds > 5 yrs    Osteoarthritis      Past Surgical History:   Procedure Laterality Date    BACK SURGERY  2017    lumbar disc OR    COLONOSCOPY      DILATION AND CURETTAGE OF UTERUS      at age 30s--miscarriage   [facility] LUMBAR FUSION Left 2020    ANTERIOR LEFT RETROPERITONEAL L5-S1 DISKECTOMY INTERBODY CAGE FUSION performed by Mery Tang MD at [address]      as child   [address]      in [place]--EMT attempted to intubate patient due to trouble breathing & then needed repair     Family History   Problem Relation Age of Onset    Diabetes Mother     Obesity Mother     High Cholesterol Mother     High Cholesterol Father     Other Sister         GSW to spine & paralysis    No Known Problems Brother     No Known Problems Son     No Known Problems Daughter     Other Brother         as infant     Social History     Socioeconomic History    Marital status: Single     Spouse name: Not on file    Number of children: Not on file    Years of education: Not on file    Highest education level: Not on file   Occupational History    Not on file   Tobacco Use    Smoking status: Former Smoker     Packs/day: 1.00     Years: 30.00     Pack years: 30.00     Types: Cigarettes     Quit date: 2019     Years since quittin.5    Smokeless tobacco: Never Used    Tobacco comment: intermittent habit   Vaping Use    Vaping Use: Never used   Substance and Sexual Activity    Alcohol use:  Yes     Alcohol/week: 0.0 standard drinks     Comment: social    Drug use: Abused:        Current Outpatient Medications on File Prior to Visit   Medication Sig Dispense Refill    meloxicam (MOBIC) 15 MG tablet Take 1 tablet by mouth daily 90 tablet 0    lidocaine (LIDODERM) 5 % Place 1 patch onto the skin daily 12 hours on, 12 hours off. 30 patch 0    lisinopril (PRINIVIL;ZESTRIL) 5 MG tablet TAKE 1 TABLET BY MOUTH DAILY      naloxone 4 MG/0.1ML LIQD nasal spray 1 spray by Nasal route as needed for Opioid Reversal 1 each 1    predniSONE (DELTASONE) 10 MG tablet Take 1 tablet by mouth daily 1 tab TID x 3 days, 1 tab BID x 3 days, 1 tab QD x 3 days 18 tablet 0    Multiple Vitamins-Minerals (THERAPEUTIC MULTIVITAMIN-MINERALS) tablet Take 1 tablet by mouth daily      magnesium oxide (MAG-OX) 400 MG tablet Take 400 mg by mouth daily      Lactobacillus (ACIDOPHILUS) 100 MG CAPS Take by mouth as needed      calcium carbonate (OSCAL) 500 MG TABS tablet Take 500 mg by mouth daily      vitamin C (ASCORBIC ACID) 500 MG tablet Take 500 mg by mouth daily      Milk Thistle 150 MG CAPS Take by mouth daily      pseudoephedrine (SUDAFED) 60 MG tablet Take 60 mg by mouth every 4 hours as needed for Congestion      busPIRone (BUSPAR) 7.5 MG tablet Take 15 mg by mouth 2 times daily       pramipexole (MIRAPEX) 0.25 MG tablet TK ONE OR TWO TS PO QHS      simvastatin (ZOCOR) 40 MG tablet Take 40 mg by mouth nightly       ipratropium (ATROVENT) 0.03 % nasal spray U 2 SPRAYS IEN BID      ALPRAZolam (XANAX) 0.25 MG tablet TK 1 T PO BID PRN      naloxone (NARCAN) 4 MG/0.1ML LIQD nasal spray 1 spray by Nasal route as needed for Opioid Reversal 1 each 0     No current facility-administered medications on file prior to visit. Pt presents today for a f/u of chronic low back and LLE pain. Continues working with [employer]. She says SCS was recommended (see copied note below). She had EMG done. They recommended she see Dr. Madelaine Beltran for injections but patient declines for now.  Taking Gabapentin TID. Pt feels pain level and functioning improves with prescribed medications and is able to perform ADLs. Pt feels that twisting aggravates the pain. Pt c/o LLE \"burning pain\" and N/T down to the thigh. She says she has lost 10 pounds with some relief. Previous SI injection and MIKAELA did not help. Saw Dr. Jaciel Garner March 2021 for consult and no further surgery recommended - noted Lt meralgia paraesthetica, Lt L3 radiculopathy and spondylosis in note. She is not interested in SCS.     Past Surgical Hx:   06/23/2017 right and bilateral L4-5 microdissection, discectomy, interbody posterolateral fusion, pedicle screws.   [facility].    8/12/2020 left anterior retroperitoneal L5-S1 discectomy interbody cage fusion.      She takes Percocet 7.5mg Q6hrs PRN pain and Gabapentin 600mg TID and zanaflex 4mg QHS and lidoderm patch. Also uses mobic 15mg daily PRN    2/15/21 BL SI inj    From Care Everywhere: \"EMG 9/23/21  left lower extremity did show evidence of chronic left-sided lumbosacral polyradiculopathy affecting left L4? L5-S1 nerve roots with active denervation. I took liberty of sending Gonzalo Ashley to see Dr. Karina Okeefe from pain management to try some interventional treatment and since she failed multiple conservative management in the past she may be a good candidate for spinal cord stimulator. She will continue with home exercise program and same medications unchanged, for now. \"        Review of Systems   Constitutional: Negative for fever. HENT: Negative for congestion and sore throat. Respiratory: Negative for shortness of breath. Gastrointestinal: Negative for abdominal pain. Genitourinary: Negative for difficulty urinating. Musculoskeletal: Positive for back pain. Neurological: Negative for speech difficulty and headaches. Hematological: Negative for adenopathy. Psychiatric/Behavioral: Negative for agitation. All other systems reviewed and are negative.         Objective:     Vitals:  BP 120/80   Temp 97.1 °F (36.2 °C)   Ht 5' (1.524 m)   Wt 160 lb (72.6 kg)   LMP  (LMP Unknown)   BMI 31.25 kg/m² Pain Score:   8      Physical Exam  Vitals and nursing note reviewed. Pt is alert and oriented x 3. Recent and remote memory is intact. Mood, judgement and affect are normal.  No signs of distress or SOB noted. Visualized skin intact. Sensation intact to light touch. Decreased ROM with flexion and extension of low back. Tender with palpation to bilateral lumbar spine with positive provacative maneuvers noted. Negative SLR. Pt is able to briefly heel walk and toe walk. Strength, balance, and coordination are functional for ambulation. TTP over BL SI joints. Assessment:      Diagnosis Orders   1. Spinal stenosis, lumbar region, without neurogenic claudication  oxyCODONE-acetaminophen (PERCOCET) 7.5-325 MG per tablet    gabapentin (NEURONTIN) 600 MG tablet   2. S/P lumbar and lumbosacral fusion by anterior technique  oxyCODONE-acetaminophen (PERCOCET) 7.5-325 MG per tablet    gabapentin (NEURONTIN) 600 MG tablet   3. Lumbar radiculopathy  oxyCODONE-acetaminophen (PERCOCET) 7.5-325 MG per tablet    gabapentin (NEURONTIN) 600 MG tablet   4. Chronic pain syndrome  oxyCODONE-acetaminophen (PERCOCET) 7.5-325 MG per tablet   5. Lumbosacral spondylosis without myelopathy  oxyCODONE-acetaminophen (PERCOCET) 7.5-325 MG per tablet   6. Encounter for long-term opiate analgesic use  oxyCODONE-acetaminophen (PERCOCET) 7.5-325 MG per tablet   7. Muscle spasm  tiZANidine (ZANAFLEX) 2 MG tablet       Plan:     Periodic Controlled Substance Monitoring: Possible medication side effects, risk of tolerance/dependence & alternative treatments discussed., No signs of potential drug abuse or diversion identified. , Assessed functional status., Obtaining appropriate analgesic effect of treatment.  (Urvashi Tolbert, APRN - CNP)    Orders Placed This Encounter   Medications    oxyCODONE-acetaminophen (PERCOCET) 7.5-325 MG per tablet     Sig: Take 1 tablet by mouth every 6 hours as needed for Pain for up to 30 days. Dispense:  4 tablet     Refill:  0     Reduce doses taken as pain becomes manageable    gabapentin (NEURONTIN) 600 MG tablet     Sig: Take 1 tablet by mouth 3 times daily for 30 days. Dispense:  90 tablet     Refill:  0    tiZANidine (ZANAFLEX) 2 MG tablet     Sig: Take 1 tablet by mouth nightly as needed (pain)     Dispense:  30 tablet     Refill:  0       No orders of the defined types were placed in this encounter. Discussed options with the patient today.  Continue tizanidine, gabapentin, Percocet, and Mobic.  Will decrease Tizanidine to 2mg due to drowsiness.    She has a  virtually. Ok to continue to see  pain management for options and SCS trial. Continue HEP. she was advised to watch alcohol intake and understands.  All questions were answered.  Pt verbalized understanding and agrees with above plan. Utox reviewed from November 2020 with high levels of oxycodone on despite reporting having run out of her medications.  Utox 9/2/21 + oxycodone at low levels; also + for gabapentin, xanax, and high levels ethyl.  Narcan sent 6/30/2021.     Will continue medications for chronic pain as they help pt function with ADL and improve quality of life.  Discussed possible risks of opiate medication with pt, including but not limited to, constipation, nausea or vomiting, sedation, urinary retention, dependence and possible addiction. Pt agrees to use medication as directed.  Pt advised to not use opiates while driving or operating heavy equipment, or in situations where pt may harm him/herself or others.  Pt is aware that while on narcotics, pt needs to be seen monthly to reassess pain and need for continued medication.  NDP reviewed. Pierre Manuel was reviewed.  This NP saw pt under direct supervision of Dr. Rebeca Larsen    Follow up:  Return in about 4 weeks (around 11/2/2021) for review meds and reassess pain.     Taylor Hong, APRN - CNP

## 2021-10-25 DIAGNOSIS — M62.838 MUSCLE SPASM: ICD-10-CM

## 2021-10-25 DIAGNOSIS — G89.4 CHRONIC PAIN SYNDROME: ICD-10-CM

## 2021-10-25 DIAGNOSIS — M54.16 LUMBAR RADICULOPATHY: ICD-10-CM

## 2021-10-25 DIAGNOSIS — M47.817 LUMBOSACRAL SPONDYLOSIS WITHOUT MYELOPATHY: ICD-10-CM

## 2021-10-25 DIAGNOSIS — Z98.1 S/P LUMBAR AND LUMBOSACRAL FUSION BY ANTERIOR TECHNIQUE: ICD-10-CM

## 2021-10-25 DIAGNOSIS — Z79.891 ENCOUNTER FOR LONG-TERM OPIATE ANALGESIC USE: ICD-10-CM

## 2021-10-25 DIAGNOSIS — M48.061 SPINAL STENOSIS, LUMBAR REGION, WITHOUT NEUROGENIC CLAUDICATION: ICD-10-CM

## 2021-10-25 RX ORDER — TIZANIDINE 2 MG/1
2 TABLET ORAL NIGHTLY PRN
Qty: 5 TABLET | Refills: 0 | Status: SHIPPED | OUTPATIENT
Start: 2021-11-04 | End: 2021-11-09 | Stop reason: SDUPTHER

## 2021-10-25 RX ORDER — GABAPENTIN 600 MG/1
600 TABLET ORAL 3 TIMES DAILY
Qty: 15 TABLET | Refills: 0 | Status: SHIPPED | OUTPATIENT
Start: 2021-11-04 | End: 2021-11-09 | Stop reason: SDUPTHER

## 2021-10-25 RX ORDER — OXYCODONE AND ACETAMINOPHEN 7.5; 325 MG/1; MG/1
1 TABLET ORAL EVERY 6 HOURS PRN
Qty: 20 TABLET | Refills: 0 | Status: SHIPPED | OUTPATIENT
Start: 2021-11-04 | End: 2021-11-09 | Stop reason: SDUPTHER

## 2021-10-25 NOTE — TELEPHONE ENCOUNTER
Requested Prescriptions     Pending Prescriptions Disp Refills    gabapentin (NEURONTIN) 600 MG tablet 15 tablet 0     Sig: Take 1 tablet by mouth 3 times daily for 5 days.  tiZANidine (ZANAFLEX) 2 MG tablet 5 tablet 0     Sig: Take 1 tablet by mouth nightly as needed (pain)    oxyCODONE-acetaminophen (PERCOCET) 7.5-325 MG per tablet 20 tablet 0     Sig: Take 1 tablet by mouth every 6 hours as needed for Pain for up to 5 days. Patient last seen on:  10/5  Date of last surgery:  n/a  Date of last refill:  10/5  Pain level:  n/a  Patient complaining of:  Patient rs'ed and asking for refill until next appt.    Future appts: 11/9

## 2021-10-29 DIAGNOSIS — M47.817 LUMBOSACRAL SPONDYLOSIS WITHOUT MYELOPATHY: ICD-10-CM

## 2021-10-29 NOTE — TELEPHONE ENCOUNTER
Requested Prescriptions     Pending Prescriptions Disp Refills    lidocaine (LIDODERM) 5 % 30 patch 0     Sig: Place 1 patch onto the skin daily 12 hours on, 12 hours off. Patient last seen on:  10/5  Date of last surgery:  n/a  Date of last refill:  9/2  Pain level:  n/a  Patient complaining of:  Patient asking for refill.    Future appts: 11/9

## 2021-11-01 RX ORDER — LIDOCAINE 50 MG/G
1 PATCH TOPICAL DAILY
Qty: 30 PATCH | Refills: 0 | Status: SHIPPED | OUTPATIENT
Start: 2021-11-01 | End: 2021-12-09 | Stop reason: SDUPTHER

## 2021-11-09 ENCOUNTER — VIRTUAL VISIT (OUTPATIENT)
Dept: PAIN MANAGEMENT | Age: 66
End: 2021-11-09
Payer: MEDICARE

## 2021-11-09 DIAGNOSIS — M54.16 LUMBAR RADICULOPATHY: ICD-10-CM

## 2021-11-09 DIAGNOSIS — Z79.891 ENCOUNTER FOR LONG-TERM OPIATE ANALGESIC USE: ICD-10-CM

## 2021-11-09 DIAGNOSIS — Z98.1 S/P LUMBAR AND LUMBOSACRAL FUSION BY ANTERIOR TECHNIQUE: ICD-10-CM

## 2021-11-09 DIAGNOSIS — M62.838 MUSCLE SPASM: ICD-10-CM

## 2021-11-09 DIAGNOSIS — G89.4 CHRONIC PAIN SYNDROME: ICD-10-CM

## 2021-11-09 DIAGNOSIS — M48.061 SPINAL STENOSIS, LUMBAR REGION, WITHOUT NEUROGENIC CLAUDICATION: ICD-10-CM

## 2021-11-09 DIAGNOSIS — M47.817 LUMBOSACRAL SPONDYLOSIS WITHOUT MYELOPATHY: ICD-10-CM

## 2021-11-09 PROCEDURE — 99441 PR PHYS/QHP TELEPHONE EVALUATION 5-10 MIN: CPT | Performed by: PAIN MEDICINE

## 2021-11-09 RX ORDER — GABAPENTIN 600 MG/1
600 TABLET ORAL 3 TIMES DAILY
Qty: 90 TABLET | Refills: 0 | Status: SHIPPED | OUTPATIENT
Start: 2021-11-09 | End: 2021-12-09 | Stop reason: SDUPTHER

## 2021-11-09 RX ORDER — TIZANIDINE 2 MG/1
2 TABLET ORAL NIGHTLY PRN
Qty: 30 TABLET | Refills: 0 | Status: SHIPPED | OUTPATIENT
Start: 2021-11-09 | End: 2021-12-09 | Stop reason: SDUPTHER

## 2021-11-09 RX ORDER — OXYCODONE AND ACETAMINOPHEN 7.5; 325 MG/1; MG/1
1 TABLET ORAL EVERY 6 HOURS PRN
Qty: 120 TABLET | Refills: 0 | Status: SHIPPED | OUTPATIENT
Start: 2021-11-09 | End: 2021-12-09 | Stop reason: SDUPTHER

## 2021-11-09 NOTE — PROGRESS NOTES
Blanca Beard  (1955)    11/9/2021    TELEHEALTH EVALUATION -- Audio Only (During NOFBT-71 public health emergency)    Due to [place] 19 outbreak, patient's office visit was converted to a virtual visit. Patient was contacted and agreed to proceed with a audio only telehealth service. Patient understands that this encounter is a billable visit and that insurance coverage and co-pays are up to their individual insurance plans. At the beginning of this telehealth encounter, I verified with the patient their name and date of birth. Verbal consent for telehealth encounter was obtained. Subjective:       Chief Complaint   Patient presents with    Back Pain       Blanca Beard is 78 y.o. female who complains today of:   Patient had a telehealth visit. She has chronic pain. Is on the Percocet gabapentin and Zanaflex. She is wondering about spinal cord stimulation. Legs worse with activity. History of lumbar surgeries. Pain worse with activity. No aberrant behavior with pain medications or side effects. Pain medicines help her function, do basic activities a day living, chores around the house. Plan: We will have her get in touch with Dr. Anne-Marie Singh again to discuss spinal cord stimulation. We renewed her Percocet gabapentin and Zanaflex for chronic pain. Question answered chart reviewed. Allergies:  Patient has no known allergies.     History:  Past Medical History:   Diagnosis Date    Anxiety     Coronary artery disease involving native coronary artery of native heart without angina pectoris 12/11/2018    Hyperlipidemia     meds > 5 yrs    Osteoarthritis      Past Surgical History:   Procedure Laterality Date    BACK SURGERY  2017    lumbar disc OR    COLONOSCOPY      DILATION AND CURETTAGE OF UTERUS      at age 30s--miscarriage    LUMBAR FUSION Left 8/12/2020    ANTERIOR LEFT RETROPERITONEAL L5-S1 DISKECTOMY INTERBODY CAGE FUSION performed by Rosa Maria Pa MD at [address] [place] [address] as child   [address]      in [place]--EMT attempted to intubate patient due to trouble breathing & then needed repair     Family History   Problem Relation Age of Onset    Diabetes Mother     Obesity Mother     High Cholesterol Mother     High Cholesterol Father     Other Sister         GSW to spine & paralysis    No Known Problems Brother     No Known Problems Son     No Known Problems Daughter     Other Brother         as infant     Social History     Socioeconomic History    Marital status: Single     Spouse name: Not on file    Number of children: Not on file    Years of education: Not on file    Highest education level: Not on file   Occupational History    Not on file   Tobacco Use    Smoking status: Former Smoker     Packs/day: 1.00     Years: 30.00     Pack years: 30.00     Types: Cigarettes     Quit date: 2019     Years since quittin.5    Smokeless tobacco: Never Used    Tobacco comment: intermittent habit   Vaping Use    Vaping Use: Never used   Substance and Sexual Activity    Alcohol use:  Yes     Alcohol/week: 0.0 standard drinks     Comment: social    Drug use: Not on file    Sexual activity: Not on file   Other Topics Concern    Not on file   Social History Narrative         Lives With: Alone    Type of Home: House    Home Layout: One level    Home Access: Stairs to enter with rails    Entrance Stairs - Number of Steps: 4-5    Entrance Stairs - Rails: Both    Bathroom Shower/Tub: Walk-in shower, Tub/Shower unit(uses walk in)    [employer]: Built-in shower seat, Grab bars in shower    Home Equipment: (plans to purchase hip kit and borrow walker)    Receives Help From: Family(father and fathers wife can assist per pt, pt states \"doctor downplayed the sx\" so pt thought she would recover quickly)    ADL Assistance: Independent    Homemaking Assistance: Independent    Homemaking Responsibilities: Yes(yard work included)    Ambulation Assistance: Independent(no AD)    Transfer Assistance: Independent    Active : Yes    Mode of Transportation: Car    Occupation: Retired    Type of occupation: Homemaker    Additional Comments: Pt. states lately she has had to sit after only 20 minutes of standing before sx     Social Determinants of Health     Financial Resource Strain:     Difficulty of Paying Living Expenses: Not on file   Food Insecurity:     Worried About [facility] in the Last Year: Not on file    Sarah of Food in the Last Year: Not on file   Transportation Needs:     Lack of Transportation (Medical): Not on file    Lack of Transportation (Non-Medical): Not on file   Physical Activity:     Days of Exercise per Week: Not on file    Minutes of Exercise per Session: Not on file   Stress:     Feeling of Stress : Not on file   Social Connections:     Frequency of Communication with Friends and Family: Not on file    Frequency of Social Gatherings with Friends and Family: Not on file    Attends [employer]ism Services: Not on file    Active Member of [address] [place] [address] or Organizations: Not on file    Attends Club or Organization Meetings: Not on file    Marital Status: Not on file   Intimate Partner Violence:     Fear of Current or Ex-Partner: Not on file    Emotionally Abused: Not on file    Physically Abused: Not on file    Sexually Abused: Not on file   Housing Stability:     Unable to Pay for Housing in the Last Year: Not on file    Number of [place] in the Last Year: Not on file    Unstable Housing in the Last Year: Not on file       Current Outpatient Medications on File Prior to Visit   Medication Sig Dispense Refill    lidocaine (LIDODERM) 5 % Place 1 patch onto the skin daily 12 hours on, 12 hours off.  30 patch 0    meloxicam (MOBIC) 15 MG tablet Take 1 tablet by mouth daily 90 tablet 0    lisinopril (PRINIVIL;ZESTRIL) 5 MG tablet TAKE 1 TABLET BY MOUTH DAILY      naloxone 4 MG/0.1ML LIQD nasal spray 1 spray by Nasal route as needed for Opioid Reversal 1 each 1    predniSONE (DELTASONE) 10 MG tablet Take 1 tablet by mouth daily 1 tab TID x 3 days, 1 tab BID x 3 days, 1 tab QD x 3 days 18 tablet 0    Multiple Vitamins-Minerals (THERAPEUTIC MULTIVITAMIN-MINERALS) tablet Take 1 tablet by mouth daily      magnesium oxide (MAG-OX) 400 MG tablet Take 400 mg by mouth daily      Lactobacillus (ACIDOPHILUS) 100 MG CAPS Take by mouth as needed      calcium carbonate (OSCAL) 500 MG TABS tablet Take 500 mg by mouth daily      vitamin C (ASCORBIC ACID) 500 MG tablet Take 500 mg by mouth daily      Milk Thistle 150 MG CAPS Take by mouth daily      pseudoephedrine (SUDAFED) 60 MG tablet Take 60 mg by mouth every 4 hours as needed for Congestion      busPIRone (BUSPAR) 7.5 MG tablet Take 15 mg by mouth 2 times daily       pramipexole (MIRAPEX) 0.25 MG tablet TK ONE OR TWO TS PO QHS      simvastatin (ZOCOR) 40 MG tablet Take 40 mg by mouth nightly       ipratropium (ATROVENT) 0.03 % nasal spray U 2 SPRAYS IEN BID      ALPRAZolam (XANAX) 0.25 MG tablet TK 1 T PO BID PRN      naloxone (NARCAN) 4 MG/0.1ML LIQD nasal spray 1 spray by Nasal route as needed for Opioid Reversal 1 each 0     No current facility-administered medications on file prior to visit. HPI    Review of Systems    Objective:     Vitals:  LMP  (LMP Unknown)      PHYSICAL EXAMINATION:    [x] Alert  [x] Oriented to person/place/time  [x] No apparent distress      [x] Mood and affect normal  [x] Recent and remote memory intact  [x] Judgement and insight normal     [] OTHER:      Due to this being a TeleHealth encounter, evaluation of the following organ systems is limited: Vitals/Constitutional/EENT/Resp/CV/GI//MS/Neuro/Skin/Heme-Lymph-Imm. Assessment:      Diagnosis Orders   1. Spinal stenosis, lumbar region, without neurogenic claudication  gabapentin (NEURONTIN) 600 MG tablet    oxyCODONE-acetaminophen (PERCOCET) 7.5-325 MG per tablet   2.  S/P lumbar and lumbosacral fusion by anterior technique  gabapentin (NEURONTIN) 600 MG tablet    oxyCODONE-acetaminophen (PERCOCET) 7.5-325 MG per tablet   3. Lumbar radiculopathy  gabapentin (NEURONTIN) 600 MG tablet    oxyCODONE-acetaminophen (PERCOCET) 7.5-325 MG per tablet   4. Muscle spasm  tiZANidine (ZANAFLEX) 2 MG tablet   5. Chronic pain syndrome  oxyCODONE-acetaminophen (PERCOCET) 7.5-325 MG per tablet   6. Lumbosacral spondylosis without myelopathy  oxyCODONE-acetaminophen (PERCOCET) 7.5-325 MG per tablet   7. Encounter for long-term opiate analgesic use  oxyCODONE-acetaminophen (PERCOCET) 7.5-325 MG per tablet       Plan:          Orders Placed This Encounter   Medications    gabapentin (NEURONTIN) 600 MG tablet     Sig: Take 1 tablet by mouth 3 times daily for 30 days. Dispense:  90 tablet     Refill:  0    tiZANidine (ZANAFLEX) 2 MG tablet     Sig: Take 1 tablet by mouth nightly as needed (pain)     Dispense:  30 tablet     Refill:  0    oxyCODONE-acetaminophen (PERCOCET) 7.5-325 MG per tablet     Sig: Take 1 tablet by mouth every 6 hours as needed for Pain for up to 30 days. Dispense:  120 tablet     Refill:  0     Reduce doses taken as pain becomes manageable       No orders of the defined types were placed in this encounter. Follow up:  No follow-ups on file. Amber Hernandez DO      >50% of minute appointment was spent with discussion, addressing questions and concerns, including prognosis, treatment options, patient education, and recommendations. [id number]    Pursuant to the emergency declaration under the [facility], [facility] waiver authority and the [employer] Act, this Virtual  Visit was conducted, with patient's consent, to reduce the patient's risk of exposure to COVID-19 and provide continuity of care for an established patient.     Services were provided through an audio discussion to substitute for in-person clinic visit.

## 2021-12-09 ENCOUNTER — VIRTUAL VISIT (OUTPATIENT)
Dept: PAIN MANAGEMENT | Age: 66
End: 2021-12-09
Payer: MEDICARE

## 2021-12-09 DIAGNOSIS — M62.838 MUSCLE SPASM: ICD-10-CM

## 2021-12-09 DIAGNOSIS — M47.817 LUMBOSACRAL SPONDYLOSIS WITHOUT MYELOPATHY: ICD-10-CM

## 2021-12-09 DIAGNOSIS — G89.4 CHRONIC PAIN SYNDROME: ICD-10-CM

## 2021-12-09 DIAGNOSIS — M54.16 LUMBAR RADICULOPATHY: ICD-10-CM

## 2021-12-09 DIAGNOSIS — Z98.1 S/P LUMBAR AND LUMBOSACRAL FUSION BY ANTERIOR TECHNIQUE: ICD-10-CM

## 2021-12-09 DIAGNOSIS — Z79.891 ENCOUNTER FOR LONG-TERM OPIATE ANALGESIC USE: ICD-10-CM

## 2021-12-09 DIAGNOSIS — M48.061 SPINAL STENOSIS, LUMBAR REGION, WITHOUT NEUROGENIC CLAUDICATION: ICD-10-CM

## 2021-12-09 PROCEDURE — 99442 PR PHYS/QHP TELEPHONE EVALUATION 11-20 MIN: CPT | Performed by: NURSE PRACTITIONER

## 2021-12-09 RX ORDER — LIDOCAINE 50 MG/G
1 PATCH TOPICAL DAILY
Qty: 30 PATCH | Refills: 0 | Status: SHIPPED | OUTPATIENT
Start: 2021-12-09 | End: 2022-04-11 | Stop reason: SDUPTHER

## 2021-12-09 RX ORDER — OXYCODONE AND ACETAMINOPHEN 7.5; 325 MG/1; MG/1
1 TABLET ORAL EVERY 6 HOURS PRN
Qty: 120 TABLET | Refills: 0 | Status: SHIPPED | OUTPATIENT
Start: 2021-12-09 | End: 2022-01-04 | Stop reason: SDUPTHER

## 2021-12-09 RX ORDER — GABAPENTIN 600 MG/1
600 TABLET ORAL 3 TIMES DAILY
Qty: 90 TABLET | Refills: 0 | Status: SHIPPED | OUTPATIENT
Start: 2021-12-09 | End: 2022-01-04 | Stop reason: SDUPTHER

## 2021-12-09 RX ORDER — TIZANIDINE 2 MG/1
2 TABLET ORAL NIGHTLY PRN
Qty: 30 TABLET | Refills: 0 | Status: SHIPPED | OUTPATIENT
Start: 2021-12-09 | End: 2022-01-04 | Stop reason: SDUPTHER

## 2021-12-09 ASSESSMENT — ENCOUNTER SYMPTOMS
CONSTIPATION: 0
COUGH: 0
EYES NEGATIVE: 1
DIARRHEA: 0
SHORTNESS OF BREATH: 0
BACK PAIN: 1
GASTROINTESTINAL NEGATIVE: 1
TROUBLE SWALLOWING: 0

## 2021-12-09 NOTE — PROGRESS NOTES
Ila Brand  (1955)    12/9/2021    TELEHEALTH EVALUATION -- Audio/Visual (During [id number] public health emergency)    Due to COVID 19 outbreak, patient's office visit was converted to a virtual visit. Patient was contacted and agreed to proceed with a virtual visit via audio-visual platform. Patient understands that this encounter is a billable visit and that insurance coverage and co-pays are up to their individual insurance plans. At the beginning of this telehealth encounter, I verified with the patient their name and date of birth. Verbal consent for telehealth encounter was obtained. Subjective:       Chief Complaint   Patient presents with    Back Pain     lumbar    Leg Pain     left thigh is numb       Ila Brand is 78 y.o. female who complains today of: Allergies:  Patient has no known allergies.     History:  Past Medical History:   Diagnosis Date    Anxiety     Coronary artery disease involving native coronary artery of native heart without angina pectoris 12/11/2018    Hyperlipidemia     meds > 5 yrs    Osteoarthritis      Past Surgical History:   Procedure Laterality Date    BACK SURGERY  2017    lumbar disc OR    COLONOSCOPY      DILATION AND CURETTAGE OF UTERUS      at age 29s--miscarriage   [facility] LUMBAR FUSION Left 8/12/2020    ANTERIOR LEFT RETROPERITONEAL L5-S1 DISKECTOMY INTERBODY CAGE FUSION performed by Yoshi Humphrey MD at [address] [place] [address]      as child   [address]  2010    in [place]--EMT attempted to intubate patient due to trouble breathing & then needed repair     Family History   Problem Relation Age of Onset    Diabetes Mother     Obesity Mother     High Cholesterol Mother     High Cholesterol Father     Other Sister         GSW to spine & paralysis    No Known Problems Brother     No Known Problems Son     No Known Problems Daughter     Other Brother         as infant     Social History     Socioeconomic History    Marital status: Single     Spouse name: Not on file    Number of children: Not on file    Years of education: Not on file    Highest education level: Not on file   Occupational History    Not on file   Tobacco Use    Smoking status: Former Smoker     Packs/day: 1.00     Years: 30.00     Pack years: 30.00     Types: Cigarettes     Quit date: 2019     Years since quittin.6    Smokeless tobacco: Never Used    Tobacco comment: intermittent habit   Vaping Use    Vaping Use: Never used   Substance and Sexual Activity    Alcohol use:  Yes     Alcohol/week: 0.0 standard drinks     Comment: social    Drug use: Not on file    Sexual activity: Not on file   Other Topics Concern    Not on file   Social History Narrative         Lives With: Alone    Type of Home: House    Home Layout: One level    Home Access: Stairs to enter with rails    Entrance Stairs - Number of Steps: 4-5    Entrance Stairs - Rails: Both    Bathroom Shower/Tub: Walk-in shower, Tub/Shower unit(uses walk in)    [employer]: Built-in shower seat, Grab bars in shower    Home Equipment: (plans to purchase hip kit and borrow walker)    Receives Help From: Family(father and fathers wife can assist per pt, pt states \"doctor downplayed the sx\" so pt thought she would recover quickly)    ADL Assistance: [address]: Independent    Homemaking Responsibilities: Yes(yard work included)    Ambulation Assistance: Independent(no AD)    Transfer Assistance: Independent    Active : Yes    Mode of Transportation: Car    Occupation: Retired    Type of occupation: Homemaker    Additional Comments: Pt. states lately she has had to sit after only 20 minutes of standing before sx     Social Determinants of Health     Financial Resource Strain:     Difficulty of Paying Living Expenses: Not on file   Food Insecurity:     Worried About [address] in the Last Year: Not on file    [address] in the Last Year: Not on file Transportation Needs:     Lack of Transportation (Medical): Not on file    Lack of Transportation (Non-Medical):  Not on file   Physical Activity:     Days of Exercise per Week: Not on file    Minutes of Exercise per Session: Not on file   Stress:     Feeling of Stress : Not on file   Social Connections:     Frequency of Communication with Friends and Family: Not on file    Frequency of Social Gatherings with Friends and Family: Not on file    Attends Caoda[employer] Services: Not on file    Active Member of [address] [place] [address] or Organizations: Not on file    Attends Club or Organization Meetings: Not on file    Marital Status: Not on file   Intimate Partner Violence:     Fear of Current or Ex-Partner: Not on file    Emotionally Abused: Not on file    Physically Abused: Not on file    Sexually Abused: Not on file   Housing Stability:     Unable to Pay for Housing in the Last Year: Not on file    Number of [place] in the Last Year: Not on file    Unstable Housing in the Last Year: Not on file       Current Outpatient Medications on File Prior to Visit   Medication Sig Dispense Refill    meloxicam (MOBIC) 15 MG tablet Take 1 tablet by mouth daily 90 tablet 0    lisinopril (PRINIVIL;ZESTRIL) 5 MG tablet TAKE 1 TABLET BY MOUTH DAILY      naloxone 4 MG/0.1ML LIQD nasal spray 1 spray by Nasal route as needed for Opioid Reversal 1 each 1    predniSONE (DELTASONE) 10 MG tablet Take 1 tablet by mouth daily 1 tab TID x 3 days, 1 tab BID x 3 days, 1 tab QD x 3 days 18 tablet 0    Multiple Vitamins-Minerals (THERAPEUTIC MULTIVITAMIN-MINERALS) tablet Take 1 tablet by mouth daily      magnesium oxide (MAG-OX) 400 MG tablet Take 400 mg by mouth daily      Lactobacillus (ACIDOPHILUS) 100 MG CAPS Take by mouth as needed      calcium carbonate (OSCAL) 500 MG TABS tablet Take 500 mg by mouth daily      vitamin C (ASCORBIC ACID) 500 MG tablet Take 500 mg by mouth daily      Milk Thistle 150 MG CAPS Take by mouth daily  pseudoephedrine (SUDAFED) 60 MG tablet Take 60 mg by mouth every 4 hours as needed for Congestion      busPIRone (BUSPAR) 7.5 MG tablet Take 15 mg by mouth 2 times daily       pramipexole (MIRAPEX) 0.25 MG tablet TK ONE OR TWO TS PO QHS      simvastatin (ZOCOR) 40 MG tablet Take 40 mg by mouth nightly       ipratropium (ATROVENT) 0.03 % nasal spray U 2 SPRAYS IEN BID      ALPRAZolam (XANAX) 0.25 MG tablet TK 1 T PO BID PRN      naloxone (NARCAN) 4 MG/0.1ML LIQD nasal spray 1 spray by Nasal route as needed for Opioid Reversal 1 each 0     No current facility-administered medications on file prior to visit. Pt's f/u done today with a telehealth visit for her pain d/t Covid 19 pandemic. Pt last saw Dr. Vicente Rivera on 11/9/21 for her chronic pain. Discussion of spinal cord stimulator. She has history of multiple lumbar surgeries. Dr. Vicente Rivera discussed Dr. Jose Guadalupe Tan to discuss spinal cord stim which she hasn't contacted. Tizanidine was decreased to 2 mg due to drowsiness. Evidently she uses a  virtually. She has been told to limit alcohol intake due to her Percocet. Discussed NDP in the past. She has chronic low back and LLE pain. Has seen [facility] neurosurgery. She says SCS was recommended (see copied note below). She had EMG done. They recommended she see Dr. Katheryn Holcomb for injections but patient declines for now. She says she has lost 10 pounds with some relief. Previous SI injection and MIKAELA did not help. Saw Dr. Martinez Mzea March 2021 for consult and no further surgery recommended - noted Lt meralgia paraesthetica, Lt L3 radiculopathy and spondylosis in note. She is not interested in SCS.     Past Surgical Hx:   06/23/2017 right and bilateral L4-5 microdissection, discectomy, interbody posterolateral fusion, pedicle screws.   EM.    8/12/2020 left anterior retroperitoneal L5-S1 discectomy interbody cage fusion.     2/15/21 BL SI inj     From Care Everywhere:   \"EMG 9/23/21  left lower extremity did show evidence of chronic left-sided lumbosacral polyradiculopathy affecting left L4? L5-S1 nerve roots with active denervation. I took liberty of sending Geni Manjarrez to see Dr. Poonam Robertson from pain management to try some interventional treatment and since she failed multiple conservative management in the past she may be a good candidate for spinal cord stimulator. She will continue with home exercise program and same medications unchanged, for now. \"    She takes Percocet 7.5mg Q6hrs PRN pain and Gabapentin 600mg TID and zanaflex 4mg QHS and lidoderm patch. Also uses mobic 15mg daily PRN      Pt feels pain level with her medication is 2-3/10, and 6+/10 without medication. Pt feels that walking, standing, moving, weather change makes the pain worse, and medication, yoga/stretching makes the pain better. Pt feels her medication helps   her function and improve her quality of life, specifically says allows to move and stay active. Pt admits to Lt LE radiating numbness and tingling in thigh. Denies recent falls, injuries or trauma. Pt denies new weakness. Pt reports PT has been done in the past.           Review of Systems   Constitutional: Negative. Negative for fatigue. HENT: Negative. Negative for trouble swallowing. Eyes: Negative. Respiratory: Negative for cough and shortness of breath. Cardiovascular: Negative for chest pain. Gastrointestinal: Negative. Negative for constipation and diarrhea. Endocrine: Negative. Genitourinary: Negative. Musculoskeletal: Positive for back pain and neck pain. Skin: Negative. Neurological: Negative for dizziness, weakness and headaches. Hematological: Negative. Psychiatric/Behavioral: Negative.         Objective:     Vitals:  LMP  (LMP Unknown)      PHYSICAL EXAMINATION:    [x] Alert  [x] Oriented to person/place/time  [x] No apparent distress      [x] Mood and affect normal  [x] Recent and remote memory intact  [x] Judgement and insight normal     [] Dispense:  30 tablet     Refill:  0    oxyCODONE-acetaminophen (PERCOCET) 7.5-325 MG per tablet     Sig: Take 1 tablet by mouth every 6 hours as needed for Pain for up to 30 days. Dispense:  120 tablet     Refill:  0     Reduce doses taken as pain becomes manageable    lidocaine (LIDODERM) 5 %     Sig: Place 1 patch onto the skin daily 12 hours on, 12 hours off. Dispense:  30 patch     Refill:  0       No orders of the defined types were placed in this encounter. Discussed options with the patient today. Telehealth visit d/t COVID-19 as noted above. Will continue her tizanidine 2 mg nightly, gabapentin 600 mg 3 times daily, and Percocet 7.5 mg up to 4-day as needed pain. She has refills of Mobic and would like to take holiday from this next time it's due for refills as discussed. She is not interested in further injections. She is not interested in further therapy. She should f/u with Dr. Jose Guadalupe Tan at [facility] as previously directed. Options are limited. All questions were answered. Discussed home exercise program.  Relevant imaging and pain generators reviewed. Pt verbalized understanding and agrees with above plan. Pt has chronic pain. Utox reviewed from November 2020 with high levels of oxycodone on despite reporting having run out of her medications.  Utox 9/2/21 + oxycodone at low levels; also + for gabapentin, xanax, and high levels ethyl. ORT score 2 - considered high risk d/t above. Narcan Rx sent in June 2021        Will continue medications for chronic pain that has been previously directed as they do help pt function with ADL and improve quality of life. Pt is aware goal is to use the least amount of medication possible to allow pt to function and help with quality of life as discussed in detail. Ideal to keep MME below 50 which it is. Have discussed proper disposal of narcotic medication. Advised to have medications in safe place and locked up/in lock box.   Discussed possible risks of opiate medication with pt, including, but not limited to possible constipation, nausea or vomiting, sedation, urinary retention, dependence and possible addiction. Pt agrees to use medication as prescribed/as directed. Pt advised to not use opiates while driving or operating heavy equipment, or in situations where pt may harm him/herself or others. Pt is aware that while on narcotics, pt needs to be seen to reassess pain and reassess need for continued medication at that time. NDP reviewed. OARRS was reviewed. This NP saw pt under direct supervision of Dr. Nilda Robertson. Follow up:  Return in about 4 weeks (around 1/6/2022) for review meds and reassess pain. Adelaida Madrigal, APRN - CNP      >50% of 11 minute appointment was spent with discussion, addressing questions and concerns, including prognosis, treatment options, patient education, and recommendations. [id number]    Pursuant to the emergency declaration under the [facility], [facility] waiver authority and the [employer] General Act, this Virtual  Visit was conducted, with patient's consent, to reduce the patient's risk of exposure to COVID-19 and provide continuity of care for an established patient. Services were provided through a video synchronous discussion virtually to substitute for in-person clinic visit.

## 2022-01-04 DIAGNOSIS — M48.061 SPINAL STENOSIS, LUMBAR REGION, WITHOUT NEUROGENIC CLAUDICATION: ICD-10-CM

## 2022-01-04 DIAGNOSIS — M62.838 MUSCLE SPASM: ICD-10-CM

## 2022-01-04 DIAGNOSIS — Z79.891 ENCOUNTER FOR LONG-TERM OPIATE ANALGESIC USE: ICD-10-CM

## 2022-01-04 DIAGNOSIS — M47.817 LUMBOSACRAL SPONDYLOSIS WITHOUT MYELOPATHY: ICD-10-CM

## 2022-01-04 DIAGNOSIS — Z98.1 S/P LUMBAR AND LUMBOSACRAL FUSION BY ANTERIOR TECHNIQUE: ICD-10-CM

## 2022-01-04 DIAGNOSIS — G89.4 CHRONIC PAIN SYNDROME: ICD-10-CM

## 2022-01-04 DIAGNOSIS — M54.16 LUMBAR RADICULOPATHY: ICD-10-CM

## 2022-01-04 RX ORDER — TIZANIDINE 2 MG/1
2 TABLET ORAL NIGHTLY PRN
Qty: 4 TABLET | Refills: 0 | Status: SHIPPED | OUTPATIENT
Start: 2022-01-08 | End: 2022-01-12 | Stop reason: SDUPTHER

## 2022-01-04 RX ORDER — GABAPENTIN 600 MG/1
600 TABLET ORAL 3 TIMES DAILY
Qty: 12 TABLET | Refills: 0 | Status: SHIPPED | OUTPATIENT
Start: 2022-01-08 | End: 2022-02-09 | Stop reason: SDUPTHER

## 2022-01-04 RX ORDER — OXYCODONE AND ACETAMINOPHEN 7.5; 325 MG/1; MG/1
1 TABLET ORAL EVERY 6 HOURS PRN
Qty: 16 TABLET | Refills: 0 | Status: SHIPPED | OUTPATIENT
Start: 2022-01-08 | End: 2022-01-12 | Stop reason: SDUPTHER

## 2022-01-04 NOTE — TELEPHONE ENCOUNTER
Requested Prescriptions     Pending Prescriptions Disp Refills    gabapentin (NEURONTIN) 600 MG tablet 12 tablet 0     Sig: Take 1 tablet by mouth 3 times daily for 4 days.  tiZANidine (ZANAFLEX) 2 MG tablet 4 tablet 0     Sig: Take 1 tablet by mouth nightly as needed (pain)    oxyCODONE-acetaminophen (PERCOCET) 7.5-325 MG per tablet 16 tablet 0     Sig: Take 1 tablet by mouth every 6 hours as needed for Pain for up to 4 days.        Patient last seen on:  12/9  Date of last surgery:  n/a  Date of last refill:  12/9  Pain level:  n/a  Patient complaining of:  PT is asking for refill  Future appts: 1/12

## 2022-01-12 ENCOUNTER — OFFICE VISIT (OUTPATIENT)
Dept: PAIN MANAGEMENT | Age: 67
End: 2022-01-12
Payer: MEDICARE

## 2022-01-12 VITALS
HEIGHT: 60 IN | TEMPERATURE: 98.2 F | DIASTOLIC BLOOD PRESSURE: 78 MMHG | BODY MASS INDEX: 31.41 KG/M2 | WEIGHT: 160 LBS | SYSTOLIC BLOOD PRESSURE: 122 MMHG

## 2022-01-12 DIAGNOSIS — G89.4 CHRONIC PAIN SYNDROME: ICD-10-CM

## 2022-01-12 DIAGNOSIS — Z79.891 ENCOUNTER FOR LONG-TERM OPIATE ANALGESIC USE: ICD-10-CM

## 2022-01-12 DIAGNOSIS — M48.061 SPINAL STENOSIS, LUMBAR REGION, WITHOUT NEUROGENIC CLAUDICATION: Primary | ICD-10-CM

## 2022-01-12 DIAGNOSIS — M62.838 MUSCLE SPASM: ICD-10-CM

## 2022-01-12 DIAGNOSIS — M47.817 LUMBOSACRAL SPONDYLOSIS WITHOUT MYELOPATHY: ICD-10-CM

## 2022-01-12 DIAGNOSIS — M54.16 LUMBAR RADICULOPATHY: ICD-10-CM

## 2022-01-12 DIAGNOSIS — Z98.1 S/P LUMBAR AND LUMBOSACRAL FUSION BY ANTERIOR TECHNIQUE: ICD-10-CM

## 2022-01-12 PROCEDURE — G8484 FLU IMMUNIZE NO ADMIN: HCPCS | Performed by: NURSE PRACTITIONER

## 2022-01-12 PROCEDURE — 3017F COLORECTAL CA SCREEN DOC REV: CPT | Performed by: NURSE PRACTITIONER

## 2022-01-12 PROCEDURE — 1123F ACP DISCUSS/DSCN MKR DOCD: CPT | Performed by: NURSE PRACTITIONER

## 2022-01-12 PROCEDURE — 4040F PNEUMOC VAC/ADMIN/RCVD: CPT | Performed by: NURSE PRACTITIONER

## 2022-01-12 PROCEDURE — G8400 PT W/DXA NO RESULTS DOC: HCPCS | Performed by: NURSE PRACTITIONER

## 2022-01-12 PROCEDURE — 1036F TOBACCO NON-USER: CPT | Performed by: NURSE PRACTITIONER

## 2022-01-12 PROCEDURE — 99214 OFFICE O/P EST MOD 30 MIN: CPT | Performed by: NURSE PRACTITIONER

## 2022-01-12 PROCEDURE — G8417 CALC BMI ABV UP PARAM F/U: HCPCS | Performed by: NURSE PRACTITIONER

## 2022-01-12 PROCEDURE — 1090F PRES/ABSN URINE INCON ASSESS: CPT | Performed by: NURSE PRACTITIONER

## 2022-01-12 PROCEDURE — G8427 DOCREV CUR MEDS BY ELIG CLIN: HCPCS | Performed by: NURSE PRACTITIONER

## 2022-01-12 RX ORDER — OXYCODONE AND ACETAMINOPHEN 7.5; 325 MG/1; MG/1
1 TABLET ORAL EVERY 6 HOURS PRN
Qty: 120 TABLET | Refills: 0 | Status: SHIPPED | OUTPATIENT
Start: 2022-01-12 | End: 2022-02-09 | Stop reason: SDUPTHER

## 2022-01-12 RX ORDER — TIZANIDINE 2 MG/1
2 TABLET ORAL 2 TIMES DAILY PRN
Qty: 60 TABLET | Refills: 0 | Status: SHIPPED | OUTPATIENT
Start: 2022-01-12 | End: 2022-02-09 | Stop reason: SDUPTHER

## 2022-01-12 ASSESSMENT — ENCOUNTER SYMPTOMS
SHORTNESS OF BREATH: 0
DIARRHEA: 0
EYES NEGATIVE: 1
TROUBLE SWALLOWING: 0
GASTROINTESTINAL NEGATIVE: 1
COUGH: 0
CONSTIPATION: 0
BACK PAIN: 1

## 2022-01-12 NOTE — PROGRESS NOTES
Kaitlyn Power  (1955)    2022    Subjective:     Kaitlyn Power is 78 y.o. female who complains today of:    Chief Complaint   Patient presents with    Back Pain         Allergies:  Patient has no known allergies. Past Medical History:   Diagnosis Date    Anxiety     Coronary artery disease involving native coronary artery of native heart without angina pectoris 2018    Hyperlipidemia     meds > 5 yrs    Osteoarthritis      Past Surgical History:   Procedure Laterality Date    BACK SURGERY  2017    lumbar disc OR    COLONOSCOPY      DILATION AND CURETTAGE OF UTERUS      at age 30s--miscarriage   Bhavesh Adam LUMBAR FUSION Left 2020    ANTERIOR LEFT RETROPERITONEAL L5-S1 DISKECTOMY INTERBODY CAGE FUSION performed by David Greenberg MD at [address]       as child   [address]      in [place]--EMT attempted to intubate patient due to trouble breathing & then needed repair     Family History   Problem Relation Age of Onset    Diabetes Mother     Obesity Mother     High Cholesterol Mother     High Cholesterol Father     Other Sister         GSW to spine & paralysis    No Known Problems Brother     No Known Problems Son     No Known Problems Daughter     Other Brother         as infant     Social History     Socioeconomic History    Marital status: Single     Spouse name: Not on file    Number of children: Not on file    Years of education: Not on file    Highest education level: Not on file   Occupational History    Not on file   Tobacco Use    Smoking status: Former Smoker     Packs/day: 1.00     Years: 30.00     Pack years: 30.00     Types: Cigarettes     Quit date: 2019     Years since quittin.7    Smokeless tobacco: Never Used    Tobacco comment: intermittent habit   Vaping Use    Vaping Use: Never used   Substance and Sexual Activity    Alcohol use:  Yes     Alcohol/week: 0.0 standard drinks     Comment: social    Drug use: Not on file    Sexual activity: Not on file   Other Topics Concern    Not on file   Social History Narrative         Lives With: Alone    Type of Home: House    Home Layout: One level    Home Access: Stairs to enter with rails    Entrance Stairs - Number of Steps: 4-5    Entrance Stairs - Rails: Both    Bathroom Shower/Tub: Walk-in shower, Tub/Shower unit(uses walk in)    [employer]: Built-in shower seat, Grab bars in shower    Home Equipment: (plans to purchase hip kit and borrow walker)    Receives Help From: Family(father and fathers wife can assist per pt, pt states \"doctor downplayed the sx\" so pt thought she would recover quickly)    ADL Assistance: [address]: Independent    Homemaking Responsibilities: Yes(yard work included)    Ambulation Assistance: Independent(no AD)    Transfer Assistance: Independent    Active : Yes    Mode of Transportation: Car    Occupation: Retired    Type of occupation: Homemaker    Additional Comments: Pt. states lately she has had to sit after only 20 minutes of standing before sx     Social Determinants of Health     Financial Resource Strain:     Difficulty of Paying Living Expenses: Not on file   Food Insecurity:     Worried About [address] in the Last Year: Not on file    [address] in the Last Year: Not on file   Transportation Needs:     Lack of Transportation (Medical): Not on file    Lack of Transportation (Non-Medical):  Not on file   Physical Activity:     Days of Exercise per Week: Not on file    Minutes of Exercise per Session: Not on file   Stress:     Feeling of Stress : Not on file   Social Connections:     Frequency of Communication with Friends and Family: Not on file    Frequency of Social Gatherings with Friends and Family: Not on file    Attends Taoist Services: Not on file    Active Member of Clubs or Organizations: Not on file    Attends Club or Organization Meetings: Not on file    Marital Status: Not on file   Intimate Partner Violence:     Fear of Current or Ex-Partner: Not on file    Emotionally Abused: Not on file    Physically Abused: Not on file    Sexually Abused: Not on file   Housing Stability:     Unable to Pay for Housing in the Last Year: Not on file    Number of Lisa in the Last Year: Not on file    Unstable Housing in the Last Year: Not on file       Current Outpatient Medications on File Prior to Visit   Medication Sig Dispense Refill    gabapentin (NEURONTIN) 600 MG tablet Take 1 tablet by mouth 3 times daily for 4 days. 12 tablet 0    lidocaine (LIDODERM) 5 % Place 1 patch onto the skin daily 12 hours on, 12 hours off.  30 patch 0    lisinopril (PRINIVIL;ZESTRIL) 5 MG tablet TAKE 1 TABLET BY MOUTH DAILY      naloxone 4 MG/0.1ML LIQD nasal spray 1 spray by Nasal route as needed for Opioid Reversal 1 each 1    Multiple Vitamins-Minerals (THERAPEUTIC MULTIVITAMIN-MINERALS) tablet Take 1 tablet by mouth daily      magnesium oxide (MAG-OX) 400 MG tablet Take 400 mg by mouth daily      Lactobacillus (ACIDOPHILUS) 100 MG CAPS Take by mouth as needed      calcium carbonate (OSCAL) 500 MG TABS tablet Take 500 mg by mouth daily      vitamin C (ASCORBIC ACID) 500 MG tablet Take 500 mg by mouth daily      Milk Thistle 150 MG CAPS Take by mouth daily      pseudoephedrine (SUDAFED) 60 MG tablet Take 60 mg by mouth every 4 hours as needed for Congestion      busPIRone (BUSPAR) 7.5 MG tablet Take 15 mg by mouth 2 times daily       pramipexole (MIRAPEX) 0.25 MG tablet TK ONE OR TWO TS PO QHS      simvastatin (ZOCOR) 40 MG tablet Take 40 mg by mouth nightly       ipratropium (ATROVENT) 0.03 % nasal spray U 2 SPRAYS IEN BID      meloxicam (MOBIC) 15 MG tablet Take 1 tablet by mouth daily 90 tablet 0    predniSONE (DELTASONE) 10 MG tablet Take 1 tablet by mouth daily 1 tab TID x 3 days, 1 tab BID x 3 days, 1 tab QD x 3 days 18 tablet 0    ALPRAZolam (XANAX) 0.25 MG tablet TK 1 T PO BID PRN      naloxone (NARCAN) 4 MG/0.1ML LIQD nasal spray 1 spray by Nasal route as needed for Opioid Reversal 1 each 0     No current facility-administered medications on file prior to visit. Pt presents today for a f/u of her pain. PCP is Dr. Roshan Nance MD.  Pt last saw this NP  for her chronic pain. Discussion of spinal cord stimulator. She has history of multiple lumbar surgeries. Dr. Marko Lopez discussed Dr. Magda Carbone to discuss spinal cord stim. She says she \"isn't crazy about that\" idea. She says she is trying to exercise. She isn't sure if this is helping. She continues to use her virtual . Tizanidine was decreased to 2 mg due to drowsiness. She has been told to limit alcohol intake due to her Percocet. Discussed NDP in the past. She has chronic low back and LLE pain. Has seen [address] [place] [address] neurosurgery. She says SCS was recommended (see copied note below). She had EMG done. They recommended she see Dr. Kaci Garcia for injections but patient declines for now. She says she has lost 10 pounds with some relief. Previous SI injection and MIKAELA did not help. Saw Dr. Yesi Hampton March 2021 for consult and no further surgery recommended - noted Lt meralgia paraesthetica, Lt L3 radiculopathy and spondylosis in note. She is not interested in SCS.     Past Surgical Hx:   06/23/2017 right and bilateral L4-5 microdissection, discectomy, interbody posterolateral fusion, pedicle screws.   EM.    8/12/2020 left anterior retroperitoneal L5-S1 discectomy interbody cage fusion.     2/15/21 BL SI inj     From Care Everywhere: \"EMG 9/23/21  left lower extremity did show evidence of chronic left-sided lumbosacral polyradiculopathy affecting left L4? L5-S1 nerve roots with active denervation. I took liberty of sending Nory Lopez to see Dr. Zachary Bennett from pain management to try some interventional treatment and since she failed multiple conservative management in the past she may be a good candidate for spinal cord stimulator. She will continue with home exercise program and same medications unchanged, for now. \"     She takes Percocet 7.5mg Q6hrs PRN pain and Gabapentin 600mg TID and zanaflex 4mg QHS and lidoderm patch. Also uses mobic 15mg daily PRN. Pt feels pain level with her medication is 6/10, and 8/10 without medication. Pt feels that prolonged standing, rising from seated position makes the pain worse, and medication, stretching, sitting short term makes the pain better. Pt feels her medication helps   her function and improve her quality of life, specifically says allows to do ADL's and exercise/stretch, housework. Pt admits to LE radiating numbness and tingling, and also reports Lt LE \"shooting pains\" at times. Denies recent falls, injuries or trauma. Pt denies new weakness. Pt reports PT has been done. Review of Systems   Constitutional: Negative. Negative for fatigue. HENT: Negative. Negative for trouble swallowing. Eyes: Negative. Respiratory: Negative for cough and shortness of breath. Cardiovascular: Negative for chest pain. Gastrointestinal: Negative. Negative for constipation and diarrhea. Endocrine: Negative. Genitourinary: Negative. Musculoskeletal: Positive for back pain and neck pain. Skin: Negative. Neurological: Positive for numbness. Negative for dizziness, weakness and headaches. Hematological: Negative. Psychiatric/Behavioral: Negative. Objective:     Vitals:  /78   Temp 98.2 °F (36.8 °C)   Ht 5' (1.524 m)   Wt 160 lb (72.6 kg)   LMP  (LMP Unknown)   BMI 31.25 kg/m² Pain Score:   7      Physical Exam  Vitals and nursing note reviewed. This is a pleasant female who answers questions appropriately and follows commands. Pt is alert and oriented x 3. Recent and remote memory is intact. Mood and affect, judgement and insight are normal.  No signs of distress, no dyspnea or SOB noted. HEENT: PERRL. Neck is supple, trachea midline.   No lymphadenopathy noted. Decreased ROM with flexion and extension of low back. Tender with palpation to lumbar spine with palpation B/L but greater over Lt lower lumbar region/SI joint with positive provacative maneuvers noted. Positive SLR on Lt. Tightness in both hamstrings noted. Surgical scars noted. Pt is able to briefly rise up on heels and toes with assistance of exam table. Balance and coordination normal. Gait mildly antalgic. Strength is functional for ambulation. Cranial nerves II-XII are intact. Assessment:      Diagnosis Orders   1. Spinal stenosis, lumbar region, without neurogenic claudication  oxyCODONE-acetaminophen (PERCOCET) 7.5-325 MG per tablet   2. S/P lumbar and lumbosacral fusion by anterior technique  oxyCODONE-acetaminophen (PERCOCET) 7.5-325 MG per tablet   3. Lumbar radiculopathy  oxyCODONE-acetaminophen (PERCOCET) 7.5-325 MG per tablet   4. Muscle spasm  tiZANidine (ZANAFLEX) 2 MG tablet   5. Chronic pain syndrome  oxyCODONE-acetaminophen (PERCOCET) 7.5-325 MG per tablet   6. Lumbosacral spondylosis without myelopathy  oxyCODONE-acetaminophen (PERCOCET) 7.5-325 MG per tablet   7. Encounter for long-term opiate analgesic use  oxyCODONE-acetaminophen (PERCOCET) 7.5-325 MG per tablet       Plan:     Periodic Controlled Substance Monitoring: Possible medication side effects, risk of tolerance/dependence & alternative treatments discussed. ,No signs of potential drug abuse or diversion identified. ,Assessed functional status. ,Obtaining appropriate analgesic effect of treatment. (Adelaida Gar, APRN - CNP)    Orders Placed This Encounter   Medications    tiZANidine (ZANAFLEX) 2 MG tablet     Sig: Take 1 tablet by mouth 2 times daily as needed (spasm)     Dispense:  60 tablet     Refill:  0    oxyCODONE-acetaminophen (PERCOCET) 7.5-325 MG per tablet     Sig: Take 1 tablet by mouth every 6 hours as needed for Pain for up to 30 days.      Dispense:  120 tablet     Refill:  0     Reduce doses taken as pain becomes manageable       No orders of the defined types were placed in this encounter. Discussed options with the patient today. Anatomic model pathology was shown and reviewed with pt. She says she has enough gabapentin - not sure why she has but has enough for 1 month. Requests increase back to 4mg zanaflex - will try increasing 2mg to BID PRN for after exercise - hopefully won't make too drowsy. Continue current does of Percocet 7.5mg Q6hrs PRN pain as it helps her function. She is not wanting more injections or PT. F/U with Dr. Soila Díaz at [facility] - pt declines. Options are limited. All questions were answered. Discussed home exercise program.  Relevant imaging and pain generators reviewed. Pt verbalized understanding and agrees with above plan. Pt has chronic pain. Utox 9/2/21 + oxycodone at low levels; also + for gabapentin, xanax, and high levels ethyl*. ORT score 2 - considered high risk d/t above. Narcan Rx sent in June 2021. Will continue medications for chronic pain that has been previously directed as they do help pt function with ADL and improve quality of life. Pt is aware goal is to use the least amount of medication possible to allow pt to function and help with quality of life as discussed in detail. Ideal to keep MME below 50 which it is. Have discussed proper disposal of narcotic medication. Advised to have medications in safe place and locked up/in lock box. Discussed possible risks of opiate medication with pt, including, but not limited to possible constipation, nausea or vomiting, sedation, urinary retention, dependence and possible addiction. Pt agrees to use medication as prescribed/as directed. Pt advised to not use opiates while driving or operating heavy equipment, or in situations where pt may harm him/herself or others. Pt is aware that while on narcotics, pt needs to be seen to reassess pain and reassess need for continued medication at that time.  NDP reviewed. OARRS was reviewed. This NP saw pt under direct supervision of Dr. Perry Garcia. Follow up:  Return in about 4 weeks (around 2/9/2022) for review meds and reassess pain.     Speedy Madrigal, ABENA - CNP

## 2022-02-09 DIAGNOSIS — Z98.1 S/P LUMBAR AND LUMBOSACRAL FUSION BY ANTERIOR TECHNIQUE: ICD-10-CM

## 2022-02-09 DIAGNOSIS — M48.061 SPINAL STENOSIS, LUMBAR REGION, WITHOUT NEUROGENIC CLAUDICATION: ICD-10-CM

## 2022-02-09 DIAGNOSIS — M62.838 MUSCLE SPASM: ICD-10-CM

## 2022-02-09 DIAGNOSIS — Z79.891 ENCOUNTER FOR LONG-TERM OPIATE ANALGESIC USE: ICD-10-CM

## 2022-02-09 DIAGNOSIS — M47.817 LUMBOSACRAL SPONDYLOSIS WITHOUT MYELOPATHY: ICD-10-CM

## 2022-02-09 DIAGNOSIS — G89.4 CHRONIC PAIN SYNDROME: ICD-10-CM

## 2022-02-09 DIAGNOSIS — M54.16 LUMBAR RADICULOPATHY: ICD-10-CM

## 2022-02-09 NOTE — TELEPHONE ENCOUNTER
Requested Prescriptions     Pending Prescriptions Disp Refills    tiZANidine (ZANAFLEX) 2 MG tablet 60 tablet 0     Sig: Take 1 tablet by mouth 2 times daily as needed (spasm)    oxyCODONE-acetaminophen (PERCOCET) 7.5-325 MG per tablet 120 tablet 0     Sig: Take 1 tablet by mouth every 6 hours as needed for Pain for up to 30 days.  gabapentin (NEURONTIN) 600 MG tablet 12 tablet 0     Sig: Take 1 tablet by mouth 3 times daily for 4 days.     meloxicam (MOBIC) 15 MG tablet 90 tablet 0     Sig: Take 1 tablet by mouth daily       Patient last seen on:  1/12/22  Date of last surgery:  n/a  Date of last refill:  1/12/22  Pain level:  n/a  Patient complaining of:  Pt requesting refill  Future appts: 3/9/22

## 2022-02-10 RX ORDER — GABAPENTIN 600 MG/1
600 TABLET ORAL 3 TIMES DAILY
Qty: 12 TABLET | Refills: 0 | Status: SHIPPED | OUTPATIENT
Start: 2022-02-11 | End: 2022-02-11 | Stop reason: SDUPTHER

## 2022-02-10 RX ORDER — TIZANIDINE 2 MG/1
2 TABLET ORAL 2 TIMES DAILY PRN
Qty: 60 TABLET | Refills: 0 | Status: SHIPPED | OUTPATIENT
Start: 2022-02-11 | End: 2022-03-09 | Stop reason: SDUPTHER

## 2022-02-10 RX ORDER — OXYCODONE AND ACETAMINOPHEN 7.5; 325 MG/1; MG/1
1 TABLET ORAL EVERY 6 HOURS PRN
Qty: 120 TABLET | Refills: 0 | Status: SHIPPED | OUTPATIENT
Start: 2022-02-11 | End: 2022-03-09 | Stop reason: SDUPTHER

## 2022-02-10 RX ORDER — MELOXICAM 15 MG/1
15 TABLET ORAL DAILY
Qty: 90 TABLET | Refills: 0 | Status: SHIPPED | OUTPATIENT
Start: 2022-02-11 | End: 2022-05-13 | Stop reason: SDUPTHER

## 2022-02-11 DIAGNOSIS — M48.061 SPINAL STENOSIS, LUMBAR REGION, WITHOUT NEUROGENIC CLAUDICATION: ICD-10-CM

## 2022-02-11 DIAGNOSIS — M54.16 LUMBAR RADICULOPATHY: ICD-10-CM

## 2022-02-11 DIAGNOSIS — Z98.1 S/P LUMBAR AND LUMBOSACRAL FUSION BY ANTERIOR TECHNIQUE: ICD-10-CM

## 2022-02-11 NOTE — TELEPHONE ENCOUNTER
Patient was only sent 4 days of the gabapentin. She is asking for a refill for the remaining medication.

## 2022-02-14 RX ORDER — GABAPENTIN 600 MG/1
600 TABLET ORAL 3 TIMES DAILY
Qty: 78 TABLET | Refills: 0 | Status: SHIPPED | OUTPATIENT
Start: 2022-02-15 | End: 2022-03-09 | Stop reason: SDUPTHER

## 2022-03-09 ENCOUNTER — OFFICE VISIT (OUTPATIENT)
Dept: PAIN MANAGEMENT | Age: 67
End: 2022-03-09
Payer: MEDICARE

## 2022-03-09 VITALS
WEIGHT: 160 LBS | SYSTOLIC BLOOD PRESSURE: 120 MMHG | TEMPERATURE: 98.5 F | BODY MASS INDEX: 31.41 KG/M2 | DIASTOLIC BLOOD PRESSURE: 72 MMHG | HEIGHT: 60 IN

## 2022-03-09 DIAGNOSIS — Z79.891 ENCOUNTER FOR LONG-TERM OPIATE ANALGESIC USE: ICD-10-CM

## 2022-03-09 DIAGNOSIS — M62.838 MUSCLE SPASM: ICD-10-CM

## 2022-03-09 DIAGNOSIS — M54.16 LUMBAR RADICULOPATHY: ICD-10-CM

## 2022-03-09 DIAGNOSIS — M48.061 SPINAL STENOSIS, LUMBAR REGION, WITHOUT NEUROGENIC CLAUDICATION: Primary | ICD-10-CM

## 2022-03-09 DIAGNOSIS — M47.817 LUMBOSACRAL SPONDYLOSIS WITHOUT MYELOPATHY: ICD-10-CM

## 2022-03-09 DIAGNOSIS — Z98.1 S/P LUMBAR AND LUMBOSACRAL FUSION BY ANTERIOR TECHNIQUE: ICD-10-CM

## 2022-03-09 DIAGNOSIS — G89.4 CHRONIC PAIN SYNDROME: ICD-10-CM

## 2022-03-09 PROCEDURE — G8427 DOCREV CUR MEDS BY ELIG CLIN: HCPCS | Performed by: NURSE PRACTITIONER

## 2022-03-09 PROCEDURE — G8484 FLU IMMUNIZE NO ADMIN: HCPCS | Performed by: NURSE PRACTITIONER

## 2022-03-09 PROCEDURE — 4040F PNEUMOC VAC/ADMIN/RCVD: CPT | Performed by: NURSE PRACTITIONER

## 2022-03-09 PROCEDURE — 3017F COLORECTAL CA SCREEN DOC REV: CPT | Performed by: NURSE PRACTITIONER

## 2022-03-09 PROCEDURE — G8417 CALC BMI ABV UP PARAM F/U: HCPCS | Performed by: NURSE PRACTITIONER

## 2022-03-09 PROCEDURE — 1036F TOBACCO NON-USER: CPT | Performed by: NURSE PRACTITIONER

## 2022-03-09 PROCEDURE — G8400 PT W/DXA NO RESULTS DOC: HCPCS | Performed by: NURSE PRACTITIONER

## 2022-03-09 PROCEDURE — 99213 OFFICE O/P EST LOW 20 MIN: CPT | Performed by: NURSE PRACTITIONER

## 2022-03-09 PROCEDURE — 1090F PRES/ABSN URINE INCON ASSESS: CPT | Performed by: NURSE PRACTITIONER

## 2022-03-09 PROCEDURE — 1123F ACP DISCUSS/DSCN MKR DOCD: CPT | Performed by: NURSE PRACTITIONER

## 2022-03-09 RX ORDER — TIZANIDINE 2 MG/1
2 TABLET ORAL 2 TIMES DAILY PRN
Qty: 60 TABLET | Refills: 0 | Status: SHIPPED | OUTPATIENT
Start: 2022-03-13 | End: 2022-04-11 | Stop reason: SDUPTHER

## 2022-03-09 RX ORDER — OXYCODONE AND ACETAMINOPHEN 7.5; 325 MG/1; MG/1
1 TABLET ORAL EVERY 6 HOURS PRN
Qty: 120 TABLET | Refills: 0 | Status: SHIPPED | OUTPATIENT
Start: 2022-03-13 | End: 2022-04-11 | Stop reason: SDUPTHER

## 2022-03-09 RX ORDER — GABAPENTIN 600 MG/1
600 TABLET ORAL 3 TIMES DAILY
Qty: 90 TABLET | Refills: 0 | Status: SHIPPED | OUTPATIENT
Start: 2022-03-13 | End: 2022-04-11 | Stop reason: SDUPTHER

## 2022-03-09 ASSESSMENT — ENCOUNTER SYMPTOMS
EYES NEGATIVE: 1
COUGH: 0
TROUBLE SWALLOWING: 0
DIARRHEA: 0
BACK PAIN: 1
SHORTNESS OF BREATH: 0
GASTROINTESTINAL NEGATIVE: 1
CONSTIPATION: 0

## 2022-03-09 NOTE — PROGRESS NOTES
on file   Intimate Partner Violence:     Fear of Current or Ex-Partner: Not on file    Emotionally Abused: Not on file    Physically Abused: Not on file    Sexually Abused: Not on file   Housing Stability:     Unable to Pay for Housing in the Last Year: Not on file    Number of Lisa in the Last Year: Not on file    Unstable Housing in the Last Year: Not on file       Current Outpatient Medications on File Prior to Visit   Medication Sig Dispense Refill    meloxicam (MOBIC) 15 MG tablet Take 1 tablet by mouth daily 90 tablet 0    lidocaine (LIDODERM) 5 % Place 1 patch onto the skin daily 12 hours on, 12 hours off. 30 patch 0    lisinopril (PRINIVIL;ZESTRIL) 5 MG tablet TAKE 1 TABLET BY MOUTH DAILY      naloxone 4 MG/0.1ML LIQD nasal spray 1 spray by Nasal route as needed for Opioid Reversal 1 each 1    Multiple Vitamins-Minerals (THERAPEUTIC MULTIVITAMIN-MINERALS) tablet Take 1 tablet by mouth daily      magnesium oxide (MAG-OX) 400 MG tablet Take 400 mg by mouth daily      Lactobacillus (ACIDOPHILUS) 100 MG CAPS Take by mouth as needed      calcium carbonate (OSCAL) 500 MG TABS tablet Take 500 mg by mouth daily      vitamin C (ASCORBIC ACID) 500 MG tablet Take 500 mg by mouth daily      Milk Thistle 150 MG CAPS Take by mouth daily      pseudoephedrine (SUDAFED) 60 MG tablet Take 60 mg by mouth every 4 hours as needed for Congestion      busPIRone (BUSPAR) 7.5 MG tablet Take 15 mg by mouth 2 times daily       pramipexole (MIRAPEX) 0.25 MG tablet TK ONE OR TWO TS PO QHS      simvastatin (ZOCOR) 40 MG tablet Take 40 mg by mouth nightly       ipratropium (ATROVENT) 0.03 % nasal spray U 2 SPRAYS IEN BID      naloxone (NARCAN) 4 MG/0.1ML LIQD nasal spray 1 spray by Nasal route as needed for Opioid Reversal 1 each 0     No current facility-administered medications on file prior to visit. Pt presents today for a f/u of her pain. She last saw this NP.   Tried increasing tizanidine 2 mg to twice a day as needed. She says it helps with spasms, but does get some dry mouth Sx. She continues to do her exercises. Feels zanaflex helps after exercise. She continues to use her virtual . She has history of multiple lumbar surgeries. Dr. Marina Mak discussed Dr. Cathleen Mack to discuss spinal cord stim and isn't interested in this. Most pain across low back \"mostly on Lt side\". She has been told to limit alcohol intake due to her Percocet. Discussed NDP in the past. She has chronic low back and LLE pain. Has seen [facility] neurosurgery. She says SCS was recommended (see copied note below). She had EMG done. They recommended she see Dr. Soham Jones for injections but patient declines for now. She says she has lost 10 pounds with some relief. Previous SI injection and MIKAELA did not help. Saw Dr. Rainey Claude March 2021 for consult and no further surgery recommended - noted Lt meralgia paraesthetica, Lt L3 radiculopathy and spondylosis in note. She is not interested in SCS.     Past Surgical Hx:   06/23/2017 right and bilateral L4-5 microdissection, discectomy, interbody posterolateral fusion, pedicle screws.   [facility].    8/12/2020 left anterior retroperitoneal L5-S1 discectomy interbody cage fusion.     2/15/21 BL SI inj     From Care Everywhere: \"EMG 9/23/21  left lower extremity did show evidence of chronic left-sided lumbosacral polyradiculopathy affecting left L4? L5-S1 nerve roots with active denervation. I took liberty of sending Samuel Dalton to see Dr. Liz Hernandez from pain management to try some interventional treatment and since she failed multiple conservative management in the past she may be a good candidate for spinal cord stimulator. She will continue with home exercise program and same medications unchanged, for now.      She takes Percocet 7.5mg Q6hrs PRN pain - says took yesterday,  and Gabapentin 600mg TID took this morning, and zanaflex 2mg BID QHS and lidoderm patch.  Also uses mobic 15mg daily PRN.      Pt feels pain level with her medication is 0/10 with sitting and 4/10 with standing, and 8/10 without medication. Pt feels that cooking, walking, standing, vacuuming, being on feet makes the pain worse, and exercise, sitting, medication, change of position, ice makes the pain better. Pt feels her medication helps   her function and improve her quality of life, specifically says allows to do ADL's. Pt admits to Lt LE radiating numbness and tingling. Denies recent falls, injuries or trauma. Pt denies new weakness. Pt reports PT has been done in the past.         Review of Systems   Constitutional: Negative. Negative for fatigue. HENT: Negative. Negative for trouble swallowing. Eyes: Negative. Respiratory: Negative for cough and shortness of breath. Cardiovascular: Negative for chest pain. Gastrointestinal: Negative. Negative for constipation and diarrhea. Endocrine: Negative. Genitourinary: Negative. Musculoskeletal: Positive for back pain. Negative for neck pain. Skin: Negative. Neurological: Positive for numbness. Negative for dizziness, weakness and headaches. Hematological: Negative. Psychiatric/Behavioral: Negative. Objective:     Vitals:  /72   Temp 98.5 °F (36.9 °C)   Ht 5' (1.524 m)   Wt 160 lb (72.6 kg)   LMP  (LMP Unknown)   BMI 31.25 kg/m² Pain Score:   6      Physical Exam  Vitals and nursing note reviewed. This is a pleasant female who answers questions appropriately and follows commands.  Pt is alert and oriented x 3.  Recent and remote memory is intact.  Mood and affect, judgement and insight are normal.  No signs of distress, no dyspnea or SOB noted. HEENT: PERRL.  Neck is supple, trachea midline.  No lymphadenopathy noted.  Decreased ROM with flexion and extension of low back.  Tender with palpation to lumbar spine with palpation B/L but greater over Lt lower lumbar region/SI joint with positive provacative maneuvers noted. Positive SLR on Lt. Tender over bilateral hip bursa with palpation.   Tightness in both hamstrings noted.  Surgical scars noted.  Pt is able to briefly rise up on heels and toes with assistance of exam table. Balance and coordination normal. Gait mildly antalgic. Strength is functional for ambulation. Cranial nerves II-XII are intact.           Assessment:      Diagnosis Orders   1. Spinal stenosis, lumbar region, without neurogenic claudication  Urine Drug Screen    gabapentin (NEURONTIN) 600 MG tablet    oxyCODONE-acetaminophen (PERCOCET) 7.5-325 MG per tablet   2. S/P lumbar and lumbosacral fusion by anterior technique  Urine Drug Screen    gabapentin (NEURONTIN) 600 MG tablet    oxyCODONE-acetaminophen (PERCOCET) 7.5-325 MG per tablet   3. Lumbar radiculopathy  Urine Drug Screen    gabapentin (NEURONTIN) 600 MG tablet    oxyCODONE-acetaminophen (PERCOCET) 7.5-325 MG per tablet   4. Muscle spasm  Urine Drug Screen    tiZANidine (ZANAFLEX) 2 MG tablet   5. Chronic pain syndrome  Urine Drug Screen    oxyCODONE-acetaminophen (PERCOCET) 7.5-325 MG per tablet   6. Lumbosacral spondylosis without myelopathy  Urine Drug Screen    oxyCODONE-acetaminophen (PERCOCET) 7.5-325 MG per tablet   7. Encounter for long-term opiate analgesic use  Urine Drug Screen    oxyCODONE-acetaminophen (PERCOCET) 7.5-325 MG per tablet       Plan:     Periodic Controlled Substance Monitoring: Possible medication side effects, risk of tolerance/dependence & alternative treatments discussed. ,No signs of potential drug abuse or diversion identified. ,Assessed functional status. ,Obtaining appropriate analgesic effect of treatment. (Adelaida Gar, APRN - CNP)    Orders Placed This Encounter   Medications    gabapentin (NEURONTIN) 600 MG tablet     Sig: Take 1 tablet by mouth 3 times daily for 30 days.      Dispense:  90 tablet     Refill:  0    tiZANidine (ZANAFLEX) 2 MG tablet     Sig: Take 1 tablet by mouth 2 times daily as needed (spasm) Dispense:  60 tablet     Refill:  0    oxyCODONE-acetaminophen (PERCOCET) 7.5-325 MG per tablet     Sig: Take 1 tablet by mouth every 6 hours as needed for Pain for up to 30 days. Dispense:  120 tablet     Refill:  0     Reduce doses taken as pain becomes manageable       Orders Placed This Encounter   Procedures    Urine Drug Screen     Chronic pain/illicits     Discussed options with the patient today. Anatomic model pathology was shown and reviewed with pt. Continue current does of Percocet 7.5mg Q6hrs PRN pain as it helps her function as well as gabapentin 600mg TID and zanaflex 2mg BID PRN. She is not wanting more injections or PT. She declines F/U with Dr. Francoise Bonilla at [facility]. Options are limited. Encouraged exercise. All questions were answered. Discussed home exercise program.  Relevant imaging and pain generators reviewed. Pt verbalized understanding and agrees with above plan. Pt has chronic pain. Will check Utox and f/u with report - last took percocet yesterday and gabapentin last night    Utox 9/2/21 + oxycodone at low levels; also + for gabapentin, xanax, and high levels ethyl. ORT score 2 - considered high risk d/t above. Narcan Rx sent in June 2021. Will continue medications for chronic pain that has been previously directed as they do help pt function with ADL and improve quality of life. Pt is aware goal is to use the least amount of medication possible to allow pt to function and help with quality of life as discussed in detail. Ideal to keep MME below 50 which it is. Have discussed proper disposal of narcotic medication. Advised to have medications in safe place and locked up/in lock box. Discussed possible risks of opiate medication with pt, including, but not limited to possible constipation, nausea or vomiting, sedation, urinary retention, dependence and possible addiction. Pt agrees to use medication as prescribed/as directed.  Pt advised to not use opiates while driving or operating heavy equipment, or in situations where pt may harm him/herself or others. Pt is aware that while on narcotics, pt needs to be seen to reassess pain and reassess need for continued medication at that time. NDP reviewed. OARRS was reviewed. This NP saw pt under direct supervision of Dr. Yanci Ibarra. Follow up:  Return in about 4 weeks (around 4/6/2022) for review meds and reassess pain.     Danielle Madrigal, APRN - CNP

## 2022-03-11 LAB
6-ACETYLMORPHINE, UR: NORMAL
AMPHETAMINE SCREEN, URINE: NORMAL
BARBITURATE SCREEN, URINE: NORMAL
BENZODIAZEPINE SCREEN, URINE: NORMAL
CANNABINOID SCREEN URINE: NORMAL
COCAINE METABOLITE, URINE: NORMAL
CREATININE URINE: NORMAL
EDDP, URINE: NORMAL
ETHANOL URINE: NORMAL
MDMA URINE: NORMAL
METHADONE SCREEN, URINE: NORMAL
METHAMPHETAMINE, URINE: NORMAL
OPIATES, URINE: NORMAL
OXYCODONE: NORMAL
PCP: NORMAL
PH, URINE: NORMAL
PHENCYCLIDINE, URINE: NORMAL
PROPOXYPHENE, URINE: NORMAL
TRICYCLIC ANTIDEPRESSANTS, UR: NORMAL

## 2022-04-11 ENCOUNTER — TELEPHONE (OUTPATIENT)
Dept: PAIN MANAGEMENT | Age: 67
End: 2022-04-11

## 2022-04-11 ENCOUNTER — OFFICE VISIT (OUTPATIENT)
Dept: PAIN MANAGEMENT | Age: 67
End: 2022-04-11
Payer: MEDICARE

## 2022-04-11 VITALS
BODY MASS INDEX: 31.41 KG/M2 | HEART RATE: 79 BPM | TEMPERATURE: 97.3 F | WEIGHT: 160 LBS | DIASTOLIC BLOOD PRESSURE: 82 MMHG | SYSTOLIC BLOOD PRESSURE: 140 MMHG | HEIGHT: 60 IN

## 2022-04-11 DIAGNOSIS — M47.817 LUMBOSACRAL SPONDYLOSIS WITHOUT MYELOPATHY: ICD-10-CM

## 2022-04-11 DIAGNOSIS — G89.4 CHRONIC PAIN SYNDROME: ICD-10-CM

## 2022-04-11 DIAGNOSIS — Z79.891 ENCOUNTER FOR LONG-TERM OPIATE ANALGESIC USE: ICD-10-CM

## 2022-04-11 DIAGNOSIS — Z98.1 S/P LUMBAR AND LUMBOSACRAL FUSION BY ANTERIOR TECHNIQUE: ICD-10-CM

## 2022-04-11 DIAGNOSIS — M54.16 LUMBAR RADICULOPATHY: ICD-10-CM

## 2022-04-11 DIAGNOSIS — M62.838 MUSCLE SPASM: ICD-10-CM

## 2022-04-11 DIAGNOSIS — M48.061 SPINAL STENOSIS, LUMBAR REGION, WITHOUT NEUROGENIC CLAUDICATION: ICD-10-CM

## 2022-04-11 PROCEDURE — 99213 OFFICE O/P EST LOW 20 MIN: CPT | Performed by: NURSE PRACTITIONER

## 2022-04-11 PROCEDURE — 1036F TOBACCO NON-USER: CPT | Performed by: NURSE PRACTITIONER

## 2022-04-11 PROCEDURE — G8427 DOCREV CUR MEDS BY ELIG CLIN: HCPCS | Performed by: NURSE PRACTITIONER

## 2022-04-11 PROCEDURE — 1090F PRES/ABSN URINE INCON ASSESS: CPT | Performed by: NURSE PRACTITIONER

## 2022-04-11 PROCEDURE — 3017F COLORECTAL CA SCREEN DOC REV: CPT | Performed by: NURSE PRACTITIONER

## 2022-04-11 PROCEDURE — 1123F ACP DISCUSS/DSCN MKR DOCD: CPT | Performed by: NURSE PRACTITIONER

## 2022-04-11 PROCEDURE — G8417 CALC BMI ABV UP PARAM F/U: HCPCS | Performed by: NURSE PRACTITIONER

## 2022-04-11 PROCEDURE — 4040F PNEUMOC VAC/ADMIN/RCVD: CPT | Performed by: NURSE PRACTITIONER

## 2022-04-11 PROCEDURE — G8400 PT W/DXA NO RESULTS DOC: HCPCS | Performed by: NURSE PRACTITIONER

## 2022-04-11 RX ORDER — GABAPENTIN 600 MG/1
600 TABLET ORAL 3 TIMES DAILY
Qty: 90 TABLET | Refills: 0 | Status: SHIPPED | OUTPATIENT
Start: 2022-04-12 | End: 2022-05-12 | Stop reason: SDUPTHER

## 2022-04-11 RX ORDER — LIDOCAINE 50 MG/G
1 PATCH TOPICAL DAILY
Qty: 30 PATCH | Refills: 0 | Status: SHIPPED | OUTPATIENT
Start: 2022-04-11 | End: 2022-06-09 | Stop reason: SDUPTHER

## 2022-04-11 RX ORDER — OXYCODONE AND ACETAMINOPHEN 7.5; 325 MG/1; MG/1
1 TABLET ORAL EVERY 6 HOURS PRN
Qty: 120 TABLET | Refills: 0 | Status: SHIPPED | OUTPATIENT
Start: 2022-04-12 | End: 2022-05-12 | Stop reason: SDUPTHER

## 2022-04-11 RX ORDER — TIZANIDINE 2 MG/1
2 TABLET ORAL 2 TIMES DAILY PRN
Qty: 60 TABLET | Refills: 0 | Status: SHIPPED | OUTPATIENT
Start: 2022-04-12 | End: 2022-05-12 | Stop reason: SDUPTHER

## 2022-04-11 ASSESSMENT — ENCOUNTER SYMPTOMS
CONSTIPATION: 0
BACK PAIN: 1
GASTROINTESTINAL NEGATIVE: 1
TROUBLE SWALLOWING: 0
SHORTNESS OF BREATH: 0
EYES NEGATIVE: 1
DIARRHEA: 0
COUGH: 0

## 2022-04-11 NOTE — PROGRESS NOTES
Mariluz Meyers  (1955)    4/11/2022    Subjective:     Mariluz Meyers is 78 y.o. female who complains today of:    Chief Complaint   Patient presents with    Back Pain         Allergies:  Patient has no known allergies. Past Medical History:   Diagnosis Date    Anxiety     Coronary artery disease involving native coronary artery of native heart without angina pectoris 12/11/2018    Hyperlipidemia     meds > 5 yrs    Osteoarthritis      Past Surgical History:   Procedure Laterality Date    BACK SURGERY  2017    lumbar disc OR    COLONOSCOPY      DILATION AND CURETTAGE OF UTERUS      at age 29s--miscarriage   [facility] LUMBAR FUSION Left 8/12/2020    ANTERIOR LEFT RETROPERITONEAL L5-S1 DISKECTOMY INTERBODY CAGE FUSION performed by Sergio Ballard MD at [address] [place] [address]      as child   [address]  2010    in [place]--EMT attempted to intubate patient due to trouble breathing & then needed repair     Family History   Problem Relation Age of Onset    Diabetes Mother     Obesity Mother     High Cholesterol Mother     High Cholesterol Father     Other Sister         GSW to spine & paralysis    No Known Problems Brother     No Known Problems Son     No Known Problems Daughter     Other Brother         as infant     Social History     Socioeconomic History    Marital status: Single     Spouse name: Not on file    Number of children: Not on file    Years of education: Not on file    Highest education level: Not on file   Occupational History    Not on file   Tobacco Use    Smoking status: Former Smoker     Packs/day: 1.00     Years: 30.00     Pack years: 30.00     Types: Cigarettes     Quit date: 4/6/2019     Years since quitting: 3.0    Smokeless tobacco: Never Used    Tobacco comment: intermittent habit   Vaping Use    Vaping Use: Never used   Substance and Sexual Activity    Alcohol use:  Yes     Alcohol/week: 0.0 standard drinks     Comment: social    Drug use: Not on file    Sexual activity: Not on file   Other Topics Concern    Not on file   Social History Narrative         Lives With: Alone    Type of Home: House    Home Layout: One level    Home Access: Stairs to enter with rails    Entrance Stairs - Number of Steps: 4-5    Entrance Stairs - Rails: Both    Bathroom Shower/Tub: Walk-in shower, Tub/Shower unit(uses walk in)    [employer]: Built-in shower seat, Grab bars in shower    Home Equipment: (plans to purchase hip kit and borrow walker)    Receives Help From: Family(father and fathers wife can assist per pt, pt states \"doctor downplayed the sx\" so pt thought she would recover quickly)    ADL Assistance: [address]: Independent    Homemaking Responsibilities: Yes(yard work included)    Ambulation Assistance: Independent(no AD)    Transfer Assistance: Independent    Active : Yes    Mode of Transportation: Car    Occupation: Retired    Type of occupation: Homemaker    Additional Comments: Pt. states lately she has had to sit after only 20 minutes of standing before sx     Social Determinants of Health     Financial Resource Strain:     Difficulty of Paying Living Expenses: Not on file   Food Insecurity:     Worried About [address] in the Last Year: Not on file    [address] in the Last Year: Not on file   Transportation Needs:     Lack of Transportation (Medical): Not on file    Lack of Transportation (Non-Medical):  Not on file   Physical Activity:     Days of Exercise per Week: Not on file    Minutes of Exercise per Session: Not on file   Stress:     Feeling of Stress : Not on file   Social Connections:     Frequency of Communication with Friends and Family: Not on file    Frequency of Social Gatherings with Friends and Family: Not on file    Attends Alevism Services: Not on file    Active Member of Clubs or Organizations: Not on file    Attends Club or Organization Meetings: Not on file    Marital Status: Not on file   Intimate Partner Violence:     Fear of Current or Ex-Partner: Not on file    Emotionally Abused: Not on file    Physically Abused: Not on file    Sexually Abused: Not on file   Housing Stability:     Unable to Pay for Housing in the Last Year: Not on file    Number of [place] in the Last Year: Not on file    Unstable Housing in the Last Year: Not on file       Current Outpatient Medications on File Prior to Visit   Medication Sig Dispense Refill    meloxicam (MOBIC) 15 MG tablet Take 1 tablet by mouth daily 90 tablet 0    lisinopril (PRINIVIL;ZESTRIL) 5 MG tablet TAKE 1 TABLET BY MOUTH DAILY      Multiple Vitamins-Minerals (THERAPEUTIC MULTIVITAMIN-MINERALS) tablet Take 1 tablet by mouth daily      magnesium oxide (MAG-OX) 400 MG tablet Take 400 mg by mouth daily      Lactobacillus (ACIDOPHILUS) 100 MG CAPS Take by mouth as needed      calcium carbonate (OSCAL) 500 MG TABS tablet Take 500 mg by mouth daily      vitamin C (ASCORBIC ACID) 500 MG tablet Take 500 mg by mouth daily      Milk Thistle 150 MG CAPS Take by mouth daily      pseudoephedrine (SUDAFED) 60 MG tablet Take 60 mg by mouth every 4 hours as needed for Congestion      busPIRone (BUSPAR) 7.5 MG tablet Take 15 mg by mouth 2 times daily       pramipexole (MIRAPEX) 0.25 MG tablet TK ONE OR TWO TS PO QHS      simvastatin (ZOCOR) 40 MG tablet Take 40 mg by mouth nightly       ipratropium (ATROVENT) 0.03 % nasal spray U 2 SPRAYS IEN BID      naloxone 4 MG/0.1ML LIQD nasal spray 1 spray by Nasal route as needed for Opioid Reversal (Patient not taking: Reported on 4/11/2022) 1 each 1    naloxone (NARCAN) 4 MG/0.1ML LIQD nasal spray 1 spray by Nasal route as needed for Opioid Reversal (Patient not taking: Reported on 4/11/2022) 1 each 0     No current facility-administered medications on file prior to visit. Pt presents today for a f/u of her pain. PCP is Dr. Rome Schwab MD.  Pt last saw this NP.    She continues to do her exercises. Feels zanaflex helps after exercise. She continues to use her virtual . She has history of multiple lumbar surgeries. Dr. Verito Padilla discussed Dr. Stephany Buenrostro to discuss spinal cord stim and isn't interested in this. She is getting more Lt thigh numbness. Getting hip pain with sleeping at times. Says back pain continues, says mainly on Lt. Uses lidoderm patches and those seem to help - requests refills today. Says just became a grandmother - has grand daughter. From Care Everywhere:EMG 9/23/21  left lower extremity did show evidence of chronic left-sided lumbosacral polyradiculopathy affecting left L4? L5-S1 nerve roots with active denervation. I took liberty of sending Milla Stevenson to see Dr. Andrea Wilde from pain management to try some interventional treatment and since she failed multiple conservative management in the past she may be a good candidate for spinal cord stimulator. She will continue with home exercise program and same medications unchanged, for now.       She has been told to limit alcohol intake due to her Percocet. Discussed NDP in the past. She has chronic low back and LLE pain. Has seen [facility] neurosurgery. She says SCS was recommended. She had EMG done. They recommended she see Dr. Rosio Buenrostro for injections but patient declines for now. She says she has lost 10 pounds with some relief. Previous SI injection and MIKAELA did not help. Saw Dr. Carlos Allison March 2021 for consult and no further surgery recommended - noted Lt meralgia paraesthetica, Lt L3 radiculopathy and spondylosis in note. She is not interested in SCS.         Past Surgical Hx:   06/23/2017 right and bilateral L4-5 microdissection, discectomy, interbody posterolateral fusion, pedicle screws.   EM.    8/12/2020 left anterior retroperitoneal L5-S1 discectomy interbody cage fusion.     2/15/21 BL SI inj     She takes Percocet 7.5mg Q6hrs PRN pain - says took yesterday,  and Gabapentin 600mg TID took this morning, and zanaflex 2mg BID  and lidoderm patch. Also uses mobic 15mg daily PRN. Pt feels pain level with her medication is 6/10, and 9/10 without medication. Pt feels that working, standing makes the pain worse, and sitting, medication, exercise makes the pain better. Pt feels her medication helps   her function and improve her quality of life, specifically says allows to stay active. Pt admits to Lt thigh radiating numbness and tingling. Denies recent falls, injuries or trauma. Pt denies new weakness. Pt reports PT has been done. Review of Systems   Constitutional: Negative. Negative for fatigue. HENT: Positive for dental problem. Negative for trouble swallowing. Says had 3 oral surgeries and dry socket - better now. Still needs more work to get done - needs implants. Eyes: Negative. Respiratory: Negative for cough and shortness of breath. Cardiovascular: Negative for chest pain. Gastrointestinal: Negative. Negative for constipation and diarrhea. Endocrine: Negative. Genitourinary: Negative. Musculoskeletal: Positive for arthralgias, back pain and neck pain. Skin: Negative. Neurological: Negative for dizziness, weakness and headaches. Hematological: Negative. Psychiatric/Behavioral: Negative. Objective:     Vitals:  BP (!) 140/82   Pulse 79   Temp 97.3 °F (36.3 °C)   Ht 5' (1.524 m)   Wt 160 lb (72.6 kg)   LMP  (LMP Unknown)   BMI 31.25 kg/m² Pain Score:   6      Physical Exam  Vitals and nursing note reviewed. This is a pleasant female who answers questions appropriately and follows commands.  Pt is alert and oriented x 3.  Recent and remote memory is intact.  Mood and affect, judgement and insight are normal.  No signs of distress, no dyspnea or SOB noted.  HEENT: PERRL.  Neck is supple, trachea midline.  No lymphadenopathy noted.  Decreased ROM with flexion and extension of low back.  Tender with palpation to lumbar spine with palpation B/L but greater over Lt lower lumbar region/SI joint with positive provacative maneuvers noted. Positive SLR on Lt.  Tightness in both hamstrings noted.  Surgical scars noted.  Pt is able to briefly rise up on heels and toes. Balance and coordination normal. Gait mildly antalgic. Strength is functional for ambulation. Cranial nerves II-XII are intact.           Assessment:      Diagnosis Orders   1. Spinal stenosis, lumbar region, without neurogenic claudication  gabapentin (NEURONTIN) 600 MG tablet    oxyCODONE-acetaminophen (PERCOCET) 7.5-325 MG per tablet   2. S/P lumbar and lumbosacral fusion by anterior technique  gabapentin (NEURONTIN) 600 MG tablet    oxyCODONE-acetaminophen (PERCOCET) 7.5-325 MG per tablet   3. Lumbar radiculopathy  gabapentin (NEURONTIN) 600 MG tablet    oxyCODONE-acetaminophen (PERCOCET) 7.5-325 MG per tablet   4. Muscle spasm  tiZANidine (ZANAFLEX) 2 MG tablet   5. Chronic pain syndrome  oxyCODONE-acetaminophen (PERCOCET) 7.5-325 MG per tablet   6. Lumbosacral spondylosis without myelopathy  oxyCODONE-acetaminophen (PERCOCET) 7.5-325 MG per tablet    lidocaine (LIDODERM) 5 %   7. Encounter for long-term opiate analgesic use  oxyCODONE-acetaminophen (PERCOCET) 7.5-325 MG per tablet       Plan:     Periodic Controlled Substance Monitoring: Possible medication side effects, risk of tolerance/dependence & alternative treatments discussed. ,No signs of potential drug abuse or diversion identified. ,Obtaining appropriate analgesic effect of treatment. ,Assessed functional status. (Adelaida Gar, APRN - CNP)    Orders Placed This Encounter   Medications    gabapentin (NEURONTIN) 600 MG tablet     Sig: Take 1 tablet by mouth 3 times daily for 30 days.      Dispense:  90 tablet     Refill:  0    tiZANidine (ZANAFLEX) 2 MG tablet     Sig: Take 1 tablet by mouth 2 times daily as needed (spasm)     Dispense:  60 tablet     Refill:  0    oxyCODONE-acetaminophen (PERCOCET) 7.5-325 MG per tablet     Sig: Take 1 tablet by mouth every 6 hours as needed for Pain for up to 30 days. Dispense:  120 tablet     Refill:  0     Reduce doses taken as pain becomes manageable    lidocaine (LIDODERM) 5 %     Sig: Place 1 patch onto the skin daily 12 hours on, 12 hours off. Dispense:  30 patch     Refill:  0       No orders of the defined types were placed in this encounter. Discussed options with the patient today. Anatomic model pathology was shown and reviewed with pt. Continue current does of Percocet 7.5mg Q6hrs PRN pain as it helps her function as well as gabapentin 600mg TID and zanaflex 2mg BID PRN. Refilled lidocaine 5% patch to use 12 hrs on, 12 hrs off as requested. She is not wanting more injections or PT.  She declines F/U with Dr. Viktor Rodarte at [facility]. Options are limited. Encouraged exercise. . All questions were answered. Relevant imaging and pain generators reviewed. Pt verbalized understanding and agrees with above plan. Pt has chronic pain. Utox March 2022 + oxycodone, gabapentin, xanax, and  ethyl. Advised to avoid ETOH while taking opiates. Continue to monitor closely. ORT score 2 - considered high risk d/t above. Narcan Rx sent in June 2021. Will continue medications for chronic pain that has been previously directed as they do help pt function with ADL and improve quality of life. Pt is aware goal is to use the least amount of medication possible to allow pt to function and help with quality of life as discussed in detail. Ideal to keep MME below 50 which it is. Have discussed proper disposal of narcotic medication. Advised to have medications in safe place and locked up/in lock box. Discussed possible risks of opiate medication with pt, including, but not limited to possible constipation, nausea or vomiting, sedation, urinary retention, dependence and possible addiction. Pt agrees to use medication as prescribed/as directed.  Pt advised to not use opiates while driving or operating heavy equipment, or in situations where pt may harm him/herself or others. Pt is aware that while on narcotics, pt needs to be seen to reassess pain and reassess need for continued medication at that time. NDP reviewed. OARRS was reviewed. This NP saw pt under direct supervision of Dr. Jon Marie. Follow up:  Return in about 4 weeks (around 5/9/2022) for review meds and reassess pain.     Flory Madrigal, APRN - CNP

## 2022-04-12 RX ORDER — LIDOCAINE 50 MG/G
PATCH TOPICAL
Qty: 90 PATCH | OUTPATIENT
Start: 2022-04-12

## 2022-05-12 ENCOUNTER — OFFICE VISIT (OUTPATIENT)
Dept: PAIN MANAGEMENT | Age: 67
End: 2022-05-12
Payer: MEDICARE

## 2022-05-12 VITALS
BODY MASS INDEX: 30.4 KG/M2 | WEIGHT: 161 LBS | HEIGHT: 61 IN | SYSTOLIC BLOOD PRESSURE: 140 MMHG | DIASTOLIC BLOOD PRESSURE: 86 MMHG | TEMPERATURE: 96.3 F

## 2022-05-12 DIAGNOSIS — M47.817 LUMBOSACRAL SPONDYLOSIS WITHOUT MYELOPATHY: ICD-10-CM

## 2022-05-12 DIAGNOSIS — Z98.1 S/P LUMBAR AND LUMBOSACRAL FUSION BY ANTERIOR TECHNIQUE: Primary | ICD-10-CM

## 2022-05-12 DIAGNOSIS — Z79.891 ENCOUNTER FOR LONG-TERM OPIATE ANALGESIC USE: ICD-10-CM

## 2022-05-12 DIAGNOSIS — G89.4 CHRONIC PAIN SYNDROME: ICD-10-CM

## 2022-05-12 DIAGNOSIS — M54.16 LUMBAR RADICULOPATHY: ICD-10-CM

## 2022-05-12 DIAGNOSIS — M48.061 SPINAL STENOSIS, LUMBAR REGION, WITHOUT NEUROGENIC CLAUDICATION: ICD-10-CM

## 2022-05-12 DIAGNOSIS — M62.838 MUSCLE SPASM: ICD-10-CM

## 2022-05-12 DIAGNOSIS — M46.1 SACROILIITIS, NOT ELSEWHERE CLASSIFIED (HCC): ICD-10-CM

## 2022-05-12 PROCEDURE — 3017F COLORECTAL CA SCREEN DOC REV: CPT | Performed by: PAIN MEDICINE

## 2022-05-12 PROCEDURE — G8417 CALC BMI ABV UP PARAM F/U: HCPCS | Performed by: PAIN MEDICINE

## 2022-05-12 PROCEDURE — 1090F PRES/ABSN URINE INCON ASSESS: CPT | Performed by: PAIN MEDICINE

## 2022-05-12 PROCEDURE — 4040F PNEUMOC VAC/ADMIN/RCVD: CPT | Performed by: PAIN MEDICINE

## 2022-05-12 PROCEDURE — G8400 PT W/DXA NO RESULTS DOC: HCPCS | Performed by: PAIN MEDICINE

## 2022-05-12 PROCEDURE — 1036F TOBACCO NON-USER: CPT | Performed by: PAIN MEDICINE

## 2022-05-12 PROCEDURE — 1123F ACP DISCUSS/DSCN MKR DOCD: CPT | Performed by: PAIN MEDICINE

## 2022-05-12 PROCEDURE — 99213 OFFICE O/P EST LOW 20 MIN: CPT | Performed by: PAIN MEDICINE

## 2022-05-12 PROCEDURE — G8427 DOCREV CUR MEDS BY ELIG CLIN: HCPCS | Performed by: PAIN MEDICINE

## 2022-05-12 RX ORDER — TIZANIDINE 2 MG/1
2 TABLET ORAL 2 TIMES DAILY PRN
Qty: 60 TABLET | Refills: 0 | Status: SHIPPED | OUTPATIENT
Start: 2022-05-12 | End: 2022-06-08 | Stop reason: SDUPTHER

## 2022-05-12 RX ORDER — OXYCODONE AND ACETAMINOPHEN 7.5; 325 MG/1; MG/1
1 TABLET ORAL EVERY 6 HOURS PRN
Qty: 120 TABLET | Refills: 0 | Status: SHIPPED | OUTPATIENT
Start: 2022-05-12 | End: 2022-06-08 | Stop reason: SDUPTHER

## 2022-05-12 RX ORDER — GABAPENTIN 600 MG/1
600 TABLET ORAL 3 TIMES DAILY
Qty: 90 TABLET | Refills: 0 | Status: SHIPPED | OUTPATIENT
Start: 2022-05-12 | End: 2022-06-08 | Stop reason: SDUPTHER

## 2022-05-12 ASSESSMENT — ENCOUNTER SYMPTOMS
NAUSEA: 0
BACK PAIN: 0
CONSTIPATION: 0
SHORTNESS OF BREATH: 0
DIARRHEA: 0

## 2022-05-12 NOTE — PROGRESS NOTES
[facility] [place] ([facility]) Physicians  Neurosurgery and Pain [facility]  [address] [facility] , [address] [facility], [place]: [phone]  F: [phone]        Danny Mccarty  (1955)    5/12/2022    Subjective:     Danny Mccarty is 78 y.o. female who complains today of:     Chief Complaint   Patient presents with    Back Pain    Hip Pain     Patient here today for follow-up. History of multiple back surgeries and chronic pain. Pain in the back going down the right leg. Worse with walking. Percocet and Zanaflex gabapentin help her function, do her activities of daily living, chores around the house. Injections have not helped much. She does not want a spinal cord stimulator implanted. No side effects or aberrant behavior with the pain medication. Plan: We discussed options. Continue Percocet and Zanaflex Neurontin for chronic pain. We will see her in 1 month for follow-up. Continue home exercise program as tolerated. Questions answered chart was reviewed. Allergies:  Patient has no known allergies.     Past Medical History:   Diagnosis Date    Anxiety     Coronary artery disease involving native coronary artery of native heart without angina pectoris 12/11/2018    Hyperlipidemia     meds > 5 yrs    Osteoarthritis      Past Surgical History:   Procedure Laterality Date    BACK SURGERY  2017    lumbar disc OR    COLONOSCOPY      DILATION AND CURETTAGE OF UTERUS      at age 30s--miscarriage   [facility] LUMBAR FUSION Left 8/12/2020    ANTERIOR LEFT RETROPERITONEAL L5-S1 DISKECTOMY INTERBODY CAGE FUSION performed by Giana Irizarry MD at [address] [place] [address]      as child   [address]  2010    in [place]--EMT attempted to intubate patient due to trouble breathing & then needed repair     Family History   Problem Relation Age of Onset    Diabetes Mother     Obesity Mother     High Cholesterol Mother     High Cholesterol Father     Other Sister         GSW to spine & paralysis    No Known Problems Brother     No Known Problems Son     No Known Problems Daughter     Other Brother         as infant     Social History     Socioeconomic History    Marital status: Single     Spouse name: Not on file    Number of children: Not on file    Years of education: Not on file    Highest education level: Not on file   Occupational History    Not on file   Tobacco Use    Smoking status: Former Smoker     Packs/day: 1.00     Years: 30.00     Pack years: 30.00     Types: Cigarettes     Quit date: 4/6/2019     Years since quitting: 3.1    Smokeless tobacco: Never Used    Tobacco comment: intermittent habit   Vaping Use    Vaping Use: Never used   Substance and Sexual Activity    Alcohol use:  Yes     Alcohol/week: 0.0 standard drinks     Comment: social    Drug use: Not on file    Sexual activity: Not on file   Other Topics Concern    Not on file   Social History Narrative         Lives With: Alone    Type of Home: House    Home Layout: One level    Home Access: Stairs to enter with rails    Entrance Stairs - Number of Steps: 4-5    Entrance Stairs - Rails: Both    Bathroom Shower/Tub: Walk-in shower, Tub/Shower unit(uses walk in)    [employer]: Built-in shower seat, Grab bars in shower    Home Equipment: (plans to purchase hip kit and borrow walker)    Receives Help From: Family(father and fathers wife can assist per pt, pt states \"doctor downplayed the sx\" so pt thought she would recover quickly)    ADL Assistance: [address]: Independent    Homemaking Responsibilities: Yes(yard work included)    Ambulation Assistance: Independent(no AD)    Transfer Assistance: Independent    Active : Yes    Mode of Transportation: Car    Occupation: Retired    Type of occupation: Homemaker    Additional Comments: Pt. states lately she has had to sit after only 20 minutes of standing before sx     Social Determinants of Health     Financial Resource Strain:     Difficulty of Paying Living Expenses: Not on file   Food Insecurity:     Worried About Running Out of Food in the Last Year: Not on file    Sarah of Food in the Last Year: Not on file   Transportation Needs:     Lack of Transportation (Medical): Not on file    Lack of Transportation (Non-Medical): Not on file   Physical Activity:     Days of Exercise per Week: Not on file    Minutes of Exercise per Session: Not on file   Stress:     Feeling of Stress : Not on file   Social Connections:     Frequency of Communication with Friends and Family: Not on file    Frequency of Social Gatherings with Friends and Family: Not on file    Attends Orthodox Services: Not on file    Active Member of [address] [place] [address] [employer] or Organizations: Not on file    Attends Club or Organization Meetings: Not on file    Marital Status: Not on file   Intimate Partner Violence:     Fear of Current or Ex-Partner: Not on file    Emotionally Abused: Not on file    Physically Abused: Not on file    Sexually Abused: Not on file   Housing Stability:     Unable to Pay for Housing in the Last Year: Not on file    Number of [place] in the Last Year: Not on file    Unstable Housing in the Last Year: Not on file       Current Outpatient Medications on File Prior to Visit   Medication Sig Dispense Refill    lidocaine (LIDODERM) 5 % Place 1 patch onto the skin daily 12 hours on, 12 hours off.  30 patch 0    meloxicam (MOBIC) 15 MG tablet Take 1 tablet by mouth daily 90 tablet 0    lisinopril (PRINIVIL;ZESTRIL) 5 MG tablet TAKE 1 TABLET BY MOUTH DAILY      naloxone 4 MG/0.1ML LIQD nasal spray 1 spray by Nasal route as needed for Opioid Reversal (Patient not taking: Reported on 4/11/2022) 1 each 1    Multiple Vitamins-Minerals (THERAPEUTIC MULTIVITAMIN-MINERALS) tablet Take 1 tablet by mouth daily      magnesium oxide (MAG-OX) 400 MG tablet Take 400 mg by mouth daily      Lactobacillus (ACIDOPHILUS) 100 MG CAPS Take by mouth as needed      calcium carbonate (OSCAL) 500 MG TABS tablet Take 500 mg by mouth daily      vitamin C (ASCORBIC ACID) 500 MG tablet Take 500 mg by mouth daily      Milk Thistle 150 MG CAPS Take by mouth daily      pseudoephedrine (SUDAFED) 60 MG tablet Take 60 mg by mouth every 4 hours as needed for Congestion      busPIRone (BUSPAR) 7.5 MG tablet Take 15 mg by mouth 2 times daily       pramipexole (MIRAPEX) 0.25 MG tablet TK ONE OR TWO TS PO QHS      simvastatin (ZOCOR) 40 MG tablet Take 40 mg by mouth nightly       ipratropium (ATROVENT) 0.03 % nasal spray U 2 SPRAYS IEN BID      naloxone (NARCAN) 4 MG/0.1ML LIQD nasal spray 1 spray by Nasal route as needed for Opioid Reversal (Patient not taking: Reported on 4/11/2022) 1 each 0     No current facility-administered medications on file prior to visit. HPI    Review of Systems   Constitutional: Negative for fever. HENT: Negative for hearing loss. Respiratory: Negative for shortness of breath. Gastrointestinal: Negative for constipation, diarrhea and nausea. Genitourinary: Negative for difficulty urinating. Musculoskeletal: Negative for back pain and neck pain. Skin: Negative for rash. Neurological: Negative for headaches. Hematological: Does not bruise/bleed easily. Psychiatric/Behavioral: Negative for sleep disturbance. Objective:     Vitals:  BP (!) 158/82   Temp 96.3 °F (35.7 °C)   Ht 5' 1\" (1.549 m)   Wt 161 lb (73 kg)   LMP  (LMP Unknown)   BMI 30.42 kg/m² Pain Score:   5      Physical Exam  Patient alert and oriented times three, recent and remote memory intact, mood and affect normal, judgement and insight normal. Strength functional for ambulation. Balance and coordinational functional. Visualized skin intact. No visualized cyanosis, pulses intact. Cranial nerves 2-12 grossly intact. No obvious upper motor neuron signs seen. Sensation intact to light touch.   Decreased range of motion lumbar spine, lung scar    Assessment:      Diagnosis Orders   1. S/P lumbar and lumbosacral fusion by anterior technique  gabapentin (NEURONTIN) 600 MG tablet    oxyCODONE-acetaminophen (PERCOCET) 7.5-325 MG per tablet   2. Lumbosacral spondylosis without myelopathy  oxyCODONE-acetaminophen (PERCOCET) 7.5-325 MG per tablet   3. Chronic pain syndrome  oxyCODONE-acetaminophen (PERCOCET) 7.5-325 MG per tablet   4. Sacroiliitis, not elsewhere classified ([facility] [address]     5. Spinal stenosis, lumbar region, without neurogenic claudication  gabapentin (NEURONTIN) 600 MG tablet    oxyCODONE-acetaminophen (PERCOCET) 7.5-325 MG per tablet   6. Lumbar radiculopathy  gabapentin (NEURONTIN) 600 MG tablet    oxyCODONE-acetaminophen (PERCOCET) 7.5-325 MG per tablet   7. Muscle spasm  tiZANidine (ZANAFLEX) 2 MG tablet   8.  Encounter for long-term opiate analgesic use  oxyCODONE-acetaminophen (PERCOCET) 7.5-325 MG per tablet       Plan:

## 2022-05-13 DIAGNOSIS — M47.817 LUMBOSACRAL SPONDYLOSIS WITHOUT MYELOPATHY: ICD-10-CM

## 2022-05-13 RX ORDER — MELOXICAM 15 MG/1
15 TABLET ORAL DAILY
Qty: 90 TABLET | Refills: 0 | Status: SHIPPED | OUTPATIENT
Start: 2022-05-13 | End: 2022-10-20

## 2022-05-13 NOTE — TELEPHONE ENCOUNTER
Requested Prescriptions     Pending Prescriptions Disp Refills    meloxicam (MOBIC) 15 MG tablet 90 tablet 0     Sig: Take 1 tablet by mouth daily       Patient last seen on:  5/12  Date of last surgery:  n/a  Date of last refill:  2/11  Pain level:  n/a  Patient complaining of:  PT is asking for refill, she says this was not sent at her last appointment  Future appts: 6/14

## 2022-06-07 ENCOUNTER — PATIENT MESSAGE (OUTPATIENT)
Dept: PAIN MANAGEMENT | Age: 67
End: 2022-06-07

## 2022-06-07 DIAGNOSIS — M47.817 LUMBOSACRAL SPONDYLOSIS WITHOUT MYELOPATHY: ICD-10-CM

## 2022-06-08 DIAGNOSIS — M48.061 SPINAL STENOSIS, LUMBAR REGION, WITHOUT NEUROGENIC CLAUDICATION: ICD-10-CM

## 2022-06-08 DIAGNOSIS — Z79.891 ENCOUNTER FOR LONG-TERM OPIATE ANALGESIC USE: ICD-10-CM

## 2022-06-08 DIAGNOSIS — Z98.1 S/P LUMBAR AND LUMBOSACRAL FUSION BY ANTERIOR TECHNIQUE: ICD-10-CM

## 2022-06-08 DIAGNOSIS — M62.838 MUSCLE SPASM: ICD-10-CM

## 2022-06-08 DIAGNOSIS — G89.4 CHRONIC PAIN SYNDROME: ICD-10-CM

## 2022-06-08 DIAGNOSIS — M54.16 LUMBAR RADICULOPATHY: ICD-10-CM

## 2022-06-08 DIAGNOSIS — M47.817 LUMBOSACRAL SPONDYLOSIS WITHOUT MYELOPATHY: ICD-10-CM

## 2022-06-08 RX ORDER — TIZANIDINE 2 MG/1
2 TABLET ORAL 2 TIMES DAILY PRN
Qty: 8 TABLET | Refills: 0 | Status: SHIPPED | OUTPATIENT
Start: 2022-06-10 | End: 2022-08-24 | Stop reason: SDUPTHER

## 2022-06-08 RX ORDER — GABAPENTIN 600 MG/1
600 TABLET ORAL 3 TIMES DAILY
Qty: 12 TABLET | Refills: 0 | Status: SHIPPED | OUTPATIENT
Start: 2022-06-10 | End: 2022-10-20 | Stop reason: SDUPTHER

## 2022-06-08 RX ORDER — OXYCODONE AND ACETAMINOPHEN 7.5; 325 MG/1; MG/1
1 TABLET ORAL EVERY 6 HOURS PRN
Qty: 16 TABLET | Refills: 0 | Status: SHIPPED | OUTPATIENT
Start: 2022-06-10 | End: 2022-06-14 | Stop reason: SDUPTHER

## 2022-06-08 NOTE — TELEPHONE ENCOUNTER
I tore a miniscus in my knee while in [place], been home a week, need an MRI, still waiting on Dr. Cordero Ok for referral, my prescriptions are due Saturday the 11th, and my appointment isnt until next week for some reason, would you please refill and give me a month to get things straightened out with an orthopedic dr? Thanks

## 2022-06-08 NOTE — TELEPHONE ENCOUNTER
Would like pt to be seen,  but if too difficult, can change to virtual appointment.   Enough medications were sent until appointment

## 2022-06-09 DIAGNOSIS — M47.817 LUMBOSACRAL SPONDYLOSIS WITHOUT MYELOPATHY: ICD-10-CM

## 2022-06-09 RX ORDER — LIDOCAINE 50 MG/G
PATCH TOPICAL
Qty: 90 PATCH | OUTPATIENT
Start: 2022-06-09

## 2022-06-09 RX ORDER — LIDOCAINE 50 MG/G
1 PATCH TOPICAL DAILY
Qty: 30 PATCH | Refills: 0 | Status: SHIPPED | OUTPATIENT
Start: 2022-06-09

## 2022-06-09 NOTE — TELEPHONE ENCOUNTER
Requested Prescriptions     Pending Prescriptions Disp Refills    lidocaine (LIDODERM) 5 % 30 patch 0     Sig: Place 1 patch onto the skin daily 12 hours on, 12 hours off.

## 2022-06-14 ENCOUNTER — TELEPHONE (OUTPATIENT)
Dept: PAIN MANAGEMENT | Age: 67
End: 2022-06-14

## 2022-06-14 ENCOUNTER — TELEMEDICINE (OUTPATIENT)
Dept: PAIN MANAGEMENT | Age: 67
End: 2022-06-14
Payer: MEDICARE

## 2022-06-14 DIAGNOSIS — Z79.891 ENCOUNTER FOR LONG-TERM OPIATE ANALGESIC USE: ICD-10-CM

## 2022-06-14 DIAGNOSIS — M62.838 MUSCLE SPASM: ICD-10-CM

## 2022-06-14 DIAGNOSIS — M47.817 LUMBOSACRAL SPONDYLOSIS WITHOUT MYELOPATHY: ICD-10-CM

## 2022-06-14 DIAGNOSIS — M54.16 LUMBAR RADICULOPATHY: ICD-10-CM

## 2022-06-14 DIAGNOSIS — M48.061 SPINAL STENOSIS, LUMBAR REGION, WITHOUT NEUROGENIC CLAUDICATION: ICD-10-CM

## 2022-06-14 DIAGNOSIS — Z98.1 S/P LUMBAR AND LUMBOSACRAL FUSION BY ANTERIOR TECHNIQUE: ICD-10-CM

## 2022-06-14 DIAGNOSIS — G89.4 CHRONIC PAIN SYNDROME: ICD-10-CM

## 2022-06-14 PROCEDURE — 99442 PR PHYS/QHP TELEPHONE EVALUATION 11-20 MIN: CPT | Performed by: NURSE PRACTITIONER

## 2022-06-14 RX ORDER — IBUPROFEN 200 MG
600 TABLET ORAL EVERY 6 HOURS PRN
COMMUNITY

## 2022-06-14 RX ORDER — OXYCODONE AND ACETAMINOPHEN 7.5; 325 MG/1; MG/1
1 TABLET ORAL EVERY 6 HOURS PRN
Qty: 120 TABLET | Refills: 0 | Status: SHIPPED | OUTPATIENT
Start: 2022-06-14 | End: 2022-07-11 | Stop reason: SDUPTHER

## 2022-06-14 ASSESSMENT — ENCOUNTER SYMPTOMS
BACK PAIN: 1
SORE THROAT: 0
SHORTNESS OF BREATH: 0
ABDOMINAL PAIN: 0

## 2022-06-14 NOTE — TELEPHONE ENCOUNTER
Pharmacist from [employer] [phone] wanted to let provider know that patient has also been taking Xanax . 5 mg BID prescribed by Dr Cheryl Kahn in addition to percocet from our office.  They are asking for a returned call once provider responds

## 2022-06-14 NOTE — PROGRESS NOTES
Chelle Ledesma  (1955)    6/14/2022    TELEHEALTH EVALUATION -- Audio Only (During [facility]-68 public health emergency)    Due to [place] 19 outbreak, patient's office visit was converted to a virtual visit. Patient was contacted and agreed to proceed with a audio only telehealth service. Patient understands that this encounter is a billable visit and that insurance coverage and co-pays are up to their individual insurance plans. At the beginning of this telehealth encounter, I verified with the patient their name and date of birth. Verbal consent for telehealth encounter was obtained. Subjective:       Chief Complaint   Patient presents with    Follow-up       Chelle Ledesma is 78 y.o. female who complains today of: Allergies:  Patient has no known allergies.     History:  Past Medical History:   Diagnosis Date    Anxiety     Coronary artery disease involving native coronary artery of native heart without angina pectoris 12/11/2018    Hyperlipidemia     meds > 5 yrs    Osteoarthritis      Past Surgical History:   Procedure Laterality Date    BACK SURGERY  2017    lumbar disc OR    COLONOSCOPY      DILATION AND CURETTAGE OF UTERUS      at age 30s--miscarriage   [address] [facility] LUMBAR FUSION Left 8/12/2020    ANTERIOR LEFT RETROPERITONEAL L5-S1 DISKECTOMY INTERBODY CAGE FUSION performed by Allyson Ramirez MD at [address] [place] [address]      as child   [address]  2010    in [place]--EMT attempted to intubate patient due to trouble breathing & then needed repair     Family History   Problem Relation Age of Onset    Diabetes Mother     Obesity Mother     High Cholesterol Mother     High Cholesterol Father     Other Sister         GSW to spine & paralysis    No Known Problems Brother     No Known Problems Son     No Known Problems Daughter     Other Brother         as infant     Social History     Socioeconomic History    Marital status: Single     Spouse name: Not on file    Number of children: Not on file    Years of education: Not on file    Highest education level: Not on file   Occupational History    Not on file   Tobacco Use    Smoking status: Former Smoker     Packs/day: 1.00     Years: 30.00     Pack years: 30.00     Types: Cigarettes     Quit date: 4/6/2019     Years since quitting: 3.1    Smokeless tobacco: Never Used    Tobacco comment: intermittent habit   Vaping Use    Vaping Use: Never used   Substance and Sexual Activity    Alcohol use: Yes     Alcohol/week: 0.0 standard drinks     Comment: social    Drug use: Not on file    Sexual activity: Not on file   Other Topics Concern    Not on file   Social History Narrative         Lives With: Alone    Type of Home: House    Home Layout: One level    Home Access: Stairs to enter with rails    Entrance Stairs - Number of Steps: 4-5    Entrance Stairs - Rails: Both    Bathroom Shower/Tub: Walk-in shower, Tub/Shower unit(uses walk in)    [employer]: Built-in shower seat, Grab bars in shower    Home Equipment: (plans to purchase hip kit and borrow walker)    Receives Help From: Family(father and fathers wife can assist per pt, pt states \"doctor downplayed the sx\" so pt thought she would recover quickly)    ADL Assistance: [address]: Independent    Homemaking Responsibilities: Yes(yard work included)    Ambulation Assistance: Independent(no AD)    Transfer Assistance: Independent    Active : Yes    Mode of Transportation: Car    Occupation: Retired    Type of occupation: Homemaker    Additional Comments: Pt. states lately she has had to sit after only 20 minutes of standing before sx     Social Determinants of Health     Financial Resource Strain:     Difficulty of Paying Living Expenses: Not on file   Food Insecurity:     Worried About [address] in the Last Year: Not on file    [address] in the Last Year: Not on file   Transportation Needs:     Lack of Transportation (Medical):  Not on file    Lack of Transportation (Non-Medical): Not on file   Physical Activity:     Days of Exercise per Week: Not on file    Minutes of Exercise per Session: Not on file   Stress:     Feeling of Stress : Not on file   Social Connections:     Frequency of Communication with Friends and Family: Not on file    Frequency of Social Gatherings with Friends and Family: Not on file    Attends Gnosticism Services: Not on file    Active Member of [address] [place] [address] or Organizations: Not on file    Attends Club or Organization Meetings: Not on file    Marital Status: Not on file   Intimate Partner Violence:     Fear of Current or Ex-Partner: Not on file    Emotionally Abused: Not on file    Physically Abused: Not on file    Sexually Abused: Not on file   Housing Stability:     Unable to Pay for Housing in the Last Year: Not on file    Number of [place] in the Last Year: Not on file    Unstable Housing in the Last Year: Not on file       Current Outpatient Medications on File Prior to Visit   Medication Sig Dispense Refill    ibuprofen (ADVIL;MOTRIN) 200 MG tablet Take 600 mg by mouth every 6 hours as needed for Pain      lidocaine (LIDODERM) 5 % Place 1 patch onto the skin daily 12 hours on, 12 hours off. 30 patch 0    gabapentin (NEURONTIN) 600 MG tablet Take 1 tablet by mouth 3 times daily for 4 days.  12 tablet 0    tiZANidine (ZANAFLEX) 2 MG tablet Take 1 tablet by mouth 2 times daily as needed (spasm) 8 tablet 0    meloxicam (MOBIC) 15 MG tablet Take 1 tablet by mouth daily 90 tablet 0    lisinopril (PRINIVIL;ZESTRIL) 5 MG tablet TAKE 1 TABLET BY MOUTH DAILY      naloxone 4 MG/0.1ML LIQD nasal spray 1 spray by Nasal route as needed for Opioid Reversal (Patient not taking: Reported on 4/11/2022) 1 each 1    Multiple Vitamins-Minerals (THERAPEUTIC MULTIVITAMIN-MINERALS) tablet Take 1 tablet by mouth daily      magnesium oxide (MAG-OX) 400 MG tablet Take 400 mg by mouth daily      Lactobacillus (ACIDOPHILUS) 100 MG CAPS Take by mouth as needed      calcium carbonate (OSCAL) 500 MG TABS tablet Take 500 mg by mouth daily      vitamin C (ASCORBIC ACID) 500 MG tablet Take 500 mg by mouth daily      Milk Thistle 150 MG CAPS Take by mouth daily      pseudoephedrine (SUDAFED) 60 MG tablet Take 60 mg by mouth every 4 hours as needed for Congestion      busPIRone (BUSPAR) 7.5 MG tablet Take 15 mg by mouth 2 times daily       pramipexole (MIRAPEX) 0.25 MG tablet TK ONE OR TWO TS PO QHS      simvastatin (ZOCOR) 40 MG tablet Take 40 mg by mouth nightly       ipratropium (ATROVENT) 0.03 % nasal spray U 2 SPRAYS IEN BID      naloxone (NARCAN) 4 MG/0.1ML LIQD nasal spray 1 spray by Nasal route as needed for Opioid Reversal (Patient not taking: Reported on 4/11/2022) 1 each 0     No current facility-administered medications on file prior to visit. Pt presents today for a f/u of chronic low back and LLE pain. Says she was in [place] last month and tore right meniscus, seeing ortho Thursday. This happened about 2 weeks ago. Pt feels pain level and functioning improves with prescribed medications and is able to perform ADLs. Pt feels that twisting aggravates the pain. Pt c/o LLE \"burning pain\" and N/T down to the thigh. She says she has lost 10 pounds with some relief. Previous SI injection and MIKAELA did not help. Saw Dr. Topher Long March 2021 for consult and no further surgery recommended - noted Lt meralgia paraesthetica, Lt L3 radiculopathy and spondylosis in note. She is not interested in SCS. She continues to use her youbeQ - [employer] .     She has been told to limit alcohol intake due to her Percocet. Discussed NDP in the past.  Has seen [id number] neurosurgery. She says SCS was recommended.  They recommended she see Dr. Abigail Suárez for injections but patient declines for now.        Past Surgical Hx:   06/23/2017 right and bilateral L4-5 microdissection, discectomy, interbody posterolateral fusion, pedicle screws.   [facility].    8/12/2020 left anterior retroperitoneal L5-S1 discectomy interbody cage fusion.       2/15/21 BL SI inj    From Care Everywhere: \"EMG 9/23/21  left lower extremity did show evidence of chronic left-sided lumbosacral polyradiculopathy affecting left L4? L5-S1 nerve roots with active denervation. I took liberty of sending Shana Medina to see Dr. Eugenio Hall from pain management to try some interventional treatment and since she failed multiple conservative management in the past she may be a good candidate for spinal cord stimulator. She will continue with home exercise program and same medications unchanged, for now. \"                Review of Systems   Constitutional: Negative for fever. HENT: Negative for congestion and sore throat. Respiratory: Negative for shortness of breath. Gastrointestinal: Negative for abdominal pain. Genitourinary: Negative for difficulty urinating. Musculoskeletal: Positive for back pain. Neurological: Negative for speech difficulty and headaches. Hematological: Negative for adenopathy. Psychiatric/Behavioral: Negative for agitation. All other systems reviewed and are negative. Objective:     Vitals:  LMP  (LMP Unknown)      PHYSICAL EXAMINATION:    [x] Alert  [x] Oriented to person/place/time  [x] No apparent distress      [x] Mood and affect normal  [x] Recent and remote memory intact  [x] Judgement and insight normal     [] OTHER:      Due to this being a TeleHealth encounter, evaluation of the following organ systems is limited: Vitals/Constitutional/EENT/Resp/CV/GI//MS/Neuro/Skin/Heme-Lymph-Imm. Assessment:      Diagnosis Orders   1. S/P lumbar and lumbosacral fusion by anterior technique  oxyCODONE-acetaminophen (PERCOCET) 7.5-325 MG per tablet   2. Lumbosacral spondylosis without myelopathy  oxyCODONE-acetaminophen (PERCOCET) 7.5-325 MG per tablet   3. Chronic pain syndrome  oxyCODONE-acetaminophen (PERCOCET) 7.5-325 MG per tablet   4. Spinal stenosis, lumbar region, without neurogenic claudication  oxyCODONE-acetaminophen (PERCOCET) 7.5-325 MG per tablet   5. Lumbar radiculopathy  oxyCODONE-acetaminophen (PERCOCET) 7.5-325 MG per tablet   6. Encounter for long-term opiate analgesic use  oxyCODONE-acetaminophen (PERCOCET) 7.5-325 MG per tablet   7. Muscle spasm         Plan:     Periodic Controlled Substance Monitoring: Possible medication side effects, risk of tolerance/dependence & alternative treatments discussed. ,No signs of potential drug abuse or diversion identified. ,Assessed functional status. ,Obtaining appropriate analgesic effect of treatment. (Christin Lockhart, APRN - CNP)    Orders Placed This Encounter   Medications    oxyCODONE-acetaminophen (PERCOCET) 7.5-325 MG per tablet     Sig: Take 1 tablet by mouth every 6 hours as needed for Pain for up to 30 days. Dispense:  120 tablet     Refill:  0     Reduce doses taken as pain becomes manageable       No orders of the defined types were placed in this encounter. Discussed options with the patient today. Continue Percocet, gabapentin, and tizanidine, and lidocaine patches. She reports she only needs percocet today. She will follow with ortho next week for the right knee likely meniscus tear. All questions were answered. Pt verbalized understanding and agrees with above plan. Utox March 2022 + oxycodone, gabapentin, xanax, and  ethyl. Advised to avoid ETOH while taking opiates. Continue to monitor closely. ORT score 2 - considered high risk d/t above. Narcan Rx sent in June 2021. Will continue medications for chronic pain as they help pt function with ADL and improve quality of life. Discussed possible risks of opiate medication with pt, including but not limited to, constipation, nausea or vomiting, sedation, urinary retention, dependence and possible addiction. Pt agrees to use medication as directed.  Pt advised to not use opiates while driving or operating heavy equipment, or in situations where pt may harm him/herself or others. Pt is aware that while on narcotics, pt needs to be seen monthly to reassess pain and need for continued medication. NDP reviewed. OARRS was reviewed. This NP saw pt under direct supervision of Dr. Alexandria Schmidt. Follow up:  Return in about 4 weeks (around 7/12/2022) for review meds and reassess pain. Kaci Nolasco, APRN - CNP      >50% of 13 minute appointment was spent with discussion, addressing questions and concerns, including prognosis, treatment options, patient education, and recommendations. [id number]    Pursuant to the emergency declaration under the [facility], 1135 waiver authority and the [employer] Act, this Virtual  Visit was conducted, with patient's consent, to reduce the patient's risk of exposure to COVID-19 and provide continuity of care for an established patient. Services were provided through an audio discussion to substitute for in-person clinic visit.

## 2022-07-11 DIAGNOSIS — M54.16 LUMBAR RADICULOPATHY: ICD-10-CM

## 2022-07-11 DIAGNOSIS — Z79.891 ENCOUNTER FOR LONG-TERM OPIATE ANALGESIC USE: ICD-10-CM

## 2022-07-11 DIAGNOSIS — G89.4 CHRONIC PAIN SYNDROME: ICD-10-CM

## 2022-07-11 DIAGNOSIS — M47.817 LUMBOSACRAL SPONDYLOSIS WITHOUT MYELOPATHY: ICD-10-CM

## 2022-07-11 DIAGNOSIS — Z98.1 S/P LUMBAR AND LUMBOSACRAL FUSION BY ANTERIOR TECHNIQUE: ICD-10-CM

## 2022-07-11 DIAGNOSIS — M48.061 SPINAL STENOSIS, LUMBAR REGION, WITHOUT NEUROGENIC CLAUDICATION: ICD-10-CM

## 2022-07-11 NOTE — TELEPHONE ENCOUNTER
Requested Prescriptions     Pending Prescriptions Disp Refills    oxyCODONE-acetaminophen (PERCOCET) 7.5-325 MG per tablet 48 tablet 0     Sig: Take 1 tablet by mouth every 6 hours as needed for Pain for up to 12 days.        Patient last seen on:  6/14/22  Date of last surgery:  n/a  Date of last refill:  6/14/22  Pain level:  n/a  Patient complaining of:  Pt requesting partial refill until next appt  Future appts: 7/26/22

## 2022-07-12 RX ORDER — OXYCODONE AND ACETAMINOPHEN 7.5; 325 MG/1; MG/1
1 TABLET ORAL EVERY 6 HOURS PRN
Qty: 48 TABLET | Refills: 0 | Status: SHIPPED | OUTPATIENT
Start: 2022-07-14 | End: 2022-07-25 | Stop reason: SDUPTHER

## 2022-07-20 ENCOUNTER — TELEPHONE (OUTPATIENT)
Dept: PAIN MANAGEMENT | Age: 67
End: 2022-07-20

## 2022-07-20 NOTE — TELEPHONE ENCOUNTER
Received call from pharmacist Fox Welsh at [place] [address]. She states that the patient received an Rx for percocet 7.5 every 4-6 hours from a LILIANA Candelaria. She is asking is our office was aware of any surgery the patient may have had since she just received a 12 day supply on 7/14. Call back number is [phone].

## 2022-07-21 NOTE — TELEPHONE ENCOUNTER
Patient called stating that she had to take extra medication due to a miniscus tear. She says she will be out of medication today. She is asking if she can fill the Rx from the surgeon?

## 2022-07-25 DIAGNOSIS — M54.16 LUMBAR RADICULOPATHY: ICD-10-CM

## 2022-07-25 DIAGNOSIS — Z79.891 ENCOUNTER FOR LONG-TERM OPIATE ANALGESIC USE: ICD-10-CM

## 2022-07-25 DIAGNOSIS — Z98.1 S/P LUMBAR AND LUMBOSACRAL FUSION BY ANTERIOR TECHNIQUE: ICD-10-CM

## 2022-07-25 DIAGNOSIS — G89.4 CHRONIC PAIN SYNDROME: ICD-10-CM

## 2022-07-25 DIAGNOSIS — M47.817 LUMBOSACRAL SPONDYLOSIS WITHOUT MYELOPATHY: ICD-10-CM

## 2022-07-25 DIAGNOSIS — M48.061 SPINAL STENOSIS, LUMBAR REGION, WITHOUT NEUROGENIC CLAUDICATION: ICD-10-CM

## 2022-07-25 RX ORDER — OXYCODONE AND ACETAMINOPHEN 7.5; 325 MG/1; MG/1
1 TABLET ORAL EVERY 6 HOURS PRN
Qty: 48 TABLET | Refills: 0 | Status: SHIPPED | OUTPATIENT
Start: 2022-07-25 | End: 2022-08-03 | Stop reason: SDUPTHER

## 2022-08-03 DIAGNOSIS — Z79.891 ENCOUNTER FOR LONG-TERM OPIATE ANALGESIC USE: ICD-10-CM

## 2022-08-03 DIAGNOSIS — G89.4 CHRONIC PAIN SYNDROME: ICD-10-CM

## 2022-08-03 DIAGNOSIS — Z98.1 S/P LUMBAR AND LUMBOSACRAL FUSION BY ANTERIOR TECHNIQUE: ICD-10-CM

## 2022-08-03 DIAGNOSIS — M54.16 LUMBAR RADICULOPATHY: ICD-10-CM

## 2022-08-03 DIAGNOSIS — M47.817 LUMBOSACRAL SPONDYLOSIS WITHOUT MYELOPATHY: ICD-10-CM

## 2022-08-03 DIAGNOSIS — M48.061 SPINAL STENOSIS, LUMBAR REGION, WITHOUT NEUROGENIC CLAUDICATION: ICD-10-CM

## 2022-08-03 RX ORDER — OXYCODONE AND ACETAMINOPHEN 7.5; 325 MG/1; MG/1
1 TABLET ORAL EVERY 6 HOURS PRN
Qty: 76 TABLET | Refills: 0 | Status: SHIPPED | OUTPATIENT
Start: 2022-08-06 | End: 2022-08-04 | Stop reason: SDUPTHER

## 2022-08-03 NOTE — TELEPHONE ENCOUNTER
-Continue Percocet 7.5/325 every 6 hours as needed pain, 8/6/2022 - 8/25/2022, 19 days, #76  -Keep follow-up on 8/24/2022      Controlled Substance Monitoring:    Acute and Chronic Pain Monitoring:   RX Monitoring 8/3/2022   Attestation -   Acute Pain Prescriptions -   Periodic Controlled Substance Monitoring Obtaining appropriate analgesic effect of treatment.    Chronic Pain > 50 MEDD -   Chronic Pain > 80 MEDD -

## 2022-08-03 NOTE — TELEPHONE ENCOUNTER
Requested Prescriptions     Pending Prescriptions Disp Refills    oxyCODONE-acetaminophen (PERCOCET) 7.5-325 MG per tablet 84 tablet 0     Sig: Take 1 tablet by mouth every 6 hours as needed for Pain for up to 21 days. Patient last seen on:  6/14  Date of last surgery:  n/a  Date of last refill:  7/25  Pain level:  n/a  Patient complaining of:  PT is asking for refill. She says her surgeon for her meniscus tear told her to take the medication every 4 hours. She says she tried to keep it to every 6 hours.    Future appts: 8/24

## 2022-08-04 RX ORDER — OXYCODONE AND ACETAMINOPHEN 7.5; 325 MG/1; MG/1
1 TABLET ORAL EVERY 6 HOURS PRN
Qty: 84 TABLET | Refills: 0 | Status: SHIPPED | OUTPATIENT
Start: 2022-08-04 | End: 2022-08-24 | Stop reason: SDUPTHER

## 2022-08-04 NOTE — TELEPHONE ENCOUNTER
PT is requesting that this be filled today. She says that she is out of her medication due to taking some extra after her surgery.

## 2022-08-04 NOTE — TELEPHONE ENCOUNTER
-Continue Percocet 7.5/325 every 6 hours as needed pain, 8/4/2022 - 8/25/2022, 21 days, #84 early refill, originally due 8/6/2022.  -Keep follow-up on 8/24/2022    Controlled Substance Monitoring:    Acute and Chronic Pain Monitoring:   RX Monitoring 8/4/2022   Attestation -   Acute Pain Prescriptions -   Periodic Controlled Substance Monitoring Obtaining appropriate analgesic effect of treatment.    Chronic Pain > 50 MEDD -   Chronic Pain > 80 MEDD -

## 2022-08-24 ENCOUNTER — OFFICE VISIT (OUTPATIENT)
Dept: PAIN MANAGEMENT | Age: 67
End: 2022-08-24
Payer: MEDICARE

## 2022-08-24 VITALS
DIASTOLIC BLOOD PRESSURE: 74 MMHG | WEIGHT: 160 LBS | SYSTOLIC BLOOD PRESSURE: 112 MMHG | TEMPERATURE: 96.8 F | BODY MASS INDEX: 31.41 KG/M2 | HEIGHT: 60 IN

## 2022-08-24 DIAGNOSIS — M54.16 LUMBAR RADICULOPATHY: ICD-10-CM

## 2022-08-24 DIAGNOSIS — M62.838 MUSCLE SPASM: ICD-10-CM

## 2022-08-24 DIAGNOSIS — M48.061 SPINAL STENOSIS, LUMBAR REGION, WITHOUT NEUROGENIC CLAUDICATION: ICD-10-CM

## 2022-08-24 DIAGNOSIS — G89.4 CHRONIC PAIN SYNDROME: ICD-10-CM

## 2022-08-24 DIAGNOSIS — Z79.891 ENCOUNTER FOR LONG-TERM OPIATE ANALGESIC USE: ICD-10-CM

## 2022-08-24 DIAGNOSIS — Z98.1 S/P LUMBAR AND LUMBOSACRAL FUSION BY ANTERIOR TECHNIQUE: ICD-10-CM

## 2022-08-24 DIAGNOSIS — M47.817 LUMBOSACRAL SPONDYLOSIS WITHOUT MYELOPATHY: Primary | ICD-10-CM

## 2022-08-24 PROCEDURE — 1090F PRES/ABSN URINE INCON ASSESS: CPT | Performed by: NURSE PRACTITIONER

## 2022-08-24 PROCEDURE — G8427 DOCREV CUR MEDS BY ELIG CLIN: HCPCS | Performed by: NURSE PRACTITIONER

## 2022-08-24 PROCEDURE — 1123F ACP DISCUSS/DSCN MKR DOCD: CPT | Performed by: NURSE PRACTITIONER

## 2022-08-24 PROCEDURE — 1036F TOBACCO NON-USER: CPT | Performed by: NURSE PRACTITIONER

## 2022-08-24 PROCEDURE — G8417 CALC BMI ABV UP PARAM F/U: HCPCS | Performed by: NURSE PRACTITIONER

## 2022-08-24 PROCEDURE — G8400 PT W/DXA NO RESULTS DOC: HCPCS | Performed by: NURSE PRACTITIONER

## 2022-08-24 PROCEDURE — 3017F COLORECTAL CA SCREEN DOC REV: CPT | Performed by: NURSE PRACTITIONER

## 2022-08-24 PROCEDURE — 99213 OFFICE O/P EST LOW 20 MIN: CPT | Performed by: NURSE PRACTITIONER

## 2022-08-24 RX ORDER — TIZANIDINE 2 MG/1
2 TABLET ORAL NIGHTLY
Qty: 30 TABLET | Refills: 0 | Status: SHIPPED | OUTPATIENT
Start: 2022-08-24 | End: 2022-09-20 | Stop reason: SDUPTHER

## 2022-08-24 RX ORDER — OXYCODONE AND ACETAMINOPHEN 7.5; 325 MG/1; MG/1
1 TABLET ORAL EVERY 6 HOURS PRN
Qty: 120 TABLET | Refills: 0 | Status: SHIPPED | OUTPATIENT
Start: 2022-08-24 | End: 2022-09-20 | Stop reason: SDUPTHER

## 2022-08-24 ASSESSMENT — ENCOUNTER SYMPTOMS
BACK PAIN: 1
SHORTNESS OF BREATH: 0
CONSTIPATION: 0
GASTROINTESTINAL NEGATIVE: 1
COUGH: 0
EYES NEGATIVE: 1
DIARRHEA: 0
TROUBLE SWALLOWING: 0

## 2022-08-24 NOTE — PROGRESS NOTES
Dior Francois  (1955)    8/24/2022    Subjective:     Dior Francois is 75 y.o. female who complains today of:    Chief Complaint   Patient presents with    Back Pain         Allergies:  Patient has no known allergies. Past Medical History:   Diagnosis Date    Anxiety     Coronary artery disease involving native coronary artery of native heart without angina pectoris 12/11/2018    Hyperlipidemia     meds > 5 yrs    Osteoarthritis      Past Surgical History:   Procedure Laterality Date    BACK SURGERY  2017    lumbar disc OR    COLONOSCOPY      DILATION AND CURETTAGE OF UTERUS      at age 30s--miscarriage    LUMBAR FUSION Left 8/12/2020    ANTERIOR LEFT RETROPERITONEAL L5-S1 DISKECTOMY INTERBODY CAGE FUSION performed by Sofi Bennett MD at [facility]      as child    TRACHEAL SURGERY  2010    in [place]--EMT attempted to intubate patient due to trouble breathing & then needed repair     Family History   Problem Relation Age of Onset    Diabetes Mother     Obesity Mother     High Cholesterol Mother     High Cholesterol Father     Other Sister         GSW to spine & paralysis    No Known Problems Brother     No Known Problems Son     No Known Problems Daughter     Other Brother         as infant     Social History     Socioeconomic History    Marital status: Single     Spouse name: Not on file    Number of children: Not on file    Years of education: Not on file    Highest education level: Not on file   Occupational History    Not on file   Tobacco Use    Smoking status: Former     Packs/day: 1.00     Years: 30.00     Pack years: 30.00     Types: Cigarettes     Quit date: 4/6/2019     Years since quitting: 3.3    Smokeless tobacco: Never    Tobacco comments:     intermittent habit   Vaping Use    Vaping Use: Never used   Substance and Sexual Activity    Alcohol use:  Yes     Alcohol/week: 0.0 standard drinks     Comment: social    Drug use: Not on file    Sexual activity: Not on file   Other Topics Concern    Not on file   Social History Narrative         Lives With: Alone    Type of Home: House    Home Layout: One level    Home Access: Stairs to enter with rails    Entrance Stairs - Number of Steps: 4-5    Entrance Stairs - Rails: Both    Bathroom Shower/Tub: Walk-in shower, Tub/Shower unit(uses walk in)    [id number] [place]: Built-in shower seat, Grab bars in shower    Home Equipment: (plans to purchase hip kit and borrow walker)    Receives Help From: Family(father and fathers wife can assist per pt, pt states \"doctor downplayed the sx\" so pt thought she would recover quickly)    ADL Assistance: Independent    Homemaking Assistance: Independent    Homemaking Responsibilities: Yes(yard work included)    Ambulation Assistance: Independent(no AD)    Transfer Assistance: Independent    Active : Yes    Mode of Transportation: Car    Occupation: Retired    Type of occupation: Homemaker    Additional Comments: Pt. states lately she has had to sit after only 20 minutes of standing before sx     Social Determinants of Health     Financial Resource Strain: Not on file   Food Insecurity: Not on file   Transportation Needs: Not on file   Physical Activity: Not on file   Stress: Not on file   Social Connections: Not on file   Intimate Partner Violence: Not on file   Housing Stability: Not on file       Current Outpatient Medications on File Prior to Visit   Medication Sig Dispense Refill    ibuprofen (ADVIL;MOTRIN) 200 MG tablet Take 600 mg by mouth every 6 hours as needed for Pain      lidocaine (LIDODERM) 5 % Place 1 patch onto the skin daily 12 hours on, 12 hours off. 30 patch 0    gabapentin (NEURONTIN) 600 MG tablet Take 1 tablet by mouth 3 times daily for 4 days.  12 tablet 0    meloxicam (MOBIC) 15 MG tablet Take 1 tablet by mouth daily 90 tablet 0    lisinopril (PRINIVIL;ZESTRIL) 5 MG tablet TAKE 1 TABLET BY MOUTH DAILY      naloxone 4 MG/0.1ML LIQD nasal spray 1 spray by Nasal route as needed for Opioid Reversal (Patient not taking: Reported on 4/11/2022) 1 each 1    Multiple Vitamins-Minerals (THERAPEUTIC MULTIVITAMIN-MINERALS) tablet Take 1 tablet by mouth daily      magnesium oxide (MAG-OX) 400 MG tablet Take 400 mg by mouth daily      Lactobacillus (ACIDOPHILUS) 100 MG CAPS Take by mouth as needed      calcium carbonate (OSCAL) 500 MG TABS tablet Take 500 mg by mouth daily      vitamin C (ASCORBIC ACID) 500 MG tablet Take 500 mg by mouth daily      Milk Thistle 150 MG CAPS Take by mouth daily      pseudoephedrine (SUDAFED) 60 MG tablet Take 60 mg by mouth every 4 hours as needed for Congestion      busPIRone (BUSPAR) 7.5 MG tablet Take 15 mg by mouth 2 times daily       pramipexole (MIRAPEX) 0.25 MG tablet TK ONE OR TWO TS PO QHS      simvastatin (ZOCOR) 40 MG tablet Take 40 mg by mouth nightly       ipratropium (ATROVENT) 0.03 % nasal spray U 2 SPRAYS IEN BID      naloxone (NARCAN) 4 MG/0.1ML LIQD nasal spray 1 spray by Nasal route as needed for Opioid Reversal (Patient not taking: Reported on 4/11/2022) 1 each 0     No current facility-administered medications on file prior to visit. Pt presents today for a f/u of her pain. PCP is Dr. Christiano Jarrell. Pt last saw Mendoza Sanz NP for her chronic low back and LLE pain. 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 She is doing PT. She wants to be able to walk a mile in the future. Previous SI injection and MIKAELA did not help. Saw Dr. Lea Alexandre March 2021 for consult and no further surgery recommended - noted Lt meralgia paraesthetica, Lt L3 radiculopathy and spondylosis in note. She is not interested in SCS. She continues to use her virtual . She has been told to limit alcohol intake due to her Percocet. Discussed NDP in the past.  Has seen [id number] neurosurgery. She says SCS was recommended.  They recommended she see  Caitie Guerra for injections but patient declines for now. Past Surgical Hx:   06/23/2017 right and bilateral L4-5 microdissection, discectomy, interbody posterolateral fusion, pedicle screws. [facility].    8/12/2020 left anterior retroperitoneal L5-S1 discectomy interbody cage fusion. From Care Everywhere: \"EMG 9/23/21  left lower extremity did show evidence of chronic left-sided lumbosacral polyradiculopathy affecting left L4? L5-S1 nerve roots with active denervation. I took liberty of sending Anneliese Kraues to see Dr. Ricky Barragan from pain management to try some interventional treatment and since she failed multiple conservative management in the past she may be a good candidate for spinal cord stimulator. She will continue with home exercise program and same medications unchanged, for now. \"    Takes percocet 7.5mg Q6hrs PRN pain and gabapentin 600mg TID. Last took percocet yesterday and took gabapentin yesterday. Also takes zanaflex 2mg says only uses at night and lidoderm patch (uses these sparingly). Also uses mobic 15mg daily PRN. Pt feels pain level with her medication is \"fine\" with sitting, but increase to 6/10 with walk, and \"intense\" 8/10 without medication. Pt feels that walking, standing makes the pain worse, and sitting, medication makes the pain better. Pt feels her medication helps   her function and improve her quality of life, specifically says allows to walk and stay active. Pt denies change with Lt thigh numbness, but denies radiating numbness and tingling. Denies recent falls, injuries or trauma. Pt denies new weakness. Pt reports PT has been is doing. Review of Systems   Constitutional: Negative. Negative for fatigue. HENT: Negative. Negative for trouble swallowing. Eyes: Negative. Respiratory:  Negative for cough and shortness of breath. Cardiovascular:  Negative for chest pain. Gastrointestinal: Negative. Negative for constipation and diarrhea.    Endocrine: Negative. Genitourinary: Negative. Musculoskeletal:  Positive for arthralgias, back pain and neck pain. Recent knee surgery   Skin: Negative. Neurological:  Negative for dizziness, weakness and headaches. Hematological: Negative. Psychiatric/Behavioral: Negative. Objective:     Vitals:  /74   Temp 96.8 °F (36 °C)   Ht 5' (1.524 m)   Wt 160 lb (72.6 kg)   LMP  (LMP Unknown)   BMI 31.25 kg/m² Pain Score:   4      Physical Exam  Vitals and nursing note reviewed. This is a pleasant female who answers questions appropriately and follows commands. Pt is alert and oriented x 3. Recent and remote memory is intact. Mood and affect, judgement and insight are normal.  No signs of distress, no dyspnea or SOB noted. HEENT: PERRL. Neck is supple, trachea midline. No lymphadenopathy noted. Decreased ROM with flexion and extension of low back. Tender with palpation to lumbar spine with palpation bilaterally positive provacative maneuvers noted. Positive SLR on Lt. Tightness in both hamstrings noted. Surgical scars noted. Lap scars noted Rt knee. Balance and coordination normal. Gait mildly antalgic. Strength is functional for ambulation. Cranial nerves II-XII are intact. Assessment:      Diagnosis Orders   1. Lumbosacral spondylosis without myelopathy  oxyCODONE-acetaminophen (PERCOCET) 7.5-325 MG per tablet    Urine Drug Screen      2. S/P lumbar and lumbosacral fusion by anterior technique  oxyCODONE-acetaminophen (PERCOCET) 7.5-325 MG per tablet    Urine Drug Screen      3. Chronic pain syndrome  oxyCODONE-acetaminophen (PERCOCET) 7.5-325 MG per tablet    Urine Drug Screen      4. Spinal stenosis, lumbar region, without neurogenic claudication  oxyCODONE-acetaminophen (PERCOCET) 7.5-325 MG per tablet    Urine Drug Screen      5. Lumbar radiculopathy  oxyCODONE-acetaminophen (PERCOCET) 7.5-325 MG per tablet    Urine Drug Screen      6.  Encounter for long-term opiate analgesic use  oxyCODONE-acetaminophen (PERCOCET) 7.5-325 MG per tablet    Urine Drug Screen      7. Muscle spasm  Urine Drug Screen    tiZANidine (ZANAFLEX) 2 MG tablet          Plan:     Periodic Controlled Substance Monitoring: Possible medication side effects, risk of tolerance/dependence & alternative treatments discussed., No signs of potential drug abuse or diversion identified. , Assessed functional status., Obtaining appropriate analgesic effect of treatment. (Adelaida Gar, APRN - CNP)    Orders Placed This Encounter   Medications    oxyCODONE-acetaminophen (PERCOCET) 7.5-325 MG per tablet     Sig: Take 1 tablet by mouth every 6 hours as needed for Pain for up to 30 days. Dispense:  120 tablet     Refill:  0     Reduce doses taken as pain becomes manageable    tiZANidine (ZANAFLEX) 2 MG tablet     Sig: Take 1 tablet by mouth nightly PRN     Dispense:  30 tablet     Refill:  0       Orders Placed This Encounter   Procedures    Urine Drug Screen     Chronic pain/illicits     Discussed options with the patient today. Anatomic model pathology was shown and reviewed with pt. She is not wanting further surgery or injections or PT. Options are then limited. Continue current does of Percocet 7.5mg Q6hrs PRN pain as it helps her function as well as gabapentin 600mg TID (this was just refilled) and will refill zanaflex 2mg BID PRN. All questions were answered. Discussed home exercise program. Continue PT. Relevant imaging and pain generators reviewed. Pt verbalized understanding and agrees with above plan. Pt has chronic pain. Utox reviewed  from  March 2022 + oxycodone, gabapentin, xanax, and  ethyl. Advised to avoid ETOH while taking opiates. Continue to monitor closely. .ORT score 2 - considered high risk d/t above. Narcan Rx sent in June 2021.  Will check Utox and f/u with report - last took percocet last night and gabapentin today    Will continue medications for chronic pain that has been previously directed as they do help pt function with ADL and improve quality of life. Pt is aware goal is to use the least amount of medication possible to allow pt to function and help with quality of life as discussed in detail. Ideal to keep MME below 50 which it is. Have discussed proper disposal of narcotic medication. Advised to have medications in safe place and locked up/in lock box. Discussed possible risks of opiate medication with pt, including, but not limited to possible constipation, nausea or vomiting, sedation, urinary retention, dependence and possible addiction. Pt agrees to use medication as prescribed/as directed. Pt advised to not use opiates while driving or operating heavy equipment, or in situations where pt may harm him/herself or others. Pt is aware that while on narcotics, pt needs to be seen to reassess pain and reassess need for continued medication at that time. NDP reviewed. OARRS was reviewed. This NP saw pt under direct supervision of Dr. Miguelito Sellers. Follow up:  Return in about 4 weeks (around 9/21/2022) for review meds and reassess pain.     Maricel Madrigal, ABENA - CNP

## 2022-09-20 DIAGNOSIS — M54.16 LUMBAR RADICULOPATHY: ICD-10-CM

## 2022-09-20 DIAGNOSIS — M47.817 LUMBOSACRAL SPONDYLOSIS WITHOUT MYELOPATHY: ICD-10-CM

## 2022-09-20 DIAGNOSIS — M62.838 MUSCLE SPASM: ICD-10-CM

## 2022-09-20 DIAGNOSIS — Z79.891 ENCOUNTER FOR LONG-TERM OPIATE ANALGESIC USE: ICD-10-CM

## 2022-09-20 DIAGNOSIS — G89.4 CHRONIC PAIN SYNDROME: ICD-10-CM

## 2022-09-20 DIAGNOSIS — M48.061 SPINAL STENOSIS, LUMBAR REGION, WITHOUT NEUROGENIC CLAUDICATION: ICD-10-CM

## 2022-09-20 DIAGNOSIS — Z98.1 S/P LUMBAR AND LUMBOSACRAL FUSION BY ANTERIOR TECHNIQUE: ICD-10-CM

## 2022-09-20 RX ORDER — OXYCODONE AND ACETAMINOPHEN 7.5; 325 MG/1; MG/1
1 TABLET ORAL EVERY 6 HOURS PRN
Qty: 28 TABLET | Refills: 0 | Status: SHIPPED | OUTPATIENT
Start: 2022-09-23 | End: 2022-09-22 | Stop reason: SDUPTHER

## 2022-09-20 RX ORDER — TIZANIDINE 2 MG/1
2 TABLET ORAL NIGHTLY
Qty: 7 TABLET | Refills: 0 | Status: SHIPPED | OUTPATIENT
Start: 2022-09-23 | End: 2022-09-22 | Stop reason: SDUPTHER

## 2022-09-20 NOTE — TELEPHONE ENCOUNTER
Requested Prescriptions     Pending Prescriptions Disp Refills    oxyCODONE-acetaminophen (PERCOCET) 7.5-325 MG per tablet 28 tablet 0     Sig: Take 1 tablet by mouth every 6 hours as needed for Pain for up to 7 days.     tiZANidine (ZANAFLEX) 2 MG tablet 7 tablet 0     Sig: Take 1 tablet by mouth nightly for 7 days PRN       Patient last seen on:  8/24/22  Date of last surgery:  n/a  Date of last refill:  8/24/22  Pain level:  n/a  Patient complaining of:  Pt due for rx before next appt  Future appts: 9/29/22

## 2022-09-21 DIAGNOSIS — M47.817 LUMBOSACRAL SPONDYLOSIS WITHOUT MYELOPATHY: ICD-10-CM

## 2022-09-21 DIAGNOSIS — M54.16 LUMBAR RADICULOPATHY: ICD-10-CM

## 2022-09-21 DIAGNOSIS — G89.4 CHRONIC PAIN SYNDROME: ICD-10-CM

## 2022-09-21 DIAGNOSIS — Z79.891 ENCOUNTER FOR LONG-TERM OPIATE ANALGESIC USE: ICD-10-CM

## 2022-09-21 DIAGNOSIS — M48.061 SPINAL STENOSIS, LUMBAR REGION, WITHOUT NEUROGENIC CLAUDICATION: ICD-10-CM

## 2022-09-21 DIAGNOSIS — M62.838 MUSCLE SPASM: ICD-10-CM

## 2022-09-21 DIAGNOSIS — Z98.1 S/P LUMBAR AND LUMBOSACRAL FUSION BY ANTERIOR TECHNIQUE: ICD-10-CM

## 2022-09-21 RX ORDER — GABAPENTIN 600 MG/1
TABLET ORAL
Qty: 90 TABLET | OUTPATIENT
Start: 2022-09-21

## 2022-09-21 NOTE — TELEPHONE ENCOUNTER
Requested Prescriptions     Pending Prescriptions Disp Refills    oxyCODONE-acetaminophen (PERCOCET) 7.5-325 MG per tablet 120 tablet 0     Sig: Take 1 tablet by mouth every 6 hours as needed for Pain for up to 30 days. tiZANidine (ZANAFLEX) 2 MG tablet 30 tablet 0     Sig: Take 1 tablet by mouth nightly PRN       Patient last seen on:  8/24/22  Date of last surgery:  n/a  Date of last refill:  8/24/22  Pain level:  n/a  Patient complaining of:  Pt states she has to leave for [place] due to a family issue.  Pt rs'ed appt and asks for a new script to be sent in until she is back in [facility]  Future appts: 10/20/22

## 2022-09-22 RX ORDER — TIZANIDINE 2 MG/1
2 TABLET ORAL NIGHTLY
Qty: 30 TABLET | Refills: 0 | Status: SHIPPED | OUTPATIENT
Start: 2022-09-23 | End: 2022-10-20 | Stop reason: SDUPTHER

## 2022-09-22 RX ORDER — OXYCODONE AND ACETAMINOPHEN 7.5; 325 MG/1; MG/1
1 TABLET ORAL EVERY 6 HOURS PRN
Qty: 120 TABLET | Refills: 0 | Status: SHIPPED | OUTPATIENT
Start: 2022-09-23 | End: 2022-10-20 | Stop reason: SDUPTHER

## 2022-10-20 ENCOUNTER — TELEPHONE (OUTPATIENT)
Dept: PAIN MANAGEMENT | Age: 67
End: 2022-10-20

## 2022-10-20 ENCOUNTER — OFFICE VISIT (OUTPATIENT)
Dept: PAIN MANAGEMENT | Age: 67
End: 2022-10-20
Payer: MEDICARE

## 2022-10-20 VITALS
HEIGHT: 60 IN | DIASTOLIC BLOOD PRESSURE: 84 MMHG | TEMPERATURE: 97.1 F | BODY MASS INDEX: 31.41 KG/M2 | SYSTOLIC BLOOD PRESSURE: 132 MMHG | WEIGHT: 160 LBS

## 2022-10-20 DIAGNOSIS — M54.16 LUMBAR RADICULOPATHY: ICD-10-CM

## 2022-10-20 DIAGNOSIS — M48.061 SPINAL STENOSIS, LUMBAR REGION, WITHOUT NEUROGENIC CLAUDICATION: ICD-10-CM

## 2022-10-20 DIAGNOSIS — M62.838 MUSCLE SPASM: ICD-10-CM

## 2022-10-20 DIAGNOSIS — G89.4 CHRONIC PAIN SYNDROME: ICD-10-CM

## 2022-10-20 DIAGNOSIS — Z79.891 ENCOUNTER FOR LONG-TERM OPIATE ANALGESIC USE: ICD-10-CM

## 2022-10-20 DIAGNOSIS — M47.817 LUMBOSACRAL SPONDYLOSIS WITHOUT MYELOPATHY: ICD-10-CM

## 2022-10-20 DIAGNOSIS — Z98.1 S/P LUMBAR AND LUMBOSACRAL FUSION BY ANTERIOR TECHNIQUE: ICD-10-CM

## 2022-10-20 PROCEDURE — 3017F COLORECTAL CA SCREEN DOC REV: CPT | Performed by: NURSE PRACTITIONER

## 2022-10-20 PROCEDURE — 1036F TOBACCO NON-USER: CPT | Performed by: NURSE PRACTITIONER

## 2022-10-20 PROCEDURE — G8484 FLU IMMUNIZE NO ADMIN: HCPCS | Performed by: NURSE PRACTITIONER

## 2022-10-20 PROCEDURE — 1123F ACP DISCUSS/DSCN MKR DOCD: CPT | Performed by: NURSE PRACTITIONER

## 2022-10-20 PROCEDURE — 1090F PRES/ABSN URINE INCON ASSESS: CPT | Performed by: NURSE PRACTITIONER

## 2022-10-20 PROCEDURE — G8400 PT W/DXA NO RESULTS DOC: HCPCS | Performed by: NURSE PRACTITIONER

## 2022-10-20 PROCEDURE — G8417 CALC BMI ABV UP PARAM F/U: HCPCS | Performed by: NURSE PRACTITIONER

## 2022-10-20 PROCEDURE — G8427 DOCREV CUR MEDS BY ELIG CLIN: HCPCS | Performed by: NURSE PRACTITIONER

## 2022-10-20 PROCEDURE — 99214 OFFICE O/P EST MOD 30 MIN: CPT | Performed by: NURSE PRACTITIONER

## 2022-10-20 RX ORDER — TIZANIDINE 2 MG/1
2 TABLET ORAL NIGHTLY
Qty: 30 TABLET | Refills: 0 | Status: SHIPPED | OUTPATIENT
Start: 2022-10-20 | End: 2022-11-19

## 2022-10-20 RX ORDER — GABAPENTIN 600 MG/1
600 TABLET ORAL 3 TIMES DAILY
Qty: 90 TABLET | Refills: 0 | Status: SHIPPED | OUTPATIENT
Start: 2022-10-20 | End: 2022-11-19

## 2022-10-20 RX ORDER — MELOXICAM 15 MG/1
15 TABLET ORAL DAILY
COMMUNITY

## 2022-10-20 RX ORDER — OXYCODONE AND ACETAMINOPHEN 7.5; 325 MG/1; MG/1
1 TABLET ORAL EVERY 6 HOURS PRN
Qty: 120 TABLET | Refills: 0 | Status: SHIPPED | OUTPATIENT
Start: 2022-10-22 | End: 2022-11-21

## 2022-10-20 ASSESSMENT — ENCOUNTER SYMPTOMS
COUGH: 0
EYES NEGATIVE: 1
CONSTIPATION: 0
TROUBLE SWALLOWING: 0
BACK PAIN: 1
GASTROINTESTINAL NEGATIVE: 1
SHORTNESS OF BREATH: 0
DIARRHEA: 0

## 2022-10-20 NOTE — TELEPHONE ENCOUNTER
Pt states this was discussed at appt this morning of early rx of Percocet as she is out. Fill date is set for 10/22/22 so pt asks to pick it up today?

## 2022-10-20 NOTE — PROGRESS NOTES
Pauline Haile  (1955)    10/20/2022    Subjective:     Pauline Haile is 75 y.o. female who complains today of:    Chief Complaint   Patient presents with    1 Month Follow-Up    Back Pain    Knee Pain     Right knee          Allergies:  Patient has no known allergies. Past Medical History:   Diagnosis Date    Anxiety     Coronary artery disease involving native coronary artery of native heart without angina pectoris 12/11/2018    Hyperlipidemia     meds > 5 yrs    Osteoarthritis      Past Surgical History:   Procedure Laterality Date    BACK SURGERY  2017    lumbar disc OR    COLONOSCOPY      DILATION AND CURETTAGE OF UTERUS      at age 30s--miscarriage    LUMBAR FUSION Left 8/12/2020    ANTERIOR LEFT RETROPERITONEAL L5-S1 DISKECTOMY INTERBODY CAGE FUSION performed by Marilee Hinojosa MD at [address]      as child    TRACHEAL SURGERY  2010    in [place]--EMT attempted to intubate patient due to trouble breathing & then needed repair     Family History   Problem Relation Age of Onset    Diabetes Mother     Obesity Mother     High Cholesterol Mother     High Cholesterol Father     Other Sister         GSW to spine & paralysis    No Known Problems Brother     No Known Problems Son     No Known Problems Daughter     Other Brother         as infant     Social History     Socioeconomic History    Marital status: Single     Spouse name: Not on file    Number of children: Not on file    Years of education: Not on file    Highest education level: Not on file   Occupational History    Not on file   Tobacco Use    Smoking status: Former     Packs/day: 1.00     Years: 30.00     Pack years: 30.00     Types: Cigarettes     Quit date: 4/6/2019     Years since quitting: 3.5    Smokeless tobacco: Never    Tobacco comments:     intermittent habit   Vaping Use    Vaping Use: Never used   Substance and Sexual Activity    Alcohol use:  Yes     Alcohol/week: 0.0 standard drinks     Comment: social    Drug use: Not on file    Sexual activity: Not on file   Other Topics Concern    Not on file   Social History Narrative         Lives With: Alone    Type of Home: House    Home Layout: One level    Home Access: Stairs to enter with rails    Entrance Stairs - Number of Steps: 4-5    Entrance Stairs - Rails: Both    Bathroom Shower/Tub: Walk-in shower, Tub/Shower unit(uses walk in)    [employer]: Built-in shower seat, Grab bars in shower    Home Equipment: (plans to purchase hip kit and borrow walker)    Receives Help From: Family(father and fathers wife can assist per pt, pt states \"doctor downplayed the sx\" so pt thought she would recover quickly)    ADL Assistance: [address]: Independent    Homemaking Responsibilities: Yes(yard work included)    Ambulation Assistance: Independent(no AD)    Transfer Assistance: Independent    Active : Yes    Mode of Transportation: Car    Occupation: Retired    Type of occupation: Homemaker    Additional Comments: Pt. states lately she has had to sit after only 20 minutes of standing before sx     Social Determinants of Health     Financial Resource Strain: Not on file   Food Insecurity: Not on file   Transportation Needs: Not on file   Physical Activity: Not on file   Stress: Not on file   Social Connections: Not on file   Intimate Partner Violence: Not on file   Housing Stability: Not on file       Current Outpatient Medications on File Prior to Visit   Medication Sig Dispense Refill    meloxicam (MOBIC) 15 MG tablet Take 15 mg by mouth daily      lidocaine (LIDODERM) 5 % Place 1 patch onto the skin daily 12 hours on, 12 hours off.  30 patch 0    lisinopril (PRINIVIL;ZESTRIL) 5 MG tablet TAKE 1 TABLET BY MOUTH DAILY      Multiple Vitamins-Minerals (THERAPEUTIC MULTIVITAMIN-MINERALS) tablet Take 1 tablet by mouth daily      magnesium oxide (MAG-OX) 400 MG tablet Take 400 mg by mouth daily      Lactobacillus (ACIDOPHILUS) 100 MG CAPS Take by mouth as needed calcium carbonate (OSCAL) 500 MG TABS tablet Take 500 mg by mouth daily      vitamin C (ASCORBIC ACID) 500 MG tablet Take 500 mg by mouth daily      Milk Thistle 150 MG CAPS Take by mouth daily      pseudoephedrine (SUDAFED) 60 MG tablet Take 60 mg by mouth every 4 hours as needed for Congestion      busPIRone (BUSPAR) 7.5 MG tablet Take 15 mg by mouth 2 times daily       pramipexole (MIRAPEX) 0.25 MG tablet TK ONE OR TWO TS PO QHS      ipratropium (ATROVENT) 0.03 % nasal spray U 2 SPRAYS IEN BID      naloxone (NARCAN) 4 MG/0.1ML LIQD nasal spray 1 spray by Nasal route as needed for Opioid Reversal 1 each 0    ibuprofen (ADVIL;MOTRIN) 200 MG tablet Take 600 mg by mouth every 6 hours as needed for Pain (Patient not taking: Reported on 10/20/2022)      meloxicam (MOBIC) 15 MG tablet Take 1 tablet by mouth daily 90 tablet 0    naloxone 4 MG/0.1ML LIQD nasal spray 1 spray by Nasal route as needed for Opioid Reversal (Patient not taking: No sig reported) 1 each 1     No current facility-administered medications on file prior to visit. Pt presents today for a f/u of her pain. PCP is Dr. Kassie Engel. Pt last saw this NP for her chronic low back and LLE pain. She recently got back from [place] she reports to help with grand child. She says this was difficult with her pain. 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 She is doing PT. She wants to be able to walk a mile in the future. Previous SI injection and MIKAELA did not help. Saw Dr. Ernesto Schmid March 2021 for consult and no further surgery recommended - noted Lt meralgia paraesthetica, Lt L3 radiculopathy and spondylosis in note. She is not interested in SCS. She continues to use her eCozy . She has been told to limit alcohol intake due to her Percocet and she reports she just has a couple of glasses of wine a week.   She denies eye opener or anyone tell her she has had a problem. Discussed NDP in the past.  Has seen [facility] neurosurgery. She says SCS was recommended. They recommended she see Dr. Trini Good for injections but patient declines for now. Past Surgical Hx:   06/23/2017 right and bilateral L4-5 microdissection, discectomy, interbody posterolateral fusion, pedicle screws. [facility].    8/12/2020 left anterior retroperitoneal L5-S1 discectomy interbody cage fusion. From Care Everywhere: \"EMG 9/23/21  left lower extremity did show evidence of chronic left-sided lumbosacral polyradiculopathy affecting left L4? L5-S1 nerve roots with active denervation. I took liberty of sending Jimbo Rodgers to see Dr. Barrett Aviles from pain management to try some interventional treatment and since she failed multiple conservative management in the past she may be a good candidate for spinal cord stimulator. She will continue with home exercise program and same medications unchanged, for now. \"     Takes percocet 7.5mg Q6hrs PRN pain and gabapentin 600mg TID. Also takes zanaflex 2mg says only uses at night and lidoderm patch (uses these sparingly). Also uses mobic 15mg daily PRN. Pt feels pain level with her medication is better, and 7/10 without medication. Pt feels that standing, activity makes the pain worse, and medication, sitting, ice makes the pain better. Pt feels her medication helps   her function and improve her quality of life, specifically says allows to move and do ADL's. Pt admits to numbness in Rt thigh, but denies radiating numbness and tingling. Denies recent falls, injuries or trauma. Pt denies new weakness. Pt reports PT has been done. Review of Systems   Constitutional: Negative. Negative for fatigue. HENT: Negative. Negative for trouble swallowing. Eyes: Negative. Respiratory:  Negative for cough and shortness of breath. Cardiovascular:  Negative for chest pain. Gastrointestinal: Negative.   Negative for constipation and diarrhea. Endocrine: Negative. Genitourinary: Negative. Musculoskeletal:  Positive for back pain and neck pain. Skin: Negative. Neurological:  Negative for dizziness, weakness and headaches. Hematological: Negative. Psychiatric/Behavioral: Negative. Objective:     Vitals:  /84 (Site: Right Upper Arm)   Temp 97.1 °F (36.2 °C) (Temporal)   Ht 5' (1.524 m)   Wt 160 lb (72.6 kg)   LMP  (LMP Unknown)   BMI 31.25 kg/m² Pain Score:   7      Physical Exam  Vitals and nursing note reviewed. This is a pleasant female who answers questions appropriately and follows commands. Pt is alert and oriented x 3. Recent and remote memory is intact. Mood and affect, judgement and insight are normal.  No signs of distress, no dyspnea or SOB noted. HEENT: PERRL. Neck is supple, trachea midline. No lymphadenopathy noted. Decreased ROM with flexion and extension of low back. Tender with palpation to lumbar spine with palpation bilaterally positive provacative maneuvers noted. Positive SLR on Lt and reproduces low back pain. Tightness in both hamstrings noted. Surgical scars noted. Lap scars noted Rt knee. Tender with palpation over medial joint line of Rt knee. Balance and coordination normal. Gait mildly antalgic. Strength is functional for ambulation. Cranial nerves II-XII are intact. Assessment:      Diagnosis Orders   1. S/P lumbar and lumbosacral fusion by anterior technique  oxyCODONE-acetaminophen (PERCOCET) 7.5-325 MG per tablet    gabapentin (NEURONTIN) 600 MG tablet      2. Lumbosacral spondylosis without myelopathy  oxyCODONE-acetaminophen (PERCOCET) 7.5-325 MG per tablet      3. Chronic pain syndrome  oxyCODONE-acetaminophen (PERCOCET) 7.5-325 MG per tablet      4. Spinal stenosis, lumbar region, without neurogenic claudication  oxyCODONE-acetaminophen (PERCOCET) 7.5-325 MG per tablet    gabapentin (NEURONTIN) 600 MG tablet      5.  Lumbar alcohol but at much lower level than previous Utox. Discuss at follow-up concerning alcohol with narcotics per NDP. This has been discussed in the past and pt states she will have a couple glasses of wine a week at night. Will hold off reducing percocet at this time, and discussed in detail risk of ETOH and narcotics. If high level in the future again, then will wean medications as discussed. Will not give early refills. Used medication as directed. ORT score 2 - considered high risk d/t above. Narcan Rx sent in June 2021. Will continue medications for chronic pain that has been previously directed as they do help pt function with ADL and improve quality of life. Pt is aware goal is to use the least amount of medication possible to allow pt to function and help with quality of life as discussed in detail. Ideal to keep MME below 50 which it is. Have discussed proper disposal of narcotic medication. Advised to have medications in safe place and locked up/in lock box. Discussed possible risks of opiate medication with pt, including, but not limited to possible constipation, nausea or vomiting, sedation, urinary retention, dependence and possible addiction. Pt agrees to use medication as prescribed/as directed. Pt advised to not use opiates while driving or operating heavy equipment, or in situations where pt may harm him/herself or others. Pt is aware that while on narcotics, pt needs to be seen to reassess pain and reassess need for continued medication at that time. NDP reviewed. OARRS was reviewed. This NP saw pt under direct supervision of Dr. Naye Gonzalez. Follow up:  Return in about 4 weeks (around 11/17/2022) for review meds and reassess pain.     Sacha Madrigal, APRN - CNP

## 2022-11-17 ENCOUNTER — OFFICE VISIT (OUTPATIENT)
Dept: PAIN MANAGEMENT | Age: 67
End: 2022-11-17
Payer: MEDICARE

## 2022-11-17 VITALS
TEMPERATURE: 96.8 F | HEIGHT: 60 IN | SYSTOLIC BLOOD PRESSURE: 132 MMHG | BODY MASS INDEX: 31.41 KG/M2 | DIASTOLIC BLOOD PRESSURE: 80 MMHG | WEIGHT: 160 LBS

## 2022-11-17 DIAGNOSIS — Z79.891 ENCOUNTER FOR LONG-TERM OPIATE ANALGESIC USE: ICD-10-CM

## 2022-11-17 DIAGNOSIS — M48.061 SPINAL STENOSIS, LUMBAR REGION, WITHOUT NEUROGENIC CLAUDICATION: Primary | ICD-10-CM

## 2022-11-17 DIAGNOSIS — M54.16 LUMBAR RADICULOPATHY: ICD-10-CM

## 2022-11-17 DIAGNOSIS — M46.1 SACROILIITIS, NOT ELSEWHERE CLASSIFIED (HCC): ICD-10-CM

## 2022-11-17 DIAGNOSIS — Z98.1 S/P LUMBAR AND LUMBOSACRAL FUSION BY ANTERIOR TECHNIQUE: ICD-10-CM

## 2022-11-17 DIAGNOSIS — G89.4 CHRONIC PAIN SYNDROME: ICD-10-CM

## 2022-11-17 DIAGNOSIS — M47.817 LUMBOSACRAL SPONDYLOSIS WITHOUT MYELOPATHY: ICD-10-CM

## 2022-11-17 DIAGNOSIS — M62.838 MUSCLE SPASM: ICD-10-CM

## 2022-11-17 PROCEDURE — 99214 OFFICE O/P EST MOD 30 MIN: CPT | Performed by: PAIN MEDICINE

## 2022-11-17 PROCEDURE — G8400 PT W/DXA NO RESULTS DOC: HCPCS | Performed by: PAIN MEDICINE

## 2022-11-17 PROCEDURE — 3017F COLORECTAL CA SCREEN DOC REV: CPT | Performed by: PAIN MEDICINE

## 2022-11-17 PROCEDURE — G8427 DOCREV CUR MEDS BY ELIG CLIN: HCPCS | Performed by: PAIN MEDICINE

## 2022-11-17 PROCEDURE — 1036F TOBACCO NON-USER: CPT | Performed by: PAIN MEDICINE

## 2022-11-17 PROCEDURE — G8484 FLU IMMUNIZE NO ADMIN: HCPCS | Performed by: PAIN MEDICINE

## 2022-11-17 PROCEDURE — G8417 CALC BMI ABV UP PARAM F/U: HCPCS | Performed by: PAIN MEDICINE

## 2022-11-17 PROCEDURE — 1090F PRES/ABSN URINE INCON ASSESS: CPT | Performed by: PAIN MEDICINE

## 2022-11-17 PROCEDURE — 1123F ACP DISCUSS/DSCN MKR DOCD: CPT | Performed by: PAIN MEDICINE

## 2022-11-17 RX ORDER — OXYCODONE AND ACETAMINOPHEN 7.5; 325 MG/1; MG/1
1 TABLET ORAL EVERY 6 HOURS PRN
Qty: 120 TABLET | Refills: 0 | Status: SHIPPED | OUTPATIENT
Start: 2022-11-17 | End: 2022-12-17

## 2022-11-17 RX ORDER — TIZANIDINE 2 MG/1
2 TABLET ORAL NIGHTLY
Qty: 30 TABLET | Refills: 2 | Status: SHIPPED | OUTPATIENT
Start: 2022-11-17 | End: 2022-12-17

## 2022-11-17 RX ORDER — GABAPENTIN 600 MG/1
600 TABLET ORAL 3 TIMES DAILY
Qty: 90 TABLET | Refills: 0 | Status: SHIPPED | OUTPATIENT
Start: 2022-11-17 | End: 2022-12-17

## 2022-11-17 RX ORDER — MELOXICAM 15 MG/1
15 TABLET ORAL DAILY
Qty: 30 TABLET | Refills: 2 | Status: SHIPPED | OUTPATIENT
Start: 2022-11-17

## 2022-11-17 NOTE — PROGRESS NOTES
INTERBODY CAGE FUSION performed by Rasheed Arthur MD at [place] [address]      as child    TRACHEAL SURGERY  2010    in [place]--EMT attempted to intubate patient due to trouble breathing & then needed repair     Family History   Problem Relation Age of Onset    Diabetes Mother     Obesity Mother     High Cholesterol Mother     High Cholesterol Father     Other Sister         GSW to spine & paralysis    No Known Problems Brother     No Known Problems Son     No Known Problems Daughter     Other Brother         as infant     Social History     Socioeconomic History    Marital status: Single     Spouse name: Not on file    Number of children: Not on file    Years of education: Not on file    Highest education level: Not on file   Occupational History    Not on file   Tobacco Use    Smoking status: Former     Packs/day: 1.00     Years: 30.00     Pack years: 30.00     Types: Cigarettes     Quit date: 4/6/2019     Years since quitting: 3.6    Smokeless tobacco: Never    Tobacco comments:     intermittent habit   Vaping Use    Vaping Use: Never used   Substance and Sexual Activity    Alcohol use:  Yes     Alcohol/week: 0.0 standard drinks     Comment: social    Drug use: Not on file    Sexual activity: Not on file   Other Topics Concern    Not on file   Social History Narrative         Lives With: Alone    Type of Home: House    Home Layout: One level    Home Access: Stairs to enter with rails    Entrance Stairs - Number of Steps: 4-5    Entrance Stairs - Rails: Both    Bathroom Shower/Tub: Walk-in shower, Tub/Shower unit(uses walk in)    [employer]: Built-in shower seat, Grab bars in shower    Home Equipment: (plans to purchase hip kit and borrow walker)    Receives Help From: Family(father and fathers wife can assist per pt, pt states \"doctor downplayed the sx\" so pt thought she would recover quickly)    ADL Assistance: [address] [place] [address] Avenue: Independent    Homemaking Responsibilities: Yes(yard work included)    Ambulation Assistance: Independent(no AD)    Transfer Assistance: Independent    Active : Yes    Mode of Transportation: Car    Occupation: Retired    Type of occupation: Homemaker    Additional Comments: Pt. states lately she has had to sit after only 20 minutes of standing before sx     Social Determinants of Health     Financial Resource Strain: Not on file   Food Insecurity: Not on file   Transportation Needs: Not on file   Physical Activity: Not on file   Stress: Not on file   Social Connections: Not on file   Intimate Partner Violence: Not on file   Housing Stability: Not on file       Current Outpatient Medications on File Prior to Visit   Medication Sig Dispense Refill    lidocaine (LIDODERM) 5 % Place 1 patch onto the skin daily 12 hours on, 12 hours off.  30 patch 0    lisinopril (PRINIVIL;ZESTRIL) 5 MG tablet TAKE 1 TABLET BY MOUTH DAILY      naloxone 4 MG/0.1ML LIQD nasal spray 1 spray by Nasal route as needed for Opioid Reversal 1 each 1    Multiple Vitamins-Minerals (THERAPEUTIC MULTIVITAMIN-MINERALS) tablet Take 1 tablet by mouth daily      magnesium oxide (MAG-OX) 400 MG tablet Take 400 mg by mouth daily      Lactobacillus (ACIDOPHILUS) 100 MG CAPS Take by mouth as needed      calcium carbonate (OSCAL) 500 MG TABS tablet Take 500 mg by mouth daily      vitamin C (ASCORBIC ACID) 500 MG tablet Take 500 mg by mouth daily      Milk Thistle 150 MG CAPS Take by mouth daily      pseudoephedrine (SUDAFED) 60 MG tablet Take 60 mg by mouth every 4 hours as needed for Congestion      busPIRone (BUSPAR) 7.5 MG tablet Take 15 mg by mouth 2 times daily       pramipexole (MIRAPEX) 0.25 MG tablet TK ONE OR TWO TS PO QHS      ipratropium (ATROVENT) 0.03 % nasal spray U 2 SPRAYS IEN BID      ibuprofen (ADVIL;MOTRIN) 200 MG tablet Take 600 mg by mouth every 6 hours as needed for Pain (Patient not taking: Reported on 10/20/2022)      naloxone (NARCAN) 4 MG/0.1ML LIQD nasal spray 1 spray by Nasal route as needed for Opioid Reversal 1 each 0     No current facility-administered medications on file prior to visit. HPI    Review of Systems   All other systems reviewed and are negative. Objective:     Vitals:  /80 (Site: Left Upper Arm, Position: Sitting)   Temp 96.8 °F (36 °C)   Ht 5' (1.524 m)   Wt 160 lb (72.6 kg)   LMP  (LMP Unknown)   BMI 31.25 kg/m² Pain Score:   7      Physical Exam    Patient alert and oriented times three, recent and remote memory intact, mood and affect normal, judgement and insight normal. Strength functional for ambulation. Balance and coordinational functional. Visualized skin intact. No visualized cyanosis, pulses intact. Cranial nerves 2-12 grossly intact. No obvious upper motor neuron signs seen. Sensation intact to light touch. Lumbar scar decreased range of motion straight leg raise antalgic gait, Pain Over S I joint with (+) provacative manuvers. SLR Negative. Assessment:      Diagnosis Orders   1. Spinal stenosis, lumbar region, without neurogenic claudication  oxyCODONE-acetaminophen (PERCOCET) 7.5-325 MG per tablet    gabapentin (NEURONTIN) 600 MG tablet      2. S/P lumbar and lumbosacral fusion by anterior technique  oxyCODONE-acetaminophen (PERCOCET) 7.5-325 MG per tablet    gabapentin (NEURONTIN) 600 MG tablet      3. Chronic pain syndrome  oxyCODONE-acetaminophen (PERCOCET) 7.5-325 MG per tablet      4. Lumbosacral spondylosis without myelopathy  oxyCODONE-acetaminophen (PERCOCET) 7.5-325 MG per tablet      5. Lumbar radiculopathy  oxyCODONE-acetaminophen (PERCOCET) 7.5-325 MG per tablet    gabapentin (NEURONTIN) 600 MG tablet      6. Encounter for long-term opiate analgesic use  oxyCODONE-acetaminophen (PERCOCET) 7.5-325 MG per tablet      7. Muscle spasm  tiZANidine (ZANAFLEX) 2 MG tablet      8.  Sacroiliitis, not elsewhere classified ([address]  CT INJECT SI JOINT ARTHRGRPHY&/ANES/STEROID W/IMAGE          Plan:

## 2022-11-22 ENCOUNTER — PROCEDURE VISIT (OUTPATIENT)
Dept: PAIN MANAGEMENT | Age: 67
End: 2022-11-22
Payer: MEDICARE

## 2022-11-22 DIAGNOSIS — M46.1 SACROILIITIS, NOT ELSEWHERE CLASSIFIED (HCC): ICD-10-CM

## 2022-11-22 PROCEDURE — 27096 INJECT SACROILIAC JOINT: CPT | Performed by: PAIN MEDICINE

## 2022-11-22 RX ORDER — BETAMETHASONE SODIUM PHOSPHATE AND BETAMETHASONE ACETATE 3; 3 MG/ML; MG/ML
6 INJECTION, SUSPENSION INTRA-ARTICULAR; INTRALESIONAL; INTRAMUSCULAR; SOFT TISSUE ONCE
Status: COMPLETED | OUTPATIENT
Start: 2022-11-22 | End: 2022-11-22

## 2022-11-22 RX ORDER — LIDOCAINE HYDROCHLORIDE 10 MG/ML
10 INJECTION, SOLUTION EPIDURAL; INFILTRATION; INTRACAUDAL; PERINEURAL ONCE
Status: COMPLETED | OUTPATIENT
Start: 2022-11-22 | End: 2022-11-22

## 2022-11-22 RX ADMIN — BETAMETHASONE SODIUM PHOSPHATE AND BETAMETHASONE ACETATE 6 MG: 3; 3 INJECTION, SUSPENSION INTRA-ARTICULAR; INTRALESIONAL; INTRAMUSCULAR; SOFT TISSUE at 10:43

## 2022-11-22 RX ADMIN — LIDOCAINE HYDROCHLORIDE 10 MG: 10 INJECTION, SOLUTION EPIDURAL; INFILTRATION; INTRACAUDAL; PERINEURAL at 10:42

## 2022-11-22 NOTE — PROGRESS NOTES
[facility]) Physicians  Neurosurgery and Pain [address] [place], [facility]: [phone]  F: [phone]      Patient Name: Jennifer Walters  : 1955     Date:  2022      Physician: Enrique Shah DO        Jennifer Walters is here today for interventional pain management. Preoperatively, the patient presents with SI joint mediated pain, as per history and exam. Patient has failed NSAIDs, PT, and conservative treatment. Patient has significant psychological and functional impairment due to this condition. Standard ASI guidelines were followed and sterile technique used. Area was cleaned with Betadine x3. Informed consent was obtained. Fluoroscopic guidance was used for this procedure. S.I. JOINT:  Bilateral  Appropriate length spinal needle was advanced to the S.I. Joint. Negative aspiration was achieved. In total, approximately 6mg of Betamethasone and 2ml of 1% preservative free Lidocaine was injected without difficulty. Patient tolerated the procedure well, no obvious complications occurred during the procedure. Patient was appropriately monitored and discharged home in stable condition with their usual motor strength. Post Op Instructions were given to patient. Relevant and recent imaging reviewed with patient today.                                       Enrique Shah DO

## 2022-12-20 ENCOUNTER — OFFICE VISIT (OUTPATIENT)
Dept: PAIN MANAGEMENT | Age: 67
End: 2022-12-20
Payer: MEDICARE

## 2022-12-20 VITALS
SYSTOLIC BLOOD PRESSURE: 100 MMHG | TEMPERATURE: 97.2 F | WEIGHT: 150 LBS | BODY MASS INDEX: 29.45 KG/M2 | HEART RATE: 78 BPM | HEIGHT: 60 IN | DIASTOLIC BLOOD PRESSURE: 60 MMHG | OXYGEN SATURATION: 95 %

## 2022-12-20 DIAGNOSIS — Z79.891 ENCOUNTER FOR LONG-TERM OPIATE ANALGESIC USE: ICD-10-CM

## 2022-12-20 DIAGNOSIS — G89.4 CHRONIC PAIN SYNDROME: ICD-10-CM

## 2022-12-20 DIAGNOSIS — Z98.1 S/P LUMBAR AND LUMBOSACRAL FUSION BY ANTERIOR TECHNIQUE: ICD-10-CM

## 2022-12-20 DIAGNOSIS — M47.817 LUMBOSACRAL SPONDYLOSIS WITHOUT MYELOPATHY: ICD-10-CM

## 2022-12-20 DIAGNOSIS — M48.061 SPINAL STENOSIS, LUMBAR REGION, WITHOUT NEUROGENIC CLAUDICATION: ICD-10-CM

## 2022-12-20 DIAGNOSIS — M54.16 LUMBAR RADICULOPATHY: ICD-10-CM

## 2022-12-20 PROCEDURE — 1123F ACP DISCUSS/DSCN MKR DOCD: CPT | Performed by: NURSE PRACTITIONER

## 2022-12-20 PROCEDURE — G8417 CALC BMI ABV UP PARAM F/U: HCPCS | Performed by: NURSE PRACTITIONER

## 2022-12-20 PROCEDURE — 1036F TOBACCO NON-USER: CPT | Performed by: NURSE PRACTITIONER

## 2022-12-20 PROCEDURE — 99214 OFFICE O/P EST MOD 30 MIN: CPT | Performed by: NURSE PRACTITIONER

## 2022-12-20 PROCEDURE — G8484 FLU IMMUNIZE NO ADMIN: HCPCS | Performed by: NURSE PRACTITIONER

## 2022-12-20 PROCEDURE — G8400 PT W/DXA NO RESULTS DOC: HCPCS | Performed by: NURSE PRACTITIONER

## 2022-12-20 PROCEDURE — G8427 DOCREV CUR MEDS BY ELIG CLIN: HCPCS | Performed by: NURSE PRACTITIONER

## 2022-12-20 PROCEDURE — 3017F COLORECTAL CA SCREEN DOC REV: CPT | Performed by: NURSE PRACTITIONER

## 2022-12-20 PROCEDURE — 1090F PRES/ABSN URINE INCON ASSESS: CPT | Performed by: NURSE PRACTITIONER

## 2022-12-20 RX ORDER — TIZANIDINE 2 MG/1
TABLET ORAL
COMMUNITY
Start: 2022-12-19

## 2022-12-20 RX ORDER — BUSPIRONE HYDROCHLORIDE 15 MG/1
TABLET ORAL
COMMUNITY
Start: 2022-10-19

## 2022-12-20 RX ORDER — ALPRAZOLAM 0.5 MG/1
TABLET ORAL
COMMUNITY
Start: 2022-11-11

## 2022-12-20 RX ORDER — OXYCODONE AND ACETAMINOPHEN 7.5; 325 MG/1; MG/1
1 TABLET ORAL EVERY 6 HOURS PRN
Qty: 120 TABLET | Refills: 0 | Status: SHIPPED | OUTPATIENT
Start: 2022-12-20 | End: 2023-01-19

## 2022-12-20 ASSESSMENT — ENCOUNTER SYMPTOMS
SHORTNESS OF BREATH: 0
BACK PAIN: 1
ABDOMINAL PAIN: 0
SORE THROAT: 0

## 2022-12-20 NOTE — PROGRESS NOTES
Colleen Lamb  (1955)    12/20/2022    Subjective:     Colleen Lamb is 75 y.o. female who complains today of:    Chief Complaint   Patient presents with    Back Pain         Allergies:  Patient has no known allergies. Past Medical History:   Diagnosis Date    Anxiety     Coronary artery disease involving native coronary artery of native heart without angina pectoris 12/11/2018    Hyperlipidemia     meds > 5 yrs    Osteoarthritis      Past Surgical History:   Procedure Laterality Date    BACK SURGERY  2017    lumbar disc OR    COLONOSCOPY      DILATION AND CURETTAGE OF UTERUS      at age 30s--miscarriage    LUMBAR FUSION Left 8/12/2020    ANTERIOR LEFT RETROPERITONEAL L5-S1 DISKECTOMY INTERBODY CAGE FUSION performed by Marcie Mosquera MD at [facility]      as child    TRACHEAL SURGERY  2010    in [place]--EMT attempted to intubate patient due to trouble breathing & then needed repair     Family History   Problem Relation Age of Onset    Diabetes Mother     Obesity Mother     High Cholesterol Mother     High Cholesterol Father     Other Sister         GSW to spine & paralysis    No Known Problems Brother     No Known Problems Son     No Known Problems Daughter     Other Brother         as infant     Social History     Socioeconomic History    Marital status: Single     Spouse name: Not on file    Number of children: Not on file    Years of education: Not on file    Highest education level: Not on file   Occupational History    Not on file   Tobacco Use    Smoking status: Former     Packs/day: 1.00     Years: 30.00     Pack years: 30.00     Types: Cigarettes     Quit date: 4/6/2019     Years since quitting: 3.7    Smokeless tobacco: Never    Tobacco comments:     intermittent habit   Vaping Use    Vaping Use: Never used   Substance and Sexual Activity    Alcohol use:  Yes     Alcohol/week: 0.0 standard drinks     Comment: social    Drug use: Not on file    Sexual activity: Not on file   Other Topics Concern    Not on file   Social History Narrative         Lives With: Alone    Type of Home: House    Home Layout: One level    Home Access: Stairs to enter with rails    Entrance Stairs - Number of Steps: 4-5    Entrance Stairs - Rails: Both    Bathroom Shower/Tub: Walk-in shower, Tub/Shower unit(uses walk in)    [employer]: Built-in shower seat, Grab bars in shower    Home Equipment: (plans to purchase hip kit and borrow walker)    Receives Help From: Family(father and fathers wife can assist per pt, pt states \"doctor downplayed the sx\" so pt thought she would recover quickly)    ADL Assistance: Independent    Homemaking Assistance: Independent    Homemaking Responsibilities: Yes(yard work included)    Ambulation Assistance: Independent(no AD)    Transfer Assistance: Independent    Active : Yes    Mode of Transportation: Car    Occupation: Retired    Type of occupation: Homemaker    Additional Comments: Pt. states lately she has had to sit after only 20 minutes of standing before sx     Social Determinants of Health     Financial Resource Strain: Not on file   Food Insecurity: Not on file   Transportation Needs: Not on file   Physical Activity: Not on file   Stress: Not on file   Social Connections: Not on file   Intimate Partner Violence: Not on file   Housing Stability: Not on file       Current Outpatient Medications on File Prior to Visit   Medication Sig Dispense Refill    ALPRAZolam (XANAX) 0.5 MG tablet TAKE 1 TABLET BY MOUTH TWICE DAILY AS NEEDED FOR ANXIETY      busPIRone (BUSPAR) 15 MG tablet TAKE 1 TABLET BY MOUTH TWICE DAILY      tiZANidine (ZANAFLEX) 2 MG tablet       meloxicam (MOBIC) 15 MG tablet Take 1 tablet by mouth daily 30 tablet 2    lidocaine (LIDODERM) 5 % Place 1 patch onto the skin daily 12 hours on, 12 hours off.  30 patch 0    lisinopril (PRINIVIL;ZESTRIL) 5 MG tablet TAKE 1 TABLET BY MOUTH DAILY      naloxone 4 MG/0.1ML LIQD nasal spray 1 spray by Nasal route as needed for Opioid Reversal 1 each 1    Multiple Vitamins-Minerals (THERAPEUTIC MULTIVITAMIN-MINERALS) tablet Take 1 tablet by mouth daily      magnesium oxide (MAG-OX) 400 MG tablet Take 400 mg by mouth daily      Lactobacillus (ACIDOPHILUS) 100 MG CAPS Take by mouth as needed      calcium carbonate (OSCAL) 500 MG TABS tablet Take 500 mg by mouth daily      vitamin C (ASCORBIC ACID) 500 MG tablet Take 500 mg by mouth daily      Milk Thistle 150 MG CAPS Take by mouth daily      pseudoephedrine (SUDAFED) 60 MG tablet Take 60 mg by mouth every 4 hours as needed for Congestion      busPIRone (BUSPAR) 7.5 MG tablet Take 15 mg by mouth 2 times daily       pramipexole (MIRAPEX) 0.25 MG tablet TK ONE OR TWO TS PO QHS      ipratropium (ATROVENT) 0.03 % nasal spray U 2 SPRAYS IEN BID      gabapentin (NEURONTIN) 600 MG tablet Take 1 tablet by mouth 3 times daily for 30 days. 90 tablet 0     No current facility-administered medications on file prior to visit. Pt presents today for a f/u of chronic low back and LLE pain. She feels the SI joint injection has helped a lot. Tore right meniscus in November, saw ortho, got injection. Pt feels pain level and functioning improves with prescribed medications and is able to perform ADLs. Pt feels that twisting aggravates the pain. Pt c/o LLE \"burning pain\" and N/T down to the thigh. She says she has lost 10 pounds with some relief. Previous SI injection and MIKAELA did not help. Saw Dr. Radha Reyes March 2021 for consult and no further surgery recommended - noted Lt meralgia paraesthetica, Lt L3 radiculopathy and spondylosis in note. She is not interested in SCS. She continues to use her virtual . She has been told to limit alcohol intake due to her Percocet. Discussed NDP in the past.  Has [facility] neurosurgery. She says SCS was recommended. They recommended she see Dr. Waldemar Hernandez for injections but patient declines for now.         Past Surgical Hx:   06/23/2017 right and bilateral L4-5 microdissection, discectomy, interbody posterolateral fusion, pedicle screws.  [address]    8/12/2020 left anterior retroperitoneal L5-S1 discectomy interbody cage fusion. 11/22/22 S. I. JOINT:  Bilateral  2/15/21 BL SI inj    From Care Everywhere: \"EMG 9/23/21  left lower extremity did show evidence of chronic left-sided lumbosacral polyradiculopathy affecting left L4? L5-S1 nerve roots with active denervation. I took liberty of sending Jean to see Dr. Juan Luis Broderick from pain management to try some interventional treatment and since she failed multiple conservative management in the past she may be a good candidate for spinal cord stimulator. She will continue with home exercise program and same medications unchanged, for now. \"            Review of Systems   Constitutional:  Negative for fever. HENT:  Negative for congestion and sore throat. Respiratory:  Negative for shortness of breath. Gastrointestinal:  Negative for abdominal pain. Genitourinary:  Negative for difficulty urinating. Musculoskeletal:  Positive for back pain. Neurological:  Negative for speech difficulty and headaches. Hematological:  Negative for adenopathy. Psychiatric/Behavioral:  Negative for agitation. All other systems reviewed and are negative. Objective:     Vitals:  /60 (Site: Left Upper Arm, Position: Sitting, Cuff Size: Small Adult)   Pulse 78   Temp 97.2 °F (36.2 °C) (Temporal)   Ht 5' (1.524 m)   Wt 150 lb (68 kg)   LMP  (LMP Unknown)   SpO2 95%   BMI 29.29 kg/m² Pain Score:   5      Physical Exam  Vitals and nursing note reviewed. Pt is alert and oriented x 3. Recent and remote memory is intact. Mood, judgement and affect are normal.  No signs of distress or SOB noted. Visualized skin intact. Sensation intact to light touch. Decreased ROM with flexion and extension of low back.   Tender with palpation to bilateral lumbar spine with positive provacative maneuvers noted.  Negative SLR. Pt is able to briefly heel walk and toe walk. Strength, balance, and coordination are functional for ambulation. TTP over BL SI joints. Assessment:      Diagnosis Orders   1. Spinal stenosis, lumbar region, without neurogenic claudication  oxyCODONE-acetaminophen (PERCOCET) 7.5-325 MG per tablet      2. S/P lumbar and lumbosacral fusion by anterior technique  oxyCODONE-acetaminophen (PERCOCET) 7.5-325 MG per tablet      3. Chronic pain syndrome  oxyCODONE-acetaminophen (PERCOCET) 7.5-325 MG per tablet      4. Lumbosacral spondylosis without myelopathy  oxyCODONE-acetaminophen (PERCOCET) 7.5-325 MG per tablet      5. Lumbar radiculopathy  oxyCODONE-acetaminophen (PERCOCET) 7.5-325 MG per tablet      6. Encounter for long-term opiate analgesic use  oxyCODONE-acetaminophen (PERCOCET) 7.5-325 MG per tablet          Plan:     Periodic Controlled Substance Monitoring: Possible medication side effects, risk of tolerance/dependence & alternative treatments discussed., No signs of potential drug abuse or diversion identified. , Assessed functional status., Obtaining appropriate analgesic effect of treatment. (Myrna Alvarez, APRN - CNP)    Orders Placed This Encounter   Medications    oxyCODONE-acetaminophen (PERCOCET) 7.5-325 MG per tablet     Sig: Take 1 tablet by mouth every 6 hours as needed for Pain for up to 30 days. Dispense:  120 tablet     Refill:  0     Reduce doses taken as pain becomes manageable       No orders of the defined types were placed in this encounter. Discussed options with the patient today. Continue Percocet, gabapentin, and tizanidine, and lidocaine patches. Upcoming hernia surgery next month. Purnima Baker for post op pain meds from surgeon. Continue to follow with ortho for the right knee. All questions were answered. Pt verbalized understanding and agrees with above plan. Utox March 2022 + oxycodone, gabapentin, xanax, and  ethyl.  Advised to avoid ETOH while taking opiates. Continue to monitor closely. ORT score 2 - considered high risk d/t above. Narcan Rx sent in June 2021. Utox reviewed August 2022 -  appropriate for oxycodone and gabapentin, but also alcohol but at much lower level than previous Utox. Discuss at follow-up concerning alcohol with narcotics per NDP. This has been discussed in the past and pt states she will have a couple glasses of wine a week at night. Will hold off reducing percocet at this time, and discussed in detail risk of ETOH and narcotics. If high level in the future again, then will wean medications as discussed. Will not give early refills. Used medication as directed. ORT score 2 - considered high risk d/t above. Narcan Rx sent in June 2021. Will continue medications for chronic pain as they help pt function with ADL and improve quality of life. Discussed possible risks of opiate medication with pt, including but not limited to, constipation, nausea or vomiting, sedation, urinary retention, dependence and possible addiction. Pt agrees to use medication as directed. Pt advised to not use opiates while driving or operating heavy equipment, or in situations where pt may harm him/herself or others. Pt is aware that while on narcotics, pt needs to be seen monthly to reassess pain and need for continued medication. NDP reviewed. OARRS was reviewed. This NP saw pt under direct supervision of Dr. Ye Forrest. Follow up:  Return in about 4 weeks (around 1/17/2023) for review meds and reassess pain.     Trinity Lamb, APRN - CNP

## 2023-01-17 DIAGNOSIS — M47.817 LUMBOSACRAL SPONDYLOSIS WITHOUT MYELOPATHY: ICD-10-CM

## 2023-01-17 DIAGNOSIS — Z79.891 ENCOUNTER FOR LONG-TERM OPIATE ANALGESIC USE: ICD-10-CM

## 2023-01-17 DIAGNOSIS — Z98.1 S/P LUMBAR AND LUMBOSACRAL FUSION BY ANTERIOR TECHNIQUE: ICD-10-CM

## 2023-01-17 DIAGNOSIS — M54.16 LUMBAR RADICULOPATHY: ICD-10-CM

## 2023-01-17 DIAGNOSIS — G89.4 CHRONIC PAIN SYNDROME: ICD-10-CM

## 2023-01-17 DIAGNOSIS — M48.061 SPINAL STENOSIS, LUMBAR REGION, WITHOUT NEUROGENIC CLAUDICATION: ICD-10-CM

## 2023-01-17 RX ORDER — OXYCODONE AND ACETAMINOPHEN 7.5; 325 MG/1; MG/1
1 TABLET ORAL EVERY 6 HOURS PRN
Qty: 8 TABLET | Refills: 0 | Status: SHIPPED | OUTPATIENT
Start: 2023-01-19 | End: 2023-01-20 | Stop reason: SDUPTHER

## 2023-01-17 NOTE — TELEPHONE ENCOUNTER
Patient states she will be out of this medication  today, she is asking if it can be okayed that she picks up the new Rx today?

## 2023-01-17 NOTE — TELEPHONE ENCOUNTER
Requested Prescriptions     Pending Prescriptions Disp Refills    oxyCODONE-acetaminophen (PERCOCET) 7.5-325 MG per tablet 8 tablet 0     Sig: Take 1 tablet by mouth every 6 hours as needed for Pain for up to 2 days.  Max Daily Amount: 4 tablets       Patient last seen on:  12/20  Date of last surgery:  n/a  Date of last refill:  12/20  Pain level:  n/a  Patient complaining of:  PT is asking for refill  Future appts: 1/20

## 2023-01-18 ENCOUNTER — TELEPHONE (OUTPATIENT)
Dept: PAIN MANAGEMENT | Age: 68
End: 2023-01-18

## 2023-01-18 NOTE — TELEPHONE ENCOUNTER
Patient called requesting that her medication be filled today, early by 2 days. Patient was significantly slurring her words and has called several times this morning. She stated that her pharmacy won't fill it until Friday 1/20/23 even though the start date written is for Thursday 1/19/23. I told her I'd call the pharmacy to confirm that they would fill it Thursday 1/19/23. When I called the pharmacy, I spoke with the pharmacist Fanny Gomez. She stated that the patient was harassing and threatening her and her staff both last night and this morning. She further explained that the patient has been noted to [facility] a problem\" and that she is on what's known as \"the cocktail\" of a muscle relaxer, Xanax and now Percocet. Pharmacist requested that Dr Etta Quintero be made aware of this.    [facility] [address] [phone]   [address] [place] [id number]   Phone:  [phone]  Fax:  [phone]

## 2023-01-20 ENCOUNTER — TELEMEDICINE (OUTPATIENT)
Dept: PAIN MANAGEMENT | Age: 68
End: 2023-01-20
Payer: MEDICARE

## 2023-01-20 ENCOUNTER — TELEPHONE (OUTPATIENT)
Dept: PAIN MANAGEMENT | Age: 68
End: 2023-01-20

## 2023-01-20 DIAGNOSIS — Z79.891 ENCOUNTER FOR LONG-TERM OPIATE ANALGESIC USE: ICD-10-CM

## 2023-01-20 DIAGNOSIS — M54.16 LUMBAR RADICULOPATHY: ICD-10-CM

## 2023-01-20 DIAGNOSIS — Z98.1 S/P LUMBAR AND LUMBOSACRAL FUSION BY ANTERIOR TECHNIQUE: ICD-10-CM

## 2023-01-20 DIAGNOSIS — M48.061 SPINAL STENOSIS, LUMBAR REGION, WITHOUT NEUROGENIC CLAUDICATION: ICD-10-CM

## 2023-01-20 DIAGNOSIS — M47.817 LUMBOSACRAL SPONDYLOSIS WITHOUT MYELOPATHY: ICD-10-CM

## 2023-01-20 DIAGNOSIS — G89.4 CHRONIC PAIN SYNDROME: ICD-10-CM

## 2023-01-20 PROCEDURE — 99441 PR PHYS/QHP TELEPHONE EVALUATION 5-10 MIN: CPT | Performed by: NURSE PRACTITIONER

## 2023-01-20 RX ORDER — OXYCODONE AND ACETAMINOPHEN 7.5; 325 MG/1; MG/1
1 TABLET ORAL EVERY 6 HOURS PRN
Qty: 120 TABLET | Refills: 0 | Status: SHIPPED | OUTPATIENT
Start: 2023-01-20 | End: 2023-02-19

## 2023-01-20 ASSESSMENT — ENCOUNTER SYMPTOMS
SORE THROAT: 0
BACK PAIN: 1
SHORTNESS OF BREATH: 0
ABDOMINAL PAIN: 0

## 2023-01-20 NOTE — TELEPHONE ENCOUNTER
PT states she woke up not feeling well. She is asking if she should r/s or can she do a telehealth appointment with Theresa Reyes today?  Appointment is at 9:15

## 2023-01-20 NOTE — PROGRESS NOTES
Chelle Ledesma  (1955)    1/20/2023    TELEHEALTH EVALUATION -- Audio Only (During LTAJZ-59 public health emergency)    Due to [place] 19 outbreak, patient's office visit was converted to a virtual visit. Patient was contacted and agreed to proceed with a audio only telehealth service. Patient understands that this encounter is a billable visit and that insurance coverage and co-pays are up to their individual insurance plans. At the beginning of this telehealth encounter, I verified with the patient their name and date of birth. Verbal consent for telehealth encounter was obtained. Subjective:       Chief Complaint   Patient presents with    Back Pain    Knee Pain    Medication Refill       Chelle Ledesma is 75 y.o. female who complains today of: Allergies:  Patient has no known allergies.     History:  Past Medical History:   Diagnosis Date    Anxiety     Coronary artery disease involving native coronary artery of native heart without angina pectoris 12/11/2018    Hyperlipidemia     meds > 5 yrs    Osteoarthritis      Past Surgical History:   Procedure Laterality Date    BACK SURGERY  2017    lumbar disc OR    COLONOSCOPY      DILATION AND CURETTAGE OF UTERUS      at age 30s--miscarriage    LUMBAR FUSION Left 8/12/2020    ANTERIOR LEFT RETROPERITONEAL L5-S1 DISKECTOMY INTERBODY CAGE FUSION performed by Allyson Ramirez MD at [facility]      as child    TRACHEAL SURGERY  2010    in [place]--EMT attempted to intubate patient due to trouble breathing & then needed repair     Family History   Problem Relation Age of Onset    Diabetes Mother     Obesity Mother     High Cholesterol Mother     High Cholesterol Father     Other Sister         GSW to spine & paralysis    No Known Problems Brother     No Known Problems Son     No Known Problems Daughter     Other Brother         as infant     Social History     Socioeconomic History    Marital status: Single     Spouse name: Not on file    Number of children: Not on file    Years of education: Not on file    Highest education level: Not on file   Occupational History    Not on file   Tobacco Use    Smoking status: Former     Packs/day: 1.00     Years: 30.00     Pack years: 30.00     Types: Cigarettes     Quit date: 4/6/2019     Years since quitting: 3.7    Smokeless tobacco: Never    Tobacco comments:     intermittent habit   Vaping Use    Vaping Use: Never used   Substance and Sexual Activity    Alcohol use:  Yes     Alcohol/week: 0.0 standard drinks     Comment: social    Drug use: Not on file    Sexual activity: Not on file   Other Topics Concern    Not on file   Social History Narrative         Lives With: Alone    Type of Home: House    Home Layout: One level    Home Access: Stairs to enter with rails    Entrance Stairs - Number of Steps: 4-5    Entrance Stairs - Rails: Both    Bathroom Shower/Tub: Walk-in shower, Tub/Shower unit(uses walk in)    [employer]: Built-in shower seat, Grab bars in shower    Home Equipment: (plans to purchase hip kit and borrow walker)    Receives Help From: Family(father and fathers wife can assist per pt, pt states \"doctor downplayed the sx\" so pt thought she would recover quickly)    ADL Assistance: Independent    Homemaking Assistance: Independent    Homemaking Responsibilities: Yes(yard work included)    Ambulation Assistance: Independent(no AD)    Transfer Assistance: Independent    Active : Yes    Mode of Transportation: Car    Occupation: Retired    Type of occupation: Homemaker    Additional Comments: Pt. states lately she has had to sit after only 20 minutes of standing before sx     Social Determinants of Health     Financial Resource Strain: Not on file   Food Insecurity: Not on file   Transportation Needs: Not on file   Physical Activity: Not on file   Stress: Not on file   Social Connections: Not on file   Intimate Partner Violence: Not on file   Housing Stability: Not on file       Current Outpatient Medications on File Prior to Visit   Medication Sig Dispense Refill    ALPRAZolam (XANAX) 0.5 MG tablet TAKE 1 TABLET BY MOUTH TWICE DAILY AS NEEDED FOR ANXIETY      busPIRone (BUSPAR) 15 MG tablet TAKE 1 TABLET BY MOUTH TWICE DAILY      tiZANidine (ZANAFLEX) 2 MG tablet       lidocaine (LIDODERM) 5 % Place 1 patch onto the skin daily 12 hours on, 12 hours off. 30 patch 0    lisinopril (PRINIVIL;ZESTRIL) 5 MG tablet TAKE 1 TABLET BY MOUTH DAILY      naloxone 4 MG/0.1ML LIQD nasal spray 1 spray by Nasal route as needed for Opioid Reversal 1 each 1    Multiple Vitamins-Minerals (THERAPEUTIC MULTIVITAMIN-MINERALS) tablet Take 1 tablet by mouth daily      magnesium oxide (MAG-OX) 400 MG tablet Take 400 mg by mouth daily      Lactobacillus (ACIDOPHILUS) 100 MG CAPS Take by mouth as needed      calcium carbonate (OSCAL) 500 MG TABS tablet Take 500 mg by mouth daily      vitamin C (ASCORBIC ACID) 500 MG tablet Take 500 mg by mouth daily      Milk Thistle 150 MG CAPS Take by mouth daily      pseudoephedrine (SUDAFED) 60 MG tablet Take 60 mg by mouth every 4 hours as needed for Congestion      busPIRone (BUSPAR) 7.5 MG tablet Take 15 mg by mouth 2 times daily       pramipexole (MIRAPEX) 0.25 MG tablet TK ONE OR TWO TS PO QHS      ipratropium (ATROVENT) 0.03 % nasal spray U 2 SPRAYS IEN BID      meloxicam (MOBIC) 15 MG tablet Take 1 tablet by mouth daily (Patient not taking: Reported on 1/20/2023) 30 tablet 2    gabapentin (NEURONTIN) 600 MG tablet Take 1 tablet by mouth 3 times daily for 30 days. 90 tablet 0     No current facility-administered medications on file prior to visit. Pt presents today for a f/u of chronic low back and LLE pain. Telehealth visit today due to sore throat and earache. Hernia surgery was rescheduled by patient to 2/15. She had #12 tylenol 3 filled on 12/28 after dental work. Tore right meniscus in November, saw ortho, got injection.   Pt feels pain level and functioning improves with prescribed medications and is able to perform ADLs. Pt feels that twisting aggravates the pain. Pt c/o LLE \"burning pain\" and N/T down to the thigh. She says she has lost 10 pounds with some relief. Previous SI injection and MIKAELA did not help. Saw Dr. Rui Hale March 2021 for consult and no further surgery recommended - noted Lt meralgia paraesthetica, Lt L3 radiculopathy and spondylosis in note. She is not interested in SCS. She continues to use her virtual . She has been told to limit alcohol intake due to her Percocet. Discussed NDP in the past.  Has seen [phone] neurosurgery. She says SCS was recommended. They recommended she see Dr. Rosa Quintana for injections but patient declines for now. Past Surgical Hx:   06/23/2017 right and bilateral L4-5 microdissection, discectomy, interbody posterolateral fusion, pedicle screws. [facility].    8/12/2020 left anterior retroperitoneal L5-S1 discectomy interbody cage fusion. 11/22/22 S. I. JOINT:  Bilateral  2/15/21 BL SI inj    From Care Everywhere: \"EMG 9/23/21  left lower extremity did show evidence of chronic left-sided lumbosacral polyradiculopathy affecting left L4? L5-S1 nerve roots with active denervation. I took liberty of sending Taylor Gray to see Dr. Carroll Veronica from pain management to try some interventional treatment and since she failed multiple conservative management in the past she may be a good candidate for spinal cord stimulator. She will continue with home exercise program and same medications unchanged, for now. \"                Review of Systems   Constitutional:  Negative for fever. HENT:  Negative for congestion and sore throat. Respiratory:  Negative for shortness of breath. Gastrointestinal:  Negative for abdominal pain. Genitourinary:  Negative for difficulty urinating. Musculoskeletal:  Positive for back pain. Neurological:  Negative for speech difficulty and headaches. Hematological:  Negative for adenopathy.   Psychiatric/Behavioral:  Negative for agitation.    All other systems reviewed and are negative.    Objective:     Vitals:  LMP  (LMP Unknown)      PHYSICAL EXAMINATION:    [x] Alert  [x] Oriented to person/place/time  [x] No apparent distress      [x] Mood and affect normal  [x] Recent and remote memory intact  [x] Judgement and insight normal     [] OTHER:      Due to this being a TeleHealth encounter, evaluation of the following organ systems is limited: Vitals/Constitutional/EENT/Resp/CV/GI//MS/Neuro/Skin/Heme-Lymph-Imm.      Assessment:      Diagnosis Orders   1. Spinal stenosis, lumbar region, without neurogenic claudication  oxyCODONE-acetaminophen (PERCOCET) 7.5-325 MG per tablet      2. S/P lumbar and lumbosacral fusion by anterior technique  oxyCODONE-acetaminophen (PERCOCET) 7.5-325 MG per tablet      3. Chronic pain syndrome  oxyCODONE-acetaminophen (PERCOCET) 7.5-325 MG per tablet      4. Lumbosacral spondylosis without myelopathy  oxyCODONE-acetaminophen (PERCOCET) 7.5-325 MG per tablet      5. Lumbar radiculopathy  oxyCODONE-acetaminophen (PERCOCET) 7.5-325 MG per tablet      6. Encounter for long-term opiate analgesic use  oxyCODONE-acetaminophen (PERCOCET) 7.5-325 MG per tablet          Plan:     Periodic Controlled Substance Monitoring: Possible medication side effects, risk of tolerance/dependence & alternative treatments discussed., No signs of potential drug abuse or diversion identified., Assessed functional status., Obtaining appropriate analgesic effect of treatment. (Krystal Nolasco, APRN - CNP)    Orders Placed This Encounter   Medications    oxyCODONE-acetaminophen (PERCOCET) 7.5-325 MG per tablet     Sig: Take 1 tablet by mouth every 6 hours as needed for Pain for up to 30 days. Max Daily Amount: 4 tablets     Dispense:  120 tablet     Refill:  0     Reduce doses taken as pain becomes manageable       No orders of the defined types were placed in this encounter.    Discussed options  with the patient today. Continue Percocet, gabapentin, and tizanidine, and lidocaine patches. Upcoming hernia surgery next month. [id number] [address] for post op pain meds from surgeon. Continue to follow with ortho for the right knee. All questions were answered. Pt verbalized understanding and agrees with above plan. Utox March 2022 + oxycodone, gabapentin, xanax, and  ethyl. Advised to avoid ETOH while taking opiates. Continue to monitor closely. ORT score 2 - considered high risk d/t above. Narcan Rx sent in June 2021. Utox reviewed August 2022 -  appropriate for oxycodone and gabapentin, but also alcohol but at much lower level than previous Utox. Discuss at follow-up concerning alcohol with narcotics per NDP. This has been discussed in the past and pt states she will have a couple glasses of wine a week at night. Will hold off reducing percocet at this time, and discussed in detail risk of ETOH and narcotics. If high level in the future again, then will wean medications as discussed. Will not give early refills. Used medication as directed. ORT score 2 - considered high risk d/t above. Narcan Rx sent in June 2021. Will continue medications for chronic pain as they help pt function with ADL and improve quality of life. Discussed possible risks of opiate medication with pt, including but not limited to, constipation, nausea or vomiting, sedation, urinary retention, dependence and possible addiction. Pt agrees to use medication as directed. Pt advised to not use opiates while driving or operating heavy equipment, or in situations where pt may harm him/herself or others. Pt is aware that while on narcotics, pt needs to be seen monthly to reassess pain and need for continued medication. NDP reviewed. OARRS was reviewed. This NP saw pt under direct supervision of Dr. Gillian Zamora. Follow up:  Return in about 4 weeks (around 2/17/2023) for review meds and reassess pain.     ABENA Hutton - CNP      >50% of 10 minute appointment was spent with discussion, addressing questions and concerns, including prognosis, treatment options, patient education, and recommendations. [id number]    Pursuant to the emergency declaration under the [facility] waiver authority and the [employer] and [employer] Act, this Virtual  Visit was conducted, with patient's consent, to reduce the patient's risk of exposure to COVID-19 and provide continuity of care for an established patient. Services were provided through an audio discussion to substitute for in-person clinic visit.

## 2023-02-14 ENCOUNTER — TELEPHONE (OUTPATIENT)
Dept: PAIN MANAGEMENT | Age: 68
End: 2023-02-14

## 2023-02-14 NOTE — TELEPHONE ENCOUNTER
Pt is scheduled for hernia surgery tomorrow 2/15/23 at [facility]. Pt asks if Dr. Saadia Huston will prescribe her post op pain medication as pt states surgeon will not due to pain mgmt contract? If so, pt asks if can be a different medication than Percocet 7.5-325 to ensure insurance will pay for it.

## 2023-02-17 DIAGNOSIS — M48.061 SPINAL STENOSIS, LUMBAR REGION, WITHOUT NEUROGENIC CLAUDICATION: ICD-10-CM

## 2023-02-17 DIAGNOSIS — Z79.891 ENCOUNTER FOR LONG-TERM OPIATE ANALGESIC USE: ICD-10-CM

## 2023-02-17 DIAGNOSIS — M47.817 LUMBOSACRAL SPONDYLOSIS WITHOUT MYELOPATHY: ICD-10-CM

## 2023-02-17 DIAGNOSIS — G89.4 CHRONIC PAIN SYNDROME: ICD-10-CM

## 2023-02-17 DIAGNOSIS — M54.16 LUMBAR RADICULOPATHY: ICD-10-CM

## 2023-02-17 DIAGNOSIS — Z98.1 S/P LUMBAR AND LUMBOSACRAL FUSION BY ANTERIOR TECHNIQUE: ICD-10-CM

## 2023-02-17 RX ORDER — OXYCODONE AND ACETAMINOPHEN 7.5; 325 MG/1; MG/1
1 TABLET ORAL EVERY 6 HOURS PRN
Qty: 4 TABLET | Refills: 0 | Status: SHIPPED | OUTPATIENT
Start: 2023-02-21 | End: 2023-02-22

## 2023-02-17 NOTE — TELEPHONE ENCOUNTER
Requested Prescriptions     Pending Prescriptions Disp Refills    oxyCODONE-acetaminophen (PERCOCET) 7.5-325 MG per tablet 12 tablet 0     Sig: Take 1 tablet by mouth every 6 hours as needed for Pain for up to 3 days.  Max Daily Amount: 4 tablets       Patient last seen on:  1/20  Date of last surgery:  n/a  Date of last refill:  1/20  Pain level:  n/a  Patient complaining of:  PT r/sed due to being sick, asking for refill  Future appts: 2/21

## 2023-02-21 ENCOUNTER — TELEPHONE (OUTPATIENT)
Dept: PAIN MANAGEMENT | Age: 68
End: 2023-02-21

## 2023-02-21 ENCOUNTER — OFFICE VISIT (OUTPATIENT)
Dept: PAIN MANAGEMENT | Age: 68
End: 2023-02-21
Payer: MEDICARE

## 2023-02-21 VITALS
BODY MASS INDEX: 29.45 KG/M2 | SYSTOLIC BLOOD PRESSURE: 112 MMHG | HEIGHT: 60 IN | WEIGHT: 150 LBS | DIASTOLIC BLOOD PRESSURE: 72 MMHG | TEMPERATURE: 98 F

## 2023-02-21 DIAGNOSIS — M47.817 LUMBOSACRAL SPONDYLOSIS WITHOUT MYELOPATHY: ICD-10-CM

## 2023-02-21 DIAGNOSIS — G89.4 CHRONIC PAIN SYNDROME: ICD-10-CM

## 2023-02-21 DIAGNOSIS — M48.061 SPINAL STENOSIS, LUMBAR REGION, WITHOUT NEUROGENIC CLAUDICATION: ICD-10-CM

## 2023-02-21 DIAGNOSIS — Z98.1 S/P LUMBAR AND LUMBOSACRAL FUSION BY ANTERIOR TECHNIQUE: ICD-10-CM

## 2023-02-21 DIAGNOSIS — M54.16 LUMBAR RADICULOPATHY: ICD-10-CM

## 2023-02-21 DIAGNOSIS — Z79.891 ENCOUNTER FOR LONG-TERM OPIATE ANALGESIC USE: ICD-10-CM

## 2023-02-21 PROCEDURE — G8400 PT W/DXA NO RESULTS DOC: HCPCS | Performed by: NURSE PRACTITIONER

## 2023-02-21 PROCEDURE — 3017F COLORECTAL CA SCREEN DOC REV: CPT | Performed by: NURSE PRACTITIONER

## 2023-02-21 PROCEDURE — G8427 DOCREV CUR MEDS BY ELIG CLIN: HCPCS | Performed by: NURSE PRACTITIONER

## 2023-02-21 PROCEDURE — 99214 OFFICE O/P EST MOD 30 MIN: CPT | Performed by: NURSE PRACTITIONER

## 2023-02-21 PROCEDURE — 1090F PRES/ABSN URINE INCON ASSESS: CPT | Performed by: NURSE PRACTITIONER

## 2023-02-21 PROCEDURE — 1123F ACP DISCUSS/DSCN MKR DOCD: CPT | Performed by: NURSE PRACTITIONER

## 2023-02-21 PROCEDURE — G8417 CALC BMI ABV UP PARAM F/U: HCPCS | Performed by: NURSE PRACTITIONER

## 2023-02-21 PROCEDURE — G8484 FLU IMMUNIZE NO ADMIN: HCPCS | Performed by: NURSE PRACTITIONER

## 2023-02-21 PROCEDURE — 1036F TOBACCO NON-USER: CPT | Performed by: NURSE PRACTITIONER

## 2023-02-21 RX ORDER — TIZANIDINE 2 MG/1
2 TABLET ORAL NIGHTLY PRN
Qty: 30 TABLET | Refills: 0 | Status: SHIPPED | OUTPATIENT
Start: 2023-02-21 | End: 2023-03-23

## 2023-02-21 RX ORDER — MELOXICAM 15 MG/1
15 TABLET ORAL DAILY
Qty: 30 TABLET | Refills: 2 | Status: SHIPPED | OUTPATIENT
Start: 2023-02-21

## 2023-02-21 RX ORDER — OXYCODONE AND ACETAMINOPHEN 7.5; 325 MG/1; MG/1
1 TABLET ORAL EVERY 6 HOURS PRN
Qty: 84 TABLET | Refills: 0 | Status: SHIPPED | OUTPATIENT
Start: 2023-02-21 | End: 2023-03-14

## 2023-02-21 ASSESSMENT — ENCOUNTER SYMPTOMS
SORE THROAT: 0
ABDOMINAL PAIN: 0
BACK PAIN: 1
SHORTNESS OF BREATH: 0

## 2023-02-21 NOTE — TELEPHONE ENCOUNTER
Received call from [facility] at the pharmacy wanting to make sure that Dr. Lizbet Hyde is aware that the patient is also on Alprazolam, Gabapentin, and Tizanidine with the Oxycodone. The insurance is requiring them to make sure we are informed of the controlled narcotic cocktail. Please call [facility] back to verify at [phone].

## 2023-02-21 NOTE — PROGRESS NOTES
Bc Chinchilla  (1955)    2/21/2023    Subjective:     Bc Chinchilla is 75 y.o. female who complains today of:    Chief Complaint   Patient presents with    1 Month Follow-Up    Back Pain     Lower back pain     Abdominal Pain         Allergies:  Patient has no known allergies. Past Medical History:   Diagnosis Date    Anxiety     Coronary artery disease involving native coronary artery of native heart without angina pectoris 12/11/2018    Hyperlipidemia     meds > 5 yrs    Osteoarthritis      Past Surgical History:   Procedure Laterality Date    BACK SURGERY  2017    lumbar disc OR    COLONOSCOPY      DILATION AND CURETTAGE OF UTERUS      at age 30s--miscarriage    LUMBAR FUSION Left 8/12/2020    ANTERIOR LEFT RETROPERITONEAL L5-S1 DISKECTOMY INTERBODY CAGE FUSION performed by Carmelita Latham MD at [place]      as child    TRACHEAL SURGERY  2010    in [place]--EMT attempted to intubate patient due to trouble breathing & then needed repair     Family History   Problem Relation Age of Onset    Diabetes Mother     Obesity Mother     High Cholesterol Mother     High Cholesterol Father     Other Sister         GSW to spine & paralysis    No Known Problems Brother     No Known Problems Son     No Known Problems Daughter     Other Brother         as infant     Social History     Socioeconomic History    Marital status: Single     Spouse name: Not on file    Number of children: Not on file    Years of education: Not on file    Highest education level: Not on file   Occupational History    Not on file   Tobacco Use    Smoking status: Former     Packs/day: 1.00     Years: 30.00     Pack years: 30.00     Types: Cigarettes     Quit date: 4/6/2019     Years since quitting: 3.8    Smokeless tobacco: Never    Tobacco comments:     intermittent habit   Vaping Use    Vaping Use: Never used   Substance and Sexual Activity    Alcohol use:  Yes     Alcohol/week: 0.0 standard drinks     Comment: social    Drug use: Not on file    Sexual activity: Not on file   Other Topics Concern    Not on file   Social History Narrative         Lives With: Alone    Type of Home: House    Home Layout: One level    Home Access: Stairs to enter with rails    Entrance Stairs - Number of Steps: 4-5    Entrance Stairs - Rails: Both    Bathroom Shower/Tub: Walk-in shower, Tub/Shower unit(uses walk in)    [employer]: Built-in shower seat, Grab bars in shower    Home Equipment: (plans to purchase hip kit and borrow walker)    Receives Help From: Family(father and fathers wife can assist per pt, pt states \"doctor downplayed the sx\" so pt thought she would recover quickly)    ADL Assistance: Independent    Homemaking Assistance: Independent    Homemaking Responsibilities: Yes(yard work included)    Ambulation Assistance: Independent(no AD)    Transfer Assistance: Independent    Active : Yes    Mode of Transportation: Car    Occupation: Retired    Type of occupation: Homemaker    Additional Comments: Pt. states lately she has had to sit after only 20 minutes of standing before sx     Social Determinants of Health     Financial Resource Strain: Not on file   Food Insecurity: Not on file   Transportation Needs: Not on file   Physical Activity: Not on file   Stress: Not on file   Social Connections: Not on file   Intimate Partner Violence: Not on file   Housing Stability: Not on file       Current Outpatient Medications on File Prior to Visit   Medication Sig Dispense Refill    ALPRAZolam (XANAX) 0.5 MG tablet TAKE 1 TABLET BY MOUTH TWICE DAILY AS NEEDED FOR ANXIETY      busPIRone (BUSPAR) 15 MG tablet TAKE 1 TABLET BY MOUTH TWICE DAILY      lidocaine (LIDODERM) 5 % Place 1 patch onto the skin daily 12 hours on, 12 hours off.  30 patch 0    lisinopril (PRINIVIL;ZESTRIL) 5 MG tablet TAKE 1 TABLET BY MOUTH DAILY      naloxone 4 MG/0.1ML LIQD nasal spray 1 spray by Nasal route as needed for Opioid Reversal 1 each 1    Multiple Vitamins-Minerals (THERAPEUTIC MULTIVITAMIN-MINERALS) tablet Take 1 tablet by mouth daily      magnesium oxide (MAG-OX) 400 MG tablet Take 400 mg by mouth daily      Lactobacillus (ACIDOPHILUS) 100 MG CAPS Take by mouth as needed      calcium carbonate (OSCAL) 500 MG TABS tablet Take 500 mg by mouth daily      vitamin C (ASCORBIC ACID) 500 MG tablet Take 500 mg by mouth daily      Milk Thistle 150 MG CAPS Take by mouth daily      pseudoephedrine (SUDAFED) 60 MG tablet Take 60 mg by mouth every 4 hours as needed for Congestion      busPIRone (BUSPAR) 7.5 MG tablet Take 15 mg by mouth 2 times daily       pramipexole (MIRAPEX) 0.25 MG tablet TK ONE OR TWO TS PO QHS      ipratropium (ATROVENT) 0.03 % nasal spray U 2 SPRAYS IEN BID      gabapentin (NEURONTIN) 600 MG tablet Take 1 tablet by mouth 3 times daily for 30 days. 90 tablet 0     No current facility-administered medications on file prior to visit. Pt presents today for a f/u of chronic low back and LLE pain. Hernia surgery 2/15 with [facility]. She had #12 tylenol 3 filled on 12/28 after dental work. Tore right meniscus in November, saw ortho, got injection. Pt feels pain level and functioning improves with prescribed medications and is able to perform ADLs. Pt feels that twisting aggravates the pain. Pt c/o LLE \"burning pain\" and N/T down to the thigh, \"getting better\". She says she has lost 10 pounds with some relief. Previous SI injection and MIKAELA did not help. Saw Dr. Kaykay Sanchez March 2021 for consult and no further surgery recommended - noted Lt meralgia paraesthetica, Lt L3 radiculopathy and spondylosis in note. She is not interested in SCS. She continues to use her virtual . She has been told to limit alcohol intake due to her Percocet. Discussed NDP in the past.  Has seen [phone] neurosurgery. She says SCS was recommended. They recommended she see Dr. Lamin Rodriguez for injections but patient declines for now.         Past Surgical Hx: 06/23/2017 right and bilateral L4-5 microdissection, discectomy, interbody posterolateral fusion, pedicle screws. [facility].    8/12/2020 left anterior retroperitoneal L5-S1 discectomy interbody cage fusion. 11/22/22 S. I. JOINT:  Bilateral  2/15/21 BL SI inj    From Care Everywhere: \"EMG 9/23/21  left lower extremity did show evidence of chronic left-sided lumbosacral polyradiculopathy affecting left L4? L5-S1 nerve roots with active denervation. I took liberty of sending Liudmila Neely to see Dr. Wandy Harris from pain management to try some interventional treatment and since she failed multiple conservative management in the past she may be a good candidate for spinal cord stimulator. She will continue with home exercise program and same medications unchanged, for now. \"            Review of Systems   Constitutional:  Negative for fever. HENT:  Negative for congestion and sore throat. Respiratory:  Negative for shortness of breath. Gastrointestinal:  Negative for abdominal pain. Genitourinary:  Negative for difficulty urinating. Musculoskeletal:  Positive for back pain. Neurological:  Negative for speech difficulty and headaches. Hematological:  Negative for adenopathy. Psychiatric/Behavioral:  Negative for agitation. All other systems reviewed and are negative. Objective:     Vitals:  /72 (Site: Right Upper Arm)   Temp 98 °F (36.7 °C) (Temporal)   Ht 5' (1.524 m)   Wt 150 lb (68 kg)   LMP  (LMP Unknown)   BMI 29.29 kg/m² Pain Score:   6      Physical Exam  Vitals and nursing note reviewed. Pt is alert and oriented x 3. Recent and remote memory is intact. Mood, judgement and affect are normal.  No signs of distress or SOB noted. Visualized skin intact. Sensation intact to light touch. Decreased ROM with flexion and extension of low back. Tender with palpation to bilateral lumbar spine with positive provacative maneuvers noted. Negative SLR.    Pt is able to briefly heel walk and toe walk. Strength, balance, and coordination are functional for ambulation. TTP over BL SI joints. Assessment:      Diagnosis Orders   1. Spinal stenosis, lumbar region, without neurogenic claudication  oxyCODONE-acetaminophen (PERCOCET) 7.5-325 MG per tablet      2. S/P lumbar and lumbosacral fusion by anterior technique  oxyCODONE-acetaminophen (PERCOCET) 7.5-325 MG per tablet      3. Chronic pain syndrome  oxyCODONE-acetaminophen (PERCOCET) 7.5-325 MG per tablet      4. Lumbosacral spondylosis without myelopathy  oxyCODONE-acetaminophen (PERCOCET) 7.5-325 MG per tablet    tiZANidine (ZANAFLEX) 2 MG tablet    meloxicam (MOBIC) 15 MG tablet      5. Lumbar radiculopathy  oxyCODONE-acetaminophen (PERCOCET) 7.5-325 MG per tablet      6. Encounter for long-term opiate analgesic use  oxyCODONE-acetaminophen (PERCOCET) 7.5-325 MG per tablet          Plan:     Periodic Controlled Substance Monitoring: Possible medication side effects, risk of tolerance/dependence & alternative treatments discussed., No signs of potential drug abuse or diversion identified. , Assessed functional status., Obtaining appropriate analgesic effect of treatment. (Twyla Pizarro, APRN - CNP)    Orders Placed This Encounter   Medications    oxyCODONE-acetaminophen (PERCOCET) 7.5-325 MG per tablet     Sig: Take 1 tablet by mouth every 6 hours as needed for Pain for up to 21 days. Partial RX today. Patient has upcoming trip and will call for partial refill in a few weeks. Max Daily Amount: 4 tablets     Dispense:  84 tablet     Refill:  0     Reduce doses taken as pain becomes manageable    tiZANidine (ZANAFLEX) 2 MG tablet     Sig: Take 1 tablet by mouth nightly as needed (pain)     Dispense:  30 tablet     Refill:  0    meloxicam (MOBIC) 15 MG tablet     Sig: Take 1 tablet by mouth daily     Dispense:  30 tablet     Refill:  2       No orders of the defined types were placed in this encounter. Discussed options with the patient today. Got 10mg Percocet post op. Continue Percocet, and tizanidine, and lidocaine patches. Has not been using Gabapentin, not noticing any increase of pain. Will not send refill today. She has her daughter's wedding in [place] next month. We will send 3-week Rx today. She understands to call for a refill in a few weeks, and we will send in enough to get her to her follow-up appointment. Continue to follow with ortho for the right knee. All questions were answered. Pt verbalized understanding and agrees with above plan. Utox March 2022 + oxycodone, gabapentin, xanax, and  ethyl. Advised to avoid ETOH while taking opiates. Continue to monitor closely. ORT score 2 - considered high risk d/t above. Narcan Rx sent in June 2021. Utox reviewed August 2022 -  appropriate for oxycodone and gabapentin, but also alcohol but at much lower level than previous Utox. Discuss at follow-up concerning alcohol with narcotics per NDP. This has been discussed in the past and pt states she will have a couple glasses of wine a week at night. Will hold off reducing percocet at this time, and discussed in detail risk of ETOH and narcotics. If high level in the future again, then will wean medications as discussed. Will not give early refills. Used medication as directed. ORT score 2 - considered high risk d/t above. Narcan Rx sent in June 2021. Will continue medications for chronic pain as they help pt function with ADL and improve quality of life. Discussed possible risks of opiate medication with pt, including but not limited to, constipation, nausea or vomiting, sedation, urinary retention, dependence and possible addiction. Pt agrees to use medication as directed. Pt advised to not use opiates while driving or operating heavy equipment, or in situations where pt may harm him/herself or others. Pt is aware that while on narcotics, pt needs to be seen monthly to reassess pain and need for continued medication. NDP reviewed. OARRS was reviewed. This NP saw pt under direct supervision of Dr. Halima Walls. Follow up:  Return in about 5 weeks (around 3/28/2023) for review meds and reassess pain.     ABENA Rubio - CNP

## 2023-03-09 DIAGNOSIS — Z79.891 ENCOUNTER FOR LONG-TERM OPIATE ANALGESIC USE: ICD-10-CM

## 2023-03-09 DIAGNOSIS — Z98.1 S/P LUMBAR AND LUMBOSACRAL FUSION BY ANTERIOR TECHNIQUE: ICD-10-CM

## 2023-03-09 DIAGNOSIS — M48.061 SPINAL STENOSIS, LUMBAR REGION, WITHOUT NEUROGENIC CLAUDICATION: ICD-10-CM

## 2023-03-09 DIAGNOSIS — G89.4 CHRONIC PAIN SYNDROME: ICD-10-CM

## 2023-03-09 DIAGNOSIS — M54.16 LUMBAR RADICULOPATHY: ICD-10-CM

## 2023-03-09 DIAGNOSIS — M47.817 LUMBOSACRAL SPONDYLOSIS WITHOUT MYELOPATHY: ICD-10-CM

## 2023-03-09 RX ORDER — OXYCODONE AND ACETAMINOPHEN 7.5; 325 MG/1; MG/1
1 TABLET ORAL EVERY 6 HOURS PRN
Qty: 52 TABLET | Refills: 0 | Status: SHIPPED | OUTPATIENT
Start: 2023-03-14 | End: 2023-03-27

## 2023-03-09 NOTE — TELEPHONE ENCOUNTER
PATIENT CALLED STATING SHE WAS TOLD TO CALL US WHEN SHE NEEDED A REFILL ON HER MEDICATION DUE TO HER GOING OUT OF TOWN.       Last Appt:2/21/23  Next Appt:3/27/23  Last Refill: 2/21/23

## 2023-03-27 ENCOUNTER — OFFICE VISIT (OUTPATIENT)
Dept: PAIN MANAGEMENT | Age: 68
End: 2023-03-27
Payer: MEDICARE

## 2023-03-27 VITALS — HEIGHT: 60 IN | WEIGHT: 150 LBS | BODY MASS INDEX: 29.45 KG/M2 | TEMPERATURE: 97.4 F

## 2023-03-27 DIAGNOSIS — M47.817 LUMBOSACRAL SPONDYLOSIS WITHOUT MYELOPATHY: ICD-10-CM

## 2023-03-27 DIAGNOSIS — M54.16 LUMBAR RADICULOPATHY: ICD-10-CM

## 2023-03-27 DIAGNOSIS — G89.4 CHRONIC PAIN SYNDROME: ICD-10-CM

## 2023-03-27 DIAGNOSIS — M70.71 ILIOPSOAS BURSITIS OF RIGHT HIP: Primary | ICD-10-CM

## 2023-03-27 DIAGNOSIS — Z79.891 ENCOUNTER FOR LONG-TERM OPIATE ANALGESIC USE: ICD-10-CM

## 2023-03-27 DIAGNOSIS — Z98.1 S/P LUMBAR AND LUMBOSACRAL FUSION BY ANTERIOR TECHNIQUE: ICD-10-CM

## 2023-03-27 DIAGNOSIS — M48.061 SPINAL STENOSIS, LUMBAR REGION, WITHOUT NEUROGENIC CLAUDICATION: ICD-10-CM

## 2023-03-27 PROCEDURE — 1123F ACP DISCUSS/DSCN MKR DOCD: CPT | Performed by: NURSE PRACTITIONER

## 2023-03-27 PROCEDURE — 3017F COLORECTAL CA SCREEN DOC REV: CPT | Performed by: NURSE PRACTITIONER

## 2023-03-27 PROCEDURE — G8417 CALC BMI ABV UP PARAM F/U: HCPCS | Performed by: NURSE PRACTITIONER

## 2023-03-27 PROCEDURE — 1090F PRES/ABSN URINE INCON ASSESS: CPT | Performed by: NURSE PRACTITIONER

## 2023-03-27 PROCEDURE — 1036F TOBACCO NON-USER: CPT | Performed by: NURSE PRACTITIONER

## 2023-03-27 PROCEDURE — G8427 DOCREV CUR MEDS BY ELIG CLIN: HCPCS | Performed by: NURSE PRACTITIONER

## 2023-03-27 PROCEDURE — G8484 FLU IMMUNIZE NO ADMIN: HCPCS | Performed by: NURSE PRACTITIONER

## 2023-03-27 PROCEDURE — G8400 PT W/DXA NO RESULTS DOC: HCPCS | Performed by: NURSE PRACTITIONER

## 2023-03-27 PROCEDURE — 99214 OFFICE O/P EST MOD 30 MIN: CPT | Performed by: NURSE PRACTITIONER

## 2023-03-27 RX ORDER — OXYCODONE AND ACETAMINOPHEN 7.5; 325 MG/1; MG/1
1 TABLET ORAL EVERY 6 HOURS PRN
Qty: 120 TABLET | Refills: 0 | Status: SHIPPED | OUTPATIENT
Start: 2023-03-27 | End: 2023-04-26

## 2023-03-27 RX ORDER — TIZANIDINE 2 MG/1
2 TABLET ORAL NIGHTLY PRN
Qty: 30 TABLET | Refills: 0 | Status: SHIPPED | OUTPATIENT
Start: 2023-03-27 | End: 2023-04-26

## 2023-03-27 ASSESSMENT — ENCOUNTER SYMPTOMS
EYES NEGATIVE: 1
DIARRHEA: 0
COUGH: 0
TROUBLE SWALLOWING: 0
SHORTNESS OF BREATH: 0
CONSTIPATION: 0
GASTROINTESTINAL NEGATIVE: 1
BACK PAIN: 1

## 2023-03-27 NOTE — PROGRESS NOTES
syndrome  oxyCODONE-acetaminophen (PERCOCET) 7.5-325 MG per tablet      5. Lumbosacral spondylosis without myelopathy  oxyCODONE-acetaminophen (PERCOCET) 7.5-325 MG per tablet    tiZANidine (ZANAFLEX) 2 MG tablet      6. Lumbar radiculopathy  oxyCODONE-acetaminophen (PERCOCET) 7.5-325 MG per tablet      7. Encounter for long-term opiate analgesic use  oxyCODONE-acetaminophen (PERCOCET) 7.5-325 MG per tablet          Plan:     Periodic Controlled Substance Monitoring: Possible medication side effects, risk of tolerance/dependence & alternative treatments discussed., No signs of potential drug abuse or diversion identified. , Assessed functional status., Obtaining appropriate analgesic effect of treatment. (Adelaida Gar, APRN - CNP)    Orders Placed This Encounter   Medications    oxyCODONE-acetaminophen (PERCOCET) 7.5-325 MG per tablet     Sig: Take 1 tablet by mouth every 6 hours as needed for Pain for up to 30 days. Max Daily Amount: 4 tablets     Dispense:  120 tablet     Refill:  0     Reduce doses taken as pain becomes manageable    tiZANidine (ZANAFLEX) 2 MG tablet     Sig: Take 1 tablet by mouth nightly as needed (pain)     Dispense:  30 tablet     Refill:  0         Orders Placed This Encounter   Procedures    CHG FLUOROSCOPIC GUIDANCE NEEDLE PLACEMENT ADD ON     Standing Status:   Future     Standing Expiration Date:   6/25/2023    ME ARTHROCENTESIS ASPIR&/INJ MAJOR JT/BURSA W/O US     Rt hip bursa inj with SK     Standing Status:   Future     Standing Expiration Date:   6/25/2023       Discussed options with the patient today. Anatomic model pathology was shown and reviewed with pt. She is more tender over her Rt hip bursa - will order injection with Dr. Lisa Farrar to settle her Sx. May need SI  joint injection in the future. Use medication as directed. She is not wanting further surgery or injections or PT. Options are then limited.  Continue current does of Percocet 7.5mg Q6hrs PRN pain as it helps her

## 2023-04-20 DIAGNOSIS — M47.817 LUMBOSACRAL SPONDYLOSIS WITHOUT MYELOPATHY: ICD-10-CM

## 2023-04-20 DIAGNOSIS — M48.061 SPINAL STENOSIS, LUMBAR REGION, WITHOUT NEUROGENIC CLAUDICATION: ICD-10-CM

## 2023-04-20 DIAGNOSIS — Z98.1 S/P LUMBAR AND LUMBOSACRAL FUSION BY ANTERIOR TECHNIQUE: ICD-10-CM

## 2023-04-20 DIAGNOSIS — Z79.891 ENCOUNTER FOR LONG-TERM OPIATE ANALGESIC USE: ICD-10-CM

## 2023-04-20 DIAGNOSIS — M54.16 LUMBAR RADICULOPATHY: ICD-10-CM

## 2023-04-20 DIAGNOSIS — G89.4 CHRONIC PAIN SYNDROME: ICD-10-CM

## 2023-04-20 RX ORDER — OXYCODONE AND ACETAMINOPHEN 7.5; 325 MG/1; MG/1
1 TABLET ORAL EVERY 6 HOURS PRN
Qty: 28 TABLET | Refills: 0 | OUTPATIENT
Start: 2023-04-20 | End: 2023-04-27

## 2023-04-20 RX ORDER — OXYCODONE AND ACETAMINOPHEN 7.5; 325 MG/1; MG/1
1 TABLET ORAL EVERY 6 HOURS PRN
Qty: 28 TABLET | Refills: 0 | Status: SHIPPED | OUTPATIENT
Start: 2023-04-20 | End: 2023-04-27

## 2023-04-20 RX ORDER — TIZANIDINE 2 MG/1
2 TABLET ORAL NIGHTLY PRN
Qty: 7 TABLET | Refills: 0 | Status: SHIPPED | OUTPATIENT
Start: 2023-04-20 | End: 2023-04-27

## 2023-04-20 RX ORDER — TIZANIDINE 2 MG/1
2 TABLET ORAL NIGHTLY PRN
Qty: 7 TABLET | Refills: 0 | OUTPATIENT
Start: 2023-04-20 | End: 2023-04-27

## 2023-04-20 NOTE — TELEPHONE ENCOUNTER
[facility]?    [facility].

## 2023-04-20 NOTE — TELEPHONE ENCOUNTER
Pt states [facility] will not fill prescription early without Dr. Christopher Spring ok. Can pt  prescription today?

## 2023-04-20 NOTE — TELEPHONE ENCOUNTER
Requested Prescriptions     Pending Prescriptions Disp Refills    oxyCODONE-acetaminophen (PERCOCET) 7.5-325 MG per tablet 28 tablet 0     Sig: Take 1 tablet by mouth every 6 hours as needed for Pain for up to 7 days. Max Daily Amount: 4 tablets    tiZANidine (ZANAFLEX) 2 MG tablet 7 tablet 0     Sig: Take 1 tablet by mouth nightly as needed (pain)       Patient last seen on:  4/10  Date of last surgery:  n/a  Date of last refill:  3/27  Pain level:  n/a  Patient complaining of:  PT is asking for refill, she says that she had take extra medication this month because she went out of town for a wedding and had to babysit a 3year old for a week.    Future appts: 4/26

## 2023-04-21 NOTE — TELEPHONE ENCOUNTER
PT called asking if she can  this medication today?  She is out due to having to take extra from being in more pain than usual.

## 2023-04-21 NOTE — TELEPHONE ENCOUNTER
Spoke to pharmacist Catrachito Zavala on 4/21/23 around 5:55 pm at [employer] [phone]. Pharmacist Catrachito Zavala filled Xanax early for this patient earlier this month. She is concerned about multiple behaviors exhibited by the patient, including one episode of purchasing alcohol when picking up her opioid prescriptions along with at least one episode of unsafe driving. She is concerned about mixing of opioids with benzos and alcohol. Catrachito Zavala is concerned about aberrant behavior. The patient has been belligerent with demanding her medications early. Chart review shows early refill of opioids from us on 8/4/22. Office note 3/27/23 also notes declining early refills, alcohol on urine testing, and risk of benzos with opioids.    -Unable to provide full Rx today Friday after hours as originally prescribed Percocet 7.5/325 mg #28 originally written on Thursday 4/20  -Will provide the patient with Percocet 7.5/325 mg QID for Sat-Sun two days #8  -Patient instructed to call the office on Monday to address her early refill request. Continue to remind patient of policy of no early refills, abstinence from alcohol, and to avoid mixing benzos with opioids. Controlled Substance Monitoring:    Acute and Chronic Pain Monitoring:   RX Monitoring 4/21/2023   Attestation -   Acute Pain Prescriptions -   Periodic Controlled Substance Monitoring Obtaining appropriate analgesic effect of treatment.;Potential drug abuse or diversion identified, see note documentation.    Chronic Pain > 50 MEDD -   Chronic Pain > 80 MEDD -

## 2023-04-26 ENCOUNTER — OFFICE VISIT (OUTPATIENT)
Dept: PAIN MANAGEMENT | Age: 68
End: 2023-04-26
Payer: MEDICARE

## 2023-04-26 VITALS
TEMPERATURE: 96.9 F | DIASTOLIC BLOOD PRESSURE: 80 MMHG | WEIGHT: 150 LBS | SYSTOLIC BLOOD PRESSURE: 122 MMHG | BODY MASS INDEX: 29.45 KG/M2 | HEIGHT: 60 IN

## 2023-04-26 DIAGNOSIS — M48.061 SPINAL STENOSIS, LUMBAR REGION, WITHOUT NEUROGENIC CLAUDICATION: Primary | ICD-10-CM

## 2023-04-26 DIAGNOSIS — Z98.1 S/P LUMBAR AND LUMBOSACRAL FUSION BY ANTERIOR TECHNIQUE: ICD-10-CM

## 2023-04-26 DIAGNOSIS — Z79.891 ENCOUNTER FOR LONG-TERM OPIATE ANALGESIC USE: ICD-10-CM

## 2023-04-26 DIAGNOSIS — G89.4 CHRONIC PAIN SYNDROME: ICD-10-CM

## 2023-04-26 DIAGNOSIS — M70.61 GREATER TROCHANTERIC BURSITIS OF RIGHT HIP: ICD-10-CM

## 2023-04-26 DIAGNOSIS — M54.16 LUMBAR RADICULOPATHY: ICD-10-CM

## 2023-04-26 DIAGNOSIS — M47.817 LUMBOSACRAL SPONDYLOSIS WITHOUT MYELOPATHY: ICD-10-CM

## 2023-04-26 PROCEDURE — 1123F ACP DISCUSS/DSCN MKR DOCD: CPT | Performed by: NURSE PRACTITIONER

## 2023-04-26 PROCEDURE — 1036F TOBACCO NON-USER: CPT | Performed by: NURSE PRACTITIONER

## 2023-04-26 PROCEDURE — 1090F PRES/ABSN URINE INCON ASSESS: CPT | Performed by: NURSE PRACTITIONER

## 2023-04-26 PROCEDURE — G8417 CALC BMI ABV UP PARAM F/U: HCPCS | Performed by: NURSE PRACTITIONER

## 2023-04-26 PROCEDURE — 99214 OFFICE O/P EST MOD 30 MIN: CPT | Performed by: NURSE PRACTITIONER

## 2023-04-26 PROCEDURE — G8427 DOCREV CUR MEDS BY ELIG CLIN: HCPCS | Performed by: NURSE PRACTITIONER

## 2023-04-26 PROCEDURE — G8400 PT W/DXA NO RESULTS DOC: HCPCS | Performed by: NURSE PRACTITIONER

## 2023-04-26 PROCEDURE — 3017F COLORECTAL CA SCREEN DOC REV: CPT | Performed by: NURSE PRACTITIONER

## 2023-04-26 RX ORDER — OXYCODONE AND ACETAMINOPHEN 7.5; 325 MG/1; MG/1
1 TABLET ORAL EVERY 6 HOURS PRN
Qty: 120 TABLET | Refills: 0 | Status: SHIPPED | OUTPATIENT
Start: 2023-04-26 | End: 2023-05-26

## 2023-04-26 RX ORDER — TIZANIDINE 2 MG/1
2 TABLET ORAL NIGHTLY PRN
Qty: 30 TABLET | Refills: 0 | Status: SHIPPED | OUTPATIENT
Start: 2023-04-26 | End: 2023-05-26

## 2023-04-26 ASSESSMENT — ENCOUNTER SYMPTOMS
EYES NEGATIVE: 1
DIARRHEA: 0
CONSTIPATION: 0
COUGH: 0
BACK PAIN: 1
GASTROINTESTINAL NEGATIVE: 1
SHORTNESS OF BREATH: 0
TROUBLE SWALLOWING: 0

## 2023-04-26 NOTE — PROGRESS NOTES
Bc Chinchilla  (1955)    2023    Subjective:     Bc Chinchilla is 75 y.o. female who complains today of:    Chief Complaint   Patient presents with    Back Pain    Knee Pain         Allergies:  Patient has no known allergies. Past Medical History:   Diagnosis Date    Anxiety     Coronary artery disease involving native coronary artery of native heart without angina pectoris 2018    Hyperlipidemia     meds > 5 yrs    Osteoarthritis      Past Surgical History:   Procedure Laterality Date    BACK SURGERY  2017    lumbar disc OR    COLONOSCOPY      DILATION AND CURETTAGE OF UTERUS      at age 30s--miscarriage    LUMBAR FUSION Left 2020    ANTERIOR LEFT RETROPERITONEAL L5-S1 DISKECTOMY INTERBODY CAGE FUSION performed by Carmelita Latham MD at [place]      as child    TRACHEAL SURGERY      in [place]--EMT attempted to intubate patient due to trouble breathing & then needed repair     Family History   Problem Relation Age of Onset    Diabetes Mother     Obesity Mother     High Cholesterol Mother     High Cholesterol Father     Other Sister         GSW to spine & paralysis    No Known Problems Brother     No Known Problems Son     No Known Problems Daughter     Other Brother         as infant     Social History     Socioeconomic History    Marital status: Single     Spouse name: Not on file    Number of children: Not on file    Years of education: Not on file    Highest education level: Not on file   Occupational History    Not on file   Tobacco Use    Smoking status: Former     Packs/day: 1.00     Years: 30.00     Pack years: 30.00     Types: Cigarettes     Quit date: 2019     Years since quittin.0    Smokeless tobacco: Never    Tobacco comments:     intermittent habit   Vaping Use    Vaping Use: Never used   Substance and Sexual Activity    Alcohol use:  Yes     Alcohol/week: 0.0 standard drinks     Comment: social    Drug use: Not on file    Sexual activity: Not on

## 2023-05-24 ENCOUNTER — OFFICE VISIT (OUTPATIENT)
Dept: PAIN MANAGEMENT | Age: 68
End: 2023-05-24
Payer: MEDICARE

## 2023-05-24 VITALS
BODY MASS INDEX: 29.45 KG/M2 | WEIGHT: 150 LBS | SYSTOLIC BLOOD PRESSURE: 114 MMHG | HEIGHT: 60 IN | DIASTOLIC BLOOD PRESSURE: 72 MMHG | TEMPERATURE: 96.8 F

## 2023-05-24 DIAGNOSIS — G89.4 CHRONIC PAIN SYNDROME: ICD-10-CM

## 2023-05-24 DIAGNOSIS — M54.16 LUMBAR RADICULOPATHY: ICD-10-CM

## 2023-05-24 DIAGNOSIS — M48.061 SPINAL STENOSIS, LUMBAR REGION, WITHOUT NEUROGENIC CLAUDICATION: ICD-10-CM

## 2023-05-24 DIAGNOSIS — Z98.1 S/P LUMBAR AND LUMBOSACRAL FUSION BY ANTERIOR TECHNIQUE: ICD-10-CM

## 2023-05-24 DIAGNOSIS — M47.817 LUMBOSACRAL SPONDYLOSIS WITHOUT MYELOPATHY: ICD-10-CM

## 2023-05-24 DIAGNOSIS — Z79.891 ENCOUNTER FOR LONG-TERM OPIATE ANALGESIC USE: ICD-10-CM

## 2023-05-24 PROCEDURE — 99214 OFFICE O/P EST MOD 30 MIN: CPT | Performed by: NURSE PRACTITIONER

## 2023-05-24 PROCEDURE — G8427 DOCREV CUR MEDS BY ELIG CLIN: HCPCS | Performed by: NURSE PRACTITIONER

## 2023-05-24 PROCEDURE — G8417 CALC BMI ABV UP PARAM F/U: HCPCS | Performed by: NURSE PRACTITIONER

## 2023-05-24 PROCEDURE — 1036F TOBACCO NON-USER: CPT | Performed by: NURSE PRACTITIONER

## 2023-05-24 PROCEDURE — 1123F ACP DISCUSS/DSCN MKR DOCD: CPT | Performed by: NURSE PRACTITIONER

## 2023-05-24 PROCEDURE — 3017F COLORECTAL CA SCREEN DOC REV: CPT | Performed by: NURSE PRACTITIONER

## 2023-05-24 PROCEDURE — 1090F PRES/ABSN URINE INCON ASSESS: CPT | Performed by: NURSE PRACTITIONER

## 2023-05-24 PROCEDURE — G8400 PT W/DXA NO RESULTS DOC: HCPCS | Performed by: NURSE PRACTITIONER

## 2023-05-24 RX ORDER — TIZANIDINE 2 MG/1
2 TABLET ORAL NIGHTLY PRN
Qty: 30 TABLET | Refills: 0 | Status: SHIPPED | OUTPATIENT
Start: 2023-05-24 | End: 2023-06-23

## 2023-05-24 RX ORDER — OXYCODONE AND ACETAMINOPHEN 7.5; 325 MG/1; MG/1
1 TABLET ORAL EVERY 6 HOURS PRN
Qty: 120 TABLET | Refills: 0 | Status: SHIPPED | OUTPATIENT
Start: 2023-05-24 | End: 2023-06-23

## 2023-05-24 RX ORDER — TIZANIDINE 2 MG/1
TABLET ORAL
Qty: 30 TABLET | Refills: 0 | OUTPATIENT
Start: 2023-05-24

## 2023-05-24 ASSESSMENT — ENCOUNTER SYMPTOMS
SHORTNESS OF BREATH: 0
COUGH: 0
CONSTIPATION: 0
DIARRHEA: 0
TROUBLE SWALLOWING: 0
BACK PAIN: 1
EYES NEGATIVE: 1
GASTROINTESTINAL NEGATIVE: 1

## 2023-05-24 NOTE — PROGRESS NOTES
detail. Ideal to keep MME below 50. Have discussed proper disposal of narcotic medication. Advised to have medications in safe place and locked up/in lock box. Discussed possible risks of opiate medication with pt, including, but not limited to possible constipation, nausea or vomiting, sedation, urinary retention, dependence and possible addiction. Pt agrees to use medication as prescribed/as directed. Pt advised to not use opiates while driving or operating heavy equipment, or in situations where pt may harm him/herself or others. Pt is aware that while on narcotics, pt needs to be seen to reassess pain and reassess need for continued medication at that time. NDP reviewed. OARRS was reviewed. Follow up:  Return in about 4 weeks (around 6/21/2023) for review meds and reassess pain.     ABENA Knight - CNP

## 2023-06-21 ENCOUNTER — OFFICE VISIT (OUTPATIENT)
Dept: PAIN MANAGEMENT | Age: 68
End: 2023-06-21
Payer: MEDICARE

## 2023-06-21 ENCOUNTER — PATIENT MESSAGE (OUTPATIENT)
Dept: PAIN MANAGEMENT | Age: 68
End: 2023-06-21

## 2023-06-21 VITALS
DIASTOLIC BLOOD PRESSURE: 82 MMHG | BODY MASS INDEX: 27.6 KG/M2 | HEIGHT: 62 IN | TEMPERATURE: 97.1 F | SYSTOLIC BLOOD PRESSURE: 132 MMHG | WEIGHT: 150 LBS

## 2023-06-21 DIAGNOSIS — M48.061 SPINAL STENOSIS, LUMBAR REGION, WITHOUT NEUROGENIC CLAUDICATION: ICD-10-CM

## 2023-06-21 DIAGNOSIS — M46.1 SACROILIITIS, NOT ELSEWHERE CLASSIFIED (HCC): ICD-10-CM

## 2023-06-21 DIAGNOSIS — M54.16 LUMBAR RADICULOPATHY: ICD-10-CM

## 2023-06-21 DIAGNOSIS — M46.1 SACROILIITIS, NOT ELSEWHERE CLASSIFIED (HCC): Primary | ICD-10-CM

## 2023-06-21 DIAGNOSIS — Z79.891 ENCOUNTER FOR LONG-TERM OPIATE ANALGESIC USE: ICD-10-CM

## 2023-06-21 DIAGNOSIS — Z98.1 S/P LUMBAR AND LUMBOSACRAL FUSION BY ANTERIOR TECHNIQUE: ICD-10-CM

## 2023-06-21 DIAGNOSIS — G89.4 CHRONIC PAIN SYNDROME: ICD-10-CM

## 2023-06-21 DIAGNOSIS — M47.817 LUMBOSACRAL SPONDYLOSIS WITHOUT MYELOPATHY: ICD-10-CM

## 2023-06-21 PROCEDURE — 3017F COLORECTAL CA SCREEN DOC REV: CPT | Performed by: NURSE PRACTITIONER

## 2023-06-21 PROCEDURE — G8400 PT W/DXA NO RESULTS DOC: HCPCS | Performed by: NURSE PRACTITIONER

## 2023-06-21 PROCEDURE — 1036F TOBACCO NON-USER: CPT | Performed by: NURSE PRACTITIONER

## 2023-06-21 PROCEDURE — G8417 CALC BMI ABV UP PARAM F/U: HCPCS | Performed by: NURSE PRACTITIONER

## 2023-06-21 PROCEDURE — G8427 DOCREV CUR MEDS BY ELIG CLIN: HCPCS | Performed by: NURSE PRACTITIONER

## 2023-06-21 PROCEDURE — 1090F PRES/ABSN URINE INCON ASSESS: CPT | Performed by: NURSE PRACTITIONER

## 2023-06-21 PROCEDURE — 1123F ACP DISCUSS/DSCN MKR DOCD: CPT | Performed by: NURSE PRACTITIONER

## 2023-06-21 PROCEDURE — 99214 OFFICE O/P EST MOD 30 MIN: CPT | Performed by: NURSE PRACTITIONER

## 2023-06-21 RX ORDER — TIZANIDINE 2 MG/1
2 TABLET ORAL NIGHTLY PRN
Qty: 30 TABLET | Refills: 0 | Status: SHIPPED | OUTPATIENT
Start: 2023-06-23 | End: 2023-07-23

## 2023-06-21 RX ORDER — OXYCODONE AND ACETAMINOPHEN 7.5; 325 MG/1; MG/1
1 TABLET ORAL EVERY 6 HOURS PRN
Qty: 120 TABLET | Refills: 0 | Status: SHIPPED | OUTPATIENT
Start: 2023-06-23 | End: 2023-06-22 | Stop reason: RX

## 2023-06-21 RX ORDER — LIDOCAINE 50 MG/G
1 PATCH TOPICAL DAILY
Qty: 30 PATCH | Refills: 0 | Status: SHIPPED | OUTPATIENT
Start: 2023-06-21

## 2023-06-21 ASSESSMENT — ENCOUNTER SYMPTOMS
DIARRHEA: 0
COUGH: 0
BACK PAIN: 1
EYES NEGATIVE: 1
CONSTIPATION: 0
GASTROINTESTINAL NEGATIVE: 1
SHORTNESS OF BREATH: 0
TROUBLE SWALLOWING: 0

## 2023-06-21 NOTE — TELEPHONE ENCOUNTER
From: France Huerta  To: Christina Yost  Sent: 6/21/2023  3:43 PM EDT  Subject: Percoset prescription     Please check the date for refill, tomorrow will be 30 days, thank you    PT ALSO CALLED AND LM REGARDING THIS. PERCOCET & TIZANIDINE WERE SENT IT TODAY AND BOTH POST DATED FOR 6-23-23. WILL PROVIDER ADVISE?

## 2023-06-21 NOTE — PROGRESS NOTES
Agnieszka Antoine  (1955)    2023    Subjective:     Agnieszka Antoine is 75 y.o. female who complains today of:    Chief Complaint   Patient presents with    Back Pain     Lower    Knee Pain    Hip Pain    Leg Pain         Allergies:  Patient has no known allergies. Past Medical History:   Diagnosis Date    Anxiety     Coronary artery disease involving native coronary artery of native heart without angina pectoris 2018    Hyperlipidemia     meds > 5 yrs    Osteoarthritis      Past Surgical History:   Procedure Laterality Date    BACK SURGERY  2017    lumbar disc OR    COLONOSCOPY      DILATION AND CURETTAGE OF UTERUS      at age 30s--miscarriage    LUMBAR FUSION Left 2020    ANTERIOR LEFT RETROPERITONEAL L5-S1 DISKECTOMY INTERBODY CAGE FUSION performed by Jimi Roque MD at [place]      as child    TRACHEAL SURGERY      in [place]--EMT attempted to intubate patient due to trouble breathing & then needed repair     Family History   Problem Relation Age of Onset    Diabetes Mother     Obesity Mother     High Cholesterol Mother     High Cholesterol Father     Other Sister         GSW to spine & paralysis    No Known Problems Brother     No Known Problems Son     No Known Problems Daughter     Other Brother         as infant     Social History     Socioeconomic History    Marital status: Single     Spouse name: Not on file    Number of children: Not on file    Years of education: Not on file    Highest education level: Not on file   Occupational History    Not on file   Tobacco Use    Smoking status: Former     Packs/day: 1.00     Years: 30.00     Pack years: 30.00     Types: Cigarettes     Quit date: 2019     Years since quittin.2    Smokeless tobacco: Never    Tobacco comments:     intermittent habit   Vaping Use    Vaping Use: Never used   Substance and Sexual Activity    Alcohol use:  Yes     Alcohol/week: 0.0 standard drinks     Comment: social    Drug use: Not on

## 2023-06-22 NOTE — TELEPHONE ENCOUNTER
[facility] doesn't have this med inn stock. Needs rx. Sent to [address] [facility] [place].

## 2023-06-22 NOTE — TELEPHONE ENCOUNTER
She states that today is 30 days and she took her last medication yesterday so she doesn't have any for today. She needs her prescription today. She said she counted day 1 as 5/24 and counting to today, it's 30 days.

## 2023-06-23 RX ORDER — OXYCODONE AND ACETAMINOPHEN 7.5; 325 MG/1; MG/1
1 TABLET ORAL EVERY 6 HOURS PRN
Qty: 120 TABLET | Refills: 0 | Status: SHIPPED | OUTPATIENT
Start: 2023-06-23 | End: 2023-07-23

## 2023-06-23 NOTE — TELEPHONE ENCOUNTER
[facility] could someone please call my prescription into [facility] in [facility]?

## 2023-06-28 ENCOUNTER — TELEPHONE (OUTPATIENT)
Dept: PAIN MANAGEMENT | Age: 68
End: 2023-06-28

## 2023-07-03 NOTE — TELEPHONE ENCOUNTER
Per [employer], Lidocaine (Lidoderm) 5% patch approved. Case [id number]; Status:Approved; Review Type:Prior Auth; Coverage Start Date:05/31/2023;  Coverage End Date:06/29/2024

## 2023-07-20 DIAGNOSIS — Z79.891 ENCOUNTER FOR LONG-TERM OPIATE ANALGESIC USE: ICD-10-CM

## 2023-07-20 DIAGNOSIS — M46.1 SACROILIITIS, NOT ELSEWHERE CLASSIFIED (HCC): ICD-10-CM

## 2023-07-20 DIAGNOSIS — G89.4 CHRONIC PAIN SYNDROME: ICD-10-CM

## 2023-07-20 DIAGNOSIS — Z98.1 S/P LUMBAR AND LUMBOSACRAL FUSION BY ANTERIOR TECHNIQUE: ICD-10-CM

## 2023-07-20 DIAGNOSIS — M48.061 SPINAL STENOSIS, LUMBAR REGION, WITHOUT NEUROGENIC CLAUDICATION: ICD-10-CM

## 2023-07-20 DIAGNOSIS — M47.817 LUMBOSACRAL SPONDYLOSIS WITHOUT MYELOPATHY: ICD-10-CM

## 2023-07-20 DIAGNOSIS — M54.16 LUMBAR RADICULOPATHY: ICD-10-CM

## 2023-07-20 RX ORDER — OXYCODONE AND ACETAMINOPHEN 7.5; 325 MG/1; MG/1
1 TABLET ORAL EVERY 6 HOURS PRN
Qty: 8 TABLET | Refills: 0 | Status: SHIPPED | OUTPATIENT
Start: 2023-07-23 | End: 2023-07-25

## 2023-07-20 RX ORDER — TIZANIDINE 2 MG/1
2 TABLET ORAL NIGHTLY PRN
Qty: 30 TABLET | Refills: 0 | Status: SHIPPED | OUTPATIENT
Start: 2023-07-23 | End: 2023-08-22

## 2023-07-20 RX ORDER — MELOXICAM 15 MG/1
15 TABLET ORAL DAILY
Qty: 30 TABLET | Refills: 0 | Status: SHIPPED | OUTPATIENT
Start: 2023-07-23

## 2023-07-20 NOTE — TELEPHONE ENCOUNTER
Requested Prescriptions     Pending Prescriptions Disp Refills    meloxicam (MOBIC) 15 MG tablet 30 tablet 2     Sig: Take 1 tablet by mouth daily    tiZANidine (ZANAFLEX) 2 MG tablet 30 tablet 0     Sig: Take 1 tablet by mouth nightly as needed (pain)    oxyCODONE-acetaminophen (PERCOCET) 7.5-325 MG per tablet 120 tablet 0     Sig: Take 1 tablet by mouth every 6 hours as needed for Pain for up to 30 days.  Do not fill until 6/23/23 for 30 days Max Daily Amount: 4 tablets       Patient last seen on:  6/21  Date of last surgery:  n/a  Date of last refill:  6/23  Pain level:  n/a  Patient complaining of:  PT is asking for refill  Future appts: 7/25

## 2023-07-25 ENCOUNTER — PROCEDURE VISIT (OUTPATIENT)
Dept: PAIN MANAGEMENT | Age: 68
End: 2023-07-25

## 2023-07-25 DIAGNOSIS — Z98.1 S/P LUMBAR AND LUMBOSACRAL FUSION BY ANTERIOR TECHNIQUE: ICD-10-CM

## 2023-07-25 DIAGNOSIS — M54.16 LUMBAR RADICULOPATHY: ICD-10-CM

## 2023-07-25 DIAGNOSIS — Z79.891 ENCOUNTER FOR LONG-TERM OPIATE ANALGESIC USE: ICD-10-CM

## 2023-07-25 DIAGNOSIS — M48.061 SPINAL STENOSIS, LUMBAR REGION, WITHOUT NEUROGENIC CLAUDICATION: ICD-10-CM

## 2023-07-25 DIAGNOSIS — M46.1 SACROILIITIS, NOT ELSEWHERE CLASSIFIED (HCC): ICD-10-CM

## 2023-07-25 DIAGNOSIS — G89.4 CHRONIC PAIN SYNDROME: ICD-10-CM

## 2023-07-25 DIAGNOSIS — M47.817 LUMBOSACRAL SPONDYLOSIS WITHOUT MYELOPATHY: ICD-10-CM

## 2023-07-25 RX ORDER — BETAMETHASONE SODIUM PHOSPHATE AND BETAMETHASONE ACETATE 3; 3 MG/ML; MG/ML
6 INJECTION, SUSPENSION INTRA-ARTICULAR; INTRALESIONAL; INTRAMUSCULAR; SOFT TISSUE ONCE
Status: COMPLETED | OUTPATIENT
Start: 2023-07-25 | End: 2023-07-25

## 2023-07-25 RX ORDER — OXYCODONE AND ACETAMINOPHEN 7.5; 325 MG/1; MG/1
1 TABLET ORAL EVERY 6 HOURS PRN
Qty: 120 TABLET | Refills: 0 | Status: SHIPPED | OUTPATIENT
Start: 2023-07-25 | End: 2023-08-24

## 2023-07-25 RX ORDER — LIDOCAINE HYDROCHLORIDE 10 MG/ML
10 INJECTION, SOLUTION EPIDURAL; INFILTRATION; INTRACAUDAL; PERINEURAL ONCE
Status: COMPLETED | OUTPATIENT
Start: 2023-07-25 | End: 2023-07-25

## 2023-07-25 RX ADMIN — LIDOCAINE HYDROCHLORIDE 10 MG: 10 INJECTION, SOLUTION EPIDURAL; INFILTRATION; INTRACAUDAL; PERINEURAL at 10:38

## 2023-07-25 RX ADMIN — BETAMETHASONE SODIUM PHOSPHATE AND BETAMETHASONE ACETATE 6 MG: 3; 3 INJECTION, SUSPENSION INTRA-ARTICULAR; INTRALESIONAL; INTRAMUSCULAR; SOFT TISSUE at 10:38

## 2023-07-25 NOTE — PROGRESS NOTES
[facility]) Physicians  Neurosurgery and Pain [facility]  [address] [address] [facility], [address] [place], [place] [address]vard: [phone]  F: [phone]      Patient Name: Keyona Corrales  : 1955     Date:  2023      Physician: Cody Rivers DO        Keyona Corrales is here today for interventional pain management. Preoperatively, the patient presents with SI joint mediated pain, as per history and exam. Patient has failed NSAIDs, PT, and conservative treatment. Patient has significant psychological and functional impairment due to this condition. Standard ASIPP guidelines were followed and sterile technique used. Area was cleaned with Betadine x3. Informed consent was obtained. Fluoroscopic guidance was used for this procedure. S.I. JOINT:  Bilateral  Appropriate length spinal needle was advanced to the S.I. Joint. Negative aspiration was achieved. In total, approximately 6mg of Betamethasone and 2ml of 1% preservative free Lidocaine was injected without difficulty. Patient tolerated the procedure well, no obvious complications occurred during the procedure. Patient was appropriately monitored and discharged home in stable condition with their usual motor strength. Post Op Instructions were given to patient. Relevant and recent imaging reviewed with patient today.                                       Cody Rivers DO

## 2023-08-24 ENCOUNTER — OFFICE VISIT (OUTPATIENT)
Dept: PAIN MANAGEMENT | Age: 68
End: 2023-08-24
Payer: MEDICARE

## 2023-08-24 VITALS
BODY MASS INDEX: 29.45 KG/M2 | SYSTOLIC BLOOD PRESSURE: 130 MMHG | HEIGHT: 60 IN | WEIGHT: 150 LBS | DIASTOLIC BLOOD PRESSURE: 80 MMHG | TEMPERATURE: 97.9 F

## 2023-08-24 DIAGNOSIS — M54.16 LUMBAR RADICULOPATHY: ICD-10-CM

## 2023-08-24 DIAGNOSIS — G89.4 CHRONIC PAIN SYNDROME: ICD-10-CM

## 2023-08-24 DIAGNOSIS — Z98.1 S/P LUMBAR AND LUMBOSACRAL FUSION BY ANTERIOR TECHNIQUE: ICD-10-CM

## 2023-08-24 DIAGNOSIS — M47.817 LUMBOSACRAL SPONDYLOSIS WITHOUT MYELOPATHY: ICD-10-CM

## 2023-08-24 DIAGNOSIS — Z79.891 ENCOUNTER FOR LONG-TERM OPIATE ANALGESIC USE: ICD-10-CM

## 2023-08-24 DIAGNOSIS — M46.1 SACROILIITIS, NOT ELSEWHERE CLASSIFIED (HCC): ICD-10-CM

## 2023-08-24 DIAGNOSIS — M48.061 SPINAL STENOSIS, LUMBAR REGION, WITHOUT NEUROGENIC CLAUDICATION: ICD-10-CM

## 2023-08-24 PROCEDURE — 3017F COLORECTAL CA SCREEN DOC REV: CPT | Performed by: NURSE PRACTITIONER

## 2023-08-24 PROCEDURE — G8417 CALC BMI ABV UP PARAM F/U: HCPCS | Performed by: NURSE PRACTITIONER

## 2023-08-24 PROCEDURE — 1036F TOBACCO NON-USER: CPT | Performed by: NURSE PRACTITIONER

## 2023-08-24 PROCEDURE — G8427 DOCREV CUR MEDS BY ELIG CLIN: HCPCS | Performed by: NURSE PRACTITIONER

## 2023-08-24 PROCEDURE — 1123F ACP DISCUSS/DSCN MKR DOCD: CPT | Performed by: NURSE PRACTITIONER

## 2023-08-24 PROCEDURE — 99214 OFFICE O/P EST MOD 30 MIN: CPT | Performed by: NURSE PRACTITIONER

## 2023-08-24 PROCEDURE — G8400 PT W/DXA NO RESULTS DOC: HCPCS | Performed by: NURSE PRACTITIONER

## 2023-08-24 PROCEDURE — 1090F PRES/ABSN URINE INCON ASSESS: CPT | Performed by: NURSE PRACTITIONER

## 2023-08-24 RX ORDER — TIZANIDINE 2 MG/1
2 TABLET ORAL NIGHTLY PRN
Qty: 30 TABLET | Refills: 0 | Status: SHIPPED | OUTPATIENT
Start: 2023-08-24 | End: 2023-09-23

## 2023-08-24 RX ORDER — MELOXICAM 15 MG/1
15 TABLET ORAL DAILY
Qty: 30 TABLET | Refills: 0 | Status: SHIPPED | OUTPATIENT
Start: 2023-08-24

## 2023-08-24 RX ORDER — OXYCODONE AND ACETAMINOPHEN 7.5; 325 MG/1; MG/1
1 TABLET ORAL EVERY 6 HOURS PRN
Qty: 120 TABLET | Refills: 0 | Status: SHIPPED | OUTPATIENT
Start: 2023-08-24 | End: 2023-09-23

## 2023-08-24 ASSESSMENT — ENCOUNTER SYMPTOMS
DIARRHEA: 0
CONSTIPATION: 0
RESPIRATORY NEGATIVE: 1
BACK PAIN: 1

## 2023-08-24 NOTE — PROGRESS NOTES
treatments discussed., No signs of potential drug abuse or diversion identified. , Assessed functional status. , Random urine drug screen sent today. Gerhard Engle, APRN - CNP)    Orders Placed This Encounter   Medications    oxyCODONE-acetaminophen (PERCOCET) 7.5-325 MG per tablet     Sig: Take 1 tablet by mouth every 6 hours as needed for Pain for up to 30 days. Max Daily Amount: 4 tablets     Dispense:  120 tablet     Refill:  0     Reduce doses taken as pain becomes manageable    tiZANidine (ZANAFLEX) 2 MG tablet     Sig: Take 1 tablet by mouth nightly as needed (pain)     Dispense:  30 tablet     Refill:  0    meloxicam (MOBIC) 15 MG tablet     Sig: Take 1 tablet by mouth daily     Dispense:  30 tablet     Refill:  0       Orders Placed This Encounter   Procedures    Urine Drug Screen     Chronic pain/illicits     -UDS, no Percocet for two \"a few\" days, xanax last night,   -she has left over gabapentin (half a bottle) and will start using it to see if her right leg gets better  -I cautioned her about chronic NSAID use high risk for her age and will give her another month but recommend she take a break and talk to her PCP. -Fill Percocet 7.5 mg every 6 hours as needed pain #120 fill 8/24/2023. I told her that she will have to stretch the spells until she gets back from [place] and will take an appointment on 27th.  -Refill tizanidine 2 mg nightly as needed spasm and pain #30  -Refill Mobic 15 mg daily as needed pain. I discussed renae with her the talk to her PCP about chronic NSAID use    Discussed options with the patient today. Anatomic model pathology was shown and reviewed with pt. All questions were answered. Relevant imaging reviewed, NDP reviewed and pain generators reviewed. Pt verbalized understanding and agrees with above plan. Will continue medications as they do help pt function with ADL and improve quality of life.   Pt understands potential side effects from opioids such as constipation, dry

## 2023-09-11 ENCOUNTER — TELEPHONE (OUTPATIENT)
Dept: PAIN MANAGEMENT | Age: 68
End: 2023-09-11

## 2023-09-11 DIAGNOSIS — M47.817 LUMBOSACRAL SPONDYLOSIS WITHOUT MYELOPATHY: ICD-10-CM

## 2023-09-11 DIAGNOSIS — M48.061 SPINAL STENOSIS, LUMBAR REGION, WITHOUT NEUROGENIC CLAUDICATION: ICD-10-CM

## 2023-09-11 DIAGNOSIS — Z79.891 ENCOUNTER FOR LONG-TERM OPIATE ANALGESIC USE: ICD-10-CM

## 2023-09-11 DIAGNOSIS — M46.1 SACROILIITIS, NOT ELSEWHERE CLASSIFIED (HCC): ICD-10-CM

## 2023-09-11 DIAGNOSIS — Z98.1 S/P LUMBAR AND LUMBOSACRAL FUSION BY ANTERIOR TECHNIQUE: ICD-10-CM

## 2023-09-11 DIAGNOSIS — G89.4 CHRONIC PAIN SYNDROME: ICD-10-CM

## 2023-09-11 DIAGNOSIS — M54.16 LUMBAR RADICULOPATHY: ICD-10-CM

## 2023-09-11 NOTE — TELEPHONE ENCOUNTER
PT is going out of town tomorrow. She will be due on 9/23 for her refill, she is asking if this can be sent to [facility] in [facility]. Pharmacy saved in chart. She will keep her appointment on 9/27.

## 2023-09-13 DIAGNOSIS — Z98.1 S/P LUMBAR AND LUMBOSACRAL FUSION BY ANTERIOR TECHNIQUE: ICD-10-CM

## 2023-09-13 DIAGNOSIS — G89.4 CHRONIC PAIN SYNDROME: ICD-10-CM

## 2023-09-13 DIAGNOSIS — Z79.891 ENCOUNTER FOR LONG-TERM OPIATE ANALGESIC USE: ICD-10-CM

## 2023-09-13 DIAGNOSIS — M54.16 LUMBAR RADICULOPATHY: ICD-10-CM

## 2023-09-13 DIAGNOSIS — M46.1 SACROILIITIS, NOT ELSEWHERE CLASSIFIED (HCC): ICD-10-CM

## 2023-09-13 DIAGNOSIS — M48.061 SPINAL STENOSIS, LUMBAR REGION, WITHOUT NEUROGENIC CLAUDICATION: ICD-10-CM

## 2023-09-13 DIAGNOSIS — M47.817 LUMBOSACRAL SPONDYLOSIS WITHOUT MYELOPATHY: ICD-10-CM

## 2023-09-13 RX ORDER — OXYCODONE AND ACETAMINOPHEN 7.5; 325 MG/1; MG/1
1 TABLET ORAL EVERY 6 HOURS PRN
Qty: 20 TABLET | Refills: 0 | Status: SHIPPED | OUTPATIENT
Start: 2023-09-13 | End: 2023-09-18

## 2023-09-13 RX ORDER — OXYCODONE AND ACETAMINOPHEN 7.5; 325 MG/1; MG/1
1 TABLET ORAL EVERY 6 HOURS PRN
Qty: 120 TABLET | Refills: 0 | Status: CANCELLED | OUTPATIENT
Start: 2023-09-23 | End: 2023-10-23

## 2023-09-13 NOTE — TELEPHONE ENCOUNTER
Requested Prescriptions     Pending Prescriptions Disp Refills    oxyCODONE-acetaminophen (PERCOCET) 7.5-325 MG per tablet 120 tablet 0     Sig: Take 1 tablet by mouth every 6 hours as needed for Pain for up to 30 days.  Max Daily Amount: 4 tablets       Patient last seen on:  8/24/23  Date of last surgery:  n/a  Date of last refill:  8/24/23  Pain level:  n/a  Patient complaining of:  rescheduled from 9/27/23  Future appts: 10/19/23

## 2023-09-13 NOTE — TELEPHONE ENCOUNTER
The NP's cannot send Rx for narcotic out of state.   Please send to Dr. Carin Rodriguez to see if able, otherwise pt will need to reduce medications to make them last until her F/U

## 2023-09-22 DIAGNOSIS — G89.4 CHRONIC PAIN SYNDROME: ICD-10-CM

## 2023-09-22 DIAGNOSIS — Z79.891 ENCOUNTER FOR LONG-TERM OPIATE ANALGESIC USE: ICD-10-CM

## 2023-09-22 DIAGNOSIS — M47.817 LUMBOSACRAL SPONDYLOSIS WITHOUT MYELOPATHY: ICD-10-CM

## 2023-09-22 DIAGNOSIS — M48.061 SPINAL STENOSIS, LUMBAR REGION, WITHOUT NEUROGENIC CLAUDICATION: ICD-10-CM

## 2023-09-22 DIAGNOSIS — M54.16 LUMBAR RADICULOPATHY: ICD-10-CM

## 2023-09-22 DIAGNOSIS — Z98.1 S/P LUMBAR AND LUMBOSACRAL FUSION BY ANTERIOR TECHNIQUE: ICD-10-CM

## 2023-09-22 DIAGNOSIS — M46.1 SACROILIITIS, NOT ELSEWHERE CLASSIFIED (HCC): ICD-10-CM

## 2023-09-22 RX ORDER — OXYCODONE AND ACETAMINOPHEN 7.5; 325 MG/1; MG/1
1 TABLET ORAL EVERY 6 HOURS PRN
Qty: 120 TABLET | Refills: 0 | Status: SHIPPED | OUTPATIENT
Start: 2023-09-23 | End: 2023-10-23

## 2023-09-22 RX ORDER — MELOXICAM 15 MG/1
15 TABLET ORAL DAILY
Qty: 30 TABLET | Refills: 0 | Status: SHIPPED | OUTPATIENT
Start: 2023-09-23

## 2023-09-22 RX ORDER — TIZANIDINE 2 MG/1
2 TABLET ORAL NIGHTLY PRN
Qty: 30 TABLET | Refills: 0 | Status: SHIPPED | OUTPATIENT
Start: 2023-09-23 | End: 2023-10-23

## 2023-09-22 NOTE — TELEPHONE ENCOUNTER
Requested Prescriptions     Pending Prescriptions Disp Refills    oxyCODONE-acetaminophen (PERCOCET) 7.5-325 MG per tablet 20 tablet 0     Sig: Take 1 tablet by mouth every 6 hours as needed for Pain for up to 5 days. Max Daily Amount: 4 tablets    tiZANidine (ZANAFLEX) 2 MG tablet 30 tablet 0     Sig: Take 1 tablet by mouth nightly as needed (pain)    meloxicam (MOBIC) 15 MG tablet 30 tablet 0     Sig: Take 1 tablet by mouth daily       Patient last seen on: 8/24  Date of last surgery:  n/a  Date of last refill:  8/24, 9/18  Pain level:  n/a  Patient complaining of:  PT is asking for refill.   Future appts: 10/19

## 2023-10-10 ENCOUNTER — OFFICE VISIT (OUTPATIENT)
Dept: PAIN MANAGEMENT | Age: 68
End: 2023-10-10
Payer: MEDICARE

## 2023-10-10 VITALS
DIASTOLIC BLOOD PRESSURE: 78 MMHG | WEIGHT: 150 LBS | TEMPERATURE: 97.3 F | SYSTOLIC BLOOD PRESSURE: 120 MMHG | BODY MASS INDEX: 29.45 KG/M2 | HEIGHT: 60 IN

## 2023-10-10 DIAGNOSIS — M54.16 LUMBAR RADICULOPATHY: ICD-10-CM

## 2023-10-10 DIAGNOSIS — M46.1 SACROILIITIS, NOT ELSEWHERE CLASSIFIED (HCC): ICD-10-CM

## 2023-10-10 DIAGNOSIS — Z98.1 S/P LUMBAR AND LUMBOSACRAL FUSION BY ANTERIOR TECHNIQUE: ICD-10-CM

## 2023-10-10 DIAGNOSIS — G89.4 CHRONIC PAIN SYNDROME: Primary | ICD-10-CM

## 2023-10-10 DIAGNOSIS — M70.61 TROCHANTERIC BURSITIS, RIGHT HIP: ICD-10-CM

## 2023-10-10 DIAGNOSIS — M47.817 LUMBOSACRAL SPONDYLOSIS WITHOUT MYELOPATHY: ICD-10-CM

## 2023-10-10 PROCEDURE — 99214 OFFICE O/P EST MOD 30 MIN: CPT | Performed by: PAIN MEDICINE

## 2023-10-10 PROCEDURE — 77002 NEEDLE LOCALIZATION BY XRAY: CPT | Performed by: PAIN MEDICINE

## 2023-10-10 PROCEDURE — 20610 DRAIN/INJ JOINT/BURSA W/O US: CPT | Performed by: PAIN MEDICINE

## 2023-10-10 RX ORDER — LIDOCAINE HYDROCHLORIDE 10 MG/ML
3 INJECTION, SOLUTION EPIDURAL; INFILTRATION; INTRACAUDAL; PERINEURAL ONCE
Status: COMPLETED | OUTPATIENT
Start: 2023-10-10 | End: 2023-10-10

## 2023-10-10 RX ORDER — BETAMETHASONE SODIUM PHOSPHATE AND BETAMETHASONE ACETATE 3; 3 MG/ML; MG/ML
6 INJECTION, SUSPENSION INTRA-ARTICULAR; INTRALESIONAL; INTRAMUSCULAR; SOFT TISSUE ONCE
Status: COMPLETED | OUTPATIENT
Start: 2023-10-10 | End: 2023-10-10

## 2023-10-10 RX ORDER — GABAPENTIN 300 MG/1
300 CAPSULE ORAL 3 TIMES DAILY
Qty: 90 CAPSULE | Refills: 0 | Status: SHIPPED | OUTPATIENT
Start: 2023-10-10 | End: 2023-11-09

## 2023-10-10 RX ADMIN — LIDOCAINE HYDROCHLORIDE 3 ML: 10 INJECTION, SOLUTION EPIDURAL; INFILTRATION; INTRACAUDAL; PERINEURAL at 13:50

## 2023-10-10 RX ADMIN — BETAMETHASONE SODIUM PHOSPHATE AND BETAMETHASONE ACETATE 6 MG: 3; 3 INJECTION, SUSPENSION INTRA-ARTICULAR; INTRALESIONAL; INTRAMUSCULAR; SOFT TISSUE at 13:51

## 2023-10-10 NOTE — PROGRESS NOTES
[address] Physicians  Neurosurgery and Pain [address] , [address] [facility], [address] [place] [address] [place]: [phone]  F: [phone]                Provider: Ruben Forrest DO          Patient Name: Sanya Pozo : 1955        Date: 10/10/2023         PROCEDURE: Right hip injection under fluoroscopic guidance      Description of Procedure:  Patient is here for trochanteric hip bursa injection. Area was cleaned with Betadine. Sterile technique was used. Fluoroscopic guidance was used for accurate needle placement. Greater trochanter was identified. And 26-gauge 3 and half inch spinal needle was directed toward the greater trochanter without difficulty. After negative aspiration approximately 6 mg of Celestone along with 2 cc 1% preservative-free lidocaine was injected without difficulty. Patient tolerated the procedure well. No obvious complications occurred during the procedure. Summary and Recommendations:  She will take it easy and use ice.                  Ruben Forrest DO

## 2023-10-10 NOTE — PROGRESS NOTES
[facility] Physicians  Neurosurgery and Pain [facility]  [address] , [address] [address] [facility], [address] [place] [address] [place]: [phone]  F: [phone]        Gabriela Myles  (1955)    10/10/2023    Subjective:     Gabriela Myles is 70 y.o. female who complains today of:     Chief Complaint   Patient presents with    Back Pain    Hip Pain     Theodore      Patient here today for follow-up. She has chronic pain history of lower lumbar fusion. Has right hip pain when laying on it. Takes Percocet has taken extra recently due to increased pain. OARRS was reviewed. She takes meloxicam.  She is been having some burning lower chest upper abdomen. She has a appointment with her primary care provider Dr. Chu Pappas this week. She was on gabapentin at 1 point. She has pain in her upper buttocks sometimes pain down the right leg. Pain affects her quality life considerably. She is not diabetic not on blood thinners quit smoking 5 years ago. Bending standing walking twisting bothers her. The previous SI joint injection done in the summer did help over 50%. She has knee pain as well history of surgery. Plan: Discussed options in detail. OARRS was reviewed. Today separate right trochanteric bursa injection was performed to help that pain. She has mechanical pain below her fusion we will set her up for bilateral sacroiliac joint injections. She has taken extra Percocet may be out early. We may have to increase the Percocet to 10 mg.  Evalene Mauricio was reviewed. Narcotic drug policy reviewed. She will discuss with her primary care provider about the meloxicam.  We will reinstitute gabapentin 3 mg 3 times a day #90. She has failed conservative treatment. She may need further imaging down the road. Questions answered chart was reviewed in detail. She also has significant knee pathology on the right. Allergies:  Patient has no known allergies.     Past Medical History:   Diagnosis Date    Anxiety

## 2023-10-11 DIAGNOSIS — G89.4 CHRONIC PAIN SYNDROME: Primary | ICD-10-CM

## 2023-10-11 PROBLEM — D64.9 ANEMIA: Status: ACTIVE | Noted: 2023-10-11

## 2023-10-11 PROBLEM — F41.0 PANIC ATTACK: Status: ACTIVE | Noted: 2023-10-11

## 2023-10-11 PROBLEM — F41.8 DEPRESSION WITH ANXIETY: Status: ACTIVE | Noted: 2023-10-11

## 2023-10-11 PROBLEM — I10 BENIGN HYPERTENSION: Status: ACTIVE | Noted: 2023-10-11

## 2023-10-11 PROBLEM — R29.818 NEUROGENIC CLAUDICATION: Status: ACTIVE | Noted: 2023-10-11

## 2023-10-11 PROBLEM — J01.90 ACUTE SINUSITIS: Status: ACTIVE | Noted: 2023-10-11

## 2023-10-11 PROBLEM — M96.1 POSTLAMINECTOMY SYNDROME OF LUMBAR REGION: Status: ACTIVE | Noted: 2023-10-11

## 2023-10-11 PROBLEM — R49.0 DYSPHONIA: Status: ACTIVE | Noted: 2023-10-11

## 2023-10-11 PROBLEM — F41.0 GENERALIZED ANXIETY DISORDER WITH PANIC ATTACKS: Status: ACTIVE | Noted: 2023-10-11

## 2023-10-11 PROBLEM — M47.816 OSTEOARTHRITIS OF LUMBAR SPINE: Status: ACTIVE | Noted: 2023-10-11

## 2023-10-11 PROBLEM — M25.569 PAIN IN JOINT, LOWER LEG: Status: ACTIVE | Noted: 2023-10-11

## 2023-10-11 PROBLEM — M47.817 LUMBOSACRAL SPONDYLOSIS: Status: ACTIVE | Noted: 2023-10-11

## 2023-10-11 PROBLEM — J31.0 CHRONIC RHINITIS: Status: ACTIVE | Noted: 2023-10-11

## 2023-10-11 PROBLEM — E78.00 HYPERCHOLESTEROLEMIA: Status: ACTIVE | Noted: 2023-10-11

## 2023-10-11 PROBLEM — F43.10 POST-TRAUMATIC STRESS REACTION: Status: ACTIVE | Noted: 2023-10-11

## 2023-10-11 PROBLEM — F41.1 GENERALIZED ANXIETY DISORDER WITH PANIC ATTACKS: Status: ACTIVE | Noted: 2023-10-11

## 2023-10-11 PROBLEM — J34.2 DEVIATED SEPTUM: Status: ACTIVE | Noted: 2023-10-11

## 2023-10-11 PROBLEM — M23.91 INTERNAL DERANGEMENT OF RIGHT KNEE: Status: ACTIVE | Noted: 2023-10-11

## 2023-10-11 PROBLEM — M54.17 LUMBOSACRAL RADICULITIS: Status: ACTIVE | Noted: 2023-10-11

## 2023-10-11 PROBLEM — R00.0 TACHYCARDIA: Status: ACTIVE | Noted: 2023-10-11

## 2023-10-11 PROBLEM — M81.0 AGE RELATED OSTEOPOROSIS: Status: ACTIVE | Noted: 2023-10-11

## 2023-10-11 PROBLEM — J02.9 ACUTE PHARYNGITIS: Status: ACTIVE | Noted: 2023-10-11

## 2023-10-11 PROBLEM — D17.1 LIPOMA OF ABDOMINAL WALL: Status: ACTIVE | Noted: 2023-10-11

## 2023-10-11 PROBLEM — M80.00XA OSTEOPOROSIS WITH CURRENT PATHOLOGICAL FRACTURE: Status: ACTIVE | Noted: 2023-10-11

## 2023-10-11 PROBLEM — R94.31 ABNORMAL ELECTROCARDIOGRAPHY: Status: ACTIVE | Noted: 2023-10-11

## 2023-10-11 PROBLEM — R20.9 SENSORY DISTURBANCE: Status: ACTIVE | Noted: 2023-10-11

## 2023-10-11 PROBLEM — J30.9 ALLERGIC RHINITIS: Status: ACTIVE | Noted: 2023-10-11

## 2023-10-11 PROBLEM — G47.00 INSOMNIA: Status: ACTIVE | Noted: 2023-10-11

## 2023-10-11 PROBLEM — E78.5 HYPERLIPIDEMIA: Status: ACTIVE | Noted: 2023-10-11

## 2023-10-11 PROBLEM — E66.3 OVERWEIGHT (BMI 25.0-29.9): Status: ACTIVE | Noted: 2023-10-11

## 2023-10-11 PROBLEM — J34.89 NASAL VESTIBULITIS: Status: ACTIVE | Noted: 2023-10-11

## 2023-10-11 PROBLEM — S83.219A BUCKET HANDLE TEAR OF MEDIAL MENISCUS OF KNEE: Status: ACTIVE | Noted: 2023-10-11

## 2023-10-11 PROBLEM — G25.81 RESTLESS LEG SYNDROME: Status: ACTIVE | Noted: 2023-10-11

## 2023-10-11 RX ORDER — OXYCODONE HYDROCHLORIDE AND ACETAMINOPHEN 7.5; 325 MG/1; MG/1
TABLET ORAL EVERY 6 HOURS PRN
COMMUNITY
Start: 2021-06-30 | End: 2023-10-27 | Stop reason: WASHOUT

## 2023-10-11 RX ORDER — LISINOPRIL 10 MG/1
1 TABLET ORAL DAILY
COMMUNITY
Start: 2021-07-08 | End: 2023-10-27 | Stop reason: SDUPTHER

## 2023-10-11 RX ORDER — SIMVASTATIN 40 MG/1
TABLET, FILM COATED ORAL
COMMUNITY
End: 2023-10-27 | Stop reason: SDUPTHER

## 2023-10-11 RX ORDER — OXYCODONE AND ACETAMINOPHEN 10; 325 MG/1; MG/1
TABLET ORAL
COMMUNITY
Start: 2023-02-15 | End: 2024-03-02

## 2023-10-11 RX ORDER — PRAMIPEXOLE DIHYDROCHLORIDE 0.25 MG/1
TABLET ORAL
COMMUNITY
Start: 2021-05-05

## 2023-10-11 RX ORDER — NIRMATRELVIR AND RITONAVIR 300-100 MG
KIT ORAL
COMMUNITY
Start: 2023-03-29 | End: 2023-10-12 | Stop reason: WASHOUT

## 2023-10-11 RX ORDER — IPRATROPIUM BROMIDE 21 UG/1
SPRAY, METERED NASAL 2 TIMES DAILY
COMMUNITY
Start: 2021-10-18 | End: 2024-01-31 | Stop reason: SDUPTHER

## 2023-10-11 RX ORDER — GABAPENTIN 600 MG/1
TABLET ORAL
COMMUNITY
Start: 2021-06-30 | End: 2023-10-12 | Stop reason: WASHOUT

## 2023-10-11 RX ORDER — LIDOCAINE 50 MG/G
PATCH TOPICAL SEE ADMIN INSTRUCTIONS
COMMUNITY
Start: 2021-05-28 | End: 2024-04-02 | Stop reason: WASHOUT

## 2023-10-11 RX ORDER — ACETAMINOPHEN 325 MG/1
TABLET ORAL EVERY 6 HOURS
COMMUNITY

## 2023-10-11 RX ORDER — ALPRAZOLAM 0.5 MG/1
TABLET ORAL 3 TIMES DAILY PRN
COMMUNITY

## 2023-10-11 RX ORDER — NALOXONE HYDROCHLORIDE 4 MG/.1ML
SPRAY NASAL
COMMUNITY
Start: 2021-06-30

## 2023-10-11 RX ORDER — LORATADINE 10 MG/1
TABLET ORAL
COMMUNITY

## 2023-10-11 RX ORDER — DIPHENHYDRAMINE HCL 25 MG
1 TABLET ORAL DAILY
COMMUNITY
End: 2023-10-27 | Stop reason: WASHOUT

## 2023-10-11 RX ORDER — OXYCODONE AND ACETAMINOPHEN 10; 325 MG/1; MG/1
1 TABLET ORAL EVERY 6 HOURS PRN
Qty: 120 TABLET | Refills: 0 | Status: SHIPPED | OUTPATIENT
Start: 2023-10-11 | End: 2023-11-10

## 2023-10-11 RX ORDER — TIZANIDINE 2 MG/1
TABLET ORAL
COMMUNITY
Start: 2021-07-12

## 2023-10-11 RX ORDER — MELOXICAM 15 MG/1
TABLET ORAL
COMMUNITY
Start: 2021-02-10 | End: 2023-10-12 | Stop reason: SINTOL

## 2023-10-11 RX ORDER — BUSPIRONE HYDROCHLORIDE 15 MG/1
TABLET ORAL 2 TIMES DAILY
COMMUNITY

## 2023-10-11 RX ORDER — MUPIROCIN 20 MG/G
OINTMENT TOPICAL
COMMUNITY
Start: 2020-03-20

## 2023-10-12 ENCOUNTER — OFFICE VISIT (OUTPATIENT)
Dept: PRIMARY CARE | Facility: CLINIC | Age: 68
End: 2023-10-12
Payer: MEDICARE

## 2023-10-12 ENCOUNTER — TELEPHONE (OUTPATIENT)
Dept: PAIN MANAGEMENT | Age: 68
End: 2023-10-12

## 2023-10-12 VITALS
HEIGHT: 61 IN | WEIGHT: 159 LBS | SYSTOLIC BLOOD PRESSURE: 116 MMHG | BODY MASS INDEX: 30.02 KG/M2 | DIASTOLIC BLOOD PRESSURE: 78 MMHG | TEMPERATURE: 99.1 F | HEART RATE: 73 BPM

## 2023-10-12 DIAGNOSIS — Z00.00 WELL ADULT HEALTH CHECK: ICD-10-CM

## 2023-10-12 DIAGNOSIS — Z23 NEED FOR INFLUENZA VACCINATION: Primary | ICD-10-CM

## 2023-10-12 DIAGNOSIS — E78.5 HYPERLIPIDEMIA, UNSPECIFIED HYPERLIPIDEMIA TYPE: ICD-10-CM

## 2023-10-12 DIAGNOSIS — E66.9 OBESITY (BMI 30-39.9): ICD-10-CM

## 2023-10-12 DIAGNOSIS — Z13.1 DIABETES MELLITUS SCREENING: ICD-10-CM

## 2023-10-12 DIAGNOSIS — R07.89 BURNING CHEST PAIN: ICD-10-CM

## 2023-10-12 DIAGNOSIS — I10 BENIGN HYPERTENSION: ICD-10-CM

## 2023-10-12 PROBLEM — G25.81 RLS (RESTLESS LEGS SYNDROME): Chronic | Status: ACTIVE | Noted: 2020-08-06

## 2023-10-12 PROBLEM — G89.29 CHRONIC BACK PAIN GREATER THAN 3 MONTHS DURATION: Status: ACTIVE | Noted: 2020-09-01

## 2023-10-12 PROBLEM — K22.9 ESOPHAGEAL DISORDER: Status: ACTIVE | Noted: 2018-11-30

## 2023-10-12 PROBLEM — F41.9 ANXIETY: Status: ACTIVE | Noted: 2023-10-12

## 2023-10-12 PROBLEM — M53.2X7 LUMBOSACRAL SPINE INSTABILITY: Status: ACTIVE | Noted: 2020-08-06

## 2023-10-12 PROBLEM — F41.0 PANIC ATTACKS: Chronic | Status: ACTIVE | Noted: 2020-08-06

## 2023-10-12 PROBLEM — M43.17 SPONDYLOLISTHESIS AT L5-S1 LEVEL: Status: ACTIVE | Noted: 2020-07-02

## 2023-10-12 PROBLEM — Z98.890 S/P DISKECTOMY: Status: ACTIVE | Noted: 2020-08-12

## 2023-10-12 PROBLEM — Z98.890 HISTORY OF LUMBAR SURGERY: Status: ACTIVE | Noted: 2023-10-12

## 2023-10-12 PROBLEM — I25.10 CORONARY ARTERY DISEASE INVOLVING NATIVE CORONARY ARTERY OF NATIVE HEART WITHOUT ANGINA PECTORIS: Status: ACTIVE | Noted: 2018-12-11

## 2023-10-12 PROBLEM — F17.200 SMOKER: Status: ACTIVE | Noted: 2017-04-11

## 2023-10-12 PROBLEM — M19.90 OA (OSTEOARTHRITIS): Status: ACTIVE | Noted: 2023-10-12

## 2023-10-12 PROBLEM — M43.16 SPONDYLOLISTHESIS OF LUMBAR REGION: Status: ACTIVE | Noted: 2017-04-11

## 2023-10-12 PROBLEM — F41.8 MIXED ANXIETY AND DEPRESSIVE DISORDER: Status: ACTIVE | Noted: 2020-08-18

## 2023-10-12 PROBLEM — M47.817 LUMBOSACRAL SPONDYLOSIS WITHOUT MYELOPATHY: Status: ACTIVE | Noted: 2022-08-24

## 2023-10-12 PROBLEM — M96.1 POSTLAMINECTOMY SYNDROME OF LUMBOSACRAL REGION: Status: ACTIVE | Noted: 2020-12-10

## 2023-10-12 PROBLEM — M54.9 CHRONIC BACK PAIN GREATER THAN 3 MONTHS DURATION: Status: ACTIVE | Noted: 2020-09-01

## 2023-10-12 PROBLEM — F13.20 BENZODIAZEPINE DEPENDENCE (MULTI): Status: ACTIVE | Noted: 2020-08-21

## 2023-10-12 PROBLEM — F17.200 NICOTINE DEPENDENCE, UNSPECIFIED, UNCOMPLICATED: Status: ACTIVE | Noted: 2018-12-11

## 2023-10-12 PROCEDURE — 1159F MED LIST DOCD IN RCRD: CPT | Performed by: INTERNAL MEDICINE

## 2023-10-12 PROCEDURE — 3074F SYST BP LT 130 MM HG: CPT | Performed by: INTERNAL MEDICINE

## 2023-10-12 PROCEDURE — 1170F FXNL STATUS ASSESSED: CPT | Performed by: INTERNAL MEDICINE

## 2023-10-12 PROCEDURE — 93000 ELECTROCARDIOGRAM COMPLETE: CPT | Performed by: INTERNAL MEDICINE

## 2023-10-12 PROCEDURE — G0008 ADMIN INFLUENZA VIRUS VAC: HCPCS | Performed by: INTERNAL MEDICINE

## 2023-10-12 PROCEDURE — 1160F RVW MEDS BY RX/DR IN RCRD: CPT | Performed by: INTERNAL MEDICINE

## 2023-10-12 PROCEDURE — 3078F DIAST BP <80 MM HG: CPT | Performed by: INTERNAL MEDICINE

## 2023-10-12 PROCEDURE — 99214 OFFICE O/P EST MOD 30 MIN: CPT | Performed by: INTERNAL MEDICINE

## 2023-10-12 PROCEDURE — 90662 IIV NO PRSV INCREASED AG IM: CPT | Performed by: INTERNAL MEDICINE

## 2023-10-12 RX ORDER — LANOLIN ALCOHOL/MO/W.PET/CERES
1 CREAM (GRAM) TOPICAL DAILY
COMMUNITY

## 2023-10-12 RX ORDER — HYDROCODONE BITARTRATE AND ACETAMINOPHEN 10; 325 MG/1; MG/1
1 TABLET ORAL EVERY 6 HOURS PRN
COMMUNITY
Start: 2023-09-19 | End: 2023-10-27 | Stop reason: WASHOUT

## 2023-10-12 RX ORDER — PANTOPRAZOLE SODIUM 40 MG/1
40 TABLET, DELAYED RELEASE ORAL DAILY
Qty: 30 TABLET | Refills: 0 | Status: SHIPPED | OUTPATIENT
Start: 2023-10-12 | End: 2023-10-18 | Stop reason: SDUPTHER

## 2023-10-12 RX ORDER — GABAPENTIN 300 MG/1
300 CAPSULE ORAL 3 TIMES DAILY
COMMUNITY
Start: 2023-10-10 | End: 2023-10-12

## 2023-10-12 ASSESSMENT — ACTIVITIES OF DAILY LIVING (ADL)
GROCERY_SHOPPING: INDEPENDENT
TAKING_MEDICATION: INDEPENDENT
BATHING: INDEPENDENT
DRESSING: INDEPENDENT
MANAGING_FINANCES: INDEPENDENT
DOING_HOUSEWORK: INDEPENDENT

## 2023-10-12 NOTE — PROGRESS NOTES
Assessment/Plan   Problem List Items Addressed This Visit       Hyperlipidemia    Relevant Orders    Lipid Panel    TSH with reflex to Free T4 if abnormal    Benign hypertension    Obesity (BMI 30-39.9)    Relevant Orders    TSH with reflex to Free T4 if abnormal    Burning chest pain    Relevant Medications    pantoprazole (ProtoNix) 40 mg EC tablet    Other Relevant Orders    Referral to Cardiology    ECG 12 lead (Clinic Performed) (Completed)    Diabetes mellitus screening    Need for influenza vaccination - Primary    Relevant Orders    Flu vaccine, quadrivalent, high-dose, preservative free, age 65y+ (FLUZONE) (Completed)   Advised to see the cardiologist for assessment of the burning chest pain intermittently  It becomes apparent that this is associated to food intake and possibly gastroesophageal  It may well be associated with NSAID advised to stop meloxicam  Protonix has been started follow-up in 4 weeks  Labs ordered      Subjective   Patient ID: Alysa Braun is a 68 y.o. female who presents for Annual Exam.    Past Surgical History:   Procedure Laterality Date    OTHER SURGICAL HISTORY  08/16/2021    Lumbar laminectomy    OTHER SURGICAL HISTORY  08/11/2022    Knee surgery    OTHER SURGICAL HISTORY  01/24/2022    Spinal surgery    OTHER SURGICAL HISTORY  01/24/2022    Arthrodesis    OTHER SURGICAL HISTORY  01/24/2022    Colonoscopy    OTHER SURGICAL HISTORY  01/24/2022    Lumbar discectomy    OTHER SURGICAL HISTORY  01/24/2022    Lumbar vertebral fusion      Family History   Problem Relation Name Age of Onset    Depression Mother      Diabetes Mother      Fibromyalgia Mother      Other (High serum cholesterol sulfate) Father        Social History     Socioeconomic History    Marital status:      Spouse name: Not on file    Number of children: Not on file    Years of education: Not on file    Highest education level: Not on file   Occupational History    Not on file   Tobacco Use    Smoking  status: Former     Packs/day: 1.00     Years: 10.00     Additional pack years: 0.00     Total pack years: 10.00     Types: Cigarettes     Quit date:      Years since quittin.7    Smokeless tobacco: Never   Substance and Sexual Activity    Alcohol use: Yes     Alcohol/week: 2.0 standard drinks of alcohol     Types: 2 Glasses of wine per week    Drug use: Never    Sexual activity: Not on file   Other Topics Concern    Not on file   Social History Narrative    Not on file     Social Determinants of Health     Financial Resource Strain: Not on file   Food Insecurity: Not on file   Transportation Needs: Not on file   Physical Activity: Not on file   Stress: Not on file   Social Connections: Not on file   Intimate Partner Violence: Not on file   Housing Stability: Not on file      Patient has no known allergies.   Current Outpatient Medications   Medication Sig Dispense Refill    acetaminophen (TylenoL) 325 mg tablet Take by mouth every 6 hours.      busPIRone (Buspar) 15 mg tablet Take by mouth twice a day.      diphenhydrAMINE (Sominex) 25 mg tablet Take 1 tablet (25 mg) by mouth once daily.      HYDROcodone-acetaminophen (Norco)  mg tablet 1 tablet every 6 hours if needed for severe pain (7 - 10).      ipratropium (Atrovent) 21 mcg (0.03 %) nasal spray Administer into affected nostril(s) twice a day.      L. acidophilus/Bifid. animalis 15.5 billion cell capsule Take by mouth.      lidocaine (Lidoderm) 5 % patch see administration instructions. Apply as directed by physician      lisinopril 10 mg tablet Take 1 tablet (10 mg) by mouth once daily.      loratadine (Claritin) 10 mg tablet Take by mouth.      magnesium oxide (Mag-Ox) 400 mg (241.3 mg magnesium) tablet Take 1 tablet (400 mg) by mouth once daily.      mupirocin (Bactroban) 2 % ointment APPLY TO NOSE TID      naloxone (Narcan) 4 mg/0.1 mL nasal spray Administer into affected nostril(s).      oxyCODONE-acetaminophen (Percocet)  mg tablet        Percocet 7.5-325 mg tablet Take by mouth every 6 hours if needed.      pramipexole (Mirapex) 0.25 mg tablet Take by mouth.      simvastatin (Zocor) 40 mg tablet Take by mouth.      tiZANidine (Zanaflex) 2 mg tablet Take by mouth.      Xanax 0.5 mg tablet Take by mouth 3 times a day as needed.      pantoprazole (ProtoNix) 40 mg EC tablet Take 1 tablet (40 mg) by mouth once daily. Do not crush, chew, or split. 30 tablet 0     No current facility-administered medications for this visit.      Vitals:    10/12/23 1425   BP: 116/78   Pulse: 73   Temp: 37.3 °C (99.1 °F)      Problem List Items Addressed This Visit       Hyperlipidemia    Relevant Orders    Lipid Panel    TSH with reflex to Free T4 if abnormal    Benign hypertension    Obesity (BMI 30-39.9)    Relevant Orders    TSH with reflex to Free T4 if abnormal    Burning chest pain    Relevant Medications    pantoprazole (ProtoNix) 40 mg EC tablet    Other Relevant Orders    Referral to Cardiology    ECG 12 lead (Clinic Performed) (Completed)    Diabetes mellitus screening    Need for influenza vaccination - Primary    Relevant Orders    Flu vaccine, quadrivalent, high-dose, preservative free, age 65y+ (FLUZONE) (Completed)      Orders Placed This Encounter   Procedures    Flu vaccine, quadrivalent, high-dose, preservative free, age 65y+ (FLUZONE)    Comprehensive Metabolic Panel     Standing Status:   Future     Standing Expiration Date:   10/12/2024     Order Specific Question:   Release result to MyChart     Answer:   Immediate    Lipid Panel     Standing Status:   Future     Standing Expiration Date:   10/12/2024     Order Specific Question:   Release result to MyChart     Answer:   Immediate    TSH with reflex to Free T4 if abnormal     Standing Status:   Future     Standing Expiration Date:   10/12/2024     Order Specific Question:   Release result to MyChart     Answer:   Immediate    Referral to Cardiology     Standing Status:   Future     Standing  Expiration Date:   4/12/2024     Referral Priority:   Routine     Referral Type:   Consultation     Referral Reason:   Specialty Services Required     Requested Specialty:   Cardiology     Number of Visits Requested:   1    ECG 12 lead (Clinic Performed)        HPI she has been going to the pain management for pain control and now she is having a burning feeling on eating in the mid chest  She has been taking nonsteroidal anti-inflammatory drug as well  EKG done in the office today shows no abnormal findings    ROS apart from aches and pains there is no other issue    PHYSICAL EXAM  Cardiovascular chest abdominal neurological examination is normal        Lab Results   Component Value Date    CHOL 202 (H) 12/16/2022    CHHDL 2.9 12/16/2022

## 2023-10-12 NOTE — TELEPHONE ENCOUNTER
Pharmacy called regarding the percocet 10mg. The patient just got percocet 7.5 on 9/23 for a 30 day supply. They are asking if they should fill this or wait until she is due for a refill.

## 2023-10-13 NOTE — TELEPHONE ENCOUNTER
Spoke to pharmacist and made aware. She says she is concerned about the patient but will fill the medication for her.

## 2023-10-18 ENCOUNTER — LAB (OUTPATIENT)
Dept: LAB | Facility: LAB | Age: 68
End: 2023-10-18
Payer: MEDICARE

## 2023-10-18 DIAGNOSIS — Z00.00 WELL ADULT HEALTH CHECK: ICD-10-CM

## 2023-10-18 DIAGNOSIS — R07.89 BURNING CHEST PAIN: ICD-10-CM

## 2023-10-18 DIAGNOSIS — E78.5 HYPERLIPIDEMIA, UNSPECIFIED HYPERLIPIDEMIA TYPE: ICD-10-CM

## 2023-10-18 DIAGNOSIS — E66.9 OBESITY (BMI 30-39.9): ICD-10-CM

## 2023-10-18 LAB
ALBUMIN SERPL BCP-MCNC: 4.5 G/DL (ref 3.4–5)
ALP SERPL-CCNC: 64 U/L (ref 33–136)
ALT SERPL W P-5'-P-CCNC: 16 U/L (ref 7–45)
ANION GAP SERPL CALC-SCNC: 15 MMOL/L (ref 10–20)
AST SERPL W P-5'-P-CCNC: 23 U/L (ref 9–39)
BILIRUB SERPL-MCNC: 0.3 MG/DL (ref 0–1.2)
BUN SERPL-MCNC: 22 MG/DL (ref 6–23)
CALCIUM SERPL-MCNC: 10 MG/DL (ref 8.6–10.3)
CHLORIDE SERPL-SCNC: 97 MMOL/L (ref 98–107)
CHOLEST SERPL-MCNC: 144 MG/DL (ref 0–199)
CHOLESTEROL/HDL RATIO: 2.6
CO2 SERPL-SCNC: 29 MMOL/L (ref 21–32)
CREAT SERPL-MCNC: 0.68 MG/DL (ref 0.5–1.05)
GFR SERPL CREATININE-BSD FRML MDRD: >90 ML/MIN/1.73M*2
GLUCOSE SERPL-MCNC: 88 MG/DL (ref 74–99)
HDLC SERPL-MCNC: 56.2 MG/DL
LDLC SERPL CALC-MCNC: 62 MG/DL
NON HDL CHOLESTEROL: 88 MG/DL (ref 0–149)
POTASSIUM SERPL-SCNC: 5.6 MMOL/L (ref 3.5–5.3)
PROT SERPL-MCNC: 7.1 G/DL (ref 6.4–8.2)
SODIUM SERPL-SCNC: 135 MMOL/L (ref 136–145)
TRIGL SERPL-MCNC: 130 MG/DL (ref 0–149)
TSH SERPL-ACNC: 3.89 MIU/L (ref 0.44–3.98)
VLDL: 26 MG/DL (ref 0–40)

## 2023-10-18 PROCEDURE — 80061 LIPID PANEL: CPT

## 2023-10-18 PROCEDURE — 84443 ASSAY THYROID STIM HORMONE: CPT

## 2023-10-18 PROCEDURE — 36415 COLL VENOUS BLD VENIPUNCTURE: CPT

## 2023-10-18 PROCEDURE — 80053 COMPREHEN METABOLIC PANEL: CPT

## 2023-10-18 RX ORDER — PANTOPRAZOLE SODIUM 40 MG/1
40 TABLET, DELAYED RELEASE ORAL DAILY
Qty: 30 TABLET | Refills: 0 | Status: SHIPPED | OUTPATIENT
Start: 2023-10-18 | End: 2023-10-27 | Stop reason: SDUPTHER

## 2023-10-22 DIAGNOSIS — E87.5 HYPERKALEMIA: Primary | ICD-10-CM

## 2023-10-23 ENCOUNTER — LAB (OUTPATIENT)
Dept: LAB | Facility: LAB | Age: 68
End: 2023-10-23
Payer: MEDICARE

## 2023-10-23 DIAGNOSIS — E87.5 HYPERKALEMIA: ICD-10-CM

## 2023-10-23 LAB — POTASSIUM SERPL-SCNC: 4.8 MMOL/L (ref 3.5–5.3)

## 2023-10-23 PROCEDURE — 36415 COLL VENOUS BLD VENIPUNCTURE: CPT

## 2023-10-23 PROCEDURE — 84132 ASSAY OF SERUM POTASSIUM: CPT

## 2023-10-27 ENCOUNTER — OFFICE VISIT (OUTPATIENT)
Dept: CARDIOLOGY | Facility: CLINIC | Age: 68
End: 2023-10-27
Payer: MEDICARE

## 2023-10-27 ENCOUNTER — OFFICE VISIT (OUTPATIENT)
Dept: PRIMARY CARE | Facility: CLINIC | Age: 68
End: 2023-10-27
Payer: MEDICARE

## 2023-10-27 VITALS
HEART RATE: 86 BPM | HEIGHT: 61 IN | TEMPERATURE: 98.6 F | SYSTOLIC BLOOD PRESSURE: 118 MMHG | BODY MASS INDEX: 29.83 KG/M2 | WEIGHT: 158 LBS | DIASTOLIC BLOOD PRESSURE: 60 MMHG

## 2023-10-27 VITALS
DIASTOLIC BLOOD PRESSURE: 74 MMHG | TEMPERATURE: 97.3 F | SYSTOLIC BLOOD PRESSURE: 113 MMHG | HEIGHT: 61 IN | WEIGHT: 158 LBS | HEART RATE: 81 BPM | BODY MASS INDEX: 29.83 KG/M2

## 2023-10-27 DIAGNOSIS — I10 BENIGN HYPERTENSION: ICD-10-CM

## 2023-10-27 DIAGNOSIS — E78.00 HYPERCHOLESTEROLEMIA: Primary | ICD-10-CM

## 2023-10-27 DIAGNOSIS — G89.4 CHRONIC PAIN DISORDER: ICD-10-CM

## 2023-10-27 DIAGNOSIS — R07.89 BURNING CHEST PAIN: ICD-10-CM

## 2023-10-27 DIAGNOSIS — F41.9 ANXIETY: ICD-10-CM

## 2023-10-27 DIAGNOSIS — R79.89 ELEVATED TSH: ICD-10-CM

## 2023-10-27 DIAGNOSIS — F41.0 PANIC ATTACK: ICD-10-CM

## 2023-10-27 DIAGNOSIS — K21.9 GASTROESOPHAGEAL REFLUX DISEASE WITHOUT ESOPHAGITIS: ICD-10-CM

## 2023-10-27 DIAGNOSIS — G25.81 RESTLESS LEG SYNDROME: ICD-10-CM

## 2023-10-27 DIAGNOSIS — R07.89 ATYPICAL CHEST PAIN: ICD-10-CM

## 2023-10-27 DIAGNOSIS — E78.5 HYPERLIPIDEMIA, UNSPECIFIED HYPERLIPIDEMIA TYPE: Primary | ICD-10-CM

## 2023-10-27 DIAGNOSIS — Z87.891 FORMER SMOKER: ICD-10-CM

## 2023-10-27 DIAGNOSIS — R00.2 PALPITATIONS: ICD-10-CM

## 2023-10-27 DIAGNOSIS — M96.1 POSTLAMINECTOMY SYNDROME OF LUMBAR REGION: ICD-10-CM

## 2023-10-27 PROBLEM — F17.200 SMOKER: Status: RESOLVED | Noted: 2017-04-11 | Resolved: 2023-10-27

## 2023-10-27 PROBLEM — F17.200 NICOTINE DEPENDENCE, UNSPECIFIED, UNCOMPLICATED: Status: RESOLVED | Noted: 2018-12-11 | Resolved: 2023-10-27

## 2023-10-27 PROCEDURE — 1036F TOBACCO NON-USER: CPT | Performed by: INTERNAL MEDICINE

## 2023-10-27 PROCEDURE — 1159F MED LIST DOCD IN RCRD: CPT | Performed by: INTERNAL MEDICINE

## 2023-10-27 PROCEDURE — 99214 OFFICE O/P EST MOD 30 MIN: CPT | Performed by: INTERNAL MEDICINE

## 2023-10-27 PROCEDURE — 1160F RVW MEDS BY RX/DR IN RCRD: CPT | Performed by: INTERNAL MEDICINE

## 2023-10-27 PROCEDURE — 3008F BODY MASS INDEX DOCD: CPT | Performed by: INTERNAL MEDICINE

## 2023-10-27 PROCEDURE — 3078F DIAST BP <80 MM HG: CPT | Performed by: INTERNAL MEDICINE

## 2023-10-27 PROCEDURE — 3074F SYST BP LT 130 MM HG: CPT | Performed by: INTERNAL MEDICINE

## 2023-10-27 RX ORDER — SIMVASTATIN 40 MG/1
40 TABLET, FILM COATED ORAL NIGHTLY
Qty: 90 TABLET | Refills: 3 | Status: SHIPPED | OUTPATIENT
Start: 2023-10-27 | End: 2024-10-26

## 2023-10-27 RX ORDER — PANTOPRAZOLE SODIUM 40 MG/1
40 TABLET, DELAYED RELEASE ORAL DAILY
Qty: 90 TABLET | Refills: 3 | Status: SHIPPED | OUTPATIENT
Start: 2023-10-27 | End: 2024-10-26

## 2023-10-27 RX ORDER — LISINOPRIL 10 MG/1
10 TABLET ORAL DAILY
Qty: 90 TABLET | Refills: 3 | Status: SHIPPED | OUTPATIENT
Start: 2023-10-27 | End: 2024-10-26

## 2023-10-27 ASSESSMENT — PATIENT HEALTH QUESTIONNAIRE - PHQ9
2. FEELING DOWN, DEPRESSED OR HOPELESS: NOT AT ALL
SUM OF ALL RESPONSES TO PHQ9 QUESTIONS 1 AND 2: 0
1. LITTLE INTEREST OR PLEASURE IN DOING THINGS: NOT AT ALL

## 2023-10-27 ASSESSMENT — ENCOUNTER SYMPTOMS
PALPITATIONS: 1
CONSTITUTIONAL NEGATIVE: 1
HEMATOLOGIC/LYMPHATIC NEGATIVE: 1
RESPIRATORY NEGATIVE: 1
ALLERGIC/IMMUNOLOGIC NEGATIVE: 1
ENDOCRINE NEGATIVE: 1
GASTROINTESTINAL NEGATIVE: 1
NERVOUS/ANXIOUS: 1
NEUROLOGICAL NEGATIVE: 1
EYES NEGATIVE: 1
ARTHRALGIAS: 1

## 2023-10-27 NOTE — PATIENT INSTRUCTIONS
Get CT cardiac score - if it shows you are at high risk for a heart attack we will order a stress test. If not you can follow-up as needed.

## 2023-10-27 NOTE — PROGRESS NOTES
"  Patient:  Alysa Braun  YOB: 1955  MRN: [id number]       Chief Complaint/Active Symptoms:       Alysa Braun is a 68 y.o. female who returns today for cardiac follow-up.    Several weeks ago when she saw her PCP she complained of \"burning\" in her lungs that occurred after eating. Had no other associated symptoms. She attributed this to all the smoke from [place] this summer but didn't feel like her usual symptoms of heartburn. Started on protonix and has had non since.     Had no burning chest discomfort with exercise. Still has palpitations - a feeling of her heart racing without any particular pattern. Is usually at rest. Lasts for 1-2 seconds and resolves. No syncope or near syncope associated with them. Happens on average once a month and not changing. No shortness of breath. No edema.     Had surgery in February for a hernia without complications otherwise no other surgeries or hospitalizations. Stopped smoking.      Review of Systems   Constitutional: Negative.    HENT:  Positive for congestion.    Eyes: Negative.    Respiratory: Negative.     Cardiovascular:  Positive for chest pain and palpitations. Negative for leg swelling.   Gastrointestinal: Negative.    Endocrine: Negative.    Genitourinary: Negative.    Musculoskeletal:  Positive for arthralgias.   Skin: Negative.    Allergic/Immunologic: Negative.    Neurological: Negative.    Hematological: Negative.    Psychiatric/Behavioral:  The patient is nervous/anxious.    All other systems reviewed and are negative.      Objective:     Vitals:    10/27/23 0836   BP: 118/60   Pulse: 86   Temp: 37 °C (98.6 °F)       Vitals:    10/27/23 0836   Weight: 71.7 kg (158 lb)       Allergies:     No Known Allergies       Medications:     Current Outpatient Medications   Medication Instructions    acetaminophen (TylenoL) 325 mg tablet oral, Every 6 hours    busPIRone (Buspar) 15 mg tablet oral, 2 times daily    diphenhydrAMINE (Sominex) 25 mg tablet 1 " tablet, oral, Daily    HYDROcodone-acetaminophen (Norco)  mg tablet 1 tablet, Every 6 hours PRN    ipratropium (Atrovent) 21 mcg (0.03 %) nasal spray nasal, 2 times daily    L. acidophilus/Bifid. animalis 15.5 billion cell capsule oral    lidocaine (Lidoderm) 5 % patch See admin instructions, Apply as directed by physician    lisinopril 10 mg tablet 1 tablet, oral, Daily    loratadine (Claritin) 10 mg tablet oral    magnesium oxide (Mag-Ox) 400 mg (241.3 mg magnesium) tablet 1 tablet, oral, Daily    mupirocin (Bactroban) 2 % ointment APPLY TO NOSE TID    naloxone (Narcan) 4 mg/0.1 mL nasal spray nasal    oxyCODONE-acetaminophen (Percocet)  mg tablet     pantoprazole (PROTONIX) 40 mg, oral, Daily, Do not crush, chew, or split.    Percocet 7.5-325 mg tablet oral, Every 6 hours PRN    pramipexole (Mirapex) 0.25 mg tablet oral    simvastatin (Zocor) 40 mg tablet oral    tiZANidine (Zanaflex) 2 mg tablet oral    Xanax 0.5 mg tablet oral, 3 times daily PRN       Physical Examination:   GENERAL:  Well developed, well nourished, in no acute distress.  HEENT: NC AT, EOMI with anicteric sclera  NECK:  Supple, no JVD, no bruit.  CHEST:  Symmetric and nontender.  LUNGS:  Clear to auscultation bilaterally, normal respiratory effort.  HEART:  PMI is nondisplaced. RRR with normal S1 and S2, no S3, no mumur or rub. No carotid or abdominal bruits  ABDOMEN: Soft, NT, ND without palpable organomegaly or bruits  EXTREMITIES:  Warm with good color, no clubbing or cyanosis.  There is no edema noted.  PERIPHERAL VASCULAR:  Pulses present and equally palpable; 2+ throughout.  MUSCULOSKELETAL:   NEURO/PSYCH:  Alert and oriented times three with approppriate behavior and responses. Nonfocal motor examination with normal gait and ambulation  Lymph: No significant palpable lymphadenopathy  Skin: no rash or lesions on exposed skin or reported.    Lab:     CBC:   Lab Results   Component Value Date    WBC 5.4 12/16/2022    RBC 3.77  "(L) 12/16/2022    HGB 11.5 (L) 12/16/2022    HCT 35.5 (L) 12/16/2022     12/16/2022        CMP:    Lab Results   Component Value Date     (L) 10/18/2023    K 4.8 10/23/2023    CL 97 (L) 10/18/2023    CO2 29 10/18/2023    BUN 22 10/18/2023    CREATININE 0.68 10/18/2023    GLUCOSE 88 10/18/2023    CALCIUM 10.0 10/18/2023       Magnesium:    No results found for: \"MG\"    Lipid Profile:    Lab Results   Component Value Date    TRIG 130 10/18/2023    HDL 56.2 10/18/2023    LDLCALC 62 10/18/2023       TSH:    Lab Results   Component Value Date    TSH 3.89 10/18/2023       BNP:   No results found for: \"BNP\"     PT/INR:    Lab Results   Component Value Date    PROTIME 11.7 12/16/2022    INR 1.0 12/16/2022       HgBA1c:    No results found for: \"HGBA1C\"    BMP:  Lab Results   Component Value Date     (L) 10/18/2023     (L) 12/16/2022     (L) 07/13/2022     02/10/2022    K 4.8 10/23/2023    K 5.6 (H) 10/18/2023    K 3.9 12/16/2022    K 4.7 07/13/2022    K 4.7 02/10/2022    CL 97 (L) 10/18/2023    CL 99 12/16/2022    CL 98 07/13/2022     02/10/2022    CO2 29 10/18/2023    CO2 26 12/16/2022    CO2 27 07/13/2022    CO2 29 02/10/2022    BUN 22 10/18/2023    BUN 14 12/16/2022    BUN 20 07/13/2022    BUN 16 02/10/2022    CREATININE 0.68 10/18/2023    CREATININE 0.65 12/16/2022    CREATININE 0.64 07/13/2022    CREATININE 0.61 02/10/2022       Cardiac Enzymes:    No results found for: \"TROPHS\"    Hepatic Function Panel:    Lab Results   Component Value Date    ALKPHOS 64 10/18/2023    ALT 16 10/18/2023    AST 23 10/18/2023    PROT 7.1 10/18/2023    BILITOT 0.3 10/18/2023         Diagnostic Studies:     No echocardiogram results found for the past 12 months    No nuclear medicine results found for the past 12 months    No valid procedures specified.    EKG:   No results found for: \"EKG\"  EKG 10/12/23 - NSR, rsr' in V1 which is likely normal variant, otherwise normal EKG  Radiology:     No " orders to display         ASSESSMENT     Problem List Items Addressed This Visit       Hypercholesterolemia - Primary    Benign hypertension    Atypical chest pain    Relevant Orders    CT cardiac scoring wo IV contrast    Former smoker    Palpitations       PLAN     1.  Atypical chest pain.  Her symptoms have resolved and certainly do sound like acid reflux although a little bit of a variant there as well.  Will recommend the risk stratification we discussed when I saw her last year with a CT cardiac score.  That CT cardiac score show she is at high risk for heart attack we will order a stress test and see her afterwards.  If it is not high risk and then she can be seen by cardiology as needed.  2.  Palpitations.  Benign by description and infrequent would follow clinically  3.  Hypertension.  Blood pressures are controlled on low-dose therapy.  4.  Elevated LDL cholesterol.  Treated with an LDL cholesterol at goal.  5.  Tobacco and weight status.  Patient is a former smoker who has been tobacco free for the past 5 years she is overweight we have encouraged heart healthy Mediterranean diet.    If her CT cardiac score is not high risk she can follow-up with cardiology on an as-needed basis.  As stated above if it is elevated will order stress test and we will see her after the stress test

## 2023-10-27 NOTE — PROGRESS NOTES
Assessment/Plan   Problem List Items Addressed This Visit       Hyperlipidemia - Primary    Relevant Medications    simvastatin (Zocor) 40 mg tablet    Benign hypertension    Relevant Medications    lisinopril 10 mg tablet    Restless leg syndrome    Postlaminectomy syndrome of lumbar region    Panic attack    BMI 29.0-29.9,adult    Anxiety    Chronic pain disorder    Atypical chest pain    Relevant Medications    pantoprazole (ProtoNix) 40 mg EC tablet    Palpitations    Elevated TSH     Other Visit Diagnoses       Gastroesophageal reflux disease without esophagitis            She feels that once PPI started her gastroesophageal discomfort improved remarkably and chest pain is gone though occasionally she is having palpitation has seen the cardiologist and testing in progress  She has 2 back surgery has a chronic back pain and chronic pain syndrome and goes to the pain management as well  All the other conditions reviewed  Follow-up in 3 months    Subjective   Patient ID: Alysa Braun is a 68 y.o. female who presents for Follow-up (1 month.).    Past Surgical History:   Procedure Laterality Date    OTHER SURGICAL HISTORY  08/16/2021    Lumbar laminectomy    OTHER SURGICAL HISTORY  08/11/2022    Knee surgery    OTHER SURGICAL HISTORY  01/24/2022    Spinal surgery    OTHER SURGICAL HISTORY  01/24/2022    Arthrodesis    OTHER SURGICAL HISTORY  01/24/2022    Colonoscopy    OTHER SURGICAL HISTORY  01/24/2022    Lumbar discectomy    OTHER SURGICAL HISTORY  01/24/2022    Lumbar vertebral fusion      Family History   Problem Relation Name Age of Onset    Depression Mother      Diabetes Mother      Fibromyalgia Mother      Other (High serum cholesterol sulfate) Father        Social History     Socioeconomic History    Marital status:      Spouse name: Not on file    Number of children: Not on file    Years of education: Not on file    Highest education level: Not on file   Occupational History    Not on file    Tobacco Use    Smoking status: Former     Packs/day: 1.00     Years: 10.00     Additional pack years: 0.00     Total pack years: 10.00     Types: Cigarettes     Quit date: 2018     Years since quittin.8    Smokeless tobacco: Never   Substance and Sexual Activity    Alcohol use: Yes     Alcohol/week: 2.0 standard drinks of alcohol     Types: 2 Glasses of wine per week    Drug use: Never    Sexual activity: Not on file   Other Topics Concern    Not on file   Social History Narrative    Not on file     Social Determinants of Health     Financial Resource Strain: Not on file   Food Insecurity: Not on file   Transportation Needs: Not on file   Physical Activity: Not on file   Stress: Not on file   Social Connections: Not on file   Intimate Partner Violence: Not on file   Housing Stability: Not on file      Patient has no known allergies.   Current Outpatient Medications   Medication Sig Dispense Refill    ipratropium (Atrovent) 21 mcg (0.03 %) nasal spray Administer into affected nostril(s) twice a day.      L. acidophilus/Bifid. animalis 15.5 billion cell capsule Take by mouth.      lidocaine (Lidoderm) 5 % patch see administration instructions. Apply as directed by physician      loratadine (Claritin) 10 mg tablet Take by mouth.      magnesium oxide (Mag-Ox) 400 mg (241.3 mg magnesium) tablet Take 1 tablet (400 mg) by mouth once daily.      mupirocin (Bactroban) 2 % ointment APPLY TO NOSE TID      naloxone (Narcan) 4 mg/0.1 mL nasal spray Administer into affected nostril(s).      oxyCODONE-acetaminophen (Percocet)  mg tablet       pramipexole (Mirapex) 0.25 mg tablet Take by mouth.      tiZANidine (Zanaflex) 2 mg tablet Take by mouth.      Xanax 0.5 mg tablet Take by mouth 3 times a day as needed.      acetaminophen (TylenoL) 325 mg tablet Take by mouth every 6 hours.      busPIRone (Buspar) 15 mg tablet Take by mouth twice a day.      lisinopril 10 mg tablet Take 1 tablet (10 mg) by mouth once daily. 90  "tablet 3    pantoprazole (ProtoNix) 40 mg EC tablet Take 1 tablet (40 mg) by mouth once daily. Do not crush, chew, or split. 90 tablet 3    simvastatin (Zocor) 40 mg tablet Take 1 tablet (40 mg) by mouth once daily at bedtime. 90 tablet 3     No current facility-administered medications for this visit.      Vitals:    10/27/23 1152   BP: 113/74   BP Location: Left arm   Patient Position: Sitting   Pulse: 81   Temp: 36.3 °C (97.3 °F)   Weight: 71.7 kg (158 lb)   Height: 1.549 m (5' 1\")      Problem List Items Addressed This Visit       Hyperlipidemia - Primary    Relevant Medications    simvastatin (Zocor) 40 mg tablet    Benign hypertension    Relevant Medications    lisinopril 10 mg tablet    Restless leg syndrome    Postlaminectomy syndrome of lumbar region    Panic attack    BMI 29.0-29.9,adult    Anxiety    Chronic pain disorder    Atypical chest pain    Relevant Medications    pantoprazole (ProtoNix) 40 mg EC tablet    Palpitations    Elevated TSH     Other Visit Diagnoses       Gastroesophageal reflux disease without esophagitis               No orders of the defined types were placed in this encounter.       HPI  Says has seen a heart doctor who advised that most likely coming from the belly as after starting PPI all the discomfort where resolved  She occasionally gets palpitation and anxiety issues but otherwise doing well but though continues to have issues with the pain from the back and 2 back surgeries in the past    ROS  Negative otherwise    PHYSICAL EXAM  Cardiovascular chest abdominal neurological examination normal      No results found for: \"PR1\", \"BMPR1A\", \"CMPLAS\", \"WC4PIMDD\", \"KPSAT\"   Lab Results   Component Value Date    CHOL 144 10/18/2023    LDLCALC 62 10/18/2023    CHHDL 2.6 10/18/2023                "

## 2023-10-30 ENCOUNTER — PROCEDURE VISIT (OUTPATIENT)
Dept: PAIN MANAGEMENT | Age: 68
End: 2023-10-30
Payer: MEDICARE

## 2023-10-30 ENCOUNTER — APPOINTMENT (OUTPATIENT)
Dept: CARDIOLOGY | Facility: CLINIC | Age: 68
End: 2023-10-30
Payer: MEDICARE

## 2023-10-30 DIAGNOSIS — M46.1 SACROILIITIS, NOT ELSEWHERE CLASSIFIED (HCC): Primary | ICD-10-CM

## 2023-10-30 DIAGNOSIS — M47.817 LUMBOSACRAL SPONDYLOSIS WITHOUT MYELOPATHY: ICD-10-CM

## 2023-10-30 PROCEDURE — 27096 INJECT SACROILIAC JOINT: CPT | Performed by: PAIN MEDICINE

## 2023-10-30 RX ORDER — TIZANIDINE 2 MG/1
2 TABLET ORAL NIGHTLY PRN
Qty: 30 TABLET | Refills: 0 | Status: SHIPPED | OUTPATIENT
Start: 2023-10-30 | End: 2023-11-29

## 2023-10-30 RX ORDER — BETAMETHASONE SODIUM PHOSPHATE AND BETAMETHASONE ACETATE 3; 3 MG/ML; MG/ML
6 INJECTION, SUSPENSION INTRA-ARTICULAR; INTRALESIONAL; INTRAMUSCULAR; SOFT TISSUE ONCE
Status: COMPLETED | OUTPATIENT
Start: 2023-10-30 | End: 2023-10-30

## 2023-10-30 RX ORDER — LIDOCAINE HYDROCHLORIDE 10 MG/ML
3 INJECTION, SOLUTION EPIDURAL; INFILTRATION; INTRACAUDAL; PERINEURAL ONCE
Status: COMPLETED | OUTPATIENT
Start: 2023-10-30 | End: 2023-10-30

## 2023-10-30 RX ADMIN — BETAMETHASONE SODIUM PHOSPHATE AND BETAMETHASONE ACETATE 6 MG: 3; 3 INJECTION, SUSPENSION INTRA-ARTICULAR; INTRALESIONAL; INTRAMUSCULAR; SOFT TISSUE at 14:07

## 2023-10-30 RX ADMIN — LIDOCAINE HYDROCHLORIDE 3 ML: 10 INJECTION, SOLUTION EPIDURAL; INFILTRATION; INTRACAUDAL; PERINEURAL at 14:07

## 2023-10-30 NOTE — PROGRESS NOTES
[facility]) Physicians  Neurosurgery and Pain [facility]  [address] [address] [facility], [address] [place] [address] [place]: [phone]  F: [phone]      Patient Name: Maritza Leung  : 1955     Date:  10/30/2023      Physician: Sharifa Albarran DO        Maritza Leung is here today for interventional pain management. Preoperatively, the patient presents with SI joint mediated pain, as per history and exam. Patient has failed NSAIDs, PT, and conservative treatment. Patient has significant psychological and functional impairment due to this condition. Standard ASI guidelines were followed and sterile technique used. Area was cleaned with Betadine x3. Informed consent was obtained. Fluoroscopic guidance was used for this procedure. S.I. JOINT:  Bilateral  Appropriate length spinal needle was advanced to the S.I. Joint. Negative aspiration was achieved. In total, approximately 6mg of Betamethasone and 2ml of 1% preservative free Lidocaine was injected without difficulty. Patient tolerated the procedure well, no obvious complications occurred during the procedure. Patient was appropriately monitored and discharged home in stable condition with their usual motor strength. Post Op Instructions were given to patient. Relevant and recent imaging reviewed with patient today.                                       Sharifa Albarran DO

## 2023-10-31 ENCOUNTER — PROCEDURE VISIT (OUTPATIENT)
Dept: PAIN MANAGEMENT | Age: 68
End: 2023-10-31
Payer: MEDICARE

## 2023-10-31 DIAGNOSIS — M17.11 PRIMARY OSTEOARTHRITIS OF RIGHT KNEE: Primary | ICD-10-CM

## 2023-10-31 PROCEDURE — 20610 DRAIN/INJ JOINT/BURSA W/O US: CPT | Performed by: PAIN MEDICINE

## 2023-10-31 PROCEDURE — 77002 NEEDLE LOCALIZATION BY XRAY: CPT | Performed by: PAIN MEDICINE

## 2023-10-31 NOTE — PROGRESS NOTES
[facility]  Spine Surgery  Advanced Pain Management           Provider: Celina Taylor DO          Patient Name: Zana Boxer : 1955        Date: 10/31/2023         PROCEDURE: Right knee injection under fluoroscopic guidance    Description of Procedure: The area was cleaned with Betadine and alcohol. Sterile technique  was used. Landmarks were identified. Superolateral approach was used. After negative aspiration,  Durolane was injected without difficulty. The  patient  tolerated the procedure well. No obvious complications occurred during the procedure. Celina Taylor, [address] [facility], [id number] [place] [address],[address], [address]  Phone [phone]/IST [email or web address]

## 2023-11-01 ENCOUNTER — TELEPHONE (OUTPATIENT)
Dept: OTOLARYNGOLOGY | Facility: CLINIC | Age: 68
End: 2023-11-01
Payer: MEDICARE

## 2023-11-01 ENCOUNTER — HOSPITAL ENCOUNTER (OUTPATIENT)
Dept: RADIOLOGY | Facility: HOSPITAL | Age: 68
Discharge: HOME | End: 2023-11-01
Payer: MEDICARE

## 2023-11-01 DIAGNOSIS — R07.89 ATYPICAL CHEST PAIN: ICD-10-CM

## 2023-11-01 PROCEDURE — 75571 CT HRT W/O DYE W/CA TEST: CPT | Mod: MG

## 2023-11-01 NOTE — TELEPHONE ENCOUNTER
Patient called wanting to know if she can get refill on Ipratropium [place]? LV 5/2023. Pharmacy-[facility]. Please advise. Thanks.

## 2023-11-06 ENCOUNTER — APPOINTMENT (OUTPATIENT)
Dept: PRIMARY CARE | Facility: CLINIC | Age: 68
End: 2023-11-06
Payer: MEDICARE

## 2023-11-06 ENCOUNTER — TELEPHONE (OUTPATIENT)
Dept: PRIMARY CARE | Facility: CLINIC | Age: 68
End: 2023-11-06

## 2023-11-06 NOTE — TELEPHONE ENCOUNTER
Patient calling today asking Dr Lal if its ok to continue the medication mobic for inflammatory issues?  Patent states the cardiac testing is good,  patient states PCP requests to hold mobic until cardiac testing came back, please advise

## 2023-11-07 DIAGNOSIS — M47.817 LUMBOSACRAL SPONDYLOSIS WITHOUT MYELOPATHY: ICD-10-CM

## 2023-11-07 RX ORDER — MELOXICAM 15 MG/1
15 TABLET ORAL DAILY
Qty: 30 TABLET | Refills: 0 | OUTPATIENT
Start: 2023-11-07

## 2023-11-08 DIAGNOSIS — G89.4 CHRONIC PAIN SYNDROME: ICD-10-CM

## 2023-11-08 DIAGNOSIS — M47.817 LUMBOSACRAL SPONDYLOSIS WITHOUT MYELOPATHY: ICD-10-CM

## 2023-11-09 RX ORDER — OXYCODONE AND ACETAMINOPHEN 10; 325 MG/1; MG/1
1 TABLET ORAL EVERY 6 HOURS PRN
Qty: 120 TABLET | Refills: 0 | Status: SHIPPED | OUTPATIENT
Start: 2023-11-09 | End: 2023-12-09

## 2023-11-09 RX ORDER — MELOXICAM 15 MG/1
15 TABLET ORAL DAILY
Qty: 30 TABLET | Refills: 0 | Status: SHIPPED | OUTPATIENT
Start: 2023-11-09

## 2023-11-09 NOTE — TELEPHONE ENCOUNTER
Requested Prescriptions     Pending Prescriptions Disp Refills    meloxicam (MOBIC) 15 MG tablet 30 tablet 0     Sig: Take 1 tablet by mouth daily       Patient last seen on:  10/10/23  Date of last refill:  9/23/23  Future appts: 12/7/23

## 2023-11-09 NOTE — TELEPHONE ENCOUNTER
Requested Prescriptions     Pending Prescriptions Disp Refills    oxyCODONE-acetaminophen (PERCOCET)  MG per tablet 120 tablet 0     Sig: Take 1 tablet by mouth every 6 hours as needed for Pain for up to 30 days.  Intended supply: 30 days Max Daily Amount: 4 tablets       Patient last seen on:  10/10/23  Date of last refill:  10/11/23  Future appts: 12/7/23

## 2023-12-01 RX ORDER — GABAPENTIN 300 MG/1
300 CAPSULE ORAL 3 TIMES DAILY
Qty: 90 CAPSULE | Refills: 0 | Status: SHIPPED | OUTPATIENT
Start: 2023-12-01 | End: 2023-12-31

## 2023-12-01 NOTE — TELEPHONE ENCOUNTER
Requested Prescriptions     Pending Prescriptions Disp Refills    gabapentin (NEURONTIN) 300 MG capsule 90 capsule 0     Sig: Take 1 capsule by mouth 3 times daily for 30 days.  Intended supply: 90 days       Patient last seen on:  10/10/23  Date of last refill:  10/10/23  Future appts: 12/7/23

## 2023-12-07 DIAGNOSIS — M46.1 SACROILIITIS, NOT ELSEWHERE CLASSIFIED (HCC): ICD-10-CM

## 2023-12-07 DIAGNOSIS — G89.4 CHRONIC PAIN SYNDROME: ICD-10-CM

## 2023-12-07 DIAGNOSIS — M47.817 LUMBOSACRAL SPONDYLOSIS WITHOUT MYELOPATHY: ICD-10-CM

## 2023-12-07 RX ORDER — TIZANIDINE 2 MG/1
TABLET ORAL
Qty: 30 TABLET | Refills: 0 | Status: SHIPPED | OUTPATIENT
Start: 2023-12-07

## 2023-12-07 RX ORDER — OXYCODONE AND ACETAMINOPHEN 10; 325 MG/1; MG/1
1 TABLET ORAL EVERY 6 HOURS PRN
Qty: 120 TABLET | Refills: 0 | Status: SHIPPED | OUTPATIENT
Start: 2023-12-09 | End: 2024-01-08

## 2023-12-07 RX ORDER — MELOXICAM 15 MG/1
15 TABLET ORAL DAILY
Qty: 30 TABLET | Refills: 0 | Status: SHIPPED | OUTPATIENT
Start: 2023-12-09

## 2023-12-07 NOTE — TELEPHONE ENCOUNTER
Requested Prescriptions     Pending Prescriptions Disp Refills    meloxicam (MOBIC) 15 MG tablet 30 tablet 0     Sig: Take 1 tablet by mouth daily    oxyCODONE-acetaminophen (PERCOCET)  MG per tablet 120 tablet 0     Sig: Take 1 tablet by mouth every 6 hours as needed for Pain for up to 30 days.  Intended supply: 30 days Max Daily Amount: 4 tablets       Patient last seen on:  10/31  Date of last surgery:  n/a  Date of last refill:  11/9  Pain level:  n/a  Patient complaining of:  PT r/sed due to being sick, asking for refill  Future appts: 1/4

## 2023-12-07 NOTE — TELEPHONE ENCOUNTER
Requested Prescriptions     Pending Prescriptions Disp Refills    tiZANidine (ZANAFLEX) 2 MG tablet [Pharmacy Med Name: TIZANIDINE HCL 2 MG TABLET] 30 tablet 0     Sig: TAKE 1 TABLET NIGHTLY AS NEEDED (PAIN)       Patient last seen on:  10/31/23  Date of last surgery:  na  Date of last refill:  9/23/23  Pain level:  na  Patient complaining of:  na  Future appts: 1/4/23

## 2024-01-04 ENCOUNTER — OFFICE VISIT (OUTPATIENT)
Dept: PAIN MANAGEMENT | Age: 69
End: 2024-01-04
Payer: MEDICARE

## 2024-01-04 VITALS
WEIGHT: 150 LBS | TEMPERATURE: 97.5 F | HEIGHT: 60 IN | DIASTOLIC BLOOD PRESSURE: 70 MMHG | SYSTOLIC BLOOD PRESSURE: 130 MMHG | BODY MASS INDEX: 29.45 KG/M2

## 2024-01-04 DIAGNOSIS — G89.4 CHRONIC PAIN SYNDROME: ICD-10-CM

## 2024-01-04 DIAGNOSIS — Z98.1 S/P LUMBAR AND LUMBOSACRAL FUSION BY ANTERIOR TECHNIQUE: ICD-10-CM

## 2024-01-04 DIAGNOSIS — M47.817 LUMBOSACRAL SPONDYLOSIS WITHOUT MYELOPATHY: ICD-10-CM

## 2024-01-04 DIAGNOSIS — M17.11 PRIMARY OSTEOARTHRITIS OF RIGHT KNEE: ICD-10-CM

## 2024-01-04 DIAGNOSIS — M70.61 GREATER TROCHANTERIC BURSITIS OF RIGHT HIP: ICD-10-CM

## 2024-01-04 DIAGNOSIS — M70.61 TROCHANTERIC BURSITIS, RIGHT HIP: Primary | ICD-10-CM

## 2024-01-04 DIAGNOSIS — M46.1 SACROILIITIS, NOT ELSEWHERE CLASSIFIED (HCC): ICD-10-CM

## 2024-01-04 DIAGNOSIS — M48.061 SPINAL STENOSIS, LUMBAR REGION, WITHOUT NEUROGENIC CLAUDICATION: ICD-10-CM

## 2024-01-04 DIAGNOSIS — M54.16 LUMBAR RADICULOPATHY: ICD-10-CM

## 2024-01-04 PROCEDURE — 1090F PRES/ABSN URINE INCON ASSESS: CPT | Performed by: NURSE PRACTITIONER

## 2024-01-04 PROCEDURE — 99214 OFFICE O/P EST MOD 30 MIN: CPT | Performed by: NURSE PRACTITIONER

## 2024-01-04 PROCEDURE — 1123F ACP DISCUSS/DSCN MKR DOCD: CPT | Performed by: NURSE PRACTITIONER

## 2024-01-04 PROCEDURE — G8400 PT W/DXA NO RESULTS DOC: HCPCS | Performed by: NURSE PRACTITIONER

## 2024-01-04 PROCEDURE — 1036F TOBACCO NON-USER: CPT | Performed by: NURSE PRACTITIONER

## 2024-01-04 PROCEDURE — 3017F COLORECTAL CA SCREEN DOC REV: CPT | Performed by: NURSE PRACTITIONER

## 2024-01-04 PROCEDURE — G8417 CALC BMI ABV UP PARAM F/U: HCPCS | Performed by: NURSE PRACTITIONER

## 2024-01-04 PROCEDURE — G8427 DOCREV CUR MEDS BY ELIG CLIN: HCPCS | Performed by: NURSE PRACTITIONER

## 2024-01-04 PROCEDURE — G8484 FLU IMMUNIZE NO ADMIN: HCPCS | Performed by: NURSE PRACTITIONER

## 2024-01-04 RX ORDER — OXYCODONE AND ACETAMINOPHEN 10; 325 MG/1; MG/1
1 TABLET ORAL EVERY 6 HOURS PRN
Qty: 120 TABLET | Refills: 0 | Status: SHIPPED | OUTPATIENT
Start: 2024-01-08 | End: 2024-02-07

## 2024-01-04 RX ORDER — GABAPENTIN 300 MG/1
300 CAPSULE ORAL 3 TIMES DAILY
Qty: 90 CAPSULE | Refills: 0 | Status: SHIPPED | OUTPATIENT
Start: 2024-01-04 | End: 2024-02-03

## 2024-01-04 RX ORDER — TIZANIDINE 2 MG/1
2 TABLET ORAL NIGHTLY PRN
Qty: 30 TABLET | Refills: 0 | Status: SHIPPED | OUTPATIENT
Start: 2024-01-04

## 2024-01-04 ASSESSMENT — ENCOUNTER SYMPTOMS
CONSTIPATION: 0
RESPIRATORY NEGATIVE: 1
DIARRHEA: 0
BACK PAIN: 1

## 2024-01-04 NOTE — PROGRESS NOTES
LMP 08/06/2004   BMI 29.29 kg/m² Pain Score:   5    Physical Exam  Vitals reviewed.   Constitutional:       Appearance: She is well-developed.   HENT:      Head: Normocephalic.      Nose: Nose normal.   Pulmonary:      Effort: Pulmonary effort is normal.   Musculoskeletal:      Cervical back: Normal range of motion and neck supple.      Lumbar back: Tenderness present. Decreased range of motion. Negative right straight leg raise test and negative left straight leg raise test.      Right hip: Bony tenderness present.   Lymphadenopathy:      Cervical: No cervical adenopathy.   Skin:     General: Skin is warm and dry.      Findings: No erythema or rash.   Neurological:      Mental Status: She is alert and oriented to person, place, and time.      Cranial Nerves: No cranial nerve deficit.      Deep Tendon Reflexes: Reflexes are normal and symmetric. Reflexes normal.   Psychiatric:         Mood and Affect: Mood normal.         Behavior: Behavior normal.         Thought Content: Thought content normal.         Judgment: Judgment normal.            Assessment:        Diagnosis Orders   1. Trochanteric bursitis, right hip  AL ARTHROCENTESIS ASPIR&/INJ MAJOR JT/BURSA W/O US    CHG FLUOROSCOPIC GUIDANCE NEEDLE PLACEMENT ADD ON      2. Chronic pain syndrome  oxyCODONE-acetaminophen (PERCOCET)  MG per tablet      3. Greater trochanteric bursitis of right hip        4. Spinal stenosis, lumbar region, without neurogenic claudication        5. S/P lumbar and lumbosacral fusion by anterior technique        6. Lumbar radiculopathy        7. Primary osteoarthritis of right knee        8. Sacroiliitis, not elsewhere classified (HCC)  tiZANidine (ZANAFLEX) 2 MG tablet      9. Lumbosacral spondylosis without myelopathy  tiZANidine (ZANAFLEX) 2 MG tablet          Plan:     Periodic Controlled Substance Monitoring: Possible medication side effects, risk of tolerance/dependence & alternative treatments discussed., No signs of

## 2024-01-08 ENCOUNTER — PROCEDURE VISIT (OUTPATIENT)
Dept: PAIN MANAGEMENT | Age: 69
End: 2024-01-08
Payer: MEDICARE

## 2024-01-08 DIAGNOSIS — M46.1 SACROILIITIS, NOT ELSEWHERE CLASSIFIED (HCC): ICD-10-CM

## 2024-01-08 DIAGNOSIS — M70.61 TROCHANTERIC BURSITIS, RIGHT HIP: ICD-10-CM

## 2024-01-08 DIAGNOSIS — M47.817 LUMBOSACRAL SPONDYLOSIS WITHOUT MYELOPATHY: ICD-10-CM

## 2024-01-08 PROCEDURE — 20610 DRAIN/INJ JOINT/BURSA W/O US: CPT | Performed by: PAIN MEDICINE

## 2024-01-08 PROCEDURE — 77002 NEEDLE LOCALIZATION BY XRAY: CPT | Performed by: PAIN MEDICINE

## 2024-01-08 PROCEDURE — RXMED WILLOW AMBULATORY MEDICATION CHARGE

## 2024-01-08 RX ORDER — BETAMETHASONE SODIUM PHOSPHATE AND BETAMETHASONE ACETATE 3; 3 MG/ML; MG/ML
6 INJECTION, SUSPENSION INTRA-ARTICULAR; INTRALESIONAL; INTRAMUSCULAR; SOFT TISSUE ONCE
Status: COMPLETED | OUTPATIENT
Start: 2024-01-08 | End: 2024-01-08

## 2024-01-08 RX ORDER — MELOXICAM 15 MG/1
15 TABLET ORAL DAILY
Qty: 30 TABLET | Refills: 0 | Status: SHIPPED | OUTPATIENT
Start: 2024-01-08

## 2024-01-08 RX ORDER — LIDOCAINE 50 MG/G
1 PATCH TOPICAL DAILY
Qty: 30 PATCH | Refills: 0 | Status: SHIPPED | OUTPATIENT
Start: 2024-01-08

## 2024-01-08 RX ORDER — LIDOCAINE HYDROCHLORIDE 10 MG/ML
3 INJECTION, SOLUTION EPIDURAL; INFILTRATION; INTRACAUDAL; PERINEURAL ONCE
Status: COMPLETED | OUTPATIENT
Start: 2024-01-08 | End: 2024-01-08

## 2024-01-08 RX ADMIN — BETAMETHASONE SODIUM PHOSPHATE AND BETAMETHASONE ACETATE 6 MG: 3; 3 INJECTION, SUSPENSION INTRA-ARTICULAR; INTRALESIONAL; INTRAMUSCULAR; SOFT TISSUE at 08:34

## 2024-01-08 RX ADMIN — LIDOCAINE HYDROCHLORIDE 3 ML: 10 INJECTION, SOLUTION EPIDURAL; INFILTRATION; INTRACAUDAL; PERINEURAL at 08:34

## 2024-01-08 NOTE — PROGRESS NOTES
[facility]  Neurosurgery and Pain Management Center  [address], [address]  [place], [place]  P: [phone]  F: [phone]                Provider: JULITA PRESLEY DO          Patient Name: Mariaelena Colby : 1955        Date: 2024         PROCEDURE: Right hip injection under fluoroscopic guidance      Description of Procedure:  Patient is here for trochanteric hip bursa injection.  Area was cleaned with Betadine.  Sterile technique was used.  Fluoroscopic guidance was used for accurate needle placement.  Greater trochanter was identified.  And 26-gauge 3 and half inch spinal needle was directed toward the greater trochanter without difficulty.  After negative aspiration approximately 6 mg of [place] along with 2 cc 1% preservative-free lidocaine was injected without difficulty.  Patient tolerated the procedure well.  No obvious complications occurred during the procedure.    Summary and Recommendations:  She will take it easy and use ice.                 JULITA PRESLEY DO

## 2024-01-09 ENCOUNTER — CLINICAL SUPPORT (OUTPATIENT)
Dept: ORTHOPEDIC SURGERY | Facility: CLINIC | Age: 69
End: 2024-01-09
Payer: MEDICARE

## 2024-01-09 ENCOUNTER — OFFICE VISIT (OUTPATIENT)
Dept: ORTHOPEDIC SURGERY | Facility: CLINIC | Age: 69
End: 2024-01-09
Payer: MEDICARE

## 2024-01-09 DIAGNOSIS — M17.11 PRIMARY OSTEOARTHRITIS OF RIGHT KNEE: ICD-10-CM

## 2024-01-09 DIAGNOSIS — M17.11 PRIMARY LOCALIZED OSTEOARTHRITIS OF RIGHT KNEE: Primary | ICD-10-CM

## 2024-01-09 PROCEDURE — 1036F TOBACCO NON-USER: CPT | Performed by: ORTHOPAEDIC SURGERY

## 2024-01-09 PROCEDURE — 1159F MED LIST DOCD IN RCRD: CPT | Performed by: ORTHOPAEDIC SURGERY

## 2024-01-09 PROCEDURE — 99213 OFFICE O/P EST LOW 20 MIN: CPT | Performed by: ORTHOPAEDIC SURGERY

## 2024-01-09 PROCEDURE — 3008F BODY MASS INDEX DOCD: CPT | Performed by: ORTHOPAEDIC SURGERY

## 2024-01-09 PROCEDURE — 73564 X-RAY EXAM KNEE 4 OR MORE: CPT | Mod: RIGHT SIDE | Performed by: ORTHOPAEDIC SURGERY

## 2024-01-09 PROCEDURE — 1125F AMNT PAIN NOTED PAIN PRSNT: CPT | Performed by: ORTHOPAEDIC SURGERY

## 2024-01-09 ASSESSMENT — PAIN SCALES - GENERAL: PAINLEVEL_OUTOF10: 5 - MODERATE PAIN

## 2024-01-09 ASSESSMENT — PAIN - FUNCTIONAL ASSESSMENT: PAIN_FUNCTIONAL_ASSESSMENT: 0-10

## 2024-01-09 NOTE — PROGRESS NOTES
History of Present Illness  Chief Complaint   Patient presents with    Right Knee - Follow-up     OA  Xrays today       Patient with known osteoarthritis of the side: right knee who presents today for repeat evaluation.  The patient notes worsening knee pain.  The patient notes worsening mechanical symptoms.  The patient has tried the following modalities Rest, ice, elevation, Tylenol, NSAIDS, and Injections.      Past Medical History:   Diagnosis Date    Acute upper respiratory infection, unspecified     Acute URI    Acute upper respiratory infection, unspecified 05/11/2022    URTI (acute upper respiratory infection)    Contact with and (suspected) exposure to covid-19     Suspected COVID-19 virus infection    Cough, unspecified     Cough in adult    Discitis, unspecified, lumbar region     Septic discitis of lumbar region    Elevated blood-pressure reading, without diagnosis of hypertension     Elevated blood pressure reading    Encounter for immunization     Encounter for immunization    Enlarged lymph nodes, unspecified     Swollen gland    Nicotine dependence, unspecified, uncomplicated 12/11/2018    Other chest pain     Chest pressure    Other enthesopathies, not elsewhere classified     Tendinitis of wrist    Pain in unspecified knee 06/09/2022    Knee pain, acute    Pain, unspecified     Generalized body aches    Personal history of other diseases of the musculoskeletal system and connective tissue     History of arthritis    Personal history of other diseases of the musculoskeletal system and connective tissue     History of spinal stenosis    Personal history of other diseases of the respiratory system     History of allergic rhinitis    Personal history of other diseases of the respiratory system     History of bronchitis    Personal history of other diseases of the respiratory system     History of acute sinusitis    Personal history of other diseases of the respiratory system 03/31/2022    History of  acute pharyngitis    Personal history of other diseases of the respiratory system 11/16/2022    History of acute sinusitis    Personal history of other infectious and parasitic diseases     History of viral infection    Personal history of other specified conditions     History of fatigue    Personal history of other specified conditions     History of urinary frequency    Rash and other nonspecific skin eruption     Rash    Smoker 04/11/2017    Vitamin D deficiency, unspecified     Vitamin D insufficiency       Medication Documentation Review Audit       Reviewed by Kian Henson (Technologist) on 01/09/24 at 0902      Medication Order Taking? Sig Documenting Provider Last Dose Status   acetaminophen (TylenoL) 325 mg tablet [id number] No Take by mouth every 6 hours. Historical MD Javed Taking Active   busPIRone (Buspar) 15 mg tablet [id number] No Take by mouth twice a day. Josie Burt MD Taking Active   ipratropium (Atrovent) 21 mcg (0.03 %) nasal spray [id number] No Administer into affected nostril(s) twice a day. Historical Provider, MD Taking Active   L. acidophilus/Bifid. animalis 15.5 billion cell capsule [id number] No Take by mouth. Historical Provider, MD Taking Active   lidocaine (Lidoderm) 5 % patch [id number] No see administration instructions. Apply as directed by physician Historical MD Javed Taking Active   lidocaine (Lidoderm) 5 % patch [id number]  Place 1 patch onto the skin daily 12 hours on, 12 hours off.   Active   lisinopril 10 mg tablet [id number]  Take 1 tablet (10 mg) by mouth once daily. Chao Lal MD  Active   loratadine (Claritin) 10 mg tablet [id number] No Take by mouth. Historical Provider, MD Taking Active   magnesium oxide (Mag-Ox) 400 mg (241.3 mg magnesium) tablet [id number] No Take 1 tablet (400 mg) by mouth once daily. Historical Provider, MD Taking Active   mupirocin (Bactroban) 2 % ointment [id number] No APPLY TO NOSE TID Historical Provider, MD Taking Active   naloxone  (Narcan) 4 mg/0.1 mL nasal spray [id number] No Administer into affected nostril(s). Historical Provider, MD Taking Active   oxyCODONE-acetaminophen (Percocet)  mg tablet [id number] No  Historical Provider, MD Taking Active   pantoprazole (ProtoNix) 40 mg EC tablet [id number]  Take 1 tablet (40 mg) by mouth once daily. Do not crush, chew, or split. Chao Lal MD  Active   pramipexole (Mirapex) 0.25 mg tablet [id number] No Take by mouth. Historical Provider, MD Taking Active   simvastatin (Zocor) 40 mg tablet [id number]  Take 1 tablet (40 mg) by mouth once daily at bedtime. Chao Lal MD  Active   tiZANidine (Zanaflex) 2 mg tablet [id number] No Take by mouth. Historical Provider, MD Taking Active   Xanax 0.5 mg tablet [id number] No Take by mouth 3 times a day as needed. Historical Provider, MD Taking Active                    No Known Allergies    Social History     Socioeconomic History    Marital status:      Spouse name: Not on file    Number of children: Not on file    Years of education: Not on file    Highest education level: Not on file   Occupational History    Not on file   Tobacco Use    Smoking status: Former     Packs/day: 1.00     Years: 10.00     Additional pack years: 0.00     Total pack years: 10.00     Types: Cigarettes     Quit date:      Years since quittin.0    Smokeless tobacco: Never   Substance and Sexual Activity    Alcohol use: Yes     Alcohol/week: 2.0 standard drinks of alcohol     Types: 2 Glasses of wine per week    Drug use: Never    Sexual activity: Not on file   Other Topics Concern    Not on file   Social History Narrative    Not on file     Social Determinants of Health     Financial Resource Strain: Not on file   Food Insecurity: Not on file   Transportation Needs: Not on file   Physical Activity: Not on file   Stress: Not on file   Social Connections: Not on file   Intimate Partner Violence: Not on file   Housing Stability: Not on file       Past Surgical  History:   Procedure Laterality Date    OTHER SURGICAL HISTORY  08/16/2021    Lumbar laminectomy    OTHER SURGICAL HISTORY  08/11/2022    Knee surgery    OTHER SURGICAL HISTORY  01/24/2022    Spinal surgery    OTHER SURGICAL HISTORY  01/24/2022    Arthrodesis    OTHER SURGICAL HISTORY  01/24/2022    Colonoscopy    OTHER SURGICAL HISTORY  01/24/2022    Lumbar discectomy    OTHER SURGICAL HISTORY  01/24/2022    Lumbar vertebral fusion            Review of Systems   GENERAL: Negative for malaise, significant weight loss, fever  MUSCULOSKELETAL: see HPI  NEURO:  Negative    BMI  29    Exam  side: right Knee:  Skin healthy and intact  No gross swelling or ecchymosis  Alignment: moderate varus  Effusion: mild  ROM: normal  Crepitance with range of motion  No pain with internal rotation of the hip  Tenderness to palpation: medial Pain with patellar compression: Yes  No laxity to valgus stress  No laxity to varus stress  Negative Lachman´s test  Negative posterior drawer test  negative Ashish´s test     Neurovascular exam normal distally  2+ DP pulse and good cap refill     Imaging  XR knee right 4+ views  Interpreted By:  Khoa Oneill,   STUDY:  XR KNEE RIGHT 4+ VIEWS;  ;  1/9/2024 9:09 am      INDICATION:  Signs/Symptoms:pain.      ACCESSION NUMBER(S):  [id number]      ORDERING CLINICIAN:  KHOA ONEILL      FINDINGS:  Right knee films are negative for fracture, dislocation or  destructive lesion. There is severe tricompartmental osteoarthrosis  of the right knee with a varus deformity.          Signed by: Khoa Oneill 1/9/2024 9:18 AM  Dictation workstation:   [id number]         Assessment  Patient with known osteoarthritis of the side: right knee     Plan  We reviewed an evidence-based approach to OA of the knee.  We discussed past treatments as well options for future treatment.  We will submit for viscosupplementation injection in the right knee.  She will be contacted once these are approved.  All questions  answered.

## 2024-01-15 ENCOUNTER — PHARMACY VISIT (OUTPATIENT)
Dept: PHARMACY | Facility: CLINIC | Age: 69
End: 2024-01-15
Payer: COMMERCIAL

## 2024-01-23 ENCOUNTER — OFFICE VISIT (OUTPATIENT)
Dept: ORTHOPEDIC SURGERY | Facility: CLINIC | Age: 69
End: 2024-01-23
Payer: MEDICARE

## 2024-01-23 DIAGNOSIS — M17.11 PRIMARY OSTEOARTHRITIS OF RIGHT KNEE: Primary | ICD-10-CM

## 2024-01-23 PROCEDURE — 20610 DRAIN/INJ JOINT/BURSA W/O US: CPT | Performed by: ORTHOPAEDIC SURGERY

## 2024-01-23 PROCEDURE — 3008F BODY MASS INDEX DOCD: CPT | Performed by: ORTHOPAEDIC SURGERY

## 2024-01-23 PROCEDURE — 1159F MED LIST DOCD IN RCRD: CPT | Performed by: ORTHOPAEDIC SURGERY

## 2024-01-23 PROCEDURE — 1036F TOBACCO NON-USER: CPT | Performed by: ORTHOPAEDIC SURGERY

## 2024-01-23 PROCEDURE — 99024 POSTOP FOLLOW-UP VISIT: CPT | Performed by: ORTHOPAEDIC SURGERY

## 2024-01-23 PROCEDURE — 1125F AMNT PAIN NOTED PAIN PRSNT: CPT | Performed by: ORTHOPAEDIC SURGERY

## 2024-01-23 PROCEDURE — 1160F RVW MEDS BY RX/DR IN RCRD: CPT | Performed by: ORTHOPAEDIC SURGERY

## 2024-01-23 ASSESSMENT — PAIN SCALES - GENERAL: PAINLEVEL_OUTOF10: 8

## 2024-01-23 ASSESSMENT — PAIN - FUNCTIONAL ASSESSMENT: PAIN_FUNCTIONAL_ASSESSMENT: 0-10

## 2024-01-23 NOTE — PROGRESS NOTES
History of Present Illness   The patient is here for her Synvisc injection to the right knee     Review of Systems   GENERAL: Negative for malaise, significant weight loss, fever  MUSCULOSKELETAL: see HPI  NEURO:  Negative     Physical Exam  This is a female in no acute distress  The skin is intact about the right knee, no erythema or warmth     Imaging  None today     Assessment   Osteoarthrosis right knee     Plan  First Synvisc injection right knee, follow-up next week for the second    L Inj/Asp: R knee on 1/23/2024 9:20 AM  Indications: pain  Details: 21 G needle, anterolateral approach  Medications: 16 mg hylan 16 mg/2 mL  Outcome: tolerated well, no immediate complications  Procedure, treatment alternatives, risks and benefits explained, specific risks discussed. Consent was given by the patient. Immediately prior to procedure a time out was called to verify the correct patient, procedure, equipment, support staff and site/side marked as required. Patient was prepped and draped in the usual sterile fashion.

## 2024-01-30 ENCOUNTER — OFFICE VISIT (OUTPATIENT)
Dept: ORTHOPEDIC SURGERY | Facility: CLINIC | Age: 69
End: 2024-01-30
Payer: MEDICARE

## 2024-01-30 DIAGNOSIS — M17.11 PRIMARY OSTEOARTHRITIS OF RIGHT KNEE: Primary | ICD-10-CM

## 2024-01-30 PROCEDURE — 1125F AMNT PAIN NOTED PAIN PRSNT: CPT | Performed by: ORTHOPAEDIC SURGERY

## 2024-01-30 PROCEDURE — 1036F TOBACCO NON-USER: CPT | Performed by: ORTHOPAEDIC SURGERY

## 2024-01-30 PROCEDURE — 1159F MED LIST DOCD IN RCRD: CPT | Performed by: ORTHOPAEDIC SURGERY

## 2024-01-30 PROCEDURE — 3008F BODY MASS INDEX DOCD: CPT | Performed by: ORTHOPAEDIC SURGERY

## 2024-01-30 PROCEDURE — 99024 POSTOP FOLLOW-UP VISIT: CPT | Performed by: ORTHOPAEDIC SURGERY

## 2024-01-30 PROCEDURE — 20610 DRAIN/INJ JOINT/BURSA W/O US: CPT | Performed by: ORTHOPAEDIC SURGERY

## 2024-01-30 ASSESSMENT — PAIN SCALES - GENERAL: PAINLEVEL_OUTOF10: 6

## 2024-01-30 NOTE — PROGRESS NOTES
History of Present Illness   The patient is here for her Synvisc injection to her right knee.     Review of Systems   GENERAL: Negative for malaise, significant weight loss, fever  MUSCULOSKELETAL: see HPI  NEURO:  Negative     Physical Exam  This is a female in no acute distress  The skin is intact about the right knee, no erythema or warmth     Imaging  None today     Assessment   Osteoarthrosis right knee     Plan  Second Synvisc injection right knee, follow-up next week for the third    L Inj/Asp: R knee on 1/30/2024 10:24 AM  Indications: pain  Details: 21 G needle, anterolateral approach  Medications: 16 mg hylan 16 mg/2 mL  Outcome: tolerated well, no immediate complications  Procedure, treatment alternatives, risks and benefits explained, specific risks discussed. Consent was given by the patient. Immediately prior to procedure a time out was called to verify the correct patient, procedure, equipment, support staff and site/side marked as required. Patient was prepped and draped in the usual sterile fashion.

## 2024-01-31 ENCOUNTER — OFFICE VISIT (OUTPATIENT)
Dept: PAIN MANAGEMENT | Age: 69
End: 2024-01-31
Payer: MEDICARE

## 2024-01-31 VITALS
HEIGHT: 60 IN | TEMPERATURE: 97 F | BODY MASS INDEX: 30.43 KG/M2 | SYSTOLIC BLOOD PRESSURE: 132 MMHG | DIASTOLIC BLOOD PRESSURE: 84 MMHG | WEIGHT: 155 LBS

## 2024-01-31 DIAGNOSIS — Z98.1 S/P LUMBAR AND LUMBOSACRAL FUSION BY ANTERIOR TECHNIQUE: ICD-10-CM

## 2024-01-31 DIAGNOSIS — M47.817 LUMBOSACRAL SPONDYLOSIS WITHOUT MYELOPATHY: ICD-10-CM

## 2024-01-31 DIAGNOSIS — M17.11 PRIMARY OSTEOARTHRITIS OF RIGHT KNEE: Primary | ICD-10-CM

## 2024-01-31 DIAGNOSIS — J31.0 CHRONIC RHINITIS: ICD-10-CM

## 2024-01-31 DIAGNOSIS — M46.1 SACROILIITIS, NOT ELSEWHERE CLASSIFIED (HCC): ICD-10-CM

## 2024-01-31 DIAGNOSIS — J31.0 CHRONIC RHINITIS: Primary | ICD-10-CM

## 2024-01-31 DIAGNOSIS — Z79.891 ENCOUNTER FOR LONG-TERM OPIATE ANALGESIC USE: ICD-10-CM

## 2024-01-31 DIAGNOSIS — G89.4 CHRONIC PAIN SYNDROME: ICD-10-CM

## 2024-01-31 DIAGNOSIS — M70.61 GREATER TROCHANTERIC BURSITIS OF RIGHT HIP: ICD-10-CM

## 2024-01-31 PROCEDURE — 1090F PRES/ABSN URINE INCON ASSESS: CPT | Performed by: NURSE PRACTITIONER

## 2024-01-31 PROCEDURE — G8400 PT W/DXA NO RESULTS DOC: HCPCS | Performed by: NURSE PRACTITIONER

## 2024-01-31 PROCEDURE — 1123F ACP DISCUSS/DSCN MKR DOCD: CPT | Performed by: NURSE PRACTITIONER

## 2024-01-31 PROCEDURE — G8484 FLU IMMUNIZE NO ADMIN: HCPCS | Performed by: NURSE PRACTITIONER

## 2024-01-31 PROCEDURE — 3017F COLORECTAL CA SCREEN DOC REV: CPT | Performed by: NURSE PRACTITIONER

## 2024-01-31 PROCEDURE — G8427 DOCREV CUR MEDS BY ELIG CLIN: HCPCS | Performed by: NURSE PRACTITIONER

## 2024-01-31 PROCEDURE — 99214 OFFICE O/P EST MOD 30 MIN: CPT | Performed by: NURSE PRACTITIONER

## 2024-01-31 PROCEDURE — G8417 CALC BMI ABV UP PARAM F/U: HCPCS | Performed by: NURSE PRACTITIONER

## 2024-01-31 PROCEDURE — 1036F TOBACCO NON-USER: CPT | Performed by: NURSE PRACTITIONER

## 2024-01-31 RX ORDER — IPRATROPIUM BROMIDE 21 UG/1
2 SPRAY, METERED NASAL 2 TIMES DAILY
Qty: 30 ML | Refills: 3 | Status: SHIPPED | OUTPATIENT
Start: 2024-01-31 | End: 2024-02-05

## 2024-01-31 RX ORDER — TIZANIDINE 2 MG/1
2 TABLET ORAL NIGHTLY PRN
Qty: 30 TABLET | Refills: 0 | Status: SHIPPED | OUTPATIENT
Start: 2024-01-31

## 2024-01-31 RX ORDER — MELOXICAM 15 MG/1
15 TABLET ORAL DAILY
Qty: 30 TABLET | Refills: 0 | Status: SHIPPED | OUTPATIENT
Start: 2024-01-31

## 2024-01-31 RX ORDER — IPRATROPIUM BROMIDE 21 UG/1
2 SPRAY, METERED NASAL 2 TIMES DAILY
Qty: 30 ML | Refills: 3 | Status: SHIPPED | OUTPATIENT
Start: 2024-01-31 | End: 2024-01-31 | Stop reason: SDUPTHER

## 2024-01-31 RX ORDER — LIDOCAINE 50 MG/G
1 PATCH TOPICAL DAILY
Qty: 30 PATCH | Refills: 0 | Status: SHIPPED | OUTPATIENT
Start: 2024-01-31

## 2024-01-31 RX ORDER — OXYCODONE AND ACETAMINOPHEN 10; 325 MG/1; MG/1
1 TABLET ORAL EVERY 6 HOURS PRN
Qty: 120 TABLET | Refills: 0 | Status: SHIPPED | OUTPATIENT
Start: 2024-02-07 | End: 2024-03-08

## 2024-01-31 ASSESSMENT — ENCOUNTER SYMPTOMS
RESPIRATORY NEGATIVE: 1
CONSTIPATION: 0
BACK PAIN: 1
DIARRHEA: 0

## 2024-01-31 NOTE — TELEPHONE ENCOUNTER
Hi I saw Becca/Helen? Today, she doesn’t like to refill Meloxicams so I sent a request, I know there was an issue but it has been resolved

## 2024-01-31 NOTE — PROGRESS NOTES
Patient: Mariaelena Colby  YOB: 1955  Date: 24        Subjective:     Mariaelena Colby is a 68 y.o. female who complains today of:    Chief Complaint   Patient presents with    Follow-up    Back Pain    Hip Pain    Knee Pain         Allergies:  Patient has no known allergies.    Past Medical History:   Diagnosis Date    Anxiety     Coronary artery disease involving native coronary artery of native heart without angina pectoris 2018    Hyperlipidemia     meds > 5 yrs    Osteoarthritis      Past Surgical History:   Procedure Laterality Date    BACK SURGERY  2017    lumbar disc OR    COLONOSCOPY      DILATION AND CURETTAGE OF UTERUS      at age 30s--miscarriage    LUMBAR FUSION Left 2020    ANTERIOR LEFT RETROPERITONEAL L5-S1 DISKECTOMY INTERBODY CAGE FUSION [place] [facility] [place]    TONSILLECTOMY      as child    TRACHEAL SURGERY      in [place]--EMT attempted to intubate patient due to trouble breathing & then needed repair     Family History   Problem Relation Age of Onset    Diabetes Mother     Obesity Mother     High Cholesterol Mother     High Cholesterol Father     Other Sister         GSW to spine & paralysis    No Known Problems Brother     No Known Problems Son     No Known Problems Daughter     Other Brother         as infant     Social History     Socioeconomic History    Marital status: Single     Spouse name: Not on file    Number of children: Not on file    Years of education: Not on file    Highest education level: Not on file   Occupational History    Not on file   Tobacco Use    Smoking status: Former     Current packs/day: 0.00     Average packs/day: 1 pack/day for 30.0 years (30.0 ttl pk-yrs)     Types: Cigarettes     Start date: 1989     Quit date: 2019     Years since quittin.8    Smokeless tobacco: Never    Tobacco comments:     intermittent habit   Vaping Use    Vaping Use: Never used   Substance and Sexual Activity    Alcohol use:

## 2024-01-31 NOTE — TELEPHONE ENCOUNTER
Requested Prescriptions     Pending Prescriptions Disp Refills    meloxicam (MOBIC) 15 MG tablet 30 tablet 0     Sig: Take 1 tablet by mouth daily       Patient last seen on:  1/8/24    Date of last refill:  1/8/24    Next office visit date: 3/6/2024    Patient has no known allergies.

## 2024-02-01 RX ORDER — GABAPENTIN 300 MG/1
300 CAPSULE ORAL 3 TIMES DAILY
Qty: 90 CAPSULE | Refills: 0 | Status: SHIPPED | OUTPATIENT
Start: 2024-02-01 | End: 2024-03-02

## 2024-03-01 DIAGNOSIS — G89.4 CHRONIC PAIN SYNDROME: ICD-10-CM

## 2024-03-01 RX ORDER — OXYCODONE AND ACETAMINOPHEN 10; 325 MG/1; MG/1
1 TABLET ORAL EVERY 6 HOURS PRN
Qty: 24 TABLET | Refills: 0 | Status: SHIPPED | OUTPATIENT
Start: 2024-03-01 | End: 2024-03-07

## 2024-03-01 NOTE — TELEPHONE ENCOUNTER
Requested Prescriptions     Pending Prescriptions Disp Refills    oxyCODONE-acetaminophen (PERCOCET)  MG per tablet 120 tablet 0     Sig: Take 1 tablet by mouth every 6 hours as needed for Pain for up to 30 days. Intended supply: 30 days Max Daily Amount: 4 tablets       Next office visit date: 3/6/2024    Patient has no known allergies.

## 2024-03-01 NOTE — TELEPHONE ENCOUNTER
----- Message from Mariaelena Colby sent at 3/1/2024 10:26 AM EST -----  Regarding: Percoset prescription   Contact: [phone]  I took a nasty fall outside off the steps and am in a lot of pain in my upper back and backside, could you fill the percoset today? Thank you

## 2024-03-02 ENCOUNTER — HOSPITAL ENCOUNTER (EMERGENCY)
Facility: HOSPITAL | Age: 69
Discharge: HOME | End: 2024-03-02
Payer: MEDICARE

## 2024-03-02 ENCOUNTER — APPOINTMENT (OUTPATIENT)
Dept: RADIOLOGY | Facility: HOSPITAL | Age: 69
End: 2024-03-02
Payer: MEDICARE

## 2024-03-02 VITALS
TEMPERATURE: 97.2 F | OXYGEN SATURATION: 98 % | DIASTOLIC BLOOD PRESSURE: 74 MMHG | HEIGHT: 60 IN | RESPIRATION RATE: 18 BRPM | WEIGHT: 150 LBS | BODY MASS INDEX: 29.45 KG/M2 | HEART RATE: 89 BPM | SYSTOLIC BLOOD PRESSURE: 132 MMHG

## 2024-03-02 DIAGNOSIS — S39.92XA INJURY OF COCCYX, INITIAL ENCOUNTER: Primary | ICD-10-CM

## 2024-03-02 PROCEDURE — 96372 THER/PROPH/DIAG INJ SC/IM: CPT

## 2024-03-02 PROCEDURE — 72131 CT LUMBAR SPINE W/O DYE: CPT | Performed by: STUDENT IN AN ORGANIZED HEALTH CARE EDUCATION/TRAINING PROGRAM

## 2024-03-02 PROCEDURE — 72131 CT LUMBAR SPINE W/O DYE: CPT

## 2024-03-02 PROCEDURE — 99284 EMERGENCY DEPT VISIT MOD MDM: CPT | Mod: 25

## 2024-03-02 PROCEDURE — 2500000004 HC RX 250 GENERAL PHARMACY W/ HCPCS (ALT 636 FOR OP/ED): Performed by: NURSE PRACTITIONER

## 2024-03-02 RX ORDER — OXYCODONE AND ACETAMINOPHEN 5; 325 MG/1; MG/1
1 TABLET ORAL EVERY 6 HOURS PRN
Qty: 6 TABLET | Refills: 0 | Status: SHIPPED | OUTPATIENT
Start: 2024-03-02

## 2024-03-02 RX ORDER — KETOROLAC TROMETHAMINE 30 MG/ML
15 INJECTION, SOLUTION INTRAMUSCULAR; INTRAVENOUS ONCE
Status: COMPLETED | OUTPATIENT
Start: 2024-03-02 | End: 2024-03-02

## 2024-03-02 RX ADMIN — KETOROLAC TROMETHAMINE 15 MG: 30 INJECTION, SOLUTION INTRAMUSCULAR at 09:32

## 2024-03-02 ASSESSMENT — PAIN DESCRIPTION - LOCATION
LOCATION: BACK
LOCATION: COCCYX

## 2024-03-02 ASSESSMENT — LIFESTYLE VARIABLES
EVER FELT BAD OR GUILTY ABOUT YOUR DRINKING: NO
HAVE PEOPLE ANNOYED YOU BY CRITICIZING YOUR DRINKING: NO
EVER HAD A DRINK FIRST THING IN THE MORNING TO STEADY YOUR NERVES TO GET RID OF A HANGOVER: NO
HAVE YOU EVER FELT YOU SHOULD CUT DOWN ON YOUR DRINKING: NO

## 2024-03-02 ASSESSMENT — PAIN DESCRIPTION - PROGRESSION
CLINICAL_PROGRESSION: NOT CHANGED
CLINICAL_PROGRESSION: NOT CHANGED

## 2024-03-02 ASSESSMENT — PAIN - FUNCTIONAL ASSESSMENT
PAIN_FUNCTIONAL_ASSESSMENT: 0-10

## 2024-03-02 ASSESSMENT — PAIN DESCRIPTION - ORIENTATION: ORIENTATION: LOWER

## 2024-03-02 ASSESSMENT — PAIN DESCRIPTION - ONSET: ONSET: ONGOING

## 2024-03-02 ASSESSMENT — PAIN SCALES - GENERAL
PAINLEVEL_OUTOF10: 10 - WORST POSSIBLE PAIN
PAINLEVEL_OUTOF10: 10 - WORST POSSIBLE PAIN

## 2024-03-02 ASSESSMENT — PAIN DESCRIPTION - PAIN TYPE: TYPE: ACUTE PAIN

## 2024-03-02 ASSESSMENT — PAIN DESCRIPTION - FREQUENCY: FREQUENCY: CONSTANT/CONTINUOUS

## 2024-03-02 ASSESSMENT — PAIN DESCRIPTION - DESCRIPTORS: DESCRIPTORS: ACHING

## 2024-03-06 ENCOUNTER — OFFICE VISIT (OUTPATIENT)
Dept: PAIN MANAGEMENT | Age: 69
End: 2024-03-06
Payer: MEDICARE

## 2024-03-06 VITALS
SYSTOLIC BLOOD PRESSURE: 130 MMHG | HEIGHT: 60 IN | WEIGHT: 150 LBS | DIASTOLIC BLOOD PRESSURE: 78 MMHG | TEMPERATURE: 97.9 F | BODY MASS INDEX: 29.45 KG/M2

## 2024-03-06 DIAGNOSIS — M46.1 SACROILIITIS, NOT ELSEWHERE CLASSIFIED (HCC): ICD-10-CM

## 2024-03-06 DIAGNOSIS — M17.11 PRIMARY OSTEOARTHRITIS OF RIGHT KNEE: ICD-10-CM

## 2024-03-06 DIAGNOSIS — M70.61 GREATER TROCHANTERIC BURSITIS OF RIGHT HIP: ICD-10-CM

## 2024-03-06 DIAGNOSIS — Z79.891 ENCOUNTER FOR LONG-TERM OPIATE ANALGESIC USE: ICD-10-CM

## 2024-03-06 DIAGNOSIS — G89.4 CHRONIC PAIN SYNDROME: ICD-10-CM

## 2024-03-06 DIAGNOSIS — M47.817 LUMBOSACRAL SPONDYLOSIS WITHOUT MYELOPATHY: Primary | ICD-10-CM

## 2024-03-06 PROCEDURE — G8417 CALC BMI ABV UP PARAM F/U: HCPCS | Performed by: NURSE PRACTITIONER

## 2024-03-06 PROCEDURE — 1036F TOBACCO NON-USER: CPT | Performed by: NURSE PRACTITIONER

## 2024-03-06 PROCEDURE — 3017F COLORECTAL CA SCREEN DOC REV: CPT | Performed by: NURSE PRACTITIONER

## 2024-03-06 PROCEDURE — 99214 OFFICE O/P EST MOD 30 MIN: CPT | Performed by: NURSE PRACTITIONER

## 2024-03-06 PROCEDURE — G8484 FLU IMMUNIZE NO ADMIN: HCPCS | Performed by: NURSE PRACTITIONER

## 2024-03-06 PROCEDURE — 1123F ACP DISCUSS/DSCN MKR DOCD: CPT | Performed by: NURSE PRACTITIONER

## 2024-03-06 PROCEDURE — 1090F PRES/ABSN URINE INCON ASSESS: CPT | Performed by: NURSE PRACTITIONER

## 2024-03-06 PROCEDURE — G8427 DOCREV CUR MEDS BY ELIG CLIN: HCPCS | Performed by: NURSE PRACTITIONER

## 2024-03-06 PROCEDURE — G8400 PT W/DXA NO RESULTS DOC: HCPCS | Performed by: NURSE PRACTITIONER

## 2024-03-06 RX ORDER — TIZANIDINE 2 MG/1
2 TABLET ORAL NIGHTLY PRN
Qty: 30 TABLET | Refills: 0 | Status: SHIPPED | OUTPATIENT
Start: 2024-03-06

## 2024-03-06 RX ORDER — OXYCODONE AND ACETAMINOPHEN 10; 325 MG/1; MG/1
1 TABLET ORAL EVERY 6 HOURS PRN
Qty: 120 TABLET | Refills: 0 | Status: SHIPPED | OUTPATIENT
Start: 2024-03-07 | End: 2024-04-06

## 2024-03-06 ASSESSMENT — ENCOUNTER SYMPTOMS
CONSTIPATION: 0
BACK PAIN: 1
DIARRHEA: 0
RESPIRATORY NEGATIVE: 1

## 2024-03-07 ENCOUNTER — TELEPHONE (OUTPATIENT)
Dept: PAIN MANAGEMENT | Age: 69
End: 2024-03-07

## 2024-03-07 DIAGNOSIS — M47.817 LUMBOSACRAL SPONDYLOSIS WITHOUT MYELOPATHY: ICD-10-CM

## 2024-03-07 NOTE — TELEPHONE ENCOUNTER
KAVITA L1,2,3 MBB    NO AUTH REQUIRED    OK to schedule procedure approved as above.   Please note sides/levels approved and date range.   (If applicable, sides/levels approved may differ from those ordered)    TO BE SCHEDULED WITH

## 2024-03-07 NOTE — TELEPHONE ENCOUNTER
Patient needs okay on Percocet to fill today.    Patient needs refill on Meloxicam    Last Appt:3/6/24  Next Appt:4/3/24  Last Refill: 1/31/24

## 2024-03-07 NOTE — TELEPHONE ENCOUNTER
----- Message from Mariaelena Colby sent at 3/7/2024  9:58 AM EST -----  Regarding: March 6 appointment   Contact: [phone]  As we discussed yesterday I have had to take extra percoset as my hips and back (after fall) have rendered me unable to get any sleep at night, pharmacy needs your ok to fill percoset today, thanks also you didn’t send refill for meloxicam

## 2024-03-11 ENCOUNTER — PROCEDURE VISIT (OUTPATIENT)
Dept: PAIN MANAGEMENT | Age: 69
End: 2024-03-11
Payer: MEDICARE

## 2024-03-11 DIAGNOSIS — M47.817 LUMBOSACRAL SPONDYLOSIS WITHOUT MYELOPATHY: Primary | ICD-10-CM

## 2024-03-11 PROCEDURE — 64493 INJ PARAVERT F JNT L/S 1 LEV: CPT | Performed by: PAIN MEDICINE

## 2024-03-11 PROCEDURE — 64494 INJ PARAVERT F JNT L/S 2 LEV: CPT | Performed by: PAIN MEDICINE

## 2024-03-11 RX ORDER — MELOXICAM 15 MG/1
15 TABLET ORAL DAILY
Qty: 30 TABLET | Refills: 0 | OUTPATIENT
Start: 2024-03-11

## 2024-03-11 RX ORDER — BETAMETHASONE SODIUM PHOSPHATE AND BETAMETHASONE ACETATE 3; 3 MG/ML; MG/ML
6 INJECTION, SUSPENSION INTRA-ARTICULAR; INTRALESIONAL; INTRAMUSCULAR; SOFT TISSUE ONCE
Status: COMPLETED | OUTPATIENT
Start: 2024-03-11 | End: 2024-03-11

## 2024-03-11 RX ORDER — LIDOCAINE HYDROCHLORIDE 10 MG/ML
3 INJECTION, SOLUTION EPIDURAL; INFILTRATION; INTRACAUDAL; PERINEURAL ONCE
Status: COMPLETED | OUTPATIENT
Start: 2024-03-11 | End: 2024-03-11

## 2024-03-11 RX ADMIN — LIDOCAINE HYDROCHLORIDE 3 ML: 10 INJECTION, SOLUTION EPIDURAL; INFILTRATION; INTRACAUDAL; PERINEURAL at 14:18

## 2024-03-11 RX ADMIN — BETAMETHASONE SODIUM PHOSPHATE AND BETAMETHASONE ACETATE 6 MG: 3; 3 INJECTION, SUSPENSION INTRA-ARTICULAR; INTRALESIONAL; INTRAMUSCULAR; SOFT TISSUE at 14:18

## 2024-03-14 ENCOUNTER — TELEPHONE (OUTPATIENT)
Dept: PRIMARY CARE | Facility: CLINIC | Age: 69
End: 2024-03-14
Payer: MEDICARE

## 2024-03-14 NOTE — TELEPHONE ENCOUNTER
Patient is asking for a letter stating it is okay for her to take the Mirapex States she left a detailed VM days ago    Alysa  [phone]

## 2024-04-02 ENCOUNTER — OFFICE VISIT (OUTPATIENT)
Dept: PRIMARY CARE | Facility: CLINIC | Age: 69
End: 2024-04-02
Payer: MEDICARE

## 2024-04-02 VITALS
WEIGHT: 164.6 LBS | HEART RATE: 108 BPM | BODY MASS INDEX: 32.31 KG/M2 | HEIGHT: 60 IN | SYSTOLIC BLOOD PRESSURE: 120 MMHG | DIASTOLIC BLOOD PRESSURE: 82 MMHG | TEMPERATURE: 98.1 F

## 2024-04-02 DIAGNOSIS — E78.5 HYPERLIPIDEMIA, UNSPECIFIED HYPERLIPIDEMIA TYPE: Primary | ICD-10-CM

## 2024-04-02 DIAGNOSIS — F41.8 DEPRESSION WITH ANXIETY: ICD-10-CM

## 2024-04-02 DIAGNOSIS — F41.1 GENERALIZED ANXIETY DISORDER WITH PANIC ATTACKS: ICD-10-CM

## 2024-04-02 DIAGNOSIS — I25.10 CORONARY ARTERY DISEASE INVOLVING NATIVE CORONARY ARTERY OF NATIVE HEART WITHOUT ANGINA PECTORIS: ICD-10-CM

## 2024-04-02 DIAGNOSIS — F41.0 GENERALIZED ANXIETY DISORDER WITH PANIC ATTACKS: ICD-10-CM

## 2024-04-02 DIAGNOSIS — M19.90 OSTEOARTHRITIS, UNSPECIFIED OSTEOARTHRITIS TYPE, UNSPECIFIED SITE: ICD-10-CM

## 2024-04-02 DIAGNOSIS — D64.9 ANEMIA, UNSPECIFIED TYPE: ICD-10-CM

## 2024-04-02 DIAGNOSIS — G25.81 RLS (RESTLESS LEGS SYNDROME): Chronic | ICD-10-CM

## 2024-04-02 DIAGNOSIS — I10 BENIGN HYPERTENSION: ICD-10-CM

## 2024-04-02 DIAGNOSIS — S67.02XA CRUSHING INJURY OF LEFT THUMB, INITIAL ENCOUNTER: ICD-10-CM

## 2024-04-02 DIAGNOSIS — Z00.00 WELL ADULT HEALTH CHECK: ICD-10-CM

## 2024-04-02 DIAGNOSIS — M43.16 SPONDYLOLISTHESIS OF LUMBAR REGION: ICD-10-CM

## 2024-04-02 PROBLEM — R21 RASH: Status: ACTIVE | Noted: 2024-04-02

## 2024-04-02 PROBLEM — K21.9 GASTROESOPHAGEAL REFLUX DISEASE WITHOUT ESOPHAGITIS: Status: ACTIVE | Noted: 2024-04-02

## 2024-04-02 PROBLEM — R00.0 TACHYCARDIA: Status: ACTIVE | Noted: 2022-07-20

## 2024-04-02 PROBLEM — R07.89 CHEST DISCOMFORT: Status: ACTIVE | Noted: 2023-10-12

## 2024-04-02 PROBLEM — Z86.19 HISTORY OF VIRAL INFECTION: Status: ACTIVE | Noted: 2024-04-02

## 2024-04-02 PROBLEM — R52 GENERALIZED PAIN: Status: ACTIVE | Noted: 2024-04-02

## 2024-04-02 PROBLEM — E55.9 VITAMIN D DEFICIENCY: Status: ACTIVE | Noted: 2024-04-02

## 2024-04-02 PROBLEM — G57.12 MERALGIA PARESTHETICA OF LEFT SIDE: Status: ACTIVE | Noted: 2021-03-02

## 2024-04-02 PROBLEM — R05.9 COUGH: Status: ACTIVE | Noted: 2024-04-02

## 2024-04-02 PROBLEM — R59.9 ADENOPATHY: Status: ACTIVE | Noted: 2024-04-02

## 2024-04-02 PROBLEM — R03.0 FINDING OF ABOVE NORMAL BLOOD PRESSURE: Status: ACTIVE | Noted: 2024-04-02

## 2024-04-02 PROBLEM — M77.8 TENDINITIS OF WRIST: Status: ACTIVE | Noted: 2024-04-02

## 2024-04-02 PROBLEM — U07.1 DISEASE DUE TO SEVERE ACUTE RESPIRATORY SYNDROME CORONAVIRUS 2 (SARS-COV-2): Status: ACTIVE | Noted: 2024-04-02

## 2024-04-02 PROCEDURE — 3074F SYST BP LT 130 MM HG: CPT | Performed by: INTERNAL MEDICINE

## 2024-04-02 PROCEDURE — 3079F DIAST BP 80-89 MM HG: CPT | Performed by: INTERNAL MEDICINE

## 2024-04-02 PROCEDURE — 99214 OFFICE O/P EST MOD 30 MIN: CPT | Performed by: INTERNAL MEDICINE

## 2024-04-02 PROCEDURE — 1159F MED LIST DOCD IN RCRD: CPT | Performed by: INTERNAL MEDICINE

## 2024-04-02 PROCEDURE — 1160F RVW MEDS BY RX/DR IN RCRD: CPT | Performed by: INTERNAL MEDICINE

## 2024-04-02 PROCEDURE — 1036F TOBACCO NON-USER: CPT | Performed by: INTERNAL MEDICINE

## 2024-04-02 PROCEDURE — 3008F BODY MASS INDEX DOCD: CPT | Performed by: INTERNAL MEDICINE

## 2024-04-02 RX ORDER — MELOXICAM 15 MG/1
15 TABLET ORAL DAILY
COMMUNITY
Start: 2024-02-03

## 2024-04-02 ASSESSMENT — PATIENT HEALTH QUESTIONNAIRE - PHQ9
SUM OF ALL RESPONSES TO PHQ9 QUESTIONS 1 AND 2: 0
1. LITTLE INTEREST OR PLEASURE IN DOING THINGS: NOT AT ALL
2. FEELING DOWN, DEPRESSED OR HOPELESS: NOT AT ALL

## 2024-04-02 NOTE — PROGRESS NOTES
Assessment/Plan   Problem List Items Addressed This Visit       Hyperlipidemia - Primary    Relevant Orders    Comprehensive Metabolic Panel    Lipid Panel    Generalized anxiety disorder with panic attacks    Depression with anxiety    Benign hypertension    Anemia    Relevant Orders    CBC and Auto Differential    Coronary artery disease involving native coronary artery of native heart without angina pectoris    OA (osteoarthritis)    Spondylolisthesis of lumbar region    Relevant Orders    Comprehensive Metabolic Panel    RLS (restless legs syndrome) (Chronic)    Relevant Orders    Comprehensive Metabolic Panel     Other Visit Diagnoses       Well adult health check        Relevant Orders    Comprehensive Metabolic Panel    Crushing injury of left thumb, initial encounter        Relevant Orders    Referral to Orthopaedic Surgery        For crush injury of the left thumb see orthopedic physician  For pain control of multiple joint pains it is okay to use Mobic but also monitor the blood count and will check CBC as well  Back pain stable state  Restless leg syndrome controlled  Hypertension under control  Hyperlipidemia continue current treatment  Generalized anxiety disorder with depression is controlled under current treatment    Subjective   Patient ID: Alysa Braun is a 68 y.o. female who presents for Follow-up (Patient would like to discuss medication prescribed by another physician.  Patient also states that 1 week ago she got her left thumb slammed in the car door.  Thinks thumb might be broken.  ).    Past Surgical History:   Procedure Laterality Date    OTHER SURGICAL HISTORY  08/16/2021    Lumbar laminectomy    OTHER SURGICAL HISTORY  08/11/2022    Knee surgery    OTHER SURGICAL HISTORY  01/24/2022    Spinal surgery    OTHER SURGICAL HISTORY  01/24/2022    Arthrodesis    OTHER SURGICAL HISTORY  01/24/2022    Colonoscopy    OTHER SURGICAL HISTORY  01/24/2022    Lumbar discectomy    OTHER SURGICAL HISTORY   2022    Lumbar vertebral fusion      Family History   Problem Relation Name Age of Onset    Depression Mother      Diabetes Mother      Fibromyalgia Mother      Other (High serum cholesterol sulfate) Father        Social History     Socioeconomic History    Marital status:      Spouse name: Not on file    Number of children: Not on file    Years of education: Not on file    Highest education level: Not on file   Occupational History    Not on file   Tobacco Use    Smoking status: Former     Packs/day: 1.00     Years: 10.00     Additional pack years: 0.00     Total pack years: 10.00     Types: Cigarettes     Quit date:      Years since quittin.2    Smokeless tobacco: Never   Substance and Sexual Activity    Alcohol use: Yes     Alcohol/week: 2.0 standard drinks of alcohol     Types: 2 Glasses of wine per week    Drug use: Never    Sexual activity: Not on file   Other Topics Concern    Not on file   Social History Narrative    Not on file     Social Determinants of Health     Financial Resource Strain: Not on file   Food Insecurity: Not on file   Transportation Needs: Not on file   Physical Activity: Not on file   Stress: Not on file   Social Connections: Not on file   Intimate Partner Violence: Not on file   Housing Stability: Not on file      Patient has no known allergies.   Current Outpatient Medications   Medication Sig Dispense Refill    acetaminophen (TylenoL) 325 mg tablet Take by mouth every 6 hours.      busPIRone (Buspar) 15 mg tablet Take by mouth twice a day.      ipratropium (Atrovent) 21 mcg (0.03 %) nasal spray SPRAY 2 SPRAYS INTO EACH NOSTRIL TWICE A DAY 30 mL 3    L. acidophilus/Bifid. animalis 15.5 billion cell capsule Take by mouth.      lidocaine (Lidoderm) 5 % patch Place 1 patch onto the skin daily. Leave on for 12 hours, and keep off for 12 hours. 30 patch 0    lisinopril 10 mg tablet Take 1 tablet (10 mg) by mouth once daily. 90 tablet 3    loratadine (Claritin) 10 mg  tablet Take by mouth.      magnesium oxide (Mag-Ox) 400 mg (241.3 mg magnesium) tablet Take 1 tablet (400 mg) by mouth once daily.      meloxicam (Mobic) 15 mg tablet Take 1 tablet (15 mg) by mouth once daily.      mupirocin (Bactroban) 2 % ointment APPLY TO NOSE TID      naloxone (Narcan) 4 mg/0.1 mL nasal spray Administer into affected nostril(s).      oxyCODONE-acetaminophen (Percocet) 5-325 mg tablet Take 1 tablet by mouth every 6 hours if needed for severe pain (7 - 10). 6 tablet 0    pantoprazole (ProtoNix) 40 mg EC tablet Take 1 tablet (40 mg) by mouth once daily. Do not crush, chew, or split. 90 tablet 3    pramipexole (Mirapex) 0.25 mg tablet Take by mouth.      simvastatin (Zocor) 40 mg tablet Take 1 tablet (40 mg) by mouth once daily at bedtime. 90 tablet 3    tiZANidine (Zanaflex) 2 mg tablet Take by mouth.      Xanax 0.5 mg tablet Take by mouth 3 times a day as needed.       No current facility-administered medications for this visit.      Vitals:    04/02/24 1418   BP: 120/82   BP Location: Right arm   Patient Position: Sitting   Pulse: 108   Temp: 36.7 °C (98.1 °F)   Weight: 74.7 kg (164 lb 9.6 oz)   Height: 1.524 m (5')      Problem List Items Addressed This Visit       Hyperlipidemia - Primary    Relevant Orders    Comprehensive Metabolic Panel    Lipid Panel    Generalized anxiety disorder with panic attacks    Depression with anxiety    Benign hypertension    Anemia    Relevant Orders    CBC and Auto Differential    Coronary artery disease involving native coronary artery of native heart without angina pectoris    OA (osteoarthritis)    Spondylolisthesis of lumbar region    Relevant Orders    Comprehensive Metabolic Panel    RLS (restless legs syndrome) (Chronic)    Relevant Orders    Comprehensive Metabolic Panel     Other Visit Diagnoses       Well adult health check        Relevant Orders    Comprehensive Metabolic Panel    Crushing injury of left thumb, initial encounter        Relevant Orders  "   Referral to Orthopaedic Surgery           Orders Placed This Encounter   Procedures    CBC and Auto Differential     Standing Status:   Future     Standing Expiration Date:   4/2/2025     Order Specific Question:   Release result to MyChart     Answer:   Immediate    Comprehensive Metabolic Panel     Standing Status:   Future     Standing Expiration Date:   4/2/2025     Order Specific Question:   Release result to MyChart     Answer:   Immediate    Lipid Panel     Standing Status:   Future     Standing Expiration Date:   4/2/2025     Order Specific Question:   Release result to MyChart     Answer:   Immediate    Referral to Orthopaedic Surgery     Standing Status:   Future     Standing Expiration Date:   10/2/2024     Referral Priority:   Routine     Referral Type:   Consultation     Referral Reason:   Specialty Services Required     Requested Specialty:   Orthopaedic Surgery     Number of Visits Requested:   1        HPI  This is a 68-year-old female who presented today with 2 things 1 is that her left thumb crushed with the car door for 1 week not getting better  Additionally she has had epidural shot by the pain management physician and though it may have done some good to her she does not feel great  She mentioned that she needs clearance that she can use Mobic or meloxicam  She had a history of mild anemia and recent blood work has not been done but she does not have any symptoms suggestive of gastroesophageal problem  Other conditions have been stable continues to have a problem with the knees and knee replacement is on the cord    ROS  As above otherwise negative  Restless leg syndrome is relatively under control but sometimes she does not take Mirapex    PHYSICAL EXAM  Left thumb bruised and purplish and tender as result of the crush injury  Heart sounds regular chest is clear  Abdomen soft nontender  Neuro awake and alert  Knee consistent with a DJD      No results found for: \"PR1\", \"BMPR1A\", \"CMPLAS\", " "\"JG1UIQHI\", \"KPSAT\"   Lab Results   Component Value Date    CHOL 144 10/18/2023    LDLCALC 62 10/18/2023    CHHDL 2.6 10/18/2023                "

## 2024-04-03 ENCOUNTER — HOSPITAL ENCOUNTER (OUTPATIENT)
Dept: RADIOLOGY | Facility: CLINIC | Age: 69
Discharge: HOME | End: 2024-04-03
Payer: MEDICARE

## 2024-04-03 ENCOUNTER — OFFICE VISIT (OUTPATIENT)
Dept: ORTHOPEDIC SURGERY | Facility: CLINIC | Age: 69
End: 2024-04-03
Payer: MEDICARE

## 2024-04-03 ENCOUNTER — OFFICE VISIT (OUTPATIENT)
Dept: PAIN MANAGEMENT | Age: 69
End: 2024-04-03
Payer: MEDICARE

## 2024-04-03 ENCOUNTER — TELEPHONE (OUTPATIENT)
Dept: PAIN MANAGEMENT | Age: 69
End: 2024-04-03

## 2024-04-03 VITALS
WEIGHT: 150 LBS | HEIGHT: 60 IN | SYSTOLIC BLOOD PRESSURE: 118 MMHG | DIASTOLIC BLOOD PRESSURE: 80 MMHG | BODY MASS INDEX: 29.45 KG/M2

## 2024-04-03 DIAGNOSIS — M79.645 THUMB PAIN, LEFT: ICD-10-CM

## 2024-04-03 DIAGNOSIS — M48.061 SPINAL STENOSIS, LUMBAR REGION, WITHOUT NEUROGENIC CLAUDICATION: ICD-10-CM

## 2024-04-03 DIAGNOSIS — Z98.1 S/P LUMBAR AND LUMBOSACRAL FUSION BY ANTERIOR TECHNIQUE: ICD-10-CM

## 2024-04-03 DIAGNOSIS — M46.1 SACROILIITIS, NOT ELSEWHERE CLASSIFIED (HCC): ICD-10-CM

## 2024-04-03 DIAGNOSIS — Z79.891 ENCOUNTER FOR LONG-TERM OPIATE ANALGESIC USE: ICD-10-CM

## 2024-04-03 DIAGNOSIS — G89.4 CHRONIC PAIN SYNDROME: ICD-10-CM

## 2024-04-03 DIAGNOSIS — M47.817 LUMBOSACRAL SPONDYLOSIS WITHOUT MYELOPATHY: Primary | ICD-10-CM

## 2024-04-03 DIAGNOSIS — M17.11 PRIMARY OSTEOARTHRITIS OF RIGHT KNEE: ICD-10-CM

## 2024-04-03 DIAGNOSIS — S62.522A CLOSED FRACTURE OF BASE OF DISTAL PHALANX OF LEFT THUMB: Primary | ICD-10-CM

## 2024-04-03 PROCEDURE — 1036F TOBACCO NON-USER: CPT | Performed by: FAMILY MEDICINE

## 2024-04-03 PROCEDURE — 1160F RVW MEDS BY RX/DR IN RCRD: CPT | Performed by: FAMILY MEDICINE

## 2024-04-03 PROCEDURE — 3017F COLORECTAL CA SCREEN DOC REV: CPT | Performed by: NURSE PRACTITIONER

## 2024-04-03 PROCEDURE — 73140 X-RAY EXAM OF FINGER(S): CPT | Mod: LT

## 2024-04-03 PROCEDURE — G8417 CALC BMI ABV UP PARAM F/U: HCPCS | Performed by: NURSE PRACTITIONER

## 2024-04-03 PROCEDURE — 1159F MED LIST DOCD IN RCRD: CPT | Performed by: FAMILY MEDICINE

## 2024-04-03 PROCEDURE — 99214 OFFICE O/P EST MOD 30 MIN: CPT | Performed by: FAMILY MEDICINE

## 2024-04-03 PROCEDURE — G8427 DOCREV CUR MEDS BY ELIG CLIN: HCPCS | Performed by: NURSE PRACTITIONER

## 2024-04-03 PROCEDURE — L3924 HFO WITHOUT JOINTS PRE OTS: HCPCS | Performed by: FAMILY MEDICINE

## 2024-04-03 PROCEDURE — 3008F BODY MASS INDEX DOCD: CPT | Performed by: FAMILY MEDICINE

## 2024-04-03 PROCEDURE — 1090F PRES/ABSN URINE INCON ASSESS: CPT | Performed by: NURSE PRACTITIONER

## 2024-04-03 PROCEDURE — 1036F TOBACCO NON-USER: CPT | Performed by: NURSE PRACTITIONER

## 2024-04-03 PROCEDURE — 99214 OFFICE O/P EST MOD 30 MIN: CPT | Performed by: NURSE PRACTITIONER

## 2024-04-03 PROCEDURE — G8400 PT W/DXA NO RESULTS DOC: HCPCS | Performed by: NURSE PRACTITIONER

## 2024-04-03 PROCEDURE — 1123F ACP DISCUSS/DSCN MKR DOCD: CPT | Performed by: NURSE PRACTITIONER

## 2024-04-03 PROCEDURE — 73140 X-RAY EXAM OF FINGER(S): CPT | Mod: LEFT SIDE | Performed by: FAMILY MEDICINE

## 2024-04-03 PROCEDURE — 99213 OFFICE O/P EST LOW 20 MIN: CPT | Performed by: FAMILY MEDICINE

## 2024-04-03 RX ORDER — OXYCODONE AND ACETAMINOPHEN 10; 325 MG/1; MG/1
1 TABLET ORAL EVERY 6 HOURS PRN
Qty: 120 TABLET | Refills: 0 | Status: SHIPPED | OUTPATIENT
Start: 2024-04-07 | End: 2024-05-07

## 2024-04-03 RX ORDER — GABAPENTIN 300 MG/1
300 CAPSULE ORAL 3 TIMES DAILY
Qty: 90 CAPSULE | Refills: 0 | Status: SHIPPED | OUTPATIENT
Start: 2024-04-03 | End: 2024-05-03

## 2024-04-03 RX ORDER — TIZANIDINE 2 MG/1
2 TABLET ORAL NIGHTLY PRN
Qty: 30 TABLET | Refills: 0 | Status: SHIPPED | OUTPATIENT
Start: 2024-04-03

## 2024-04-03 RX ORDER — TRAMADOL HYDROCHLORIDE 50 MG/1
50 TABLET ORAL EVERY 8 HOURS PRN
Qty: 10 TABLET | Refills: 0 | Status: SHIPPED | OUTPATIENT
Start: 2024-04-03 | End: 2024-04-08

## 2024-04-03 ASSESSMENT — ENCOUNTER SYMPTOMS
RESPIRATORY NEGATIVE: 1
CONSTIPATION: 0
BACK PAIN: 1
DIARRHEA: 0

## 2024-04-03 NOTE — PROGRESS NOTES
medications. Advised him against driving or operating heavy machinery or performing any activities where he may harm himself or others while on pain medications. Particular caution was emphasized especially during dose adjustments and medication changes.      [place] was reviewed.   UTOX from 8/27/23 + no percocet as disclosed, +xanax as disclosed   ORT score = 2(considered high risk)  Daily MME 45  Narcan prescribed June 2021 and understands the proper use in the event of an overdose.           Follow up:  Return in about 4 weeks (around 5/1/2024) for F/U Dr Jaimes.    EDWARD VALDIVIA, APRN - CNP

## 2024-04-03 NOTE — TELEPHONE ENCOUNTER
REC'D MEDICAL CLEARANCE REQUEST FORM FROM [employer] REQUESTING INSTRUCTIONS FOR POST PAIN MANAGEMENT.    PLEASE REVIEW ATTACHMENT AND SIGN FORM ON YOUR DESK ACCORDINGLY.

## 2024-04-03 NOTE — PROGRESS NOTES
Acute Injury New Patient Visit    CC:   Chief Complaint   Patient presents with    Left Thumb - Pain     Lt thumb injury  Slammed in car door  Xrays today       HPI: Alysa is a 68 y.o.female who presents today with new complaints of 2 weeks of persistent pain and discomfort to the left thumb.  She points to the distal phalanx of her left thumb as the injury of concern.  She slammed her thumb in a car door approximately 2 weeks ago.  She thought that the pain and discomfort would go away however presents here today for further evaluation.        Review of Systems   GENERAL: Negative for malaise, significant weight loss, fever  MUSCULOSKELETAL: See HPI  NEURO: Negative for numbness / tingling     Past Medical History  Past Medical History:   Diagnosis Date    Acute upper respiratory infection, unspecified     Acute URI    Acute upper respiratory infection, unspecified 05/11/2022    URTI (acute upper respiratory infection)    Contact with and (suspected) exposure to covid-19     Suspected COVID-19 virus infection    Cough, unspecified     Cough in adult    Discitis, unspecified, lumbar region     Septic discitis of lumbar region    Elevated blood-pressure reading, without diagnosis of hypertension     Elevated blood pressure reading    Encounter for immunization     Encounter for immunization    Enlarged lymph nodes, unspecified     Swollen gland    Nicotine dependence, unspecified, uncomplicated 12/11/2018    Other chest pain     Chest pressure    Other enthesopathies, not elsewhere classified     Tendinitis of wrist    Pain in unspecified knee 06/09/2022    Knee pain, acute    Pain, unspecified     Generalized body aches    Personal history of other diseases of the musculoskeletal system and connective tissue     History of arthritis    Personal history of other diseases of the musculoskeletal system and connective tissue     History of spinal stenosis    Personal history of other diseases of the respiratory system      History of allergic rhinitis    Personal history of other diseases of the respiratory system     History of bronchitis    Personal history of other diseases of the respiratory system     History of acute sinusitis    Personal history of other diseases of the respiratory system 03/31/2022    History of acute pharyngitis    Personal history of other diseases of the respiratory system 11/16/2022    History of acute sinusitis    Personal history of other infectious and parasitic diseases     History of viral infection    Personal history of other specified conditions     History of fatigue    Personal history of other specified conditions     History of urinary frequency    Rash and other nonspecific skin eruption     Rash    Smoker 04/11/2017    Vitamin D deficiency, unspecified     Vitamin D insufficiency       Medication review  Medication Documentation Review Audit       Reviewed by Mariela Asher MA (Technologist) on 04/03/24 at 1047      Medication Order Taking? Sig Documenting Provider Last Dose Status   acetaminophen (TylenoL) 325 mg tablet [id number] No Take by mouth every 6 hours. Historical Provider, MD Taking Active   busPIRone (Buspar) 15 mg tablet [id number] No Take by mouth twice a day. Historical Provider, MD Taking Active   ipratropium (Atrovent) 21 mcg (0.03 %) nasal spray [id number] No SPRAY 2 SPRAYS INTO EACH NOSTRIL TWICE A DAY Major Maher PA-C Taking Active   L. acidophilus/Bifid. animalis 15.5 billion cell capsule [id number] No Take by mouth. Historical Provider, MD Taking Active   Discontinued 04/02/24 1437   lidocaine (Lidoderm) 5 % patch [id number] No Place 1 patch onto the skin daily. Leave on for 12 hours, and keep off for 12 hours.  Taking Active   lisinopril 10 mg tablet [id number] No Take 1 tablet (10 mg) by mouth once daily. Chao Lal MD Taking Active   loratadine (Claritin) 10 mg tablet [id number] No Take by mouth. Historical Provider, MD Taking Active   magnesium oxide  (Mag-Ox) 400 mg (241.3 mg magnesium) tablet [id number] No Take 1 tablet (400 mg) by mouth once daily. Josie Burt MD Taking Active   meloxicam (Mobic) 15 mg tablet [id number]  Take 1 tablet (15 mg) by mouth once daily. Josie Burt MD  Active   mupirocin (Bactroban) 2 % ointment [id number] No APPLY TO NOSE TID Historical MD Javed Taking Active   naloxone (Narcan) 4 mg/0.1 mL nasal spray [id number] No Administer into affected nostril(s). Josie Burt MD Taking Active   oxyCODONE-acetaminophen (Percocet) 5-325 mg tablet [id number] No Take 1 tablet by mouth every 6 hours if needed for severe pain (7 - 10). Deedee Dahl APRN-CNP Taking Active   pantoprazole (ProtoNix) 40 mg EC tablet [id number] No Take 1 tablet (40 mg) by mouth once daily. Do not crush, chew, or split. Chao Lal MD Taking Active   pramipexole (Mirapex) 0.25 mg tablet [id number] No Take by mouth. Josie Burt MD Taking Active   simvastatin (Zocor) 40 mg tablet [id number] No Take 1 tablet (40 mg) by mouth once daily at bedtime. Chao Lal MD Taking Active   tiZANidine (Zanaflex) 2 mg tablet [id number] No Take by mouth. Josie Burt MD Taking Active   Xanax 0.5 mg tablet [id number] No Take by mouth 3 times a day as needed. Josie Burt MD Taking Active                    Allergies  No Known Allergies    Social History  Social History     Socioeconomic History    Marital status:      Spouse name: Not on file    Number of children: Not on file    Years of education: Not on file    Highest education level: Not on file   Occupational History    Not on file   Tobacco Use    Smoking status: Former     Packs/day: 1.00     Years: 10.00     Additional pack years: 0.00     Total pack years: 10.00     Types: Cigarettes     Quit date:      Years since quittin.2    Smokeless tobacco: Never   Substance and Sexual Activity    Alcohol use: Yes     Alcohol/week: 2.0 standard drinks of alcohol      Types: 2 Glasses of wine per week    Drug use: Never    Sexual activity: Not on file   Other Topics Concern    Not on file   Social History Narrative    Not on file     Social Determinants of Health     Financial Resource Strain: Not on file   Food Insecurity: Not on file   Transportation Needs: Not on file   Physical Activity: Not on file   Stress: Not on file   Social Connections: Not on file   Intimate Partner Violence: Not on file   Housing Stability: Not on file       Surgical History  Past Surgical History:   Procedure Laterality Date    OTHER SURGICAL HISTORY  08/16/2021    Lumbar laminectomy    OTHER SURGICAL HISTORY  08/11/2022    Knee surgery    OTHER SURGICAL HISTORY  01/24/2022    Spinal surgery    OTHER SURGICAL HISTORY  01/24/2022    Arthrodesis    OTHER SURGICAL HISTORY  01/24/2022    Colonoscopy    OTHER SURGICAL HISTORY  01/24/2022    Lumbar discectomy    OTHER SURGICAL HISTORY  01/24/2022    Lumbar vertebral fusion       Physical Exam:  GENERAL:  Patient is awake, alert, and oriented to person place and time.  Patient appears well nourished and well kept.  Affect Calm, Not Acutely Distressed.  HEENT:  Normocephalic, Atraumatic, EOMI  CARDIOVASCULAR:  Hemodynamically stable.  RESPIRATORY:  Normal respirations with unlabored breathing.  NEURO: Gross sensation intact to the upper extremities bilaterally.  Extremity: Left thumb exam demonstrates soft tissue swelling and bruising there is no subungual hematoma.  She has ability to fully extend and gently flex the digit through pain.  Mild swelling at the first MCP joint with no laxity.  Minimal crepitus at the proximal CMC.  The remainder the left upper extremity is neurovascular intact and benign.      Diagnostics: X-rays demonstrate a subacute nondisplaced mildly comminuted intra-articular fracture of the left distal first phalanx        Procedure: None  Procedures    Assessment:   Problem List Items Addressed This Visit    None  Visit Diagnoses        Closed fracture of base of distal phalanx of left thumb    -  Primary    Relevant Medications    traMADol (Ultram) 50 mg tablet    Other Relevant Orders    Long Thumb Spica    Thumb pain, left        Relevant Orders    XR thumb left MIN 2 views             Plan: At this time due to the intra-articular nature and multiple fragmented appearance of this fracture, will recommend follow-up with one of our hand or wrist specialist.  For now I was able to provide the patient with pain medications to assist with discomfort as over-the-counter meds did not been helpful we will provide her with a thumb spica brace to protect her thumb, and she will follow-up with Dr. Ghazal Washington going forward for further evaluation and ongoing management for this fracture.  Patient should also continue to ice and elevate.  Will defer any fracture care to Dr. Ghazal Washington   Orders Placed This Encounter    Long Thumb Spica    XR thumb left MIN 2 views    traMADol (Ultram) 50 mg tablet      At the conclusion of the visit there were no further questions by the patient/family regarding their plan of care.  Patient was instructed to call or return with any issues, questions, or concerns regarding their injury and/or treatment plan described above.     04/03/24 at 12:39 PM - Cole C Budinsky, MD    Office: [phone]    This note was prepared using voice recognition software.  The details of this note are correct and have been reviewed, and corrected to the best of my ability.  Some grammatical errors may persist related to the Dragon software.

## 2024-04-05 ENCOUNTER — APPOINTMENT (OUTPATIENT)
Dept: ORTHOPEDIC SURGERY | Facility: CLINIC | Age: 69
End: 2024-04-05
Payer: MEDICARE

## 2024-04-15 ENCOUNTER — OFFICE VISIT (OUTPATIENT)
Dept: ORTHOPEDIC SURGERY | Facility: CLINIC | Age: 69
End: 2024-04-15
Payer: MEDICARE

## 2024-04-15 DIAGNOSIS — S67.02XA CRUSHING INJURY OF LEFT THUMB, INITIAL ENCOUNTER: ICD-10-CM

## 2024-04-15 PROCEDURE — 1160F RVW MEDS BY RX/DR IN RCRD: CPT | Performed by: STUDENT IN AN ORGANIZED HEALTH CARE EDUCATION/TRAINING PROGRAM

## 2024-04-15 PROCEDURE — 99213 OFFICE O/P EST LOW 20 MIN: CPT | Performed by: STUDENT IN AN ORGANIZED HEALTH CARE EDUCATION/TRAINING PROGRAM

## 2024-04-15 PROCEDURE — 99214 OFFICE O/P EST MOD 30 MIN: CPT | Mod: 25 | Performed by: STUDENT IN AN ORGANIZED HEALTH CARE EDUCATION/TRAINING PROGRAM

## 2024-04-15 PROCEDURE — 3008F BODY MASS INDEX DOCD: CPT | Performed by: STUDENT IN AN ORGANIZED HEALTH CARE EDUCATION/TRAINING PROGRAM

## 2024-04-15 PROCEDURE — 26750 TREAT FINGER FRACTURE EACH: CPT | Performed by: STUDENT IN AN ORGANIZED HEALTH CARE EDUCATION/TRAINING PROGRAM

## 2024-04-15 PROCEDURE — 1036F TOBACCO NON-USER: CPT | Performed by: STUDENT IN AN ORGANIZED HEALTH CARE EDUCATION/TRAINING PROGRAM

## 2024-04-15 PROCEDURE — 1159F MED LIST DOCD IN RCRD: CPT | Performed by: STUDENT IN AN ORGANIZED HEALTH CARE EDUCATION/TRAINING PROGRAM

## 2024-04-15 NOTE — PROGRESS NOTES
History of Present Illness:   Patient presents today for evaluation of left thumb.    The patient sustained an acute injury on 4 3.   The patient denies any loss of consciousness or additional significant injuries.  The patient denies any current numbness or tingling.  The pain is sharp, acute in nature, better with rest worse with motion.    She caught her thumb in car door.  Has been in an Exos fracture brace since that time..    Review of Systems   GENERAL: Negative  GI: Negative  MUSCULOSKELETAL: See HPI  SKIN: Negative  NEURO:  Negative    The patient's past medical history, family history, social history, and review of systems were reviewed. History is otherwise negative except as stated in the HPI.    Physical Examination:  Left upper extremity:  Mild tenderness over thumb distal phalanx.  Stable to stress at the IP.  Able to fully flex and extend her IP.  Skin healthy to gross inspection, no breakdown  Swelling / ecchymosis noted  Intact flexion and extension of 1st IP joint and finger abduction  Sensation intact to light touch medial / ulnar and radial nerve distribution   Good cap refill    Imaging:  AP lateral and oblique of the left thumb demonstrates intra-articular distal phalanx fracture.  Alignment overall maintained    Assessment:   Patient with an acute distal phalanx fracture     Plan:  Discussed with the patient that this fracture is amenable to non-surgical management.  Discussed a period of immobilization and serial radiographs followed by rehab and return to activities.    Would like her to continue wearing her thumb spica Exos brace with heavy activities.  Discussed maintaining nonweightbearing status for an additional 4 weeks    Follow-up: 1 month with x-ray    Ghazal Washington MD  Orthopaedic Surgeon

## 2024-04-22 NOTE — PROGRESS NOTES
History of Present Illness  Chief Complaint   Patient presents with    Right Knee - Follow-up     OA         Patient with known osteoarthritis of the side: right knee who presents today for repeat evaluation.  The patient notes worsening knee pain.  The patient notes worsening mechanical symptoms.  The patient has tried the following modalities Rest, ice, elevation, Tylenol, NSAIDS, and Injections.      Past Medical History:   Diagnosis Date    Acute upper respiratory infection, unspecified     Acute URI    Acute upper respiratory infection, unspecified 05/11/2022    URTI (acute upper respiratory infection)    Contact with and (suspected) exposure to covid-19     Suspected COVID-19 virus infection    Cough, unspecified     Cough in adult    Discitis, unspecified, lumbar region     Septic discitis of lumbar region    Elevated blood-pressure reading, without diagnosis of hypertension     Elevated blood pressure reading    Encounter for immunization     Encounter for immunization    Enlarged lymph nodes, unspecified     Swollen gland    Nicotine dependence, unspecified, uncomplicated 12/11/2018    Other chest pain     Chest pressure    Other enthesopathies, not elsewhere classified     Tendinitis of wrist    Pain in unspecified knee 06/09/2022    Knee pain, acute    Pain, unspecified     Generalized body aches    Personal history of other diseases of the musculoskeletal system and connective tissue     History of arthritis    Personal history of other diseases of the musculoskeletal system and connective tissue     History of spinal stenosis    Personal history of other diseases of the respiratory system     History of allergic rhinitis    Personal history of other diseases of the respiratory system     History of bronchitis    Personal history of other diseases of the respiratory system     History of acute sinusitis    Personal history of other diseases of the respiratory system 03/31/2022    History of acute  pharyngitis    Personal history of other diseases of the respiratory system 11/16/2022    History of acute sinusitis    Personal history of other infectious and parasitic diseases     History of viral infection    Personal history of other specified conditions     History of fatigue    Personal history of other specified conditions     History of urinary frequency    Rash and other nonspecific skin eruption     Rash    Smoker 04/11/2017    Vitamin D deficiency, unspecified     Vitamin D insufficiency       Medication Documentation Review Audit       Reviewed by Kian Henson (Technologist) on 04/23/24 at 1146      Medication Order Taking? Sig Documenting Provider Last Dose Status   acetaminophen (TylenoL) 325 mg tablet [id number] No Take by mouth every 6 hours. Josie Burt MD Taking Active   busPIRone (Buspar) 15 mg tablet [id number] No Take by mouth twice a day. Historical MD Javed Taking Active   ipratropium (Atrovent) 21 mcg (0.03 %) nasal spray [id number] No SPRAY 2 SPRAYS INTO EACH NOSTRIL TWICE A DAY Major Maher PA-C Taking Active   L. acidophilus/Bifid. animalis 15.5 billion cell capsule [id number] No Take by mouth. Historical MD Javed Taking Active   lidocaine (Lidoderm) 5 % patch [id number] No Place 1 patch onto the skin daily. Leave on for 12 hours, and keep off for 12 hours.  Taking Active   lisinopril 10 mg tablet [id number] No Take 1 tablet (10 mg) by mouth once daily. Chao Lal MD Taking Active   loratadine (Claritin) 10 mg tablet [id number] No Take by mouth. Historical Provider, MD Taking Active   magnesium oxide (Mag-Ox) 400 mg (241.3 mg magnesium) tablet [id number] No Take 1 tablet (400 mg) by mouth once daily. Historical Provider, MD Taking Active   meloxicam (Mobic) 15 mg tablet [id number]  Take 1 tablet (15 mg) by mouth once daily. Historical Provider, MD  Active   mupirocin (Bactroban) 2 % ointment [id number] No APPLY TO NOSE TID Historical Provider, MD Taking Active    naloxone (Narcan) 4 mg/0.1 mL nasal spray [id number] No Administer into affected nostril(s). Historical Provider, MD Taking Active   oxyCODONE-acetaminophen (Percocet) 5-325 mg tablet [id number] No Take 1 tablet by mouth every 6 hours if needed for severe pain (7 - 10). Deedee Dahl, APRN-CNP Taking Active   pantoprazole (ProtoNix) 40 mg EC tablet [id number] No Take 1 tablet (40 mg) by mouth once daily. Do not crush, chew, or split. Chao Lal MD Taking Active   pramipexole (Mirapex) 0.25 mg tablet [id number] No Take by mouth. Historical Provider, MD Taking Active   simvastatin (Zocor) 40 mg tablet [id number] No Take 1 tablet (40 mg) by mouth once daily at bedtime. Chao Lal MD Taking Active   tiZANidine (Zanaflex) 2 mg tablet [id number] No Take by mouth. Historical Provider, MD Taking Active   Xanax 0.5 mg tablet [id number] No Take by mouth 3 times a day as needed. Historical Provider, MD Taking Active                    No Known Allergies    Social History     Socioeconomic History    Marital status:      Spouse name: Not on file    Number of children: Not on file    Years of education: Not on file    Highest education level: Not on file   Occupational History    Not on file   Tobacco Use    Smoking status: Former     Current packs/day: 0.00     Average packs/day: 1 pack/day for 10.0 years (10.0 ttl pk-yrs)     Types: Cigarettes     Start date:      Quit date: 2018     Years since quittin.3    Smokeless tobacco: Never   Substance and Sexual Activity    Alcohol use: Yes     Alcohol/week: 2.0 standard drinks of alcohol     Types: 2 Glasses of wine per week    Drug use: Never    Sexual activity: Not on file   Other Topics Concern    Not on file   Social History Narrative    Not on file     Social Determinants of Health     Financial Resource Strain: Not on file   Food Insecurity: Not on file   Transportation Needs: Not on file   Physical Activity: Not on file   Stress: Not on file   Social  Connections: Not on file   Intimate Partner Violence: Not on file   Housing Stability: Not At Risk (8/5/2023)    Received from [facility]    Housing Stability     Housing Stability: Not on file     Housing Stability: Not on file       Past Surgical History:   Procedure Laterality Date    OTHER SURGICAL HISTORY  08/16/2021    Lumbar laminectomy    OTHER SURGICAL HISTORY  08/11/2022    Knee surgery    OTHER SURGICAL HISTORY  01/24/2022    Spinal surgery    OTHER SURGICAL HISTORY  01/24/2022    Arthrodesis    OTHER SURGICAL HISTORY  01/24/2022    Colonoscopy    OTHER SURGICAL HISTORY  01/24/2022    Lumbar discectomy    OTHER SURGICAL HISTORY  01/24/2022    Lumbar vertebral fusion            Review of Systems   GENERAL: Negative for malaise, significant weight loss, fever  MUSCULOSKELETAL: see HPI  NEURO:  Negative    BMI  32    Exam  side: right Knee:  Skin healthy and intact  No gross swelling or ecchymosis  Alignment: mild varus  Correctable: full  Effusion: mild  ROM: 0-110 degrees  Crepitance with range of motion  No pain with internal rotation of the hip  Tenderness to palpation: medial, lateral Pain with patellar compression: No  No laxity to valgus stress  No laxity to varus stress  Negative Lachman´s test  Negative posterior drawer test  negative Ashish´s test     Neurovascular exam normal distally  2+ DP pulse and good cap refill     Imaging  XR thumb left MIN 2 views  Narrative: Interpreted By:  Budinsky, Cole,   STUDY:  XR THUMB LEFT MIN 2 VIEWS; ;  4/3/2024 11:03 am      INDICATION:  Signs/Symptoms:pain.      ACCESSION NUMBER(S):  [id number]      ORDERING CLINICIAN:  COLE BUDINSKY      Impression: Left thumb films demonstrate comminuted left distal 1st phalanx  fracture with intra-articular extension located both to the radial  and ulnar side of the digit. Osteoarthritic changes are seen  throughout the digits and 1st CMC joint.          Signed by: Cole Budinsky 4/3/2024 6:30 PM  Dictation workstation:    [id number]         Assessment  Patient with known osteoarthritis of the side: right knee     Plan  We reviewed an evidence-based approach to OA of the knee.  We discussed past treatments as well options for future treatment.  Basic hinged knee brace for her upcoming trip to [place] to meet her new grandchild.  We will reconvene after she returns to discuss future total knee arthroplasty at the end of 2024.  She is also in pain management and takes Percocet daily.  She was very concerned with her postoperative pain control I advise she needs to discuss this with her pain management team as we will give her what we think is appropriate for surgery.  Questions answered

## 2024-04-23 ENCOUNTER — OFFICE VISIT (OUTPATIENT)
Dept: ORTHOPEDIC SURGERY | Facility: CLINIC | Age: 69
End: 2024-04-23
Payer: MEDICARE

## 2024-04-23 DIAGNOSIS — M17.11 PRIMARY OSTEOARTHRITIS OF RIGHT KNEE: Primary | ICD-10-CM

## 2024-04-23 PROCEDURE — 1036F TOBACCO NON-USER: CPT | Performed by: ORTHOPAEDIC SURGERY

## 2024-04-23 PROCEDURE — 1125F AMNT PAIN NOTED PAIN PRSNT: CPT | Performed by: ORTHOPAEDIC SURGERY

## 2024-04-23 PROCEDURE — 1159F MED LIST DOCD IN RCRD: CPT | Performed by: ORTHOPAEDIC SURGERY

## 2024-04-23 PROCEDURE — 3008F BODY MASS INDEX DOCD: CPT | Performed by: ORTHOPAEDIC SURGERY

## 2024-04-23 PROCEDURE — L1812 KO ELASTIC W/JOINTS PRE OTS: HCPCS | Performed by: ORTHOPAEDIC SURGERY

## 2024-04-23 PROCEDURE — 1160F RVW MEDS BY RX/DR IN RCRD: CPT | Performed by: ORTHOPAEDIC SURGERY

## 2024-04-23 PROCEDURE — 99213 OFFICE O/P EST LOW 20 MIN: CPT | Performed by: ORTHOPAEDIC SURGERY

## 2024-04-23 ASSESSMENT — PAIN SCALES - GENERAL: PAINLEVEL_OUTOF10: 3

## 2024-04-23 ASSESSMENT — PAIN - FUNCTIONAL ASSESSMENT: PAIN_FUNCTIONAL_ASSESSMENT: 0-10

## 2024-04-30 ENCOUNTER — PROCEDURE VISIT (OUTPATIENT)
Dept: PAIN MANAGEMENT | Age: 69
End: 2024-04-30
Payer: MEDICARE

## 2024-04-30 VITALS
SYSTOLIC BLOOD PRESSURE: 112 MMHG | HEIGHT: 60 IN | BODY MASS INDEX: 29.45 KG/M2 | DIASTOLIC BLOOD PRESSURE: 68 MMHG | WEIGHT: 150 LBS

## 2024-04-30 DIAGNOSIS — M17.11 PRIMARY OSTEOARTHRITIS OF RIGHT KNEE: ICD-10-CM

## 2024-04-30 DIAGNOSIS — M47.817 LUMBOSACRAL SPONDYLOSIS WITHOUT MYELOPATHY: ICD-10-CM

## 2024-04-30 DIAGNOSIS — Z98.1 S/P LUMBAR AND LUMBOSACRAL FUSION BY ANTERIOR TECHNIQUE: Primary | ICD-10-CM

## 2024-04-30 DIAGNOSIS — G89.4 CHRONIC PAIN SYNDROME: ICD-10-CM

## 2024-04-30 PROCEDURE — 64493 INJ PARAVERT F JNT L/S 1 LEV: CPT | Performed by: PAIN MEDICINE

## 2024-04-30 PROCEDURE — 99213 OFFICE O/P EST LOW 20 MIN: CPT | Performed by: PAIN MEDICINE

## 2024-04-30 PROCEDURE — 64494 INJ PARAVERT F JNT L/S 2 LEV: CPT | Performed by: PAIN MEDICINE

## 2024-04-30 RX ORDER — LIDOCAINE HYDROCHLORIDE 10 MG/ML
3 INJECTION, SOLUTION EPIDURAL; INFILTRATION; INTRACAUDAL; PERINEURAL ONCE
Status: COMPLETED | OUTPATIENT
Start: 2024-04-30 | End: 2024-04-30

## 2024-04-30 RX ORDER — OXYCODONE AND ACETAMINOPHEN 10; 325 MG/1; MG/1
1 TABLET ORAL EVERY 6 HOURS PRN
Qty: 120 TABLET | Refills: 0 | Status: SHIPPED | OUTPATIENT
Start: 2024-04-30 | End: 2024-05-30

## 2024-04-30 RX ADMIN — LIDOCAINE HYDROCHLORIDE 3 ML: 10 INJECTION, SOLUTION EPIDURAL; INFILTRATION; INTRACAUDAL; PERINEURAL at 11:02

## 2024-04-30 NOTE — PROGRESS NOTES
[facility]  Neurosurgery and [facility] Management Center  [address], [address]  [place], [place]  P: [phone]  F: [phone]      LUMBAR MEDIAL BRANCH BLOCK      Provider: JULITA PRESLEY DO          Patient Name: Mariaelena Colby : 1955        Date: 2024       Mariaelena Colby is here today for interventional pain management.  Standard ASI guidelines were followed and sterile technique used.  Area was cleaned with Betadine x3.  Informed consent was obtained.  Fluoroscopic guidance was used for this procedure. Junction of superior articular process and transverse process was identified. For L5 dorsal primary ramus groove of sacral ala and SAP of S1 was identified. Appropriate length spinal needle was used and advanced to correct anatomic location. Negative aspiration was achieved.  At each site approximately 1/2cc of 1% preservative free Lidocaine was injected without difficulty.    Patient tolerated the procedure well, no obvious complications occurred during the procedure.  Patient was appropriately monitored and discharged home in stable condition with their usual motor strength. The patient will keep close track of pain over the next several hours and call our office tomorrow and let us know what percentage of pain relief is experienced.  Post op Instructions were given to patient. Relevant and recent imaging reviewed with patient today.                  [x] Bilateral [] T12 [] S1      [x] L1 [] S2     [] Right [x] L2 [] S3      [x] L3      [] Left [] L4         [] L5 [] S.I.                       Patient had >80% pain relief following procedure with positive facet joint provocative maneuvers, schedule RF ablation.    JULITA PRESLEY DO

## 2024-04-30 NOTE — PROGRESS NOTES
[facility]  Neurosurgery and Pain Management Center  [address], [address]  [place], [place]  P: [phone]  F: [phone]        Mariaelena Colby  (1955)    4/30/2024    Subjective:     Mariaelena Colby is 68 y.o. female who complains today of:     Chief Complaint   Patient presents with    Back Pain     Patient here today for follow-up.  She has chronic pain history of lower lumbar fusion.  She has significant knee arthritis.  She is on Percocet.  Helps her function, do basic activity living, chores around the house.  No side effects or aberrant behavior.  Pain is worse with activity bending standing better with rest.  She has spasms.    Plan: We discussed options in detail.  [place] is reviewed narcotic drug policy reviewed.  Continue Percocet for chronic pain.  Will hold off on the Mobic as her primary care provider is checking some blood work.  Follow-up in 1 month.  Continue home excise program as tolerated.  Hopefully the RF ablation helps her significantly.    Allergies:  Patient has no known allergies.    Past Medical History:   Diagnosis Date    Anxiety     Coronary artery disease involving native coronary artery of native heart without angina pectoris 12/11/2018    Hyperlipidemia     meds > 5 yrs    Osteoarthritis      Past Surgical History:   Procedure Laterality Date    BACK SURGERY  2017    lumbar disc OR    COLONOSCOPY      DILATION AND CURETTAGE OF UTERUS      at age 30s--miscarriage    LUMBAR FUSION Left 8/12/2020    ANTERIOR LEFT RETROPERITONEAL L5-S1 DISKECTOMY INTERBODY CAGE FUSION [place] [facility] [place]    TONSILLECTOMY      as child    TRACHEAL SURGERY  2010    in [place]--EMT attempted to intubate patient due to trouble breathing & then needed repair     Family History   Problem Relation Age of Onset    Diabetes Mother     Obesity Mother     High Cholesterol Mother     High Cholesterol Father     Other Sister         GSW to spine

## 2024-05-01 DIAGNOSIS — M46.1 SACROILIITIS, NOT ELSEWHERE CLASSIFIED (HCC): ICD-10-CM

## 2024-05-01 DIAGNOSIS — M47.817 LUMBOSACRAL SPONDYLOSIS WITHOUT MYELOPATHY: ICD-10-CM

## 2024-05-01 RX ORDER — TIZANIDINE 2 MG/1
2 TABLET ORAL NIGHTLY PRN
Qty: 30 TABLET | Refills: 0 | OUTPATIENT
Start: 2024-05-01

## 2024-05-03 ENCOUNTER — LAB (OUTPATIENT)
Dept: LAB | Facility: LAB | Age: 69
End: 2024-05-03
Payer: MEDICARE

## 2024-05-03 DIAGNOSIS — Z00.00 WELL ADULT HEALTH CHECK: ICD-10-CM

## 2024-05-03 DIAGNOSIS — E78.5 HYPERLIPIDEMIA, UNSPECIFIED HYPERLIPIDEMIA TYPE: ICD-10-CM

## 2024-05-03 DIAGNOSIS — M43.16 SPONDYLOLISTHESIS OF LUMBAR REGION: ICD-10-CM

## 2024-05-03 DIAGNOSIS — D64.9 ANEMIA, UNSPECIFIED TYPE: ICD-10-CM

## 2024-05-03 DIAGNOSIS — G25.81 RLS (RESTLESS LEGS SYNDROME): Chronic | ICD-10-CM

## 2024-05-03 LAB
ALBUMIN SERPL BCP-MCNC: 4.2 G/DL (ref 3.4–5)
ALP SERPL-CCNC: 77 U/L (ref 33–136)
ALT SERPL W P-5'-P-CCNC: 18 U/L (ref 7–45)
ANION GAP SERPL CALC-SCNC: 11 MMOL/L (ref 10–20)
AST SERPL W P-5'-P-CCNC: 19 U/L (ref 9–39)
BASOPHILS # BLD AUTO: 0.05 X10*3/UL (ref 0–0.1)
BASOPHILS NFR BLD AUTO: 0.9 %
BILIRUB SERPL-MCNC: 0.4 MG/DL (ref 0–1.2)
BUN SERPL-MCNC: 26 MG/DL (ref 6–23)
CALCIUM SERPL-MCNC: 9.7 MG/DL (ref 8.6–10.3)
CHLORIDE SERPL-SCNC: 104 MMOL/L (ref 98–107)
CHOLEST SERPL-MCNC: 240 MG/DL (ref 0–199)
CHOLESTEROL/HDL RATIO: 3.7
CO2 SERPL-SCNC: 26 MMOL/L (ref 21–32)
CREAT SERPL-MCNC: 0.67 MG/DL (ref 0.5–1.05)
EGFRCR SERPLBLD CKD-EPI 2021: >90 ML/MIN/1.73M*2
EOSINOPHIL # BLD AUTO: 0.19 X10*3/UL (ref 0–0.7)
EOSINOPHIL NFR BLD AUTO: 3.5 %
ERYTHROCYTE [DISTWIDTH] IN BLOOD BY AUTOMATED COUNT: 12.6 % (ref 11.5–14.5)
GLUCOSE SERPL-MCNC: 100 MG/DL (ref 74–99)
HCT VFR BLD AUTO: 35.6 % (ref 36–46)
HDLC SERPL-MCNC: 65.7 MG/DL
HGB BLD-MCNC: 11.2 G/DL (ref 12–16)
IMM GRANULOCYTES # BLD AUTO: 0.01 X10*3/UL (ref 0–0.7)
IMM GRANULOCYTES NFR BLD AUTO: 0.2 % (ref 0–0.9)
LDLC SERPL CALC-MCNC: 125 MG/DL
LYMPHOCYTES # BLD AUTO: 1.51 X10*3/UL (ref 1.2–4.8)
LYMPHOCYTES NFR BLD AUTO: 28.1 %
MCH RBC QN AUTO: 30.1 PG (ref 26–34)
MCHC RBC AUTO-ENTMCNC: 31.5 G/DL (ref 32–36)
MCV RBC AUTO: 96 FL (ref 80–100)
MONOCYTES # BLD AUTO: 0.41 X10*3/UL (ref 0.1–1)
MONOCYTES NFR BLD AUTO: 7.6 %
NEUTROPHILS # BLD AUTO: 3.21 X10*3/UL (ref 1.2–7.7)
NEUTROPHILS NFR BLD AUTO: 59.7 %
NON HDL CHOLESTEROL: 174 MG/DL (ref 0–149)
NRBC BLD-RTO: 0 /100 WBCS (ref 0–0)
PLATELET # BLD AUTO: 313 X10*3/UL (ref 150–450)
POTASSIUM SERPL-SCNC: 4.9 MMOL/L (ref 3.5–5.3)
PROT SERPL-MCNC: 7.2 G/DL (ref 6.4–8.2)
RBC # BLD AUTO: 3.72 X10*6/UL (ref 4–5.2)
SODIUM SERPL-SCNC: 136 MMOL/L (ref 136–145)
TRIGL SERPL-MCNC: 247 MG/DL (ref 0–149)
VLDL: 49 MG/DL (ref 0–40)
WBC # BLD AUTO: 5.4 X10*3/UL (ref 4.4–11.3)

## 2024-05-03 PROCEDURE — 80061 LIPID PANEL: CPT

## 2024-05-03 PROCEDURE — 36415 COLL VENOUS BLD VENIPUNCTURE: CPT

## 2024-05-03 PROCEDURE — 85025 COMPLETE CBC W/AUTO DIFF WBC: CPT

## 2024-05-03 PROCEDURE — 80053 COMPREHEN METABOLIC PANEL: CPT

## 2024-05-07 ENCOUNTER — OFFICE VISIT (OUTPATIENT)
Dept: PAIN MANAGEMENT | Age: 69
End: 2024-05-07
Payer: MEDICARE

## 2024-05-07 DIAGNOSIS — M47.817 LUMBOSACRAL SPONDYLOSIS WITHOUT MYELOPATHY: ICD-10-CM

## 2024-05-07 PROCEDURE — 64636 DESTROY L/S FACET JNT ADDL: CPT | Performed by: PAIN MEDICINE

## 2024-05-07 PROCEDURE — 64635 DESTROY LUMB/SAC FACET JNT: CPT | Performed by: PAIN MEDICINE

## 2024-05-07 RX ORDER — LIDOCAINE HYDROCHLORIDE 10 MG/ML
3 INJECTION, SOLUTION EPIDURAL; INFILTRATION; INTRACAUDAL; PERINEURAL ONCE
Status: COMPLETED | OUTPATIENT
Start: 2024-05-07 | End: 2024-05-07

## 2024-05-07 RX ORDER — BETAMETHASONE SODIUM PHOSPHATE AND BETAMETHASONE ACETATE 3; 3 MG/ML; MG/ML
6 INJECTION, SUSPENSION INTRA-ARTICULAR; INTRALESIONAL; INTRAMUSCULAR; SOFT TISSUE ONCE
Status: COMPLETED | OUTPATIENT
Start: 2024-05-07 | End: 2024-05-07

## 2024-05-07 RX ADMIN — LIDOCAINE HYDROCHLORIDE 3 ML: 10 INJECTION, SOLUTION EPIDURAL; INFILTRATION; INTRACAUDAL; PERINEURAL at 11:11

## 2024-05-07 RX ADMIN — BETAMETHASONE SODIUM PHOSPHATE AND BETAMETHASONE ACETATE 6 MG: 3; 3 INJECTION, SUSPENSION INTRA-ARTICULAR; INTRALESIONAL; INTRAMUSCULAR; SOFT TISSUE at 11:11

## 2024-05-07 NOTE — PROGRESS NOTES
[facility]  Neurosurgery and Pain Management Center  [address], [address]  [place], [place]  P: [phone]  F: [phone]      Lumbar Radio Frequency Ablation     Provider: JULITA PRESLEY DO          Patient Name: Mariaelena Colby : 1955        Date: 2024      Mariaelena Colby is here today for interventional pain management.  Standard ASI guidelines were followed and sterile technique used.  Area was cleaned with Betadine x3.  Informed consent was obtained.  Fluoroscopic guidance was used for this procedure. Multiple views of fluoroscopy were used during procedure to assist with needle placement. Appropriate sized RF 10mm active tip needle was used and advance to appropriate anatomic location.    There was appropriate multifidus contraction noted with motor stimulation at 2 Hz between 0.5-1.5 volts. No limb or gluteal contraction was noted taking it up to 3.5 volts. Prior to lesioning at 80 degrees Celsius for 90 seconds, approximately 0.75mg/1mg of Celestone and ½ cc of 1% preservative free Lidocaine was injected. Impedance was between 200-500 ohms during the procedure.     Patient tolerated the procedure well, no obvious complications occurred during the procedure.  Patient was appropriately monitored and discharged home in stable condition with their usual motor strength. Post Op instructions were given to patient.          [x] Bilateral [] T11 [x] L1 [] S1     [] T12 [x] L2 [] S2    [] Right  [x] L3 [] S3      [] L4 [] S4    [] Left  [] L5                              JULITA PRESLEY DO

## 2024-05-14 ENCOUNTER — TELEPHONE (OUTPATIENT)
Dept: PRIMARY CARE | Facility: CLINIC | Age: 69
End: 2024-05-14
Payer: MEDICARE

## 2024-05-14 NOTE — TELEPHONE ENCOUNTER
Pt called stating she had labs drawn 5/3 and would like you to review them and advise her pain management if ok to take meloxicam.

## 2024-05-20 ENCOUNTER — APPOINTMENT (OUTPATIENT)
Dept: ORTHOPEDIC SURGERY | Facility: CLINIC | Age: 69
End: 2024-05-20
Payer: MEDICARE

## 2024-05-29 ENCOUNTER — OFFICE VISIT (OUTPATIENT)
Dept: PAIN MANAGEMENT | Age: 69
End: 2024-05-29
Payer: MEDICARE

## 2024-05-29 VITALS
HEIGHT: 60 IN | TEMPERATURE: 97 F | WEIGHT: 150 LBS | BODY MASS INDEX: 29.45 KG/M2 | SYSTOLIC BLOOD PRESSURE: 126 MMHG | DIASTOLIC BLOOD PRESSURE: 70 MMHG

## 2024-05-29 DIAGNOSIS — M70.61 TROCHANTERIC BURSITIS, RIGHT HIP: ICD-10-CM

## 2024-05-29 DIAGNOSIS — M47.817 LUMBOSACRAL SPONDYLOSIS WITHOUT MYELOPATHY: ICD-10-CM

## 2024-05-29 DIAGNOSIS — Z79.891 ENCOUNTER FOR LONG-TERM OPIATE ANALGESIC USE: ICD-10-CM

## 2024-05-29 DIAGNOSIS — M48.061 SPINAL STENOSIS, LUMBAR REGION, WITHOUT NEUROGENIC CLAUDICATION: ICD-10-CM

## 2024-05-29 DIAGNOSIS — G89.4 CHRONIC PAIN SYNDROME: ICD-10-CM

## 2024-05-29 DIAGNOSIS — M17.11 PRIMARY OSTEOARTHRITIS OF RIGHT KNEE: Primary | ICD-10-CM

## 2024-05-29 DIAGNOSIS — Z98.1 S/P LUMBAR AND LUMBOSACRAL FUSION BY ANTERIOR TECHNIQUE: ICD-10-CM

## 2024-05-29 DIAGNOSIS — M46.1 SACROILIITIS, NOT ELSEWHERE CLASSIFIED (HCC): ICD-10-CM

## 2024-05-29 PROCEDURE — 1090F PRES/ABSN URINE INCON ASSESS: CPT | Performed by: NURSE PRACTITIONER

## 2024-05-29 PROCEDURE — G8417 CALC BMI ABV UP PARAM F/U: HCPCS | Performed by: NURSE PRACTITIONER

## 2024-05-29 PROCEDURE — 99214 OFFICE O/P EST MOD 30 MIN: CPT | Performed by: NURSE PRACTITIONER

## 2024-05-29 PROCEDURE — 1123F ACP DISCUSS/DSCN MKR DOCD: CPT | Performed by: NURSE PRACTITIONER

## 2024-05-29 PROCEDURE — G8400 PT W/DXA NO RESULTS DOC: HCPCS | Performed by: NURSE PRACTITIONER

## 2024-05-29 PROCEDURE — 1036F TOBACCO NON-USER: CPT | Performed by: NURSE PRACTITIONER

## 2024-05-29 PROCEDURE — G8427 DOCREV CUR MEDS BY ELIG CLIN: HCPCS | Performed by: NURSE PRACTITIONER

## 2024-05-29 PROCEDURE — 3017F COLORECTAL CA SCREEN DOC REV: CPT | Performed by: NURSE PRACTITIONER

## 2024-05-29 RX ORDER — OXYCODONE AND ACETAMINOPHEN 10; 325 MG/1; MG/1
1 TABLET ORAL EVERY 6 HOURS PRN
Qty: 84 TABLET | Refills: 0 | Status: SHIPPED | OUTPATIENT
Start: 2024-05-30 | End: 2024-06-20

## 2024-05-29 RX ORDER — TIZANIDINE 2 MG/1
2 TABLET ORAL NIGHTLY PRN
Qty: 30 TABLET | Refills: 0 | Status: SHIPPED | OUTPATIENT
Start: 2024-05-29

## 2024-05-29 RX ORDER — OXYCODONE AND ACETAMINOPHEN 10; 325 MG/1; MG/1
1 TABLET ORAL EVERY 6 HOURS PRN
Qty: 120 TABLET | Refills: 0 | Status: SHIPPED | OUTPATIENT
Start: 2024-05-30 | End: 2024-05-29

## 2024-05-29 RX ORDER — GABAPENTIN 300 MG/1
300 CAPSULE ORAL 3 TIMES DAILY
Qty: 90 CAPSULE | Refills: 0 | Status: SHIPPED | OUTPATIENT
Start: 2024-05-29 | End: 2024-06-28

## 2024-05-29 ASSESSMENT — ENCOUNTER SYMPTOMS
BACK PAIN: 1
DIARRHEA: 0
RESPIRATORY NEGATIVE: 1
CONSTIPATION: 0

## 2024-05-29 NOTE — PROGRESS NOTES
Patient: Mariaelena Colby  YOB: 1955  Date: 24        Subjective:     Mariaelena Colby is a 68 y.o. female who complains today of:    Chief Complaint   Patient presents with    Follow-up    Knee Pain     right    Back Pain         Allergies:  Patient has no known allergies.    Past Medical History:   Diagnosis Date    Anxiety     Coronary artery disease involving native coronary artery of native heart without angina pectoris 2018    Hyperlipidemia     meds > 5 yrs    Osteoarthritis      Past Surgical History:   Procedure Laterality Date    BACK SURGERY  2017    lumbar disc OR    COLONOSCOPY      DILATION AND CURETTAGE OF UTERUS      at age 30s--miscarriage    LUMBAR FUSION Left 2020    ANTERIOR LEFT RETROPERITONEAL L5-S1 DISKECTOMY INTERBODY CAGE FUSION [place] [facility] [place]    TONSILLECTOMY      as child    TRACHEAL SURGERY      in [place]--EMT attempted to intubate patient due to trouble breathing & then needed repair     Family History   Problem Relation Age of Onset    Diabetes Mother     Obesity Mother     High Cholesterol Mother     High Cholesterol Father     Other Sister         GSW to spine & paralysis    No Known Problems Brother     No Known Problems Son     No Known Problems Daughter     Other Brother         as infant     Social History     Socioeconomic History    Marital status: Single     Spouse name: Not on file    Number of children: Not on file    Years of education: Not on file    Highest education level: Not on file   Occupational History    Not on file   Tobacco Use    Smoking status: Former     Current packs/day: 0.00     Average packs/day: 1 pack/day for 30.0 years (30.0 ttl pk-yrs)     Types: Cigarettes     Start date: 1989     Quit date: 2019     Years since quittin.1    Smokeless tobacco: Never    Tobacco comments:     intermittent habit   Vaping Use    Vaping Use: Never used   Substance and Sexual Activity    Alcohol use: Yes

## 2024-06-06 NOTE — PROGRESS NOTES
COMPLETE:  Diagnosis: The encounter diagnosis was Primary osteoarthritis of right knee.   Procedure/Consent: Right total knee arthroplasty  CPT CODES: 86217  Case Comments/Implants: Ehsan Medrano Total Knee System  Surgery Scheduled as: Outpatient  Anesthesia Requested: Choice with adductor canal block  Referring Family Doctor: Queenie Yost MD PAT  [x] Christa SHIRLEY Date/Time:                                                            [x] History & Physical [] Physician will Provide [] Attached [] Dictated [] Other  [x] Follow Anesthesia Pre-Op Orders for X-rays, [facility]     [x] SN & PT to evaluate and treat/educate disease management, medications, home safety & equipment needs for total joint patients  [] Other: ____________________________________________________  Consults: Medical/Cardiac Clearance done by  ____________________  PRE-OP ORDERS:   Allergies: Patient has no known allergies. Latex Allergies:             Diabetic:           [] IV ________________________  [x] IV Start with J-loop     Preprinted Orders: Attached [] Yes [] No   ANTIBIOTIC PRE-OP: [x] ANCEF 2 gram IVPB if > 120 kg 3 grams IVPB within 1 hour of incision, if ALLERGIC, use VANCOMYCIN 1 gram IV, 2 hours pre-op  [x] TXA Protocol [] Other:   [x] NPO   [] Betablocker (if needed) _____________________________________   [x] Knee high anti-embolic hose [] Thigh high anti-embolic hose   Other: _________ chlorhexidine_____________________________________________    Physician Signature Required:    Date/Time: 6/7/2024

## 2024-06-07 ENCOUNTER — OFFICE VISIT (OUTPATIENT)
Dept: ORTHOPEDIC SURGERY | Age: 69
End: 2024-06-07

## 2024-06-07 VITALS
WEIGHT: 150 LBS | HEIGHT: 65 IN | OXYGEN SATURATION: 99 % | HEART RATE: 69 BPM | TEMPERATURE: 97.7 F | BODY MASS INDEX: 24.99 KG/M2

## 2024-06-07 DIAGNOSIS — M17.11 PRIMARY OSTEOARTHRITIS OF RIGHT KNEE: Primary | ICD-10-CM

## 2024-06-07 RX ORDER — TRIAMCINOLONE ACETONIDE 40 MG/ML
80 INJECTION, SUSPENSION INTRA-ARTICULAR; INTRAMUSCULAR ONCE
Status: COMPLETED | OUTPATIENT
Start: 2024-06-07 | End: 2024-06-07

## 2024-06-07 RX ORDER — LIDOCAINE HYDROCHLORIDE 10 MG/ML
8 INJECTION, SOLUTION INFILTRATION; PERINEURAL ONCE
Status: COMPLETED | OUTPATIENT
Start: 2024-06-07 | End: 2024-06-07

## 2024-06-07 RX ADMIN — LIDOCAINE HYDROCHLORIDE 8 ML: 10 INJECTION, SOLUTION INFILTRATION; PERINEURAL at 10:52

## 2024-06-07 RX ADMIN — TRIAMCINOLONE ACETONIDE 80 MG: 40 INJECTION, SUSPENSION INTRA-ARTICULAR; INTRAMUSCULAR at 10:54

## 2024-06-07 ASSESSMENT — ENCOUNTER SYMPTOMS
COUGH: 0
SHORTNESS OF BREATH: 0
ABDOMINAL PAIN: 0
EYE PAIN: 0
CONSTIPATION: 0
EYE DISCHARGE: 0
EYE ITCHING: 0
DIARRHEA: 0

## 2024-06-19 ENCOUNTER — PREP FOR PROCEDURE (OUTPATIENT)
Dept: ORTHOPEDIC SURGERY | Age: 69
End: 2024-06-19

## 2024-06-19 DIAGNOSIS — M17.11 PRIMARY OSTEOARTHRITIS OF RIGHT KNEE: ICD-10-CM

## 2024-06-20 ENCOUNTER — OFFICE VISIT (OUTPATIENT)
Dept: PAIN MANAGEMENT | Age: 69
End: 2024-06-20
Payer: MEDICARE

## 2024-06-20 VITALS
HEIGHT: 60 IN | SYSTOLIC BLOOD PRESSURE: 118 MMHG | BODY MASS INDEX: 29.45 KG/M2 | TEMPERATURE: 98 F | WEIGHT: 150 LBS | DIASTOLIC BLOOD PRESSURE: 70 MMHG

## 2024-06-20 DIAGNOSIS — M46.1 SACROILIITIS, NOT ELSEWHERE CLASSIFIED (HCC): ICD-10-CM

## 2024-06-20 DIAGNOSIS — Z79.891 ENCOUNTER FOR LONG-TERM OPIATE ANALGESIC USE: ICD-10-CM

## 2024-06-20 DIAGNOSIS — G89.4 CHRONIC PAIN SYNDROME: ICD-10-CM

## 2024-06-20 DIAGNOSIS — Z98.1 S/P LUMBAR AND LUMBOSACRAL FUSION BY ANTERIOR TECHNIQUE: ICD-10-CM

## 2024-06-20 DIAGNOSIS — M47.817 LUMBOSACRAL SPONDYLOSIS WITHOUT MYELOPATHY: ICD-10-CM

## 2024-06-20 DIAGNOSIS — M17.11 PRIMARY OSTEOARTHRITIS OF RIGHT KNEE: ICD-10-CM

## 2024-06-20 DIAGNOSIS — M48.061 SPINAL STENOSIS, LUMBAR REGION, WITHOUT NEUROGENIC CLAUDICATION: ICD-10-CM

## 2024-06-20 DIAGNOSIS — F11.20 OPIOID DEPENDENCE WITH CURRENT USE (HCC): Primary | ICD-10-CM

## 2024-06-20 PROCEDURE — 3017F COLORECTAL CA SCREEN DOC REV: CPT | Performed by: NURSE PRACTITIONER

## 2024-06-20 PROCEDURE — 99214 OFFICE O/P EST MOD 30 MIN: CPT | Performed by: NURSE PRACTITIONER

## 2024-06-20 PROCEDURE — G8427 DOCREV CUR MEDS BY ELIG CLIN: HCPCS | Performed by: NURSE PRACTITIONER

## 2024-06-20 PROCEDURE — G8400 PT W/DXA NO RESULTS DOC: HCPCS | Performed by: NURSE PRACTITIONER

## 2024-06-20 PROCEDURE — 1036F TOBACCO NON-USER: CPT | Performed by: NURSE PRACTITIONER

## 2024-06-20 PROCEDURE — 1123F ACP DISCUSS/DSCN MKR DOCD: CPT | Performed by: NURSE PRACTITIONER

## 2024-06-20 PROCEDURE — 1090F PRES/ABSN URINE INCON ASSESS: CPT | Performed by: NURSE PRACTITIONER

## 2024-06-20 PROCEDURE — G8417 CALC BMI ABV UP PARAM F/U: HCPCS | Performed by: NURSE PRACTITIONER

## 2024-06-20 RX ORDER — GABAPENTIN 300 MG/1
300 CAPSULE ORAL 3 TIMES DAILY
Qty: 90 CAPSULE | Refills: 0 | Status: SHIPPED | OUTPATIENT
Start: 2024-06-20 | End: 2024-07-20

## 2024-06-20 RX ORDER — TIZANIDINE 2 MG/1
2 TABLET ORAL NIGHTLY PRN
Qty: 30 TABLET | Refills: 0 | Status: SHIPPED | OUTPATIENT
Start: 2024-06-20

## 2024-06-20 RX ORDER — OXYCODONE AND ACETAMINOPHEN 10; 325 MG/1; MG/1
1 TABLET ORAL EVERY 6 HOURS PRN
Qty: 120 TABLET | Refills: 0 | Status: SHIPPED | OUTPATIENT
Start: 2024-06-20 | End: 2024-07-20

## 2024-06-20 ASSESSMENT — ENCOUNTER SYMPTOMS
DIARRHEA: 0
BACK PAIN: 1
RESPIRATORY NEGATIVE: 1
CONSTIPATION: 0

## 2024-06-20 NOTE — PROGRESS NOTES
Patient: Mariaelena Colby  YOB: 1955  Date: 24        Subjective:     Mariaelena Colby is a 68 y.o. female who complains today of:    Chief Complaint   Patient presents with    Back Pain         Allergies:  Patient has no known allergies.    Past Medical History:   Diagnosis Date    Anxiety     Coronary artery disease involving native coronary artery of native heart without angina pectoris 2018    Hyperlipidemia     meds > 5 yrs    Osteoarthritis      Past Surgical History:   Procedure Laterality Date    BACK SURGERY  2017    lumbar disc OR    COLONOSCOPY      DILATION AND CURETTAGE OF UTERUS      at age 30s--miscarriage    LUMBAR FUSION Left 2020    ANTERIOR LEFT RETROPERITONEAL L5-S1 DISKECTOMY INTERBODY CAGE FUSION [place] [facility] [place]    TONSILLECTOMY      as child    TRACHEAL SURGERY      in [place]--EMT attempted to intubate patient due to trouble breathing & then needed repair     Family History   Problem Relation Age of Onset    Diabetes Mother     Obesity Mother     High Cholesterol Mother     High Cholesterol Father     Other Sister         GSW to spine & paralysis    No Known Problems Brother     No Known Problems Son     No Known Problems Daughter     Other Brother         as infant     Social History     Socioeconomic History    Marital status: Single     Spouse name: Not on file    Number of children: Not on file    Years of education: Not on file    Highest education level: Not on file   Occupational History    Not on file   Tobacco Use    Smoking status: Former     Current packs/day: 0.00     Average packs/day: 1 pack/day for 30.0 years (30.0 ttl pk-yrs)     Types: Cigarettes     Start date: 1989     Quit date: 2019     Years since quittin.2    Smokeless tobacco: Never    Tobacco comments:     intermittent habit   Vaping Use    Vaping Use: Never used   Substance and Sexual Activity    Alcohol use: Yes     Alcohol/week: 0.0 standard

## 2024-07-12 ENCOUNTER — OFFICE VISIT (OUTPATIENT)
Dept: OTOLARYNGOLOGY | Facility: CLINIC | Age: 69
End: 2024-07-12
Payer: MEDICARE

## 2024-07-12 DIAGNOSIS — J31.0 CHRONIC RHINITIS: ICD-10-CM

## 2024-07-12 DIAGNOSIS — J01.90 ACUTE SINUSITIS, RECURRENCE NOT SPECIFIED, UNSPECIFIED LOCATION: Primary | ICD-10-CM

## 2024-07-12 PROCEDURE — 3008F BODY MASS INDEX DOCD: CPT | Performed by: PHYSICIAN ASSISTANT

## 2024-07-12 PROCEDURE — 99213 OFFICE O/P EST LOW 20 MIN: CPT | Performed by: PHYSICIAN ASSISTANT

## 2024-07-12 RX ORDER — IPRATROPIUM BROMIDE 21 UG/1
2 SPRAY, METERED NASAL 2 TIMES DAILY
Qty: 30 ML | Refills: 3 | Status: SHIPPED | OUTPATIENT
Start: 2024-07-12

## 2024-07-12 RX ORDER — PREDNISONE 20 MG/1
TABLET ORAL
Qty: 9 TABLET | Refills: 0 | Status: SHIPPED | OUTPATIENT
Start: 2024-07-12

## 2024-07-12 RX ORDER — DOXYCYCLINE 100 MG/1
100 TABLET ORAL 2 TIMES DAILY
Qty: 20 TABLET | Refills: 0 | Status: SHIPPED | OUTPATIENT
Start: 2024-07-12 | End: 2024-07-22

## 2024-07-12 NOTE — PROGRESS NOTES
Alysa Braun is a 69 y.o. year old female patient with Sinus Problem     Patient presents to the office today for assessment of sinuses.  Patient is here today for concern of acute sinusitis.  She complains of increased nasal congestion and drainage with thickened mucus.  She also has head pressure and headache symptoms.  He reports that the symptoms have been present for 3 to 4 days.  Patient also needs refill of Atrovent nasal spray.  All other ENT issues are negative        Physical Exam:     General appearance: No acute distress. Normal facies. Symmetric facial movement. No gross lesions of the face are noted.  The external ear structures appear normal. The ear canals patent and the tympanic membranes are intact without evidence of air-fluid levels, retraction, or congenital defects.  Patient with significant nasal septal deviation to the right anteriorly and into the left in the mid posterior aspect with synechiae/scar band between the left middle turbinate and septum.  Patient has evidence of thickened mucus with yellow-green discoloration bilaterally.  The tongue is normally mobile. There are no lesions on the gingiva, buccal, or oral mucosa. There are no oral cavity masses.  The neck is negative for mass lymphadenopathy. The trachea and parotid are clear. The thyroid bed is grossly unremarkable. The salivary gland structures are grossly unremarkable.    Assessment/Plan   1.  Acute sinusitis  2.  Deviated nasal septum  Patient seen in the office today for assessment of nose and sinuses.  Patient with acute sinusitis.  The patient at this time will be started on doxycycline and prednisone.  The patient will also be sent a prescription for Atrovent refill.  She will return in 1 month for follow-up.  The patient will return to reassess abdomen synechiae.

## 2024-07-17 ENCOUNTER — TELEPHONE (OUTPATIENT)
Dept: PRIMARY CARE | Facility: CLINIC | Age: 69
End: 2024-07-17
Payer: MEDICARE

## 2024-07-17 ENCOUNTER — OFFICE VISIT (OUTPATIENT)
Dept: PAIN MANAGEMENT | Age: 69
End: 2024-07-17
Payer: MEDICARE

## 2024-07-17 VITALS
TEMPERATURE: 98.2 F | WEIGHT: 150 LBS | HEIGHT: 60 IN | SYSTOLIC BLOOD PRESSURE: 136 MMHG | BODY MASS INDEX: 29.45 KG/M2 | DIASTOLIC BLOOD PRESSURE: 82 MMHG

## 2024-07-17 DIAGNOSIS — Z98.1 S/P LUMBAR AND LUMBOSACRAL FUSION BY ANTERIOR TECHNIQUE: ICD-10-CM

## 2024-07-17 DIAGNOSIS — J01.90 ACUTE SINUSITIS, RECURRENCE NOT SPECIFIED, UNSPECIFIED LOCATION: Primary | ICD-10-CM

## 2024-07-17 DIAGNOSIS — M48.061 SPINAL STENOSIS, LUMBAR REGION, WITHOUT NEUROGENIC CLAUDICATION: Primary | ICD-10-CM

## 2024-07-17 DIAGNOSIS — G89.4 CHRONIC PAIN SYNDROME: ICD-10-CM

## 2024-07-17 DIAGNOSIS — M46.1 SACROILIITIS, NOT ELSEWHERE CLASSIFIED (HCC): ICD-10-CM

## 2024-07-17 DIAGNOSIS — Z79.891 ENCOUNTER FOR LONG-TERM OPIATE ANALGESIC USE: ICD-10-CM

## 2024-07-17 DIAGNOSIS — M47.817 LUMBOSACRAL SPONDYLOSIS WITHOUT MYELOPATHY: ICD-10-CM

## 2024-07-17 DIAGNOSIS — M17.11 PRIMARY OSTEOARTHRITIS OF RIGHT KNEE: ICD-10-CM

## 2024-07-17 PROCEDURE — G8427 DOCREV CUR MEDS BY ELIG CLIN: HCPCS | Performed by: NURSE PRACTITIONER

## 2024-07-17 PROCEDURE — 1090F PRES/ABSN URINE INCON ASSESS: CPT | Performed by: NURSE PRACTITIONER

## 2024-07-17 PROCEDURE — 1036F TOBACCO NON-USER: CPT | Performed by: NURSE PRACTITIONER

## 2024-07-17 PROCEDURE — G8417 CALC BMI ABV UP PARAM F/U: HCPCS | Performed by: NURSE PRACTITIONER

## 2024-07-17 PROCEDURE — 3017F COLORECTAL CA SCREEN DOC REV: CPT | Performed by: NURSE PRACTITIONER

## 2024-07-17 PROCEDURE — 1123F ACP DISCUSS/DSCN MKR DOCD: CPT | Performed by: NURSE PRACTITIONER

## 2024-07-17 PROCEDURE — G8400 PT W/DXA NO RESULTS DOC: HCPCS | Performed by: NURSE PRACTITIONER

## 2024-07-17 PROCEDURE — 99214 OFFICE O/P EST MOD 30 MIN: CPT | Performed by: NURSE PRACTITIONER

## 2024-07-17 RX ORDER — MUPIROCIN 20 MG/G
OINTMENT TOPICAL
Qty: 22 G | Refills: 2 | Status: SHIPPED | OUTPATIENT
Start: 2024-07-17 | End: 2024-08-16

## 2024-07-17 RX ORDER — OXYCODONE AND ACETAMINOPHEN 10; 325 MG/1; MG/1
1 TABLET ORAL EVERY 6 HOURS PRN
Qty: 120 TABLET | Refills: 0 | Status: CANCELLED | OUTPATIENT
Start: 2024-07-20 | End: 2024-08-19

## 2024-07-17 NOTE — PROGRESS NOTES
Patient: Mariaelena Colby  YOB: 1955  Date: 24        Subjective:     Mariaelena Colby is a 69 y.o. female who complains today of:    Chief Complaint   Patient presents with    Back Pain         Allergies:  Patient has no known allergies.    Past Medical History:   Diagnosis Date    Anxiety     Coronary artery disease involving native coronary artery of native heart without angina pectoris 2018    Hyperlipidemia     meds > 5 yrs    Osteoarthritis      Past Surgical History:   Procedure Laterality Date    BACK SURGERY  2017    lumbar disc OR    COLONOSCOPY      DILATION AND CURETTAGE OF UTERUS      at age 30s--miscarriage    LUMBAR FUSION Left 2020    ANTERIOR LEFT RETROPERITONEAL L5-S1 DISKECTOMY INTERBODY CAGE FUSION performed by Ines Mendiola MD [place] [facility] [place] [place]    TONSILLECTOMY      as child    TRACHEAL SURGERY      in [place]--EMT attempted to intubate patient due to trouble breathing & then needed repair     Family History   Problem Relation Age of Onset    Diabetes Mother     Obesity Mother     High Cholesterol Mother     High Cholesterol Father     Other Sister         GSW to spine & paralysis    No Known Problems Brother     No Known Problems Son     No Known Problems Daughter     Other Brother         as infant     Social History     Socioeconomic History    Marital status: Single     Spouse name: Not on file    Number of children: Not on file    Years of education: Not on file    Highest education level: Not on file   Occupational History    Not on file   Tobacco Use    Smoking status: Former     Current packs/day: 0.00     Average packs/day: 1 pack/day for 30.0 years (30.0 ttl pk-yrs)     Types: Cigarettes     Start date: 1989     Quit date: 2019     Years since quittin.2    Smokeless tobacco: Never    Tobacco comments:     intermittent habit   Vaping Use    Vaping Use: Never used   Substance and Sexual Activity    Alcohol use: Yes     Alcohol/week: 0.0 standard

## 2024-07-18 ENCOUNTER — OFFICE VISIT (OUTPATIENT)
Dept: PAIN MANAGEMENT | Age: 69
End: 2024-07-18
Payer: MEDICARE

## 2024-07-18 VITALS
BODY MASS INDEX: 29.45 KG/M2 | SYSTOLIC BLOOD PRESSURE: 112 MMHG | WEIGHT: 150 LBS | DIASTOLIC BLOOD PRESSURE: 70 MMHG | HEIGHT: 60 IN | TEMPERATURE: 97.3 F

## 2024-07-18 DIAGNOSIS — M17.11 PRIMARY OSTEOARTHRITIS OF RIGHT KNEE: ICD-10-CM

## 2024-07-18 DIAGNOSIS — G89.4 CHRONIC PAIN SYNDROME: ICD-10-CM

## 2024-07-18 DIAGNOSIS — Z98.1 S/P LUMBAR AND LUMBOSACRAL FUSION BY ANTERIOR TECHNIQUE: ICD-10-CM

## 2024-07-18 DIAGNOSIS — M47.817 LUMBOSACRAL SPONDYLOSIS WITHOUT MYELOPATHY: Primary | ICD-10-CM

## 2024-07-18 PROCEDURE — 3017F COLORECTAL CA SCREEN DOC REV: CPT | Performed by: PAIN MEDICINE

## 2024-07-18 PROCEDURE — G8400 PT W/DXA NO RESULTS DOC: HCPCS | Performed by: PAIN MEDICINE

## 2024-07-18 PROCEDURE — G8427 DOCREV CUR MEDS BY ELIG CLIN: HCPCS | Performed by: PAIN MEDICINE

## 2024-07-18 PROCEDURE — 99213 OFFICE O/P EST LOW 20 MIN: CPT | Performed by: PAIN MEDICINE

## 2024-07-18 PROCEDURE — 1123F ACP DISCUSS/DSCN MKR DOCD: CPT | Performed by: PAIN MEDICINE

## 2024-07-18 PROCEDURE — G8417 CALC BMI ABV UP PARAM F/U: HCPCS | Performed by: PAIN MEDICINE

## 2024-07-18 PROCEDURE — 1090F PRES/ABSN URINE INCON ASSESS: CPT | Performed by: PAIN MEDICINE

## 2024-07-18 PROCEDURE — 1036F TOBACCO NON-USER: CPT | Performed by: PAIN MEDICINE

## 2024-07-18 RX ORDER — OXYCODONE AND ACETAMINOPHEN 7.5; 325 MG/1; MG/1
1 TABLET ORAL EVERY 6 HOURS PRN
Qty: 120 TABLET | Refills: 0 | Status: SHIPPED | OUTPATIENT
Start: 2024-07-18 | End: 2024-08-17

## 2024-07-18 NOTE — PROGRESS NOTES
[facility]  [facility]  P: [phone]  F: [phone]        Mariaelena Colby  (1955)    7/18/2024    Subjective:     Mariaelena Colby is 69 y.o. female who complains today of:     Chief Complaint   Patient presents with    Follow-up    Knee Pain     right       Patient here today for follow-up.  She just saw our nurse practitioner Helen Willis yesterday.  I reviewed her note in detail.  She was concerned with patient's history of alcohol showing up on a previous urine also benzodiazepine use and also running out of her medication early.  Patient will get her right knee replaced in a couple months with Dr. Junior.  We still do not have documentation from Dr. Duran that patient can take meloxicam.  She is trying to figure this out with her primary care provider.  She wants the medication dosage to stay the same in terms of the Percocet 10 mg.  Per Helen's note she wants to reduce it to 7.5 mg.    Assessment: Chronic pain syndrome, right knee arthritis, multiple pain generators lumbar spondylosis, history of lower lumbar surgery    Plan: I discussed with patient I cannot reverse the nurse practitioner's decision.  We are waiting on the urine drug screen results.  I will discuss it with Heeln further but at this point I am not going to override the concerns that our nurse practitioner had about the patient and her narcotic usage and dosage.  Since the Percocet was not sent yesterday I did send the Percocet 7.5 mg 4 times a day as per Helen's office notes from yesterday.  Allergies:  Patient has no known allergies.    Past Medical History:   Diagnosis Date    Anxiety     Coronary artery disease involving native coronary artery of native heart without angina pectoris 12/11/2018    Hyperlipidemia     meds > 5 yrs    Osteoarthritis      Past Surgical History:   Procedure Laterality Date    BACK SURGERY  2017    lumbar disc OR    COLONOSCOPY

## 2024-07-24 ENCOUNTER — OFFICE VISIT (OUTPATIENT)
Dept: PRIMARY CARE | Facility: CLINIC | Age: 69
End: 2024-07-24
Payer: MEDICARE

## 2024-07-24 VITALS
DIASTOLIC BLOOD PRESSURE: 88 MMHG | SYSTOLIC BLOOD PRESSURE: 113 MMHG | TEMPERATURE: 98.1 F | BODY MASS INDEX: 32.39 KG/M2 | HEART RATE: 78 BPM | WEIGHT: 165 LBS | HEIGHT: 60 IN

## 2024-07-24 DIAGNOSIS — R06.00 DYSPNEA, UNSPECIFIED TYPE: ICD-10-CM

## 2024-07-24 DIAGNOSIS — J20.9 ACUTE BRONCHITIS, UNSPECIFIED ORGANISM: ICD-10-CM

## 2024-07-24 DIAGNOSIS — R06.2 WHEEZING: ICD-10-CM

## 2024-07-24 DIAGNOSIS — Z87.891 FORMER SMOKER: ICD-10-CM

## 2024-07-24 DIAGNOSIS — I10 BENIGN HYPERTENSION: ICD-10-CM

## 2024-07-24 DIAGNOSIS — I25.10 CORONARY ARTERY DISEASE INVOLVING NATIVE CORONARY ARTERY OF NATIVE HEART WITHOUT ANGINA PECTORIS: Primary | ICD-10-CM

## 2024-07-24 LAB
POC BINAX EXPIRATION: NORMAL
POC BINAX NOW COVID SERIAL NUMBER: NORMAL
POC SARS-COV-2 AG BINAX: NORMAL

## 2024-07-24 PROCEDURE — 1036F TOBACCO NON-USER: CPT | Performed by: INTERNAL MEDICINE

## 2024-07-24 PROCEDURE — 87811 SARS-COV-2 COVID19 W/OPTIC: CPT | Performed by: INTERNAL MEDICINE

## 2024-07-24 PROCEDURE — 1159F MED LIST DOCD IN RCRD: CPT | Performed by: INTERNAL MEDICINE

## 2024-07-24 PROCEDURE — 3008F BODY MASS INDEX DOCD: CPT | Performed by: INTERNAL MEDICINE

## 2024-07-24 PROCEDURE — 1160F RVW MEDS BY RX/DR IN RCRD: CPT | Performed by: INTERNAL MEDICINE

## 2024-07-24 PROCEDURE — 3074F SYST BP LT 130 MM HG: CPT | Performed by: INTERNAL MEDICINE

## 2024-07-24 PROCEDURE — 3079F DIAST BP 80-89 MM HG: CPT | Performed by: INTERNAL MEDICINE

## 2024-07-24 PROCEDURE — 99214 OFFICE O/P EST MOD 30 MIN: CPT | Performed by: INTERNAL MEDICINE

## 2024-07-24 RX ORDER — MONTELUKAST SODIUM 10 MG/1
10 TABLET ORAL NIGHTLY
Qty: 30 TABLET | Refills: 5 | Status: SHIPPED | OUTPATIENT
Start: 2024-07-24 | End: 2025-01-20

## 2024-07-24 RX ORDER — BUPROPION HYDROCHLORIDE 100 MG/1
1 TABLET, EXTENDED RELEASE ORAL
COMMUNITY
Start: 2024-07-05

## 2024-07-24 RX ORDER — PREDNISONE 10 MG/1
TABLET ORAL
Qty: 15 TABLET | Refills: 0 | Status: SHIPPED | OUTPATIENT
Start: 2024-07-24 | End: 2024-08-03

## 2024-07-24 RX ORDER — LEVOFLOXACIN 500 MG/1
500 TABLET, FILM COATED ORAL DAILY
Qty: 10 TABLET | Refills: 0 | Status: SHIPPED | OUTPATIENT
Start: 2024-07-24 | End: 2024-08-03

## 2024-07-24 RX ORDER — GABAPENTIN 300 MG/1
1 CAPSULE ORAL 3 TIMES DAILY
COMMUNITY
Start: 2024-06-26

## 2024-07-24 RX ORDER — BUDESONIDE AND FORMOTEROL FUMARATE DIHYDRATE 160; 4.5 UG/1; UG/1
2 AEROSOL RESPIRATORY (INHALATION)
Qty: 10.2 G | Refills: 11 | Status: SHIPPED | OUTPATIENT
Start: 2024-07-24 | End: 2025-07-24

## 2024-07-24 RX ORDER — ALBUTEROL SULFATE 90 UG/1
2 AEROSOL, METERED RESPIRATORY (INHALATION) EVERY 4 HOURS PRN
Qty: 8.5 G | Refills: 0 | Status: SHIPPED | OUTPATIENT
Start: 2024-07-24 | End: 2025-07-24

## 2024-07-24 ASSESSMENT — LIFESTYLE VARIABLES
HOW OFTEN DO YOU HAVE SIX OR MORE DRINKS ON ONE OCCASION: NEVER
AUDIT-C TOTAL SCORE: 0
HAS A RELATIVE, FRIEND, DOCTOR, OR ANOTHER HEALTH PROFESSIONAL EXPRESSED CONCERN ABOUT YOUR DRINKING OR SUGGESTED YOU CUT DOWN: NO
HAVE YOU OR SOMEONE ELSE BEEN INJURED AS A RESULT OF YOUR DRINKING: NO
HOW MANY STANDARD DRINKS CONTAINING ALCOHOL DO YOU HAVE ON A TYPICAL DAY: PATIENT DOES NOT DRINK
SKIP TO QUESTIONS 9-10: 1
AUDIT TOTAL SCORE: 0
HOW OFTEN DO YOU HAVE A DRINK CONTAINING ALCOHOL: NEVER

## 2024-07-24 NOTE — PROGRESS NOTES
Assessment/Plan   69 years old female who has chronic medical problems including hypertension and has had questionable coronary artery disease but seen by cardiologist this stable cardiac wise without any acute symptoms.  Patient has symptoms of upper respite tract infection which has progressed to at this time bronchitis over the.  Over the last 4 to 6 weeks.  We discussed and reviewed the treatment options.  Her COVID testing in the office was negative as well as her home testing.    She will have a chest x-ray and a complete pulmonary function test done because of her wheezing.  At this time I would start her on treatment which includes albuterol inhalers and also preventive steroid inhalers.  Will start her on Levaquin antibiotics and also montelukast side effects benefits were reviewed and discussed.  I also advised her to do a complete pulmonary function test pre and post bronchodilators and advised the patient to follow-up with the primary care physician.  If there is any acute change such as chest pain short of breath she understands to go to the emergency room.        Problem List Items Addressed This Visit       Benign hypertension    Coronary artery disease involving native coronary artery of native heart without angina pectoris - Primary    Former smoker    Wheezing    Relevant Medications    albuterol (ProAir HFA) 90 mcg/actuation inhaler    montelukast (Singulair) 10 mg tablet    predniSONE (Deltasone) 10 mg tablet    levoFLOXacin (Levaquin) 500 mg tablet    budesonide-formoteroL (Breyna) 160-4.5 mcg/actuation inhaler    Other Relevant Orders    XR chest 2 views    Complete Pulmonary Function Test Pre/Post Bronchodialator (Spirometry Pre/Post/DLCO/Lung Volumes)    POCT BinaxNOW Covid-19 Ag Card manually resulted (Completed)    Acute bronchitis    Relevant Medications    montelukast (Singulair) 10 mg tablet    predniSONE (Deltasone) 10 mg tablet    levoFLOXacin (Levaquin) 500 mg tablet     budesonide-formoteroL (Breyna) 160-4.5 mcg/actuation inhaler    Other Relevant Orders    XR chest 2 views    Complete Pulmonary Function Test Pre/Post Bronchodialator (Spirometry Pre/Post/DLCO/Lung Volumes)    POCT BinaxNOW Covid-19 Ag Card manually resulted (Completed)    Dyspnea    Relevant Orders    Complete Pulmonary Function Test Pre/Post Bronchodialator (Spirometry Pre/Post/DLCO/Lung Volumes)       Subjective     Patient ID: Alysa Braun is a 69 y.o. female who presents for Cough (Wants covid test, congestion, sinus).    History of present illness  69 years old female who is independent and active came for an acute visit because of having chronic cough and congestion for more than 6 to 8 weeks she also complains of having lots of sinus congestion.  She had seen a ENT and she was given doxycycline and prednisone but patient states that it has not made her feel better except at the time of taking it she felt little bit improved.  She continues to cough with some productive sputum.  She has no chest pain or fever except at the beginning of the illness she thought she had some fever.  She has checked COVID testing 2 days ago at home and it was negative but she wanted to be checked again.    She denies any history of palpitations.  She had seen cardiologist in the past and had a echocardiogram in 2022 which was reviewed and attached to this chart.    She denies any nausea vomiting.  She wants the treatment to be effective at this time.    Patient's previous chronic problems are reviewed and discussed in detail.  Her available records are reviewed in detail.  She has had no history of any recent travel    She is feeling generally tired.    Rest of the review of systems no acute complaints.      Family History   Problem Relation Name Age of Onset    Depression Mother      Diabetes Mother      Fibromyalgia Mother      Other (High serum cholesterol sulfate) Father        Social History     Socioeconomic History     Marital status:      Spouse name: Not on file    Number of children: Not on file    Years of education: Not on file    Highest education level: Not on file   Occupational History    Not on file   Tobacco Use    Smoking status: Former     Current packs/day: 0.00     Average packs/day: 1 pack/day for 10.0 years (10.0 ttl pk-yrs)     Types: Cigarettes     Start date:      Quit date: 2018     Years since quittin.5    Smokeless tobacco: Never   Vaping Use    Vaping status: Never Used   Substance and Sexual Activity    Alcohol use: Never     Alcohol/week: 2.0 standard drinks of alcohol     Types: 2 Glasses of wine per week    Drug use: Never    Sexual activity: Not on file   Other Topics Concern    Not on file   Social History Narrative    Not on file     Social Determinants of Health     Financial Resource Strain: Not on file   Food Insecurity: Not on file   Transportation Needs: Not on file   Physical Activity: Not on file   Stress: Not on file   Social Connections: Not on file   Intimate Partner Violence: Not on file   Housing Stability: Not At Risk (2023)    [facility]    Housing Stability     Housing Stability: Not on file     Housing Stability: Not on file      Patient has no known allergies.   Current Outpatient Medications   Medication Sig Dispense Refill    acetaminophen (TylenoL) 325 mg tablet Take by mouth every 6 hours.      buPROPion SR (Wellbutrin SR) 100 mg 12 hr tablet Take 1 tablet (100 mg) by mouth early in the morning..      busPIRone (Buspar) 15 mg tablet Take 1 tablet (15 mg) by mouth 3 times a day.      gabapentin (Neurontin) 300 mg capsule Take 1 capsule (300 mg) by mouth 3 times a day.      ipratropium (Atrovent) 21 mcg (0.03 %) nasal spray Administer 2 sprays into each nostril 2 times a day. 30 mL 3    L. acidophilus/Bifid. animalis 15.5 billion cell capsule Take by mouth.      lidocaine (Lidoderm) 5 % patch Place 1 patch onto the skin daily. Leave  on for 12 hours, and keep off for 12 hours. 30 patch 0    lisinopril 10 mg tablet Take 1 tablet (10 mg) by mouth once daily. 90 tablet 3    loratadine (Claritin) 10 mg tablet Take by mouth.      magnesium oxide (Mag-Ox) 400 mg (241.3 mg magnesium) tablet Take 1 tablet (400 mg) by mouth once daily.      mupirocin (Bactroban) 2 % ointment Apply topically 3 times a day. 22 g 2    naloxone (Narcan) 4 mg/0.1 mL nasal spray Administer into affected nostril(s).      oxyCODONE-acetaminophen (Percocet) 5-325 mg tablet Take 1 tablet by mouth every 6 hours if needed for severe pain (7 - 10). 6 tablet 0    pantoprazole (ProtoNix) 40 mg EC tablet Take 1 tablet (40 mg) by mouth once daily. Do not crush, chew, or split. 90 tablet 3    pramipexole (Mirapex) 0.25 mg tablet Take by mouth.      simvastatin (Zocor) 40 mg tablet Take 1 tablet (40 mg) by mouth once daily at bedtime. 90 tablet 3    tiZANidine (Zanaflex) 2 mg tablet Take by mouth.      Xanax 0.5 mg tablet Take by mouth 3 times a day as needed.      albuterol (ProAir HFA) 90 mcg/actuation inhaler Inhale 2 puffs every 4 hours if needed for wheezing or shortness of breath. 8.5 g 0    budesonide-formoteroL (Breyna) 160-4.5 mcg/actuation inhaler Inhale 2 puffs 2 times a day. Rinse mouth with water after use to reduce aftertaste and incidence of candidiasis. Do not swallow. 10.2 g 11    levoFLOXacin (Levaquin) 500 mg tablet Take 1 tablet (500 mg) by mouth once daily for 10 days. 10 tablet 0    meloxicam (Mobic) 15 mg tablet Take 1 tablet (15 mg) by mouth once daily.      montelukast (Singulair) 10 mg tablet Take 1 tablet (10 mg) by mouth once daily at bedtime. 30 tablet 5    mupirocin (Bactroban) 2 % ointment APPLY TO NOSE TID      predniSONE (Deltasone) 10 mg tablet Take 5 tablets (50 mg) by mouth every other day for 2 days, THEN 4 tablets (40 mg) every other day for 2 days, THEN 3 tablets (30 mg) every other day for 2 days, THEN 2 tablets (20 mg) every other day for 2 days,  THEN 1 tablet (10 mg) every other day for 2 days. 15 tablet 0     No current facility-administered medications for this visit.          Objective     Vitals:    07/24/24 1219   BP: 113/88   Pulse: 78   Temp: 36.7 °C (98.1 °F)        Physical Exam   Normal-built, well-nourished  with no apparent distress. Alert oriented  Skin:  Normal turgor.  No rash.  Head:  Normocephalic, atraumatic.  Eyes:  Pupils are equal, round,.  No pallor of conjunctivae.  Mouth has moist oral mucosa.  Pharynx appears normal.  No erythema.  Neck:  Supple.  No JVD.  No carotid bruit.  No thyromegaly. No cervical lymphadenopathy.  No clubbing  Chest:  Vesicular breathing Bilaterally moderate air entry.  Occasional wheezing.  No crackles.  Heart:  Regular rate and rhythm.  S1, S2 positive.  No murmur.  Abdomen:  Soft and nontender.  Bowel sounds are positive.  No organomegaly.  Extremities:  Bilaterally no pedal pitting edema.  Bilaterally 2+ dorsalis pedis pulses.  No calf tenderness. Homans sign is negative.  Neuro Exam: No focal signs. Gait is normal.      Problem List Items Addressed This Visit       Benign hypertension    Coronary artery disease involving native coronary artery of native heart without angina pectoris - Primary    Former smoker    Wheezing    Relevant Medications    albuterol (ProAir HFA) 90 mcg/actuation inhaler    montelukast (Singulair) 10 mg tablet    predniSONE (Deltasone) 10 mg tablet    levoFLOXacin (Levaquin) 500 mg tablet    budesonide-formoteroL (Breyna) 160-4.5 mcg/actuation inhaler    Other Relevant Orders    XR chest 2 views    Complete Pulmonary Function Test Pre/Post Bronchodialator (Spirometry Pre/Post/DLCO/Lung Volumes)    POCT BinaxNOW Covid-19 Ag Card manually resulted (Completed)    Acute bronchitis    Relevant Medications    montelukast (Singulair) 10 mg tablet    predniSONE (Deltasone) 10 mg tablet    levoFLOXacin (Levaquin) 500 mg tablet    budesonide-formoteroL (Breyna) 160-4.5 mcg/actuation inhaler     Other Relevant Orders    XR chest 2 views    Complete Pulmonary Function Test Pre/Post Bronchodialator (Spirometry Pre/Post/DLCO/Lung Volumes)    POCT BinaxNOW Covid-19 Ag Card manually resulted (Completed)    Dyspnea    Relevant Orders    Complete Pulmonary Function Test Pre/Post Bronchodialator (Spirometry Pre/Post/DLCO/Lung Volumes)        Orders Placed This Encounter   Procedures    XR chest 2 views     Standing Status:   Future     Standing Expiration Date:   7/24/2025     Order Specific Question:   Reason for exam:     Answer:   wheezing     Order Specific Question:   Radiologist to Determine Optimal Study     Answer:   Yes     Order Specific Question:   Release result to MyChart     Answer:   Immediate [1]     Order Specific Question:   Is this exam part of a Research Study? If Yes, link this order to the research study     Answer:   No    POCT BinaxNOW Covid-19 Ag Card manually resulted     Order Specific Question:   Release result to MyChart     Answer:   Immediate [1]    Complete Pulmonary Function Test Pre/Post Bronchodialator (Spirometry Pre/Post/DLCO/Lung Volumes)     Standing Status:   Future     Standing Expiration Date:   7/24/2025        Lab Results   Component Value Date    WBC 5.4 05/03/2024    HGB 11.2 (L) 05/03/2024    HCT 35.6 (L) 05/03/2024     05/03/2024    CHOL 240 (H) 05/03/2024    TRIG 247 (H) 05/03/2024    HDL 65.7 05/03/2024    ALT 18 05/03/2024    AST 19 05/03/2024     05/03/2024    K 4.9 05/03/2024     05/03/2024    CREATININE 0.67 05/03/2024    BUN 26 (H) 05/03/2024    CO2 26 05/03/2024    TSH 3.89 10/18/2023    INR 1.0 12/16/2022     Lab Results   Component Value Date    CHOL 240 (H) 05/03/2024    LDLCALC 125 (H) 05/03/2024    CHHDL 3.7 05/03/2024       NON-CARDIOVASCULAR IMPRESSION      Possibility of left-sided esophageal diverticulum (epiphrenic  esophageal diverticulum) just proximal to the gastroesophageal  junction. Especially for any correlating symptoms  such as dysphagia,  further evaluation with esophagram or endoscopy could be considered.  If the diverticulum is real, this type of diverticulum is most often  of the pulsion type and can be associated with (the sequela of)  significant underlying esophageal dysmotility      NOTE THIS ADDENDUM IS SOLELY FOR INTERPRETATION OF ANATOMY OUTSIDE  THE CARDIOVASCULAR SYSTEM. INTERPRETATION OF AND REPORTING OF THE  CARDIOVASCULAR STRUCTURES ARE THE SOLE RESPONSIBILITY OF THE  CARDIOLOGIST SUBMITTING THE ORIGINAL REPORT (NOT THIS ADDENDUM)      CONCLUSIONS:   1. The left ventricular systolic function is normal with a 65-70% estimated ejection fraction.   2. Spectral Doppler shows an impaired relaxation pattern of left ventricular diastolic filling.   3. No evidence of mitral valve prolapse.   4. There is No tricuspid stenosis.   5. RVSP within normal limits.   6. Aortic valve stenosis is not present.  4/21/2022

## 2024-07-26 ENCOUNTER — TELEPHONE (OUTPATIENT)
Dept: CARDIOLOGY | Facility: CLINIC | Age: 69
End: 2024-07-26
Payer: MEDICARE

## 2024-07-30 ENCOUNTER — APPOINTMENT (OUTPATIENT)
Dept: RADIOLOGY | Facility: HOSPITAL | Age: 69
End: 2024-07-30
Payer: MEDICARE

## 2024-07-30 ENCOUNTER — HOSPITAL ENCOUNTER (EMERGENCY)
Facility: HOSPITAL | Age: 69
Discharge: HOME | End: 2024-07-30
Attending: STUDENT IN AN ORGANIZED HEALTH CARE EDUCATION/TRAINING PROGRAM
Payer: MEDICARE

## 2024-07-30 VITALS
WEIGHT: 140 LBS | RESPIRATION RATE: 18 BRPM | TEMPERATURE: 99 F | HEART RATE: 60 BPM | BODY MASS INDEX: 27.48 KG/M2 | OXYGEN SATURATION: 100 % | HEIGHT: 60 IN | SYSTOLIC BLOOD PRESSURE: 138 MMHG | DIASTOLIC BLOOD PRESSURE: 77 MMHG

## 2024-07-30 DIAGNOSIS — T07.XXXA MULTIPLE CONTUSIONS: ICD-10-CM

## 2024-07-30 DIAGNOSIS — S41.111A LACERATION OF RIGHT UPPER EXTREMITY, INITIAL ENCOUNTER: Primary | ICD-10-CM
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
## 2024-07-30 LAB
ALBUMIN SERPL BCP-MCNC: 4.4 G/DL (ref 3.4–5)
ALP SERPL-CCNC: 62 U/L (ref 33–136)
ALT SERPL W P-5'-P-CCNC: 22 U/L (ref 7–45)
ANION GAP SERPL CALC-SCNC: 15 MMOL/L (ref 10–20)
AST SERPL W P-5'-P-CCNC: 30 U/L (ref 9–39)
BASOPHILS # BLD AUTO: 0.01 X10*3/UL (ref 0–0.1)
BASOPHILS NFR BLD AUTO: 0.1 %
BILIRUB SERPL-MCNC: 0.6 MG/DL (ref 0–1.2)
BUN SERPL-MCNC: 39 MG/DL (ref 6–23)
CALCIUM SERPL-MCNC: 9.6 MG/DL (ref 8.6–10.3)
CHLORIDE SERPL-SCNC: 98 MMOL/L (ref 98–107)
CO2 SERPL-SCNC: 25 MMOL/L (ref 21–32)
CREAT SERPL-MCNC: 0.87 MG/DL (ref 0.5–1.05)
EGFRCR SERPLBLD CKD-EPI 2021: 72 ML/MIN/1.73M*2
EOSINOPHIL # BLD AUTO: 0 X10*3/UL (ref 0–0.7)
EOSINOPHIL NFR BLD AUTO: 0 %
ERYTHROCYTE [DISTWIDTH] IN BLOOD BY AUTOMATED COUNT: 14.8 % (ref 11.5–14.5)
GLUCOSE SERPL-MCNC: 102 MG/DL (ref 74–99)
HCT VFR BLD AUTO: 33.6 % (ref 36–46)
HGB BLD-MCNC: 11.1 G/DL (ref 12–16)
HOLD SPECIMEN: NORMAL
IMM GRANULOCYTES # BLD AUTO: 0.03 X10*3/UL (ref 0–0.7)
IMM GRANULOCYTES NFR BLD AUTO: 0.4 % (ref 0–0.9)
LIPASE SERPL-CCNC: 12 U/L (ref 9–82)
LYMPHOCYTES # BLD AUTO: 0.85 X10*3/UL (ref 1.2–4.8)
LYMPHOCYTES NFR BLD AUTO: 10.4 %
MCH RBC QN AUTO: 30.2 PG (ref 26–34)
MCHC RBC AUTO-ENTMCNC: 33 G/DL (ref 32–36)
MCV RBC AUTO: 91 FL (ref 80–100)
MONOCYTES # BLD AUTO: 0.54 X10*3/UL (ref 0.1–1)
MONOCYTES NFR BLD AUTO: 6.6 %
NEUTROPHILS # BLD AUTO: 6.76 X10*3/UL (ref 1.2–7.7)
NEUTROPHILS NFR BLD AUTO: 82.5 %
NRBC BLD-RTO: 0 /100 WBCS (ref 0–0)
PLATELET # BLD AUTO: 306 X10*3/UL (ref 150–450)
POTASSIUM SERPL-SCNC: 5.1 MMOL/L (ref 3.5–5.3)
PROT SERPL-MCNC: 7.7 G/DL (ref 6.4–8.2)
RBC # BLD AUTO: 3.68 X10*6/UL (ref 4–5.2)
SODIUM SERPL-SCNC: 133 MMOL/L (ref 136–145)
WBC # BLD AUTO: 8.2 X10*3/UL (ref 4.4–11.3)

## 2024-07-30 PROCEDURE — 73201 CT UPPER EXTREMITY W/DYE: CPT | Mod: RT

## 2024-07-30 PROCEDURE — 72131 CT LUMBAR SPINE W/O DYE: CPT | Mod: FOREIGN READ | Performed by: RADIOLOGY

## 2024-07-30 PROCEDURE — 73564 X-RAY EXAM KNEE 4 OR MORE: CPT | Mod: BILATERAL PROCEDURE | Performed by: RADIOLOGY

## 2024-07-30 PROCEDURE — 99285 EMERGENCY DEPT VISIT HI MDM: CPT | Mod: 25

## 2024-07-30 PROCEDURE — RXMED WILLOW AMBULATORY MEDICATION CHARGE

## 2024-07-30 PROCEDURE — 73080 X-RAY EXAM OF ELBOW: CPT | Mod: RT

## 2024-07-30 PROCEDURE — 70450 CT HEAD/BRAIN W/O DYE: CPT | Performed by: RADIOLOGY

## 2024-07-30 PROCEDURE — 72125 CT NECK SPINE W/O DYE: CPT

## 2024-07-30 PROCEDURE — 2500000004 HC RX 250 GENERAL PHARMACY W/ HCPCS (ALT 636 FOR OP/ED): Performed by: STUDENT IN AN ORGANIZED HEALTH CARE EDUCATION/TRAINING PROGRAM

## 2024-07-30 PROCEDURE — 73080 X-RAY EXAM OF ELBOW: CPT | Mod: RIGHT SIDE | Performed by: RADIOLOGY

## 2024-07-30 PROCEDURE — 72131 CT LUMBAR SPINE W/O DYE: CPT | Mod: RCN

## 2024-07-30 PROCEDURE — 74177 CT ABD & PELVIS W/CONTRAST: CPT

## 2024-07-30 PROCEDURE — 73564 X-RAY EXAM KNEE 4 OR MORE: CPT | Mod: 50

## 2024-07-30 PROCEDURE — 2500000001 HC RX 250 WO HCPCS SELF ADMINISTERED DRUGS (ALT 637 FOR MEDICARE OP): Performed by: STUDENT IN AN ORGANIZED HEALTH CARE EDUCATION/TRAINING PROGRAM

## 2024-07-30 PROCEDURE — 36415 COLL VENOUS BLD VENIPUNCTURE: CPT | Performed by: STUDENT IN AN ORGANIZED HEALTH CARE EDUCATION/TRAINING PROGRAM

## 2024-07-30 PROCEDURE — 70450 CT HEAD/BRAIN W/O DYE: CPT

## 2024-07-30 PROCEDURE — 90715 TDAP VACCINE 7 YRS/> IM: CPT | Performed by: STUDENT IN AN ORGANIZED HEALTH CARE EDUCATION/TRAINING PROGRAM

## 2024-07-30 PROCEDURE — 80053 COMPREHEN METABOLIC PANEL: CPT | Performed by: STUDENT IN AN ORGANIZED HEALTH CARE EDUCATION/TRAINING PROGRAM

## 2024-07-30 PROCEDURE — 73201 CT UPPER EXTREMITY W/DYE: CPT | Mod: RIGHT SIDE | Performed by: SPECIALIST

## 2024-07-30 PROCEDURE — 71260 CT THORAX DX C+: CPT | Mod: FOREIGN READ | Performed by: RADIOLOGY

## 2024-07-30 PROCEDURE — 74177 CT ABD & PELVIS W/CONTRAST: CPT | Mod: FOREIGN READ | Performed by: RADIOLOGY

## 2024-07-30 PROCEDURE — 72128 CT CHEST SPINE W/O DYE: CPT | Mod: RCN

## 2024-07-30 PROCEDURE — 85025 COMPLETE CBC W/AUTO DIFF WBC: CPT | Performed by: STUDENT IN AN ORGANIZED HEALTH CARE EDUCATION/TRAINING PROGRAM

## 2024-07-30 PROCEDURE — 76377 3D RENDER W/INTRP POSTPROCES: CPT

## 2024-07-30 PROCEDURE — 2550000001 HC RX 255 CONTRASTS: Performed by: STUDENT IN AN ORGANIZED HEALTH CARE EDUCATION/TRAINING PROGRAM

## 2024-07-30 PROCEDURE — 83690 ASSAY OF LIPASE: CPT | Performed by: STUDENT IN AN ORGANIZED HEALTH CARE EDUCATION/TRAINING PROGRAM

## 2024-07-30 PROCEDURE — 72128 CT CHEST SPINE W/O DYE: CPT | Mod: FOREIGN READ | Performed by: RADIOLOGY

## 2024-07-30 PROCEDURE — 96361 HYDRATE IV INFUSION ADD-ON: CPT | Mod: 59

## 2024-07-30 PROCEDURE — 96374 THER/PROPH/DIAG INJ IV PUSH: CPT | Mod: 59

## 2024-07-30 PROCEDURE — 90471 IMMUNIZATION ADMIN: CPT | Performed by: STUDENT IN AN ORGANIZED HEALTH CARE EDUCATION/TRAINING PROGRAM

## 2024-07-30 PROCEDURE — 72125 CT NECK SPINE W/O DYE: CPT | Performed by: RADIOLOGY

## 2024-07-30 PROCEDURE — 70486 CT MAXILLOFACIAL W/O DYE: CPT

## 2024-07-30 RX ORDER — CEPHALEXIN 500 MG/1
500 CAPSULE ORAL ONCE
Status: COMPLETED | OUTPATIENT
Start: 2024-07-30 | End: 2024-07-30

## 2024-07-30 RX ORDER — CEPHALEXIN 500 MG/1
500 CAPSULE ORAL 4 TIMES DAILY
Qty: 28 CAPSULE | Refills: 0 | Status: SHIPPED | OUTPATIENT
Start: 2024-07-30 | End: 2024-08-07

## 2024-07-30 RX ORDER — AMOXICILLIN AND CLAVULANATE POTASSIUM 875; 125 MG/1; MG/1
1 TABLET, FILM COATED ORAL ONCE
Status: DISCONTINUED | OUTPATIENT
Start: 2024-07-30 | End: 2024-07-30

## 2024-07-30 RX ORDER — LEVOFLOXACIN 250 MG/1
750 TABLET ORAL DAILY
Qty: 30 TABLET | Refills: 0 | Status: SHIPPED | OUTPATIENT
Start: 2024-07-30 | End: 2024-08-10

## 2024-07-30 RX ORDER — OXYCODONE HYDROCHLORIDE 5 MG/1
5 TABLET ORAL ONCE
Qty: 1 TABLET | Refills: 0 | Status: SHIPPED | OUTPATIENT
Start: 2024-07-30 | End: 2024-08-01

## 2024-07-30 RX ORDER — LEVOFLOXACIN 250 MG/1
750 TABLET ORAL ONCE
Status: COMPLETED | OUTPATIENT
Start: 2024-07-30 | End: 2024-07-30

## 2024-07-30 RX ORDER — ACETAMINOPHEN 325 MG/1
975 TABLET ORAL ONCE
Status: COMPLETED | OUTPATIENT
Start: 2024-07-30 | End: 2024-07-30

## 2024-07-30 ASSESSMENT — PAIN SCALES - GENERAL
PAINLEVEL_OUTOF10: 8

## 2024-07-30 ASSESSMENT — COLUMBIA-SUICIDE SEVERITY RATING SCALE - C-SSRS
2. HAVE YOU ACTUALLY HAD ANY THOUGHTS OF KILLING YOURSELF?: NO
6. HAVE YOU EVER DONE ANYTHING, STARTED TO DO ANYTHING, OR PREPARED TO DO ANYTHING TO END YOUR LIFE?: NO
1. IN THE PAST MONTH, HAVE YOU WISHED YOU WERE DEAD OR WISHED YOU COULD GO TO SLEEP AND NOT WAKE UP?: NO

## 2024-07-30 ASSESSMENT — PAIN DESCRIPTION - LOCATION
LOCATION: ARM
LOCATION: HEAD

## 2024-07-30 ASSESSMENT — PAIN - FUNCTIONAL ASSESSMENT
PAIN_FUNCTIONAL_ASSESSMENT: 0-10

## 2024-07-30 ASSESSMENT — PAIN DESCRIPTION - PAIN TYPE: TYPE: ACUTE PAIN

## 2024-07-30 ASSESSMENT — LIFESTYLE VARIABLES
HAVE YOU EVER FELT YOU SHOULD CUT DOWN ON YOUR DRINKING: NO
EVER FELT BAD OR GUILTY ABOUT YOUR DRINKING: NO
HAVE PEOPLE ANNOYED YOU BY CRITICIZING YOUR DRINKING: NO
TOTAL SCORE: 0
EVER HAD A DRINK FIRST THING IN THE MORNING TO STEADY YOUR NERVES TO GET RID OF A HANGOVER: NO

## 2024-07-30 ASSESSMENT — ENCOUNTER SYMPTOMS
ROS GI COMMENTS: ABD TENDERNESS
WOUND: 1

## 2024-07-30 ASSESSMENT — PAIN SCALES - PAIN ASSESSMENT IN ADVANCED DEMENTIA (PAINAD): TOTALSCORE: MEDICATION (SEE MAR)
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
## 2024-07-31 ENCOUNTER — PHARMACY VISIT (OUTPATIENT)
Dept: PHARMACY | Facility: CLINIC | Age: 69
End: 2024-07-31
Payer: COMMERCIAL

## 2024-08-01 ENCOUNTER — HOSPITAL ENCOUNTER (OUTPATIENT)
Dept: RESPIRATORY THERAPY | Facility: HOSPITAL | Age: 69
End: 2024-08-01
Payer: MEDICARE

## 2024-08-05 ENCOUNTER — APPOINTMENT (OUTPATIENT)
Dept: PRIMARY CARE | Facility: CLINIC | Age: 69
End: 2024-08-05
Payer: MEDICARE

## 2024-08-06 DIAGNOSIS — M46.1 SACROILIITIS, NOT ELSEWHERE CLASSIFIED (HCC): ICD-10-CM

## 2024-08-06 DIAGNOSIS — M47.817 LUMBOSACRAL SPONDYLOSIS WITHOUT MYELOPATHY: ICD-10-CM

## 2024-08-06 NOTE — TELEPHONE ENCOUNTER
Requested Prescriptions     Pending Prescriptions Disp Refills    tiZANidine (ZANAFLEX) 2 MG tablet 30 tablet 0     Sig: Take 1 tablet by mouth nightly as needed (spasm/pain)       Patient last seen on: 07/18/24    Date of last surgery: N/A    Date of last refill: 06/20/24    Reason for request: [place] requested    Request date for pharmacy pick-up: 08/06/24    Next office visit date: 8/15/2024    Patient has no known allergies.

## 2024-08-07 ENCOUNTER — APPOINTMENT (OUTPATIENT)
Dept: SURGERY | Facility: CLINIC | Age: 69
End: 2024-08-07
Payer: COMMERCIAL

## 2024-08-07 ENCOUNTER — HOSPITAL ENCOUNTER (OUTPATIENT)
Dept: RESPIRATORY THERAPY | Facility: HOSPITAL | Age: 69
End: 2024-08-07
Payer: MEDICARE

## 2024-08-07 VITALS
DIASTOLIC BLOOD PRESSURE: 66 MMHG | BODY MASS INDEX: 27.54 KG/M2 | HEART RATE: 81 BPM | WEIGHT: 141 LBS | SYSTOLIC BLOOD PRESSURE: 117 MMHG

## 2024-08-07 DIAGNOSIS — S41.111A LACERATION OF RIGHT UPPER EXTREMITY, INITIAL ENCOUNTER: ICD-10-CM

## 2024-08-07 PROCEDURE — 1159F MED LIST DOCD IN RCRD: CPT | Performed by: SURGERY

## 2024-08-07 PROCEDURE — 3078F DIAST BP <80 MM HG: CPT | Performed by: SURGERY

## 2024-08-07 PROCEDURE — 1036F TOBACCO NON-USER: CPT | Performed by: SURGERY

## 2024-08-07 PROCEDURE — 99213 OFFICE O/P EST LOW 20 MIN: CPT | Performed by: SURGERY

## 2024-08-07 PROCEDURE — 3074F SYST BP LT 130 MM HG: CPT | Performed by: SURGERY

## 2024-08-07 ASSESSMENT — ENCOUNTER SYMPTOMS
JOINT SWELLING: 1
FACIAL SWELLING: 1

## 2024-08-08 ENCOUNTER — APPOINTMENT (OUTPATIENT)
Dept: SURGERY | Facility: CLINIC | Age: 69
End: 2024-08-08
Payer: COMMERCIAL

## 2024-08-08 RX ORDER — TIZANIDINE 2 MG/1
2 TABLET ORAL NIGHTLY PRN
Qty: 30 TABLET | Refills: 0 | Status: SHIPPED | OUTPATIENT
Start: 2024-08-08

## 2024-08-14 ENCOUNTER — APPOINTMENT (OUTPATIENT)
Dept: PRIMARY CARE | Facility: CLINIC | Age: 69
End: 2024-08-14
Payer: MEDICARE

## 2024-08-14 VITALS
HEART RATE: 74 BPM | SYSTOLIC BLOOD PRESSURE: 100 MMHG | HEIGHT: 60 IN | WEIGHT: 157.2 LBS | BODY MASS INDEX: 30.86 KG/M2 | DIASTOLIC BLOOD PRESSURE: 68 MMHG
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
## 2024-08-14 DIAGNOSIS — E78.00 HYPERCHOLESTEROLEMIA: ICD-10-CM

## 2024-08-14 DIAGNOSIS — J20.9 ACUTE BRONCHITIS, UNSPECIFIED ORGANISM: ICD-10-CM

## 2024-08-14 DIAGNOSIS — G25.81 RESTLESS LEG SYNDROME: ICD-10-CM

## 2024-08-14 DIAGNOSIS — J30.9 ALLERGIC RHINITIS, UNSPECIFIED SEASONALITY, UNSPECIFIED TRIGGER: ICD-10-CM

## 2024-08-14 DIAGNOSIS — I10 BENIGN HYPERTENSION: Primary | ICD-10-CM

## 2024-08-14 DIAGNOSIS — I25.10 CORONARY ARTERY DISEASE INVOLVING NATIVE CORONARY ARTERY OF NATIVE HEART WITHOUT ANGINA PECTORIS: ICD-10-CM

## 2024-08-14 PROBLEM — F11.20 OPIOID DEPENDENCE WITH CURRENT USE (MULTI): Status: ACTIVE | Noted: 2024-06-20

## 2024-08-14 PROBLEM — S67.02XA CRUSHING INJURY OF LEFT THUMB: Status: ACTIVE | Noted: 2024-08-14

## 2024-08-14 PROBLEM — S62.523A: Status: ACTIVE | Noted: 2024-08-14

## 2024-08-14 PROBLEM — M79.645 PAIN OF LEFT THUMB: Status: ACTIVE | Noted: 2024-08-14
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
## 2024-08-14 PROCEDURE — 1123F ACP DISCUSS/DSCN MKR DOCD: CPT | Performed by: INTERNAL MEDICINE

## 2024-08-14 PROCEDURE — 3078F DIAST BP <80 MM HG: CPT | Performed by: INTERNAL MEDICINE

## 2024-08-14 PROCEDURE — 3074F SYST BP LT 130 MM HG: CPT | Performed by: INTERNAL MEDICINE

## 2024-08-14 PROCEDURE — 99214 OFFICE O/P EST MOD 30 MIN: CPT | Performed by: INTERNAL MEDICINE

## 2024-08-14 PROCEDURE — 3008F BODY MASS INDEX DOCD: CPT | Performed by: INTERNAL MEDICINE

## 2024-08-14 PROCEDURE — 1158F ADVNC CARE PLAN TLK DOCD: CPT | Performed by: INTERNAL MEDICINE

## 2024-08-14 PROCEDURE — 1159F MED LIST DOCD IN RCRD: CPT | Performed by: INTERNAL MEDICINE

## 2024-08-14 PROCEDURE — 1036F TOBACCO NON-USER: CPT | Performed by: INTERNAL MEDICINE

## 2024-08-14 RX ORDER — FLUTICASONE PROPIONATE AND SALMETEROL 250; 50 UG/1; UG/1
1 POWDER RESPIRATORY (INHALATION)
Qty: 60 EACH | Refills: 0 | Status: SHIPPED | OUTPATIENT
Start: 2024-08-14

## 2024-08-14 RX ORDER — PRAMIPEXOLE DIHYDROCHLORIDE 0.25 MG/1
0.25 TABLET ORAL DAILY
Qty: 90 TABLET | Refills: 1 | Status: SHIPPED | OUTPATIENT
Start: 2024-08-14

## 2024-08-14 NOTE — PROGRESS NOTES
Assessment/Plan   Problem List Items Addressed This Visit       Hypercholesterolemia    Benign hypertension - Primary    Allergic rhinitis    Restless leg syndrome    Relevant Medications    pramipexole (Mirapex) 0.25 mg tablet    Coronary artery disease involving native coronary artery of native heart without angina pectoris    Acute bronchitis    Relevant Medications    fluticasone propion-salmeteroL (Advair Diskus) 250-50 mcg/dose diskus inhaler    Other Relevant Orders    Referral to Pulmonology    Fall   Fall prevention discussed  Pulmonary condition is not stabilized yet though she has improved there may be underlying infection of the bronchial tree and may improve in due course of time  But advised Advair to use and a pulmonary consultation  A pulmonary function test has already been planned to be done tomorrow  Allergic rhinitis continue current treatment  Restless leg syndrome continue current treatment  Coronary artery disease is stable  Hypertension and hyperlipidemia continue current management    Subjective   Patient ID: Alysa Braun is a 69 y.o. female who presents for Follow-up.    Past Surgical History:   Procedure Laterality Date    OTHER SURGICAL HISTORY  08/16/2021    Lumbar laminectomy    OTHER SURGICAL HISTORY  08/11/2022    Knee surgery    OTHER SURGICAL HISTORY  01/24/2022    Spinal surgery    OTHER SURGICAL HISTORY  01/24/2022    Arthrodesis    OTHER SURGICAL HISTORY  01/24/2022    Colonoscopy    OTHER SURGICAL HISTORY  01/24/2022    Lumbar discectomy    OTHER SURGICAL HISTORY  01/24/2022    Lumbar vertebral fusion      Family History   Problem Relation Name Age of Onset    Depression Mother      Diabetes Mother      Fibromyalgia Mother      Other (High serum cholesterol sulfate) Father        Social History     Socioeconomic History    Marital status:      Spouse name: Not on file    Number of children: Not on file    Years of education: Not on file    Highest education level: Not  on file   Occupational History    Not on file   Tobacco Use    Smoking status: Former     Current packs/day: 0.00     Average packs/day: 1 pack/day for 10.0 years (10.0 ttl pk-yrs)     Types: Cigarettes     Start date:      Quit date: 2018     Years since quittin.6    Smokeless tobacco: Never   Vaping Use    Vaping status: Never Used   Substance and Sexual Activity    Alcohol use: Never     Alcohol/week: 2.0 standard drinks of alcohol     Types: 2 Glasses of wine per week    Drug use: Never    Sexual activity: Not on file   Other Topics Concern    Not on file   Social History Narrative    Not on file     Social Determinants of Health     Financial Resource Strain: Not on file   Food Insecurity: Not on file   Transportation Needs: Not on file   Physical Activity: Not on file   Stress: Not on file   Social Connections: Not on file   Intimate Partner Violence: Not on file   Housing Stability: Not At Risk (2023)    [facility]    Housing Stability     Housing Stability: Not on file     Housing Stability: Not on file      Patient has no known allergies.   Current Outpatient Medications   Medication Sig Dispense Refill    acetaminophen (TylenoL) 325 mg tablet Take by mouth every 6 hours.      albuterol (ProAir HFA) 90 mcg/actuation inhaler Inhale 2 puffs every 4 hours if needed for wheezing or shortness of breath. 8.5 g 0    buPROPion SR (Wellbutrin SR) 100 mg 12 hr tablet Take 1 tablet (100 mg) by mouth early in the morning..      busPIRone (Buspar) 15 mg tablet Take 1 tablet (15 mg) by mouth 3 times a day.      gabapentin (Neurontin) 300 mg capsule Take 1 capsule (300 mg) by mouth 3 times a day.      ipratropium (Atrovent) 21 mcg (0.03 %) nasal spray Administer 2 sprays into each nostril 2 times a day. 30 mL 3    L. acidophilus/Bifid. animalis 15.5 billion cell capsule Take by mouth.      lidocaine (Lidoderm) 5 % patch Place 1 patch onto the skin daily. Leave on for 12 hours, and  keep off for 12 hours. 30 patch 0    lisinopril 10 mg tablet Take 1 tablet (10 mg) by mouth once daily. 90 tablet 3    loratadine (Claritin) 10 mg tablet Take by mouth.      magnesium oxide (Mag-Ox) 400 mg (241.3 mg magnesium) tablet Take 1 tablet (400 mg) by mouth once daily.      montelukast (Singulair) 10 mg tablet Take 1 tablet (10 mg) by mouth once daily at bedtime. 30 tablet 5    mupirocin (Bactroban) 2 % ointment APPLY TO NOSE TID      naloxone (Narcan) 4 mg/0.1 mL nasal spray Administer into affected nostril(s).      oxyCODONE-acetaminophen (Percocet) 5-325 mg tablet Take 1 tablet by mouth every 6 hours if needed for severe pain (7 - 10). 6 tablet 0    pantoprazole (ProtoNix) 40 mg EC tablet Take 1 tablet (40 mg) by mouth once daily. Do not crush, chew, or split. 90 tablet 3    simvastatin (Zocor) 40 mg tablet Take 1 tablet (40 mg) by mouth once daily at bedtime. 90 tablet 3    tiZANidine (Zanaflex) 2 mg tablet Take by mouth.      Xanax 0.5 mg tablet Take by mouth 3 times a day as needed.      fluticasone propion-salmeteroL (Advair Diskus) 250-50 mcg/dose diskus inhaler Inhale 1 puff 2 times a day. Rinse mouth with water after use to reduce aftertaste and incidence of candidiasis. Do not swallow. 60 each 0    pramipexole (Mirapex) 0.25 mg tablet Take 1 tablet (0.25 mg) by mouth once daily. 90 tablet 1     No current facility-administered medications for this visit.      Vitals:    08/14/24 1112   BP: 100/68   BP Location: Left arm   Patient Position: Sitting   Pulse: 74   Weight: 71.3 kg (157 lb 3.2 oz)   Height: 1.524 m (5')      Problem List Items Addressed This Visit       Hypercholesterolemia    Benign hypertension - Primary    Allergic rhinitis    Restless leg syndrome    Relevant Medications    pramipexole (Mirapex) 0.25 mg tablet    Coronary artery disease involving native coronary artery of native heart without angina pectoris    Acute bronchitis    Relevant Medications    fluticasone  "propion-salmeteroL (Advair Diskus) 250-50 mcg/dose diskus inhaler    Other Relevant Orders    Referral to Pulmonology    Fall      Orders Placed This Encounter   Procedures    Referral to Pulmonology     Standing Status:   Future     Standing Expiration Date:   8/14/2025     Referral Priority:   Routine     Referral Type:   Consultation     Referral Reason:   Specialty Services Required     Requested Specialty:   Pulmonary Disease     Number of Visits Requested:   1        HPI  This patient came for follow-up  On 24 July she was seen by Dr. Cardoso was given antibiotic steroid advised to have a chest x-ray  She did not get a chest x-ray but subsequently in few days she had a fall had a bruise on the face and she had also self imaging including CT chest which did not show any pulmonary problems such by imaging  She had a wheezing which improved she has congestion and the nasal congestion is being treated with the help of the ENT doctor as well  She has problem in the right knee and is supposed to have surgery for that    ROS using inhaler  Wheezing is controlled  Could not get a steroid-based inhaler because of the cost implication  Past medical history reviewed  Social and family history reviewed  Allergies and medications reviewed  Recent labs reviewed  Vital signs reviewed  PHYSICAL EXAM  Face shows some bruises result of the fall improving  Heart sounds regular  Chest shows bilateral wheezes  Thommen soft nontender  Neuro awake and alert      No results found for: \"PR1\", \"BMPR1A\", \"CMPLAS\", \"IM8GAAFR\", \"KPSAT\"   Lab Results   Component Value Date    CHOL 240 (H) 05/03/2024    LDLCALC 125 (H) 05/03/2024    CHHDL 3.7 05/03/2024                "

## 2024-08-15 ENCOUNTER — OFFICE VISIT (OUTPATIENT)
Dept: PAIN MANAGEMENT | Age: 69
End: 2024-08-15
Payer: MEDICARE

## 2024-08-15 VITALS
TEMPERATURE: 97.7 F | HEIGHT: 60 IN | SYSTOLIC BLOOD PRESSURE: 114 MMHG | BODY MASS INDEX: 29.45 KG/M2 | DIASTOLIC BLOOD PRESSURE: 72 MMHG | WEIGHT: 150 LBS

## 2024-08-15 DIAGNOSIS — G89.4 CHRONIC PAIN SYNDROME: ICD-10-CM

## 2024-08-15 DIAGNOSIS — M47.817 LUMBOSACRAL SPONDYLOSIS WITHOUT MYELOPATHY: Primary | ICD-10-CM

## 2024-08-15 DIAGNOSIS — Z79.891 ENCOUNTER FOR LONG-TERM OPIATE ANALGESIC USE: ICD-10-CM

## 2024-08-15 DIAGNOSIS — M17.11 PRIMARY OSTEOARTHRITIS OF RIGHT KNEE: ICD-10-CM

## 2024-08-15 DIAGNOSIS — F11.20 OPIOID DEPENDENCE WITH CURRENT USE (HCC): ICD-10-CM

## 2024-08-15 PROCEDURE — G8427 DOCREV CUR MEDS BY ELIG CLIN: HCPCS | Performed by: NURSE PRACTITIONER

## 2024-08-15 PROCEDURE — 99215 OFFICE O/P EST HI 40 MIN: CPT | Performed by: NURSE PRACTITIONER

## 2024-08-15 PROCEDURE — 3017F COLORECTAL CA SCREEN DOC REV: CPT | Performed by: NURSE PRACTITIONER

## 2024-08-15 PROCEDURE — 1036F TOBACCO NON-USER: CPT | Performed by: NURSE PRACTITIONER

## 2024-08-15 PROCEDURE — G8400 PT W/DXA NO RESULTS DOC: HCPCS | Performed by: NURSE PRACTITIONER

## 2024-08-15 PROCEDURE — 1090F PRES/ABSN URINE INCON ASSESS: CPT | Performed by: NURSE PRACTITIONER

## 2024-08-15 PROCEDURE — 1123F ACP DISCUSS/DSCN MKR DOCD: CPT | Performed by: NURSE PRACTITIONER

## 2024-08-15 PROCEDURE — G8417 CALC BMI ABV UP PARAM F/U: HCPCS | Performed by: NURSE PRACTITIONER

## 2024-08-15 RX ORDER — OXYCODONE AND ACETAMINOPHEN 7.5; 325 MG/1; MG/1
1 TABLET ORAL EVERY 6 HOURS PRN
Qty: 120 TABLET | Refills: 0 | Status: SHIPPED | OUTPATIENT
Start: 2024-08-17 | End: 2024-09-16

## 2024-08-15 RX ORDER — GABAPENTIN 300 MG/1
300 CAPSULE ORAL 3 TIMES DAILY
Qty: 90 CAPSULE | Refills: 0 | Status: SHIPPED | OUTPATIENT
Start: 2024-08-15 | End: 2024-09-14

## 2024-08-15 ASSESSMENT — ENCOUNTER SYMPTOMS
RESPIRATORY NEGATIVE: 1
BACK PAIN: 1
DIARRHEA: 0
CONSTIPATION: 0

## 2024-08-15 NOTE — PROGRESS NOTES
Alcohol use: Yes     Alcohol/week: 0.0 standard drinks of alcohol     Comment: social    Drug use: Not on file    Sexual activity: Not on file   Other Topics Concern    Not on file   Social History Narrative         Lives With: Alone    Type of Home: House    Home Layout: One level    Home Access: Stairs to enter with rails    Entrance Stairs - Number of Steps: 4-5    Entrance Stairs - Rails: Both    Bathroom Shower/Tub: Walk-in shower, Tub/Shower unit(uses walk in)    Bathroom Equipment: Built-in shower seat, Grab bars in shower    Home Equipment: (plans to purchase hip kit and borrow walker)    Receives Help From: Family(father and fathers wife can assist per pt, pt states \"doctor downplayed the sx\" so pt thought she would recover quickly)    ADL Assistance: Independent    Homemaking Assistance: Independent    Homemaking Responsibilities: Yes(yard work included)    Ambulation Assistance: Independent(no AD)    Transfer Assistance: Independent    Active : Yes    Mode of Transportation: Car    Occupation: Retired    Type of occupation: Homemaker    Additional Comments: Pt. states lately she has had to sit after only 20 minutes of standing before sx     Social Determinants of Health     Financial Resource Strain: Not on file   Food Insecurity: Not on file   Transportation Needs: Not on file   Physical Activity: Not on file   Stress: Not on file   Social Connections: Not on file   Intimate Partner Violence: Not on file   Housing Stability: Not At Risk (8/5/2023)    Received from [facility]    Housing Stability     Housing Stability: Not on file     Housing Stability: Not on file       Current Outpatient Medications on File Prior to Visit   Medication Sig Dispense Refill    tiZANidine (ZANAFLEX) 2 MG tablet Take 1 tablet by mouth nightly as needed (spasm/pain) 30 tablet 0    Misc. Devices (BATH/SHOWER SEAT) MISC Dispense 1 Shower/Bath seat 1 each 0    Misc. Devices (CLASSICS ROLLING WALKER) MISC Dispense 1

## 2024-08-16 ENCOUNTER — OFFICE VISIT (OUTPATIENT)
Dept: WOUND CARE | Facility: CLINIC | Age: 69
End: 2024-08-16
Payer: COMMERCIAL

## 2024-08-16 DIAGNOSIS — S41.111A LACERATION OF RIGHT UPPER EXTREMITY, INITIAL ENCOUNTER: ICD-10-CM

## 2024-08-16 PROCEDURE — 11042 DBRDMT SUBQ TIS 1ST 20SQCM/<: CPT | Performed by: SURGERY

## 2024-08-16 PROCEDURE — 99213 OFFICE O/P EST LOW 20 MIN: CPT

## 2024-08-16 PROCEDURE — 11042 DBRDMT SUBQ TIS 1ST 20SQCM/<: CPT

## 2024-08-16 PROCEDURE — 1123F ACP DISCUSS/DSCN MKR DOCD: CPT | Performed by: SURGERY

## 2024-08-16 PROCEDURE — 99213 OFFICE O/P EST LOW 20 MIN: CPT | Performed by: SURGERY

## 2024-08-19 ENCOUNTER — APPOINTMENT (OUTPATIENT)
Dept: OTOLARYNGOLOGY | Facility: CLINIC | Age: 69
End: 2024-08-19
Payer: MEDICARE

## 2024-08-19 DIAGNOSIS — J01.40 ACUTE NON-RECURRENT PANSINUSITIS: Primary | ICD-10-CM

## 2024-08-19 DIAGNOSIS — J38.1 VOCAL CORD POLYPS: ICD-10-CM

## 2024-08-19 PROCEDURE — 99213 OFFICE O/P EST LOW 20 MIN: CPT | Performed by: OTOLARYNGOLOGY

## 2024-08-19 PROCEDURE — 1160F RVW MEDS BY RX/DR IN RCRD: CPT | Performed by: OTOLARYNGOLOGY

## 2024-08-19 PROCEDURE — 1159F MED LIST DOCD IN RCRD: CPT | Performed by: OTOLARYNGOLOGY

## 2024-08-19 PROCEDURE — 31575 DIAGNOSTIC LARYNGOSCOPY: CPT | Performed by: OTOLARYNGOLOGY

## 2024-08-19 PROCEDURE — 1123F ACP DISCUSS/DSCN MKR DOCD: CPT | Performed by: OTOLARYNGOLOGY

## 2024-08-19 NOTE — PROGRESS NOTES
Patient returns.  Seeing her back today follow-up check status post treatment for acute rhinosinusitis.  Left vantage point she is doing well but reportedly was having some evident wheezing phenomenon.  She was then recommended to follow back up with us to make sure nothing is worrisome with the airway.  Prior history of smoking.  Denying any other worrisome ENT symptoms or signs.  No other change in past medical surgical history.    Physical exam:  No acute distress.  The external ear structures appear normal. The ear canals patent and the tympanic membranes are intact without evidence of air-fluid levels, retraction, or congenital defects.  Anterior rhinoscopy notes essentially a midline nasal septum. Examination is noted for normal healthy mucosal membranes without any evidence of lesions, polyps, or exudate. The tongue is normally mobile. There are no lesions on the gingiva, buccal, or oral mucosa. There are no oral cavity masses.  The neck is negative for mass lymphadenopathy. The trachea and parotid are clear. The thyroid bed is grossly unremarkable. The salivary gland structures are grossly unremarkable.    Procedure:  In order to assess the larynx, flexible laryngoscopy was performed based on the patient's history.  After topical anesthesia, a very complete flexible laryngoscopy was performed. This examination reveals a normal appearance to the laryngeal structures including the true cords, false cords, epiglottis, base of tongue, and piriform sinus, except as noted.  Bilateral polyps are noted on the vocal cords as the only finding and appear completely benign and the polyp or larger right greater than left.    Assessment and plan:  1.  Resolved rhinosinusitis.  2.  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  Patient to update her pulmonologist that she does have these as the likely etiology for some of that airway  respiration noise.  Follow-up with me depending how things fare.  All questions were answered in this regard accordingly.

## 2024-08-21 ENCOUNTER — HOSPITAL ENCOUNTER (OUTPATIENT)
Dept: RESPIRATORY THERAPY | Facility: HOSPITAL | Age: 69
Discharge: HOME | End: 2024-08-21
Payer: MEDICARE

## 2024-08-21 DIAGNOSIS — R06.2 WHEEZING: ICD-10-CM

## 2024-08-21 DIAGNOSIS — J20.9 ACUTE BRONCHITIS, UNSPECIFIED ORGANISM: ICD-10-CM

## 2024-08-21 DIAGNOSIS — R06.00 DYSPNEA, UNSPECIFIED TYPE: ICD-10-CM

## 2024-08-21 LAB
MGC ASCENT PFT - FEV1 - POST: 1.73
MGC ASCENT PFT - FEV1 - PRE: 1.67
MGC ASCENT PFT - FEV1 - PREDICTED: 2.14
MGC ASCENT PFT - FVC - POST: 2.35
MGC ASCENT PFT - FVC - PRE: 2.31
MGC ASCENT PFT - FVC - PREDICTED: 2.75

## 2024-08-21 PROCEDURE — 94726 PLETHYSMOGRAPHY LUNG VOLUMES: CPT

## 2024-08-22 NOTE — RESULT ENCOUNTER NOTE
The pulmonary function test shows overall no acute abnormalities but there may be mild decreased expansion of the lungs which could be further followed up in the office by the PCP.  Patient should continue the same medications and management and follow-up with the PCP.

## 2024-08-23 ENCOUNTER — TELEPHONE (OUTPATIENT)
Dept: PRIMARY CARE | Facility: CLINIC | Age: 69
End: 2024-08-23
Payer: MEDICARE

## 2024-08-23 ENCOUNTER — OFFICE VISIT (OUTPATIENT)
Dept: WOUND CARE | Facility: CLINIC | Age: 69
End: 2024-08-23
Payer: MEDICARE

## 2024-08-23 PROCEDURE — 11042 DBRDMT SUBQ TIS 1ST 20SQCM/<: CPT

## 2024-08-23 PROCEDURE — 11042 DBRDMT SUBQ TIS 1ST 20SQCM/<: CPT | Performed by: SURGERY

## 2024-08-23 NOTE — TELEPHONE ENCOUNTER
----- Message from Rohit Cardoso sent at 8/22/2024 12:02 PM EDT -----  The pulmonary function test shows overall no acute abnormalities but there may be mild decreased expansion of the lungs which could be further followed up in the office by the PCP.  Patient should continue the same medications and management and follow-up with the PCP.

## 2024-08-27 ENCOUNTER — TELEPHONE (OUTPATIENT)
Dept: ORTHOPEDIC SURGERY | Age: 69
End: 2024-08-27

## 2024-08-27 NOTE — TELEPHONE ENCOUNTER
Good Morning Mariaelena,     I hope this message finds you well. I wanted to inform you that the time and location of your Pre-Admission testing and Lab appointment have been updated. These changes are now reflected in your [email or web address] account. For your convenience, I’ve also provided the updated details below:     Pre-Operative Appointment:  Date: September 3, 2024  Time: 1:00 PM  With: Darren Wesley  Location: [address] [place] [id number]        Lab Appointment:  Date: September 3, 2024  Time: 2:00 PM  Location: [address] [place], [place] [id number]  Entrance: Door \"B\" - Please check in at the Welcome Desk        If you have any further questions or require additional assistance, please do not hesitate to contact our office at [phone].     Thank you, and I look forward to assisting you with your upcoming appointments.

## 2024-08-29 ASSESSMENT — KOOS JR
GOING UP OR DOWN STAIRS: MODERATE
STANDING UPRIGHT: MODERATE
TWISING OR PIVOTING ON KNEE: EXTREME
HOW SEVERE IS YOUR KNEE STIFFNESS AFTER FIRST WAKING IN MORNING: SEVERE
KOOS JR TOTAL INTERVAL SCORE: 44.905
BENDING TO THE FLOOR TO PICK UP OBJECT: MODERATE
STRAIGHTENING KNEE FULLY: MODERATE
RISING FROM SITTING: MODERATE

## 2024-08-29 ASSESSMENT — PROMIS GLOBAL HEALTH SCALE
SUM OF RESPONSES TO QUESTIONS 3, 6, 7, & 8: 16
IN GENERAL, WOULD YOU SAY YOUR QUALITY OF LIFE IS...[ON A SCALE OF 1 (POOR) TO 5 (EXCELLENT)]: GOOD
IN THE PAST 7 DAYS, HOW WOULD YOU RATE YOUR FATIGUE ON AVERAGE [ON A SCALE FROM 1 (NONE) TO 5 (VERY SEVERE)]?: MODERATE
IN GENERAL, PLEASE RATE HOW WELL YOU CARRY OUT YOUR USUAL SOCIAL ACTIVITIES (INCLUDES ACTIVITIES AT HOME, AT WORK, AND IN YOUR COMMUNITY, AND RESPONSIBILITIES AS A PARENT, CHILD, SPOUSE, EMPLOYEE, FRIEND, ETC) [ON A SCALE OF 1 (POOR) TO 5 (EXCELLENT)]?: POOR
IN GENERAL, WOULD YOU SAY YOUR HEALTH IS...[ON A SCALE OF 1 (POOR) TO 5 (EXCELLENT)]: VERY GOOD
IN THE PAST 7 DAYS, HOW WOULD YOU RATE YOUR PAIN ON AVERAGE [ON A SCALE FROM 0 (NO PAIN) TO 10 (WORST IMAGINABLE PAIN)]?: 8
SUM OF RESPONSES TO QUESTIONS 2, 4, 5, & 10: 10
IN GENERAL, HOW WOULD YOU RATE YOUR PHYSICAL HEALTH [ON A SCALE OF 1 (POOR) TO 5 (EXCELLENT)]?: GOOD
WHO IS THE PERSON COMPLETING THE PROMIS V1.1 SURVEY?: SELF
IN GENERAL, HOW WOULD YOU RATE YOUR SATISFACTION WITH YOUR SOCIAL ACTIVITIES AND RELATIONSHIPS [ON A SCALE OF 1 (POOR) TO 5 (EXCELLENT)]?: POOR
TO WHAT EXTENT ARE YOU ABLE TO CARRY OUT YOUR EVERYDAY PHYSICAL ACTIVITIES SUCH AS WALKING, CLIMBING STAIRS, CARRYING GROCERIES, OR MOVING A CHAIR [ON A SCALE OF 1 (NOT AT ALL) TO 5 (COMPLETELY)]?: A LITTLE
IN THE PAST 7 DAYS, HOW OFTEN HAVE YOU BEEN BOTHERED BY EMOTIONAL PROBLEMS, SUCH AS FEELING ANXIOUS, DEPRESSED, OR IRRITABLE [ON A SCALE FROM 1 (NEVER) TO 5 (ALWAYS)]?: SOMETIMES
HOW IS THE PROMIS V1.1 BEING ADMINISTERED?: TELEPHONE
IN GENERAL, HOW WOULD YOU RATE YOUR MENTAL HEALTH, INCLUDING YOUR MOOD AND YOUR ABILITY TO THINK [ON A SCALE OF 1 (POOR) TO 5 (EXCELLENT)]?: GOOD

## 2024-09-03 ENCOUNTER — OFFICE VISIT (OUTPATIENT)
Dept: FAMILY MEDICINE CLINIC | Age: 69
End: 2024-09-03

## 2024-09-03 VITALS
BODY MASS INDEX: 30.86 KG/M2 | DIASTOLIC BLOOD PRESSURE: 66 MMHG | WEIGHT: 157.2 LBS | SYSTOLIC BLOOD PRESSURE: 108 MMHG | HEIGHT: 60 IN | HEART RATE: 101 BPM | OXYGEN SATURATION: 95 %

## 2024-09-03 DIAGNOSIS — R52 PAIN, UNSPECIFIED: ICD-10-CM

## 2024-09-03 DIAGNOSIS — Z01.818 PRE-OP TESTING: ICD-10-CM

## 2024-09-03 DIAGNOSIS — Z79.899 OTHER LONG TERM (CURRENT) DRUG THERAPY: ICD-10-CM

## 2024-09-03 DIAGNOSIS — Z01.818 PRE-OP EXAM: Primary | ICD-10-CM

## 2024-09-03 RX ORDER — SIMVASTATIN 40 MG
40 TABLET ORAL NIGHTLY
COMMUNITY
Start: 2024-07-30

## 2024-09-03 RX ORDER — FLUTICASONE PROPIONATE AND SALMETEROL 250; 50 UG/1; UG/1
1 POWDER RESPIRATORY (INHALATION) 2 TIMES DAILY
COMMUNITY
Start: 2024-08-14

## 2024-09-03 RX ORDER — OXYCODONE HYDROCHLORIDE 5 MG/1
5 TABLET ORAL
COMMUNITY
Start: 2024-07-30

## 2024-09-03 RX ORDER — MUPIROCIN 20 MG/G
OINTMENT TOPICAL
COMMUNITY
Start: 2024-07-17

## 2024-09-03 RX ORDER — SODIUM CHLORIDE 9 MG/ML
INJECTION, SOLUTION INTRAVENOUS PRN
OUTPATIENT
Start: 2024-09-09

## 2024-09-03 RX ORDER — ACETAMINOPHEN AND CODEINE PHOSPHATE 300; 30 MG/1; MG/1
TABLET ORAL
COMMUNITY
Start: 2022-12-28

## 2024-09-03 RX ORDER — SODIUM CHLORIDE 0.9 % (FLUSH) 0.9 %
5-40 SYRINGE (ML) INJECTION PRN
OUTPATIENT
Start: 2024-09-09

## 2024-09-03 RX ORDER — BUPROPION HYDROCHLORIDE 100 MG/1
100 TABLET, EXTENDED RELEASE ORAL
COMMUNITY
Start: 2024-06-24

## 2024-09-03 RX ORDER — ACETAMINOPHEN 325 MG/1
TABLET ORAL
COMMUNITY

## 2024-09-03 RX ORDER — HYDROCODONE BITARTRATE AND ACETAMINOPHEN 10; 325 MG/1; MG/1
TABLET ORAL
COMMUNITY
Start: 2022-10-11

## 2024-09-03 RX ORDER — LORATADINE 10 MG/1
TABLET ORAL
COMMUNITY

## 2024-09-03 RX ORDER — PANTOPRAZOLE SODIUM 40 MG/1
40 TABLET, DELAYED RELEASE ORAL DAILY
COMMUNITY

## 2024-09-03 RX ORDER — SODIUM CHLORIDE, SODIUM LACTATE, POTASSIUM CHLORIDE, CALCIUM CHLORIDE 600; 310; 30; 20 MG/100ML; MG/100ML; MG/100ML; MG/100ML
INJECTION, SOLUTION INTRAVENOUS CONTINUOUS
OUTPATIENT
Start: 2024-09-09

## 2024-09-03 RX ORDER — SODIUM CHLORIDE 0.9 % (FLUSH) 0.9 %
5-40 SYRINGE (ML) INJECTION EVERY 12 HOURS SCHEDULED
OUTPATIENT
Start: 2024-09-09

## 2024-09-03 RX ORDER — ALBUTEROL SULFATE 90 UG/1
2 AEROSOL, METERED RESPIRATORY (INHALATION) EVERY 4 HOURS PRN
COMMUNITY
Start: 2024-07-24

## 2024-09-03 RX ORDER — CHLORHEXIDINE GLUCONATE 40 MG/ML
SOLUTION TOPICAL
Qty: 118 ML | Refills: 0 | Status: SHIPPED | OUTPATIENT
Start: 2024-09-03

## 2024-09-03 SDOH — ECONOMIC STABILITY: FOOD INSECURITY: WITHIN THE PAST 12 MONTHS, YOU WORRIED THAT YOUR FOOD WOULD RUN OUT BEFORE YOU GOT MONEY TO BUY MORE.: NEVER TRUE

## 2024-09-03 SDOH — ECONOMIC STABILITY: INCOME INSECURITY: HOW HARD IS IT FOR YOU TO PAY FOR THE VERY BASICS LIKE FOOD, HOUSING, MEDICAL CARE, AND HEATING?: NOT HARD AT ALL

## 2024-09-03 SDOH — ECONOMIC STABILITY: FOOD INSECURITY: WITHIN THE PAST 12 MONTHS, THE FOOD YOU BOUGHT JUST DIDN'T LAST AND YOU DIDN'T HAVE MONEY TO GET MORE.: NEVER TRUE

## 2024-09-03 ASSESSMENT — PATIENT HEALTH QUESTIONNAIRE - PHQ9
7. TROUBLE CONCENTRATING ON THINGS, SUCH AS READING THE NEWSPAPER OR WATCHING TELEVISION: NOT AT ALL
SUM OF ALL RESPONSES TO PHQ QUESTIONS 1-9: 5
SUM OF ALL RESPONSES TO PHQ9 QUESTIONS 1 & 2: 2
5. POOR APPETITE OR OVEREATING: SEVERAL DAYS
3. TROUBLE FALLING OR STAYING ASLEEP: SEVERAL DAYS
SUM OF ALL RESPONSES TO PHQ QUESTIONS 1-9: 5
6. FEELING BAD ABOUT YOURSELF - OR THAT YOU ARE A FAILURE OR HAVE LET YOURSELF OR YOUR FAMILY DOWN: NOT AT ALL
2. FEELING DOWN, DEPRESSED OR HOPELESS: SEVERAL DAYS
8. MOVING OR SPEAKING SO SLOWLY THAT OTHER PEOPLE COULD HAVE NOTICED. OR THE OPPOSITE, BEING SO FIGETY OR RESTLESS THAT YOU HAVE BEEN MOVING AROUND A LOT MORE THAN USUAL: NOT AT ALL
1. LITTLE INTEREST OR PLEASURE IN DOING THINGS: SEVERAL DAYS
SUM OF ALL RESPONSES TO PHQ QUESTIONS 1-9: 5
9. THOUGHTS THAT YOU WOULD BE BETTER OFF DEAD, OR OF HURTING YOURSELF: NOT AT ALL
4. FEELING TIRED OR HAVING LITTLE ENERGY: SEVERAL DAYS
SUM OF ALL RESPONSES TO PHQ QUESTIONS 1-9: 5
10. IF YOU CHECKED OFF ANY PROBLEMS, HOW DIFFICULT HAVE THESE PROBLEMS MADE IT FOR YOU TO DO YOUR WORK, TAKE CARE OF THINGS AT HOME, OR GET ALONG WITH OTHER PEOPLE: NOT DIFFICULT AT ALL

## 2024-09-03 ASSESSMENT — ENCOUNTER SYMPTOMS
SHORTNESS OF BREATH: 0
EYE REDNESS: 0
RHINORRHEA: 0
SINUS PAIN: 0
DIARRHEA: 0
EYE PAIN: 0
VOMITING: 0
NAUSEA: 0
BLOOD IN STOOL: 0
TROUBLE SWALLOWING: 0
BACK PAIN: 0
CHEST TIGHTNESS: 0
ABDOMINAL PAIN: 0
PHOTOPHOBIA: 0
COUGH: 0
SINUS PRESSURE: 0
SORE THROAT: 0
CONSTIPATION: 0

## 2024-09-03 NOTE — PROGRESS NOTES
Bathroom Equipment: Built-in shower seat, Grab bars in shower    Home Equipment: (plans to purchase hip kit and borrow walker)    Receives Help From: Family(father and fathers wife can assist per pt, pt states \"doctor downplayed the sx\" so pt thought she would recover quickly)    ADL Assistance: Independent    Homemaking Assistance: Independent    Homemaking Responsibilities: Yes(yard work included)    Ambulation Assistance: Independent(no AD)    Transfer Assistance: Independent    Active : Yes    Mode of Transportation: Car    Occupation: Retired    Type of occupation: Homemaker    Additional Comments: Pt. states lately she has had to sit after only 20 minutes of standing before sx     Social Determinants of Health     Financial Resource Strain: Low Risk  (9/3/2024)    Overall Financial Resource Strain (CARDIA)     Difficulty of Paying Living Expenses: Not hard at all   Food Insecurity: No Food Insecurity (9/3/2024)    Hunger Vital Sign     Worried About Running Out of Food in the Last Year: Never true     Ran Out of Food in the Last Year: Never true   Transportation Needs: Unknown (9/3/2024)    PRAPARE - Transportation     Lack of Transportation (Non-Medical): No   Housing Stability: Unknown (9/3/2024)    Housing Stability Vital Sign     Homeless in the Last Year: No        PHYSICAL EXAM     Vitals:    09/03/24 1305   BP: 108/66   Site: Left Upper Arm   Position: Sitting   Cuff Size: Medium Adult   Pulse: (!) 101   SpO2: 95%   Weight: 71.3 kg (157 lb 3.2 oz)   Height: 1.524 m (5')     Physical Exam  Constitutional:       Appearance: Normal appearance. She is normal weight.   HENT:      Right Ear: Tympanic membrane, ear canal and external ear normal.      Left Ear: Tympanic membrane, ear canal and external ear normal.      Nose: Nose normal.      Mouth/Throat:      Mouth: Mucous membranes are moist.      Pharynx: Oropharynx is clear.   Eyes:      Extraocular Movements: Extraocular movements intact.

## 2024-09-04 ENCOUNTER — OFFICE VISIT (OUTPATIENT)
Dept: WOUND CARE | Facility: CLINIC | Age: 69
End: 2024-09-04
Payer: MEDICARE

## 2024-09-04 DIAGNOSIS — M47.817 LUMBOSACRAL SPONDYLOSIS WITHOUT MYELOPATHY: ICD-10-CM

## 2024-09-04 DIAGNOSIS — M46.1 SACROILIITIS, NOT ELSEWHERE CLASSIFIED (HCC): ICD-10-CM

## 2024-09-04 PROCEDURE — 99212 OFFICE O/P EST SF 10 MIN: CPT

## 2024-09-04 PROCEDURE — 99213 OFFICE O/P EST LOW 20 MIN: CPT

## 2024-09-04 RX ORDER — TIZANIDINE 2 MG/1
2 TABLET ORAL NIGHTLY PRN
Qty: 30 TABLET | Refills: 0 | OUTPATIENT
Start: 2024-09-04

## 2024-09-05 ENCOUNTER — HOSPITAL ENCOUNTER (OUTPATIENT)
Dept: PREADMISSION TESTING | Age: 69
Discharge: HOME | End: 2024-09-09
Payer: MEDICARE

## 2024-09-05 DIAGNOSIS — Z79.899 OTHER LONG TERM (CURRENT) DRUG THERAPY: ICD-10-CM

## 2024-09-05 DIAGNOSIS — R52 PAIN, UNSPECIFIED: ICD-10-CM

## 2024-09-05 DIAGNOSIS — Z01.818 PRE-OP TESTING: ICD-10-CM

## 2024-09-05 LAB
ABO + RH BLD: NORMAL
ANION GAP SERPL CALCULATED.3IONS-SCNC: 9 MEQ/L (ref 9–15)
BASOPHILS # BLD: 0 K/UL (ref 0–0.2)
BASOPHILS NFR BLD: 0.7 %
BILIRUB UR QL STRIP: NEGATIVE
BLD GP AB SCN SERPL QL: NORMAL
BUN SERPL-MCNC: 22 MG/DL (ref 8–23)
CALCIUM SERPL-MCNC: 9.8 MG/DL (ref 8.5–9.9)
CHLORIDE SERPL-SCNC: 95 MEQ/L (ref 95–107)
CLARITY UR: CLEAR
CO2 SERPL-SCNC: 28 MEQ/L (ref 20–31)
COLOR UR: YELLOW
CREAT SERPL-MCNC: 0.53 MG/DL (ref 0.5–0.9)
EOSINOPHIL # BLD: 0.2 K/UL (ref 0–0.7)
EOSINOPHIL NFR BLD: 2.8 %
ERYTHROCYTE [DISTWIDTH] IN BLOOD BY AUTOMATED COUNT: 13.4 % (ref 11.5–14.5)
ESTIMATED AVERAGE GLUCOSE: 117 MG/DL
GLUCOSE SERPL-MCNC: 108 MG/DL (ref 70–99)
GLUCOSE UR STRIP-MCNC: NEGATIVE MG/DL
HBA1C MFR BLD: 5.7 % (ref 4–6)
HCT VFR BLD AUTO: 33.3 % (ref 37–47)
HGB BLD-MCNC: 10.8 G/DL (ref 12–16)
HGB UR QL STRIP: NEGATIVE
KETONES UR STRIP-MCNC: NEGATIVE MG/DL
LEUKOCYTE ESTERASE UR QL STRIP: NEGATIVE
LYMPHOCYTES # BLD: 1.4 K/UL (ref 1–4.8)
LYMPHOCYTES NFR BLD: 24.2 %
MCH RBC QN AUTO: 30.9 PG (ref 27–31.3)
MCHC RBC AUTO-ENTMCNC: 32.4 % (ref 33–37)
MCV RBC AUTO: 95.1 FL (ref 79.4–94.8)
MONOCYTES # BLD: 0.6 K/UL (ref 0.2–0.8)
MONOCYTES NFR BLD: 10.1 %
NEUTROPHILS # BLD: 3.5 K/UL (ref 1.4–6.5)
NEUTS SEG NFR BLD: 61.8 %
NITRITE UR QL STRIP: NEGATIVE
PH UR STRIP: 6 [PH] (ref 5–9)
PLATELET # BLD AUTO: 322 K/UL (ref 130–400)
POTASSIUM SERPL-SCNC: 4.5 MEQ/L (ref 3.4–4.9)
PROT UR STRIP-MCNC: NEGATIVE MG/DL
RBC # BLD AUTO: 3.5 M/UL (ref 4.2–5.4)
SODIUM SERPL-SCNC: 132 MEQ/L (ref 135–144)
SP GR UR STRIP: 1.01 (ref 1–1.03)
UROBILINOGEN UR STRIP-ACNC: 0.2 E.U./DL
WBC # BLD AUTO: 5.6 K/UL (ref 4.8–10.8)

## 2024-09-05 PROCEDURE — 86850 RBC ANTIBODY SCREEN: CPT

## 2024-09-05 PROCEDURE — 80048 BASIC METABOLIC PNL TOTAL CA: CPT

## 2024-09-05 PROCEDURE — 86900 BLOOD TYPING SEROLOGIC ABO: CPT

## 2024-09-05 PROCEDURE — 87641 MR-STAPH DNA AMP PROBE: CPT

## 2024-09-05 PROCEDURE — 81003 URINALYSIS AUTO W/O SCOPE: CPT

## 2024-09-05 PROCEDURE — 87086 URINE CULTURE/COLONY COUNT: CPT

## 2024-09-05 PROCEDURE — 83036 HEMOGLOBIN GLYCOSYLATED A1C: CPT

## 2024-09-05 PROCEDURE — 86901 BLOOD TYPING SEROLOGIC RH(D): CPT

## 2024-09-05 PROCEDURE — 85025 COMPLETE CBC W/AUTO DIFF WBC: CPT

## 2024-09-06 LAB
BACTERIA UR CULT: NORMAL
MRSA, DNA, NASAL: NEGATIVE
SPECIMEN DESCRIPTION: NORMAL

## 2024-09-09 ENCOUNTER — HOSPITAL ENCOUNTER (OUTPATIENT)
Age: 69
Setting detail: OBSERVATION
Discharge: HOME HEALTH | End: 2024-09-10
Attending: ORTHOPAEDIC SURGERY | Admitting: ORTHOPAEDIC SURGERY
Payer: MEDICARE

## 2024-09-09 ENCOUNTER — ANESTHESIA (OUTPATIENT)
Dept: OPERATING ROOM | Age: 69
End: 2024-09-09
Payer: MEDICARE

## 2024-09-09 ENCOUNTER — ANESTHESIA EVENT (OUTPATIENT)
Dept: OPERATING ROOM | Age: 69
End: 2024-09-09
Payer: MEDICARE

## 2024-09-09 ENCOUNTER — APPOINTMENT (OUTPATIENT)
Dept: GENERAL RADIOLOGY | Age: 69
End: 2024-09-09
Attending: ORTHOPAEDIC SURGERY
Payer: MEDICARE

## 2024-09-09 ENCOUNTER — APPOINTMENT (OUTPATIENT)
Dept: PRIMARY CARE | Facility: CLINIC | Age: 69
End: 2024-09-09
Payer: MEDICARE

## 2024-09-09 ENCOUNTER — APPOINTMENT (OUTPATIENT)
Dept: ULTRASOUND IMAGING | Age: 69
End: 2024-09-09
Attending: ORTHOPAEDIC SURGERY
Payer: MEDICARE

## 2024-09-09 DIAGNOSIS — Z96.651 STATUS POST TOTAL KNEE REPLACEMENT, RIGHT: Primary | ICD-10-CM

## 2024-09-09 PROCEDURE — 6370000000 HC RX 637 (ALT 250 FOR IP): Performed by: NURSE PRACTITIONER

## 2024-09-09 PROCEDURE — 27447 TOTAL KNEE ARTHROPLASTY: CPT | Performed by: PHYSICIAN ASSISTANT

## 2024-09-09 PROCEDURE — 2580000003 HC RX 258: Performed by: NURSE PRACTITIONER

## 2024-09-09 PROCEDURE — 27447 TOTAL KNEE ARTHROPLASTY: CPT | Performed by: ORTHOPAEDIC SURGERY

## 2024-09-09 PROCEDURE — 6360000002 HC RX W HCPCS: Performed by: ANESTHESIOLOGY

## 2024-09-09 PROCEDURE — 7100000000 HC PACU RECOVERY - FIRST 15 MIN: Performed by: ORTHOPAEDIC SURGERY

## 2024-09-09 PROCEDURE — 3600000004 HC SURGERY LEVEL 4 BASE: Performed by: ORTHOPAEDIC SURGERY

## 2024-09-09 PROCEDURE — 94664 DEMO&/EVAL PT USE INHALER: CPT

## 2024-09-09 PROCEDURE — 3700000000 HC ANESTHESIA ATTENDED CARE: Performed by: ORTHOPAEDIC SURGERY

## 2024-09-09 PROCEDURE — A4217 STERILE WATER/SALINE, 500 ML: HCPCS | Performed by: ORTHOPAEDIC SURGERY

## 2024-09-09 PROCEDURE — 6360000002 HC RX W HCPCS: Performed by: ORTHOPAEDIC SURGERY

## 2024-09-09 PROCEDURE — 6360000002 HC RX W HCPCS: Performed by: NURSE PRACTITIONER

## 2024-09-09 PROCEDURE — 7100000001 HC PACU RECOVERY - ADDTL 15 MIN: Performed by: ORTHOPAEDIC SURGERY

## 2024-09-09 PROCEDURE — 6370000000 HC RX 637 (ALT 250 FOR IP): Performed by: INTERNAL MEDICINE

## 2024-09-09 PROCEDURE — G0378 HOSPITAL OBSERVATION PER HR: HCPCS

## 2024-09-09 PROCEDURE — 3600000014 HC SURGERY LEVEL 4 ADDTL 15MIN: Performed by: ORTHOPAEDIC SURGERY

## 2024-09-09 PROCEDURE — 73560 X-RAY EXAM OF KNEE 1 OR 2: CPT

## 2024-09-09 PROCEDURE — 97530 THERAPEUTIC ACTIVITIES: CPT

## 2024-09-09 PROCEDURE — C1776 JOINT DEVICE (IMPLANTABLE): HCPCS | Performed by: ORTHOPAEDIC SURGERY

## 2024-09-09 PROCEDURE — 3700000001 HC ADD 15 MINUTES (ANESTHESIA): Performed by: ORTHOPAEDIC SURGERY

## 2024-09-09 PROCEDURE — 94640 AIRWAY INHALATION TREATMENT: CPT

## 2024-09-09 PROCEDURE — 2709999900 HC NON-CHARGEABLE SUPPLY: Performed by: ORTHOPAEDIC SURGERY

## 2024-09-09 PROCEDURE — 2580000003 HC RX 258: Performed by: ORTHOPAEDIC SURGERY

## 2024-09-09 PROCEDURE — 2500000003 HC RX 250 WO HCPCS: Performed by: NURSE PRACTITIONER

## 2024-09-09 PROCEDURE — 97162 PT EVAL MOD COMPLEX 30 MIN: CPT

## 2024-09-09 PROCEDURE — 64447 NJX AA&/STRD FEMORAL NRV IMG: CPT | Performed by: ANESTHESIOLOGY

## 2024-09-09 PROCEDURE — 76942 ECHO GUIDE FOR BIOPSY: CPT

## 2024-09-09 DEVICE — PATELLA
Type: IMPLANTABLE DEVICE | Site: KNEE | Status: FUNCTIONAL
Brand: TRIATHLON

## 2024-09-09 DEVICE — CRUCIATE RETAINING FEMORAL
Type: IMPLANTABLE DEVICE | Site: KNEE | Status: FUNCTIONAL
Brand: TRIATHLON

## 2024-09-09 DEVICE — COMPONENT TOT KNEE CAPPED PRIMARY K2STRYKER] STRYKER CORP]: Type: IMPLANTABLE DEVICE | Site: KNEE | Status: FUNCTIONAL

## 2024-09-09 DEVICE — TIBIAL BEARING INSERT - CS
Type: IMPLANTABLE DEVICE | Site: KNEE | Status: FUNCTIONAL
Brand: TRIATHLON

## 2024-09-09 DEVICE — TIBIAL COMPONENT
Type: IMPLANTABLE DEVICE | Site: KNEE | Status: FUNCTIONAL
Brand: TRIATHLON

## 2024-09-09 RX ORDER — SODIUM CHLORIDE 9 MG/ML
INJECTION, SOLUTION INTRAVENOUS PRN
Status: DISCONTINUED | OUTPATIENT
Start: 2024-09-09 | End: 2024-09-10 | Stop reason: HOSPADM

## 2024-09-09 RX ORDER — ALBUTEROL SULFATE 90 UG/1
2 AEROSOL, METERED RESPIRATORY (INHALATION) EVERY 4 HOURS PRN
Status: DISCONTINUED | OUTPATIENT
Start: 2024-09-09 | End: 2024-09-10 | Stop reason: HOSPADM

## 2024-09-09 RX ORDER — BUPROPION HYDROCHLORIDE 100 MG/1
100 TABLET, EXTENDED RELEASE ORAL DAILY
Status: DISCONTINUED | OUTPATIENT
Start: 2024-09-09 | End: 2024-09-10 | Stop reason: HOSPADM

## 2024-09-09 RX ORDER — SODIUM CHLORIDE 9 MG/ML
INJECTION, SOLUTION INTRAVENOUS CONTINUOUS
Status: DISCONTINUED | OUTPATIENT
Start: 2024-09-09 | End: 2024-09-10

## 2024-09-09 RX ORDER — MAGNESIUM HYDROXIDE/ALUMINUM HYDROXICE/SIMETHICONE 120; 1200; 1200 MG/30ML; MG/30ML; MG/30ML
15 SUSPENSION ORAL EVERY 6 HOURS PRN
Status: DISCONTINUED | OUTPATIENT
Start: 2024-09-09 | End: 2024-09-10 | Stop reason: HOSPADM

## 2024-09-09 RX ORDER — GLYCOPYRROLATE 1 MG/5 ML
SYRINGE (ML) INTRAVENOUS PRN
Status: DISCONTINUED | OUTPATIENT
Start: 2024-09-09 | End: 2024-09-09 | Stop reason: SDUPTHER

## 2024-09-09 RX ORDER — HYDROXYZINE HYDROCHLORIDE 10 MG/1
10 TABLET, FILM COATED ORAL 3 TIMES DAILY
Status: DISCONTINUED | OUTPATIENT
Start: 2024-09-09 | End: 2024-09-10 | Stop reason: HOSPADM

## 2024-09-09 RX ORDER — ASPIRIN 81 MG/1
81 TABLET ORAL 2 TIMES DAILY
Status: DISCONTINUED | OUTPATIENT
Start: 2024-09-09 | End: 2024-09-10 | Stop reason: HOSPADM

## 2024-09-09 RX ORDER — MORPHINE SULFATE 2 MG/ML
2 INJECTION, SOLUTION INTRAMUSCULAR; INTRAVENOUS
Status: DISCONTINUED | OUTPATIENT
Start: 2024-09-09 | End: 2024-09-10 | Stop reason: HOSPADM

## 2024-09-09 RX ORDER — MIDAZOLAM HYDROCHLORIDE 1 MG/ML
INJECTION INTRAMUSCULAR; INTRAVENOUS PRN
Status: DISCONTINUED | OUTPATIENT
Start: 2024-09-09 | End: 2024-09-09 | Stop reason: SDUPTHER

## 2024-09-09 RX ORDER — OXYCODONE HYDROCHLORIDE 5 MG/1
2.5 TABLET ORAL EVERY 4 HOURS PRN
Status: DISCONTINUED | OUTPATIENT
Start: 2024-09-09 | End: 2024-09-10 | Stop reason: HOSPADM

## 2024-09-09 RX ORDER — ALPRAZOLAM 0.5 MG
0.5 TABLET ORAL 2 TIMES DAILY PRN
Status: DISCONTINUED | OUTPATIENT
Start: 2024-09-09 | End: 2024-09-10 | Stop reason: HOSPADM

## 2024-09-09 RX ORDER — BUSPIRONE HYDROCHLORIDE 15 MG/1
15 TABLET ORAL 2 TIMES DAILY
Status: DISCONTINUED | OUTPATIENT
Start: 2024-09-09 | End: 2024-09-10 | Stop reason: HOSPADM

## 2024-09-09 RX ORDER — SODIUM CHLORIDE 0.9 % (FLUSH) 0.9 %
5-40 SYRINGE (ML) INJECTION PRN
Status: DISCONTINUED | OUTPATIENT
Start: 2024-09-09 | End: 2024-09-09 | Stop reason: HOSPADM

## 2024-09-09 RX ORDER — DEXTROSE MONOHYDRATE 100 MG/ML
INJECTION, SOLUTION INTRAVENOUS CONTINUOUS PRN
Status: DISCONTINUED | OUTPATIENT
Start: 2024-09-09 | End: 2024-09-09 | Stop reason: HOSPADM

## 2024-09-09 RX ORDER — SODIUM CHLORIDE 0.9 % (FLUSH) 0.9 %
5-40 SYRINGE (ML) INJECTION EVERY 12 HOURS SCHEDULED
Status: DISCONTINUED | OUTPATIENT
Start: 2024-09-09 | End: 2024-09-09 | Stop reason: HOSPADM

## 2024-09-09 RX ORDER — CYCLOBENZAPRINE HCL 10 MG
10 TABLET ORAL 3 TIMES DAILY PRN
Status: DISCONTINUED | OUTPATIENT
Start: 2024-09-09 | End: 2024-09-10 | Stop reason: HOSPADM

## 2024-09-09 RX ORDER — IPRATROPIUM BROMIDE AND ALBUTEROL SULFATE 2.5; .5 MG/3ML; MG/3ML
1 SOLUTION RESPIRATORY (INHALATION)
Status: DISCONTINUED | OUTPATIENT
Start: 2024-09-09 | End: 2024-09-09 | Stop reason: HOSPADM

## 2024-09-09 RX ORDER — ONDANSETRON 2 MG/ML
4 INJECTION INTRAMUSCULAR; INTRAVENOUS
Status: DISCONTINUED | OUTPATIENT
Start: 2024-09-09 | End: 2024-09-09 | Stop reason: HOSPADM

## 2024-09-09 RX ORDER — MAGNESIUM HYDROXIDE 1200 MG/15ML
LIQUID ORAL CONTINUOUS PRN
Status: COMPLETED | OUTPATIENT
Start: 2024-09-09 | End: 2024-09-09

## 2024-09-09 RX ORDER — FENTANYL CITRATE 0.05 MG/ML
25 INJECTION, SOLUTION INTRAMUSCULAR; INTRAVENOUS EVERY 5 MIN PRN
Status: DISCONTINUED | OUTPATIENT
Start: 2024-09-09 | End: 2024-09-09 | Stop reason: HOSPADM

## 2024-09-09 RX ORDER — SODIUM CHLORIDE 0.9 % (FLUSH) 0.9 %
5-40 SYRINGE (ML) INJECTION PRN
Status: DISCONTINUED | OUTPATIENT
Start: 2024-09-09 | End: 2024-09-10 | Stop reason: HOSPADM

## 2024-09-09 RX ORDER — SENNA AND DOCUSATE SODIUM 50; 8.6 MG/1; MG/1
1 TABLET, FILM COATED ORAL 2 TIMES DAILY
Status: DISCONTINUED | OUTPATIENT
Start: 2024-09-09 | End: 2024-09-10 | Stop reason: HOSPADM

## 2024-09-09 RX ORDER — ATORVASTATIN CALCIUM 40 MG/1
40 TABLET, FILM COATED ORAL DAILY
Status: DISCONTINUED | OUTPATIENT
Start: 2024-09-09 | End: 2024-09-10 | Stop reason: HOSPADM

## 2024-09-09 RX ORDER — CELECOXIB 100 MG/1
200 CAPSULE ORAL ONCE
Status: COMPLETED | OUTPATIENT
Start: 2024-09-09 | End: 2024-09-09

## 2024-09-09 RX ORDER — SODIUM CHLORIDE 0.9 % (FLUSH) 0.9 %
5-40 SYRINGE (ML) INJECTION EVERY 12 HOURS SCHEDULED
Status: DISCONTINUED | OUTPATIENT
Start: 2024-09-09 | End: 2024-09-10 | Stop reason: HOSPADM

## 2024-09-09 RX ORDER — OXYCODONE HCL 10 MG/1
10 TABLET, FILM COATED, EXTENDED RELEASE ORAL ONCE
Status: COMPLETED | OUTPATIENT
Start: 2024-09-09 | End: 2024-09-09

## 2024-09-09 RX ORDER — FENTANYL 12.5 UG/1
1 PATCH TRANSDERMAL
Status: DISCONTINUED | OUTPATIENT
Start: 2024-09-09 | End: 2024-09-09 | Stop reason: HOSPADM

## 2024-09-09 RX ORDER — BUPIVACAINE HYDROCHLORIDE 5 MG/ML
INJECTION, SOLUTION EPIDURAL; INTRACAUDAL
Status: COMPLETED | OUTPATIENT
Start: 2024-09-09 | End: 2024-09-09

## 2024-09-09 RX ORDER — SODIUM CHLORIDE 9 MG/ML
INJECTION, SOLUTION INTRAVENOUS PRN
Status: DISCONTINUED | OUTPATIENT
Start: 2024-09-09 | End: 2024-09-09 | Stop reason: HOSPADM

## 2024-09-09 RX ORDER — ROPIVACAINE HYDROCHLORIDE 5 MG/ML
INJECTION, SOLUTION EPIDURAL; INFILTRATION; PERINEURAL
Status: COMPLETED | OUTPATIENT
Start: 2024-09-09 | End: 2024-09-09

## 2024-09-09 RX ORDER — OXYCODONE HYDROCHLORIDE 5 MG/1
5 TABLET ORAL EVERY 4 HOURS PRN
Status: DISCONTINUED | OUTPATIENT
Start: 2024-09-09 | End: 2024-09-10 | Stop reason: HOSPADM

## 2024-09-09 RX ORDER — MEPERIDINE HYDROCHLORIDE 25 MG/ML
12.5 INJECTION INTRAMUSCULAR; INTRAVENOUS; SUBCUTANEOUS EVERY 5 MIN PRN
Status: DISCONTINUED | OUTPATIENT
Start: 2024-09-09 | End: 2024-09-09 | Stop reason: HOSPADM

## 2024-09-09 RX ORDER — PANTOPRAZOLE SODIUM 40 MG/1
40 TABLET, DELAYED RELEASE ORAL DAILY
Status: DISCONTINUED | OUTPATIENT
Start: 2024-09-09 | End: 2024-09-10 | Stop reason: HOSPADM

## 2024-09-09 RX ORDER — LABETALOL HYDROCHLORIDE 5 MG/ML
10 INJECTION, SOLUTION INTRAVENOUS
Status: DISCONTINUED | OUTPATIENT
Start: 2024-09-09 | End: 2024-09-09 | Stop reason: HOSPADM

## 2024-09-09 RX ORDER — ACETAMINOPHEN 500 MG
1000 TABLET ORAL ONCE
Status: COMPLETED | OUTPATIENT
Start: 2024-09-09 | End: 2024-09-09

## 2024-09-09 RX ORDER — GLUCAGON 1 MG/ML
1 KIT INJECTION PRN
Status: DISCONTINUED | OUTPATIENT
Start: 2024-09-09 | End: 2024-09-09 | Stop reason: HOSPADM

## 2024-09-09 RX ORDER — ACETAMINOPHEN 325 MG/1
650 TABLET ORAL EVERY 6 HOURS
Status: DISCONTINUED | OUTPATIENT
Start: 2024-09-09 | End: 2024-09-10 | Stop reason: HOSPADM

## 2024-09-09 RX ORDER — ONDANSETRON 2 MG/ML
4 INJECTION INTRAMUSCULAR; INTRAVENOUS EVERY 6 HOURS PRN
Status: DISCONTINUED | OUTPATIENT
Start: 2024-09-09 | End: 2024-09-10 | Stop reason: HOSPADM

## 2024-09-09 RX ORDER — PROPOFOL 10 MG/ML
INJECTION, EMULSION INTRAVENOUS CONTINUOUS PRN
Status: DISCONTINUED | OUTPATIENT
Start: 2024-09-09 | End: 2024-09-09 | Stop reason: SDUPTHER

## 2024-09-09 RX ORDER — ONDANSETRON 4 MG/1
4 TABLET, ORALLY DISINTEGRATING ORAL EVERY 8 HOURS PRN
Status: DISCONTINUED | OUTPATIENT
Start: 2024-09-09 | End: 2024-09-10 | Stop reason: HOSPADM

## 2024-09-09 RX ORDER — GABAPENTIN 300 MG/1
300 CAPSULE ORAL 3 TIMES DAILY
Status: DISCONTINUED | OUTPATIENT
Start: 2024-09-09 | End: 2024-09-10 | Stop reason: HOSPADM

## 2024-09-09 RX ORDER — METOCLOPRAMIDE HYDROCHLORIDE 5 MG/ML
10 INJECTION INTRAMUSCULAR; INTRAVENOUS
Status: DISCONTINUED | OUTPATIENT
Start: 2024-09-09 | End: 2024-09-09 | Stop reason: HOSPADM

## 2024-09-09 RX ORDER — HYDRALAZINE HYDROCHLORIDE 20 MG/ML
10 INJECTION INTRAMUSCULAR; INTRAVENOUS
Status: DISCONTINUED | OUTPATIENT
Start: 2024-09-09 | End: 2024-09-09 | Stop reason: HOSPADM

## 2024-09-09 RX ORDER — NALOXONE HYDROCHLORIDE 0.4 MG/ML
INJECTION, SOLUTION INTRAMUSCULAR; INTRAVENOUS; SUBCUTANEOUS PRN
Status: DISCONTINUED | OUTPATIENT
Start: 2024-09-09 | End: 2024-09-09 | Stop reason: HOSPADM

## 2024-09-09 RX ORDER — BUDESONIDE AND FORMOTEROL FUMARATE DIHYDRATE 80; 4.5 UG/1; UG/1
2 AEROSOL RESPIRATORY (INHALATION)
Status: DISCONTINUED | OUTPATIENT
Start: 2024-09-09 | End: 2024-09-10 | Stop reason: HOSPADM

## 2024-09-09 RX ORDER — KETOROLAC TROMETHAMINE 15 MG/ML
7.5 INJECTION, SOLUTION INTRAMUSCULAR; INTRAVENOUS EVERY 6 HOURS
Status: COMPLETED | OUTPATIENT
Start: 2024-09-09 | End: 2024-09-10

## 2024-09-09 RX ORDER — SODIUM CHLORIDE, SODIUM LACTATE, POTASSIUM CHLORIDE, CALCIUM CHLORIDE 600; 310; 30; 20 MG/100ML; MG/100ML; MG/100ML; MG/100ML
INJECTION, SOLUTION INTRAVENOUS CONTINUOUS
Status: DISCONTINUED | OUTPATIENT
Start: 2024-09-09 | End: 2024-09-09 | Stop reason: HOSPADM

## 2024-09-09 RX ADMIN — CEFAZOLIN 2000 MG: 2 INJECTION, POWDER, FOR SOLUTION INTRAMUSCULAR; INTRAVENOUS at 18:21

## 2024-09-09 RX ADMIN — GABAPENTIN 300 MG: 300 CAPSULE ORAL at 13:44

## 2024-09-09 RX ADMIN — BUPROPION HYDROCHLORIDE 100 MG: 100 TABLET, EXTENDED RELEASE ORAL at 13:44

## 2024-09-09 RX ADMIN — Medication 0.1 MG: at 12:03

## 2024-09-09 RX ADMIN — ASPIRIN 81 MG: 81 TABLET, COATED ORAL at 13:44

## 2024-09-09 RX ADMIN — SODIUM CHLORIDE: 9 INJECTION, SOLUTION INTRAVENOUS at 13:50

## 2024-09-09 RX ADMIN — Medication 0.1 MG: at 11:44

## 2024-09-09 RX ADMIN — ATORVASTATIN CALCIUM 40 MG: 40 TABLET, FILM COATED ORAL at 13:45

## 2024-09-09 RX ADMIN — SENNOSIDES AND DOCUSATE SODIUM 1 TABLET: 50; 8.6 TABLET ORAL at 20:10

## 2024-09-09 RX ADMIN — CEFAZOLIN 2000 MG: 2 INJECTION, POWDER, FOR SOLUTION INTRAMUSCULAR; INTRAVENOUS at 11:01

## 2024-09-09 RX ADMIN — PANTOPRAZOLE SODIUM 40 MG: 40 TABLET, DELAYED RELEASE ORAL at 13:44

## 2024-09-09 RX ADMIN — KETOROLAC TROMETHAMINE 7.5 MG: 15 INJECTION, SOLUTION INTRAMUSCULAR; INTRAVENOUS at 16:39

## 2024-09-09 RX ADMIN — Medication 0.1 MG: at 11:05

## 2024-09-09 RX ADMIN — Medication 0.2 MG: at 11:15

## 2024-09-09 RX ADMIN — MIDAZOLAM HYDROCHLORIDE 2 MG: 2 INJECTION, SOLUTION INTRAMUSCULAR; INTRAVENOUS at 10:40

## 2024-09-09 RX ADMIN — OXYCODONE 5 MG: 5 TABLET ORAL at 13:45

## 2024-09-09 RX ADMIN — OXYCODONE 5 MG: 5 TABLET ORAL at 18:14

## 2024-09-09 RX ADMIN — BUSPIRONE HYDROCHLORIDE 15 MG: 15 TABLET ORAL at 13:45

## 2024-09-09 RX ADMIN — ROPIVACAINE HYDROCHLORIDE 25 ML: 5 INJECTION, SOLUTION EPIDURAL; INFILTRATION; PERINEURAL at 10:41

## 2024-09-09 RX ADMIN — Medication 0.1 MG: at 11:51

## 2024-09-09 RX ADMIN — PROPOFOL 100 MCG/KG/MIN: 10 INJECTION, EMULSION INTRAVENOUS at 10:52

## 2024-09-09 RX ADMIN — HYDROXYZINE HYDROCHLORIDE 10 MG: 10 TABLET ORAL at 20:10

## 2024-09-09 RX ADMIN — ASPIRIN 81 MG: 81 TABLET, COATED ORAL at 20:09

## 2024-09-09 RX ADMIN — CELECOXIB 200 MG: 100 CAPSULE ORAL at 09:17

## 2024-09-09 RX ADMIN — Medication 0.1 MG: at 11:35

## 2024-09-09 RX ADMIN — BUSPIRONE HYDROCHLORIDE 15 MG: 15 TABLET ORAL at 20:10

## 2024-09-09 RX ADMIN — OXYCODONE HYDROCHLORIDE 10 MG: 10 TABLET, FILM COATED, EXTENDED RELEASE ORAL at 09:17

## 2024-09-09 RX ADMIN — ACETAMINOPHEN 1000 MG: 500 TABLET ORAL at 09:18

## 2024-09-09 RX ADMIN — SENNOSIDES AND DOCUSATE SODIUM 1 TABLET: 50; 8.6 TABLET ORAL at 13:44

## 2024-09-09 RX ADMIN — MORPHINE SULFATE 2 MG: 2 INJECTION, SOLUTION INTRAMUSCULAR; INTRAVENOUS at 23:27

## 2024-09-09 RX ADMIN — BUPIVACAINE HYDROCHLORIDE 10 MG: 5 INJECTION, SOLUTION EPIDURAL; INTRACAUDAL; PERINEURAL at 10:46

## 2024-09-09 RX ADMIN — KETOROLAC TROMETHAMINE 7.5 MG: 15 INJECTION, SOLUTION INTRAMUSCULAR; INTRAVENOUS at 23:26

## 2024-09-09 RX ADMIN — GABAPENTIN 300 MG: 300 CAPSULE ORAL at 20:10

## 2024-09-09 RX ADMIN — SODIUM CHLORIDE, POTASSIUM CHLORIDE, SODIUM LACTATE AND CALCIUM CHLORIDE: 600; 310; 30; 20 INJECTION, SOLUTION INTRAVENOUS at 09:26

## 2024-09-09 RX ADMIN — ACETAMINOPHEN 650 MG: 325 TABLET ORAL at 15:00

## 2024-09-09 RX ADMIN — Medication 0.1 MG: at 11:24

## 2024-09-09 RX ADMIN — Medication 0.1 MG: at 11:12

## 2024-09-09 RX ADMIN — TRANEXAMIC ACID 1000 MG: 10 INJECTION, SOLUTION INTRAVENOUS at 11:45

## 2024-09-09 RX ADMIN — Medication 0.1 MG: at 12:11

## 2024-09-09 RX ADMIN — ACETAMINOPHEN 650 MG: 325 TABLET ORAL at 20:09

## 2024-09-09 RX ADMIN — HYDROXYZINE HYDROCHLORIDE 10 MG: 10 TABLET ORAL at 13:45

## 2024-09-09 RX ADMIN — OXYCODONE 5 MG: 5 TABLET ORAL at 22:16

## 2024-09-09 RX ADMIN — MORPHINE SULFATE 2 MG: 2 INJECTION, SOLUTION INTRAMUSCULAR; INTRAVENOUS at 16:39

## 2024-09-09 RX ADMIN — BUDESONIDE AND FORMOTEROL FUMARATE DIHYDRATE 2 PUFF: 80; 4.5 AEROSOL RESPIRATORY (INHALATION) at 18:07

## 2024-09-09 RX ADMIN — TRANEXAMIC ACID 1000 MG: 10 INJECTION, SOLUTION INTRAVENOUS at 11:03

## 2024-09-09 ASSESSMENT — PAIN SCALES - GENERAL
PAINLEVEL_OUTOF10: 6
PAINLEVEL_OUTOF10: 0
PAINLEVEL_OUTOF10: 0
PAINLEVEL_OUTOF10: 6
PAINLEVEL_OUTOF10: 0
PAINLEVEL_OUTOF10: 3
PAINLEVEL_OUTOF10: 5
PAINLEVEL_OUTOF10: 0
PAINLEVEL_OUTOF10: 4
PAINLEVEL_OUTOF10: 5
PAINLEVEL_OUTOF10: 0
PAINLEVEL_OUTOF10: 6
PAINLEVEL_OUTOF10: 4
PAINLEVEL_OUTOF10: 0
PAINLEVEL_OUTOF10: 0
PAINLEVEL_OUTOF10: 6
PAINLEVEL_OUTOF10: 6
PAINLEVEL_OUTOF10: 4
PAINLEVEL_OUTOF10: 0

## 2024-09-09 ASSESSMENT — PAIN DESCRIPTION - LOCATION
LOCATION: KNEE

## 2024-09-09 ASSESSMENT — PAIN DESCRIPTION - ORIENTATION
ORIENTATION: RIGHT

## 2024-09-09 ASSESSMENT — PAIN SCALES - WONG BAKER
WONGBAKER_NUMERICALRESPONSE: HURTS A LITTLE BIT
WONGBAKER_NUMERICALRESPONSE: HURTS A LITTLE BIT

## 2024-09-09 ASSESSMENT — PAIN DESCRIPTION - DESCRIPTORS
DESCRIPTORS: THROBBING

## 2024-09-09 ASSESSMENT — PAIN - FUNCTIONAL ASSESSMENT: PAIN_FUNCTIONAL_ASSESSMENT: 0-10

## 2024-09-10 VITALS
SYSTOLIC BLOOD PRESSURE: 138 MMHG | BODY MASS INDEX: 29.45 KG/M2 | HEIGHT: 60 IN | TEMPERATURE: 98.4 F | WEIGHT: 150 LBS | HEART RATE: 87 BPM | OXYGEN SATURATION: 96 % | DIASTOLIC BLOOD PRESSURE: 52 MMHG | RESPIRATION RATE: 18 BRPM

## 2024-09-10 LAB
ANION GAP SERPL CALCULATED.3IONS-SCNC: 7 MEQ/L (ref 9–15)
BUN SERPL-MCNC: 15 MG/DL (ref 8–23)
CALCIUM SERPL-MCNC: 8.8 MG/DL (ref 8.5–9.9)
CHLORIDE SERPL-SCNC: 104 MEQ/L (ref 95–107)
CO2 SERPL-SCNC: 24 MEQ/L (ref 20–31)
CREAT SERPL-MCNC: 0.55 MG/DL (ref 0.5–0.9)
ERYTHROCYTE [DISTWIDTH] IN BLOOD BY AUTOMATED COUNT: 12.9 % (ref 11.7–14.4)
GLUCOSE SERPL-MCNC: 116 MG/DL (ref 70–99)
HCT VFR BLD AUTO: 27.5 % (ref 37–47)
HGB BLD-MCNC: 8.9 G/DL (ref 11.2–15.7)
MCH RBC QN AUTO: 30.2 PG (ref 25.6–32.2)
MCHC RBC AUTO-ENTMCNC: 32.4 % (ref 32.2–35.5)
MCV RBC AUTO: 93.2 FL (ref 79.4–94.8)
PLATELET # BLD AUTO: 271 K/UL (ref 182–369)
POTASSIUM SERPL-SCNC: 4.2 MEQ/L (ref 3.4–4.9)
RBC # BLD AUTO: 2.95 M/UL (ref 3.93–5.22)
SODIUM SERPL-SCNC: 135 MEQ/L (ref 135–144)
WBC # BLD AUTO: 6.4 K/UL (ref 4–10)

## 2024-09-10 PROCEDURE — G0378 HOSPITAL OBSERVATION PER HR: HCPCS

## 2024-09-10 PROCEDURE — 85027 COMPLETE CBC AUTOMATED: CPT

## 2024-09-10 PROCEDURE — 6370000000 HC RX 637 (ALT 250 FOR IP): Performed by: INTERNAL MEDICINE

## 2024-09-10 PROCEDURE — 96376 TX/PRO/DX INJ SAME DRUG ADON: CPT

## 2024-09-10 PROCEDURE — 6370000000 HC RX 637 (ALT 250 FOR IP): Performed by: NURSE PRACTITIONER

## 2024-09-10 PROCEDURE — 96375 TX/PRO/DX INJ NEW DRUG ADDON: CPT

## 2024-09-10 PROCEDURE — 97116 GAIT TRAINING THERAPY: CPT

## 2024-09-10 PROCEDURE — 97530 THERAPEUTIC ACTIVITIES: CPT

## 2024-09-10 PROCEDURE — 80048 BASIC METABOLIC PNL TOTAL CA: CPT

## 2024-09-10 PROCEDURE — 97110 THERAPEUTIC EXERCISES: CPT

## 2024-09-10 PROCEDURE — 36415 COLL VENOUS BLD VENIPUNCTURE: CPT

## 2024-09-10 PROCEDURE — 2580000003 HC RX 258: Performed by: NURSE PRACTITIONER

## 2024-09-10 PROCEDURE — 97535 SELF CARE MNGMENT TRAINING: CPT

## 2024-09-10 PROCEDURE — 97166 OT EVAL MOD COMPLEX 45 MIN: CPT

## 2024-09-10 PROCEDURE — 6360000002 HC RX W HCPCS: Performed by: NURSE PRACTITIONER

## 2024-09-10 PROCEDURE — 96374 THER/PROPH/DIAG INJ IV PUSH: CPT

## 2024-09-10 PROCEDURE — 94640 AIRWAY INHALATION TREATMENT: CPT

## 2024-09-10 RX ORDER — HYDROXYZINE HYDROCHLORIDE 10 MG/1
10 TABLET, FILM COATED ORAL EVERY 8 HOURS PRN
Qty: 15 TABLET | Refills: 0 | Status: SHIPPED | OUTPATIENT
Start: 2024-09-10 | End: 2024-09-15

## 2024-09-10 RX ORDER — CYCLOBENZAPRINE HCL 10 MG
10 TABLET ORAL 3 TIMES DAILY PRN
Qty: 30 TABLET | Refills: 0 | Status: SHIPPED | OUTPATIENT
Start: 2024-09-10 | End: 2024-09-20

## 2024-09-10 RX ORDER — SENNA AND DOCUSATE SODIUM 50; 8.6 MG/1; MG/1
1 TABLET, FILM COATED ORAL 2 TIMES DAILY
Qty: 60 TABLET | Refills: 0 | Status: SHIPPED | OUTPATIENT
Start: 2024-09-10 | End: 2024-10-10

## 2024-09-10 RX ORDER — POLYETHYLENE GLYCOL 3350 17 G/17G
17 POWDER, FOR SOLUTION ORAL DAILY
Status: DISCONTINUED | OUTPATIENT
Start: 2024-09-10 | End: 2024-09-10 | Stop reason: HOSPADM

## 2024-09-10 RX ORDER — ASPIRIN 81 MG/1
81 TABLET ORAL 2 TIMES DAILY
Qty: 60 TABLET | Refills: 0 | Status: SHIPPED | OUTPATIENT
Start: 2024-09-10 | End: 2024-10-10

## 2024-09-10 RX ADMIN — SENNOSIDES AND DOCUSATE SODIUM 1 TABLET: 50; 8.6 TABLET ORAL at 08:17

## 2024-09-10 RX ADMIN — ATORVASTATIN CALCIUM 40 MG: 40 TABLET, FILM COATED ORAL at 08:17

## 2024-09-10 RX ADMIN — SODIUM CHLORIDE: 9 INJECTION, SOLUTION INTRAVENOUS at 02:41

## 2024-09-10 RX ADMIN — MORPHINE SULFATE 2 MG: 2 INJECTION, SOLUTION INTRAMUSCULAR; INTRAVENOUS at 03:51

## 2024-09-10 RX ADMIN — BUSPIRONE HYDROCHLORIDE 15 MG: 15 TABLET ORAL at 08:16

## 2024-09-10 RX ADMIN — ACETAMINOPHEN 650 MG: 325 TABLET ORAL at 02:37

## 2024-09-10 RX ADMIN — CYCLOBENZAPRINE 10 MG: 10 TABLET, FILM COATED ORAL at 11:11

## 2024-09-10 RX ADMIN — KETOROLAC TROMETHAMINE 7.5 MG: 15 INJECTION, SOLUTION INTRAMUSCULAR; INTRAVENOUS at 11:11

## 2024-09-10 RX ADMIN — ONDANSETRON 4 MG: 4 TABLET, ORALLY DISINTEGRATING ORAL at 08:16

## 2024-09-10 RX ADMIN — OXYCODONE 5 MG: 5 TABLET ORAL at 02:37

## 2024-09-10 RX ADMIN — GABAPENTIN 300 MG: 300 CAPSULE ORAL at 08:16

## 2024-09-10 RX ADMIN — KETOROLAC TROMETHAMINE 7.5 MG: 15 INJECTION, SOLUTION INTRAMUSCULAR; INTRAVENOUS at 05:18

## 2024-09-10 RX ADMIN — HYDROXYZINE HYDROCHLORIDE 10 MG: 10 TABLET ORAL at 08:16

## 2024-09-10 RX ADMIN — ACETAMINOPHEN 650 MG: 325 TABLET ORAL at 08:16

## 2024-09-10 RX ADMIN — BUPROPION HYDROCHLORIDE 100 MG: 100 TABLET, EXTENDED RELEASE ORAL at 08:16

## 2024-09-10 RX ADMIN — OXYCODONE 5 MG: 5 TABLET ORAL at 07:03

## 2024-09-10 RX ADMIN — ASPIRIN 81 MG: 81 TABLET, COATED ORAL at 08:16

## 2024-09-10 RX ADMIN — POLYETHYLENE GLYCOL 3350 17 G: 17 POWDER, FOR SOLUTION ORAL at 09:40

## 2024-09-10 RX ADMIN — MORPHINE SULFATE 2 MG: 2 INJECTION, SOLUTION INTRAMUSCULAR; INTRAVENOUS at 08:17

## 2024-09-10 RX ADMIN — PANTOPRAZOLE SODIUM 40 MG: 40 TABLET, DELAYED RELEASE ORAL at 08:17

## 2024-09-10 RX ADMIN — CEFAZOLIN 2000 MG: 2 INJECTION, POWDER, FOR SOLUTION INTRAMUSCULAR; INTRAVENOUS at 02:40

## 2024-09-10 RX ADMIN — BUDESONIDE AND FORMOTEROL FUMARATE DIHYDRATE 2 PUFF: 80; 4.5 AEROSOL RESPIRATORY (INHALATION) at 06:06

## 2024-09-10 ASSESSMENT — PAIN DESCRIPTION - LOCATION
LOCATION: KNEE

## 2024-09-10 ASSESSMENT — PAIN SCALES - GENERAL
PAINLEVEL_OUTOF10: 9
PAINLEVEL_OUTOF10: 7
PAINLEVEL_OUTOF10: 9
PAINLEVEL_OUTOF10: 5
PAINLEVEL_OUTOF10: 6
PAINLEVEL_OUTOF10: 6
PAINLEVEL_OUTOF10: 7
PAINLEVEL_OUTOF10: 7
PAINLEVEL_OUTOF10: 6
PAINLEVEL_OUTOF10: 6
PAINLEVEL_OUTOF10: 7

## 2024-09-10 ASSESSMENT — PAIN DESCRIPTION - ORIENTATION
ORIENTATION: MID
ORIENTATION: RIGHT
ORIENTATION: RIGHT
ORIENTATION: MID
ORIENTATION: MID
ORIENTATION: RIGHT
ORIENTATION: RIGHT

## 2024-09-10 ASSESSMENT — PAIN SCALES - WONG BAKER
WONGBAKER_NUMERICALRESPONSE: HURTS A LITTLE BIT
WONGBAKER_NUMERICALRESPONSE: HURTS A LITTLE BIT

## 2024-09-10 ASSESSMENT — PAIN DESCRIPTION - DESCRIPTORS
DESCRIPTORS: THROBBING
DESCRIPTORS: ACHING
DESCRIPTORS: THROBBING

## 2024-09-12 ENCOUNTER — APPOINTMENT (OUTPATIENT)
Dept: PRIMARY CARE | Facility: CLINIC | Age: 69
End: 2024-09-12
Payer: MEDICARE

## 2024-09-12 DIAGNOSIS — G89.4 CHRONIC PAIN SYNDROME: ICD-10-CM

## 2024-09-13 RX ORDER — OXYCODONE AND ACETAMINOPHEN 7.5; 325 MG/1; MG/1
1 TABLET ORAL EVERY 6 HOURS PRN
Qty: 120 TABLET | Refills: 0 | Status: SHIPPED | OUTPATIENT
Start: 2024-09-13 | End: 2024-10-13

## 2024-09-13 RX ORDER — GABAPENTIN 300 MG/1
300 CAPSULE ORAL 3 TIMES DAILY
Qty: 90 CAPSULE | Refills: 0 | Status: SHIPPED | OUTPATIENT
Start: 2024-09-13 | End: 2024-10-13

## 2024-09-25 ENCOUNTER — APPOINTMENT (OUTPATIENT)
Dept: PULMONOLOGY | Facility: CLINIC | Age: 69
End: 2024-09-25
Payer: MEDICARE

## 2024-09-26 DIAGNOSIS — M47.817 LUMBOSACRAL SPONDYLOSIS WITHOUT MYELOPATHY: ICD-10-CM

## 2024-09-26 DIAGNOSIS — M46.1 SACROILIITIS, NOT ELSEWHERE CLASSIFIED (HCC): ICD-10-CM

## 2024-09-26 RX ORDER — TIZANIDINE 2 MG/1
2 TABLET ORAL NIGHTLY PRN
Qty: 30 TABLET | Refills: 0 | Status: SHIPPED | OUTPATIENT
Start: 2024-09-26

## 2024-10-01 ENCOUNTER — OFFICE VISIT (OUTPATIENT)
Dept: ORTHOPEDIC SURGERY | Age: 69
End: 2024-10-01
Payer: MEDICARE

## 2024-10-01 VITALS
WEIGHT: 140 LBS | OXYGEN SATURATION: 99 % | HEART RATE: 92 BPM | TEMPERATURE: 96.8 F | BODY MASS INDEX: 27.48 KG/M2 | HEIGHT: 60 IN

## 2024-10-01 DIAGNOSIS — M17.12 PRIMARY OSTEOARTHRITIS OF LEFT KNEE: ICD-10-CM

## 2024-10-01 DIAGNOSIS — Z96.651 STATUS POST TOTAL RIGHT KNEE REPLACEMENT: Primary | ICD-10-CM

## 2024-10-01 DIAGNOSIS — M47.817 LUMBOSACRAL SPONDYLOSIS WITHOUT MYELOPATHY: ICD-10-CM

## 2024-10-01 DIAGNOSIS — M46.1 SACROILIITIS, NOT ELSEWHERE CLASSIFIED (HCC): ICD-10-CM

## 2024-10-01 PROCEDURE — 99024 POSTOP FOLLOW-UP VISIT: CPT | Performed by: PHYSICIAN ASSISTANT

## 2024-10-01 PROCEDURE — 20610 DRAIN/INJ JOINT/BURSA W/O US: CPT | Performed by: PHYSICIAN ASSISTANT

## 2024-10-01 RX ORDER — TIZANIDINE 2 MG/1
2 TABLET ORAL NIGHTLY PRN
Qty: 30 TABLET | Refills: 0 | Status: SHIPPED | OUTPATIENT
Start: 2024-10-01

## 2024-10-01 RX ORDER — TRIAMCINOLONE ACETONIDE 40 MG/ML
80 INJECTION, SUSPENSION INTRA-ARTICULAR; INTRAMUSCULAR ONCE
Status: COMPLETED | OUTPATIENT
Start: 2024-10-01 | End: 2024-10-01

## 2024-10-01 RX ORDER — LIDOCAINE HYDROCHLORIDE 10 MG/ML
8 INJECTION, SOLUTION INFILTRATION; PERINEURAL ONCE
Status: COMPLETED | OUTPATIENT
Start: 2024-10-01 | End: 2024-10-01

## 2024-10-01 RX ORDER — HYDROXYZINE HYDROCHLORIDE 25 MG/1
25 TABLET, FILM COATED ORAL 2 TIMES DAILY PRN
Qty: 60 TABLET | Refills: 0 | Status: SHIPPED | OUTPATIENT
Start: 2024-10-01 | End: 2024-10-31

## 2024-10-01 RX ADMIN — LIDOCAINE HYDROCHLORIDE 8 ML: 10 INJECTION, SOLUTION INFILTRATION; PERINEURAL at 14:03

## 2024-10-01 RX ADMIN — TRIAMCINOLONE ACETONIDE 80 MG: 40 INJECTION, SUSPENSION INTRA-ARTICULAR; INTRAMUSCULAR at 14:04

## 2024-10-01 NOTE — PROGRESS NOTES
Narrative Referring Provider:   Bebo Junior MD      PCP:   Queenie Yost MD    ============================  IMPRESSION/PLAN:  ============================  Mariaelena Colby is s/p right Total knee replacement completed on 2024.    IMPRESSION: At normal postoperative stage of recovery    PLAN:  Rest, Ice, Compression, Elevation PRN. and Continue physical therapy.  Routine follow-up.  Ice compression and elevation  Patient Reassurance: Normal post-operative course discussed with patient.  Patient reassured and supported. All questions answered.    Follow [id number]  No X-Rays Needed    Patient presents today for a a routine 1st post-op visit    Status post op:  right Total knee replacement     BMI: There is no height or weight on file to calculate BMI.    Post-operative recovery was complicated by uneventful/none.    Patient rates their condition as improving.     Does the patient still experience pain? 4/10 pain, aching    Post Op discharge patient location: in home.   Functional Assessment is as follows: is ready to begin to begin outpatient PT.  Functional difficulties: None.  Pain Medication: Tylenol, Oxycodone  Currently Ambulating with: cane    =================================  EXAM: POST OP KNEE  =================================  RightPost-Operative Knee    Ambulates with a limp favoring the Right    SKIN: Appropriate postop appearance, No evidence of erythema, warmth, discharge or drainage and Incision clean/dry/intact.    Range of motion is 2 degrees in extension and   110 degrees of flexion.    Extension La degrees    Pain with ROM: Yes with deeper flexion    There is mild effusion.     Mal-alignment: No    Tender to the palpation of Medialfemoral condyle and Medial joint line    Neurovascular Status: Sensation Intact, Moves foot and ankle up & down, 2+ dorsalis pedis and negative homans sign    Stability:Varus/Valgus- Yes, stable    Quad strength: improving  Left knee-joint

## 2024-10-10 ENCOUNTER — OFFICE VISIT (OUTPATIENT)
Age: 69
End: 2024-10-10
Payer: MEDICARE

## 2024-10-10 VITALS
SYSTOLIC BLOOD PRESSURE: 126 MMHG | HEIGHT: 60 IN | BODY MASS INDEX: 28.47 KG/M2 | DIASTOLIC BLOOD PRESSURE: 80 MMHG | WEIGHT: 145 LBS | TEMPERATURE: 97.4 F

## 2024-10-10 DIAGNOSIS — G89.4 CHRONIC PAIN SYNDROME: ICD-10-CM

## 2024-10-10 DIAGNOSIS — M70.61 TROCHANTERIC BURSITIS, RIGHT HIP: ICD-10-CM

## 2024-10-10 DIAGNOSIS — M47.817 LUMBOSACRAL SPONDYLOSIS WITHOUT MYELOPATHY: Primary | ICD-10-CM

## 2024-10-10 DIAGNOSIS — Z96.651 STATUS POST RIGHT KNEE REPLACEMENT: ICD-10-CM

## 2024-10-10 DIAGNOSIS — M48.061 SPINAL STENOSIS, LUMBAR REGION, WITHOUT NEUROGENIC CLAUDICATION: ICD-10-CM

## 2024-10-10 DIAGNOSIS — Z98.1 S/P LUMBAR AND LUMBOSACRAL FUSION BY ANTERIOR TECHNIQUE: ICD-10-CM

## 2024-10-10 DIAGNOSIS — M17.11 PRIMARY OSTEOARTHRITIS OF RIGHT KNEE: ICD-10-CM

## 2024-10-10 DIAGNOSIS — Z79.891 ENCOUNTER FOR LONG-TERM OPIATE ANALGESIC USE: ICD-10-CM

## 2024-10-10 DIAGNOSIS — F11.20 OPIOID DEPENDENCE WITH CURRENT USE (HCC): ICD-10-CM

## 2024-10-10 PROCEDURE — 3017F COLORECTAL CA SCREEN DOC REV: CPT | Performed by: NURSE PRACTITIONER

## 2024-10-10 PROCEDURE — 99214 OFFICE O/P EST MOD 30 MIN: CPT | Performed by: NURSE PRACTITIONER

## 2024-10-10 PROCEDURE — G8484 FLU IMMUNIZE NO ADMIN: HCPCS | Performed by: NURSE PRACTITIONER

## 2024-10-10 PROCEDURE — 1036F TOBACCO NON-USER: CPT | Performed by: NURSE PRACTITIONER

## 2024-10-10 PROCEDURE — 1123F ACP DISCUSS/DSCN MKR DOCD: CPT | Performed by: NURSE PRACTITIONER

## 2024-10-10 PROCEDURE — G8400 PT W/DXA NO RESULTS DOC: HCPCS | Performed by: NURSE PRACTITIONER

## 2024-10-10 PROCEDURE — G8427 DOCREV CUR MEDS BY ELIG CLIN: HCPCS | Performed by: NURSE PRACTITIONER

## 2024-10-10 PROCEDURE — 1090F PRES/ABSN URINE INCON ASSESS: CPT | Performed by: NURSE PRACTITIONER

## 2024-10-10 PROCEDURE — G8417 CALC BMI ABV UP PARAM F/U: HCPCS | Performed by: NURSE PRACTITIONER

## 2024-10-10 RX ORDER — OXYCODONE AND ACETAMINOPHEN 7.5; 325 MG/1; MG/1
1 TABLET ORAL EVERY 6 HOURS PRN
Qty: 110 TABLET | Refills: 0 | Status: SHIPPED | OUTPATIENT
Start: 2024-10-13 | End: 2024-11-12

## 2024-10-10 ASSESSMENT — ENCOUNTER SYMPTOMS
BACK PAIN: 1
DIARRHEA: 0
RESPIRATORY NEGATIVE: 1
CONSTIPATION: 0

## 2024-10-10 NOTE — PROGRESS NOTES
Patient: Mariaelena Colby  YOB: 1955  Date: 10/10/24        Subjective:     Mariaelena Colby is a 69 y.o. female who complains today of:    Chief Complaint   Patient presents with    Knee Pain     right    Follow-up         Allergies:  Patient has no known allergies.    Past Medical History:   Diagnosis Date    Anxiety     CHF (congestive heart failure) (HCC)     Chronic back pain 20 years ago    Coronary artery disease involving native coronary artery of native heart without angina pectoris 12/11/2018    Hyperlipidemia     meds > 5 yrs    Osteoarthritis     Restless legs syndrome 20 years ago     Past Surgical History:   Procedure Laterality Date    BACK SURGERY  2017    lumbar disc OR    COLONOSCOPY      COSMETIC SURGERY  Feb 2024    Sonobello (abdomen)    DILATION AND CURETTAGE OF UTERUS      at age 30s--miscarriage    HERNIA REPAIR  Feb 2023    LUMBAR FUSION Left 08/12/2020    ANTERIOR LEFT RETROPERITONEAL L5-S1 DISKECTOMY INTERBODY CAGE FUSION performed by Ines Mendiola MD at [facility] [place]    TONSILLECTOMY      as child    TOTAL KNEE ARTHROPLASTY Right 9/9/2024    Right total knee arthroplasty performed by Bebo Junior MD at [facility] OR    TRACHEAL SURGERY  2010    in [place]--EMT attempted to intubate patient due to trouble breathing & then needed repair     Family History   Problem Relation Age of Onset    Diabetes Mother     Obesity Mother     High Cholesterol Mother         She’s passed    High Cholesterol Father     Other Sister         GSW to spine & paralysis    No Known Problems Brother     No Known Problems Son     No Known Problems Daughter     Other Brother         as infant     Social History     Socioeconomic History    Marital status: Single     Spouse name: Not on file    Number of children: Not on file    Years of education: Not on file    Highest education level: Not on file   Occupational History    Not on file   Tobacco Use    Smoking status: Former     Current packs/day: 0.00

## 2024-10-14 ENCOUNTER — HOSPITAL ENCOUNTER (OUTPATIENT)
Dept: PHYSICAL THERAPY | Age: 69
Setting detail: THERAPIES SERIES
Discharge: HOME | End: 2024-10-14
Payer: MEDICARE

## 2024-10-14 PROCEDURE — 97162 PT EVAL MOD COMPLEX 30 MIN: CPT

## 2024-10-14 PROCEDURE — 97140 MANUAL THERAPY 1/> REGIONS: CPT

## 2024-10-14 PROCEDURE — 97110 THERAPEUTIC EXERCISES: CPT

## 2024-10-14 ASSESSMENT — PAIN DESCRIPTION - LOCATION: LOCATION: KNEE

## 2024-10-14 ASSESSMENT — PAIN DESCRIPTION - ORIENTATION: ORIENTATION: RIGHT

## 2024-10-14 ASSESSMENT — PAIN SCALES - GENERAL: PAINLEVEL_OUTOF10: 0

## 2024-10-14 NOTE — PROGRESS NOTES
Physical Therapy: Initial Evaluation    Patient: Mariaelena Colby (69 y.o. female)   Examination Date: 10/14/2024  Plan of Care Certification Period: 10/14/2024 to 24      :  1955 ;    Confirmed: Yes MRN: [id number]  CSN: [id number]   Insurance: Payor: MEDICARE / Plan: MEDICARE PART A AND B / Product Type: *No Product type* /   Insurance ID: [id number] - (Medicare) Secondary Insurance (if applicable): [employer]   Referring Physician: Butch Penn PA Dan Basinski PA, Dr. Junior   PCP: Queenie Yost MD Visits to Date/Visits Approved: 1 / 10    No Show/Cancelled Appts: 0      Medical Diagnosis: Status post total right knee replacement [Z96.651] S/P R TKA  Treatment Diagnosis: limited ROM, weakness S/P R TKA     PERTINENT MEDICAL HISTORY      Self reported health status:: Good    Medical History: Chart Reviewed: Yes   Past Medical History:   Diagnosis Date    Anxiety     CHF (congestive heart failure) (HCC)     Chronic back pain 20 years ago    Coronary artery disease involving native coronary artery of native heart without angina pectoris 2018    Hyperlipidemia     meds > 5 yrs    Osteoarthritis     Restless legs syndrome 20 years ago     Surgical History:   Past Surgical History:   Procedure Laterality Date    BACK SURGERY      lumbar disc OR    COLONOSCOPY      COSMETIC SURGERY  2024    Sonobello (abdomen)    DILATION AND CURETTAGE OF UTERUS      at age 30s--miscarriage    HERNIA REPAIR  2023    LUMBAR FUSION Left 2020    ANTERIOR LEFT RETROPERITONEAL L5-S1 DISKECTOMY INTERBODY CAGE FUSION performed by Ines Mendiola MD at [facility] [place]    TONSILLECTOMY      as child    TOTAL KNEE ARTHROPLASTY Right 2024    Right total knee arthroplasty performed by Bebo Junior MD at [facility] OR    TRACHEAL SURGERY      in [place]--EMT attempted to intubate patient due to trouble breathing & then needed repair       Medications:   Current Outpatient Medications:

## 2024-10-15 ENCOUNTER — APPOINTMENT (OUTPATIENT)
Dept: PULMONOLOGY | Facility: CLINIC | Age: 69
End: 2024-10-15
Payer: MEDICARE

## 2024-10-16 ENCOUNTER — HOSPITAL ENCOUNTER (OUTPATIENT)
Dept: PHYSICAL THERAPY | Age: 69
Setting detail: THERAPIES SERIES
Discharge: HOME | End: 2024-10-16
Payer: MEDICARE

## 2024-10-16 PROCEDURE — 97140 MANUAL THERAPY 1/> REGIONS: CPT

## 2024-10-16 PROCEDURE — 97110 THERAPEUTIC EXERCISES: CPT

## 2024-10-16 ASSESSMENT — PAIN SCALES - GENERAL: PAINLEVEL_OUTOF10: 0

## 2024-10-16 NOTE — PROGRESS NOTES
oscillations to improve mobility.  Soft Tissue Mobilizaton: STM, MFR distal hamstrings Limitations addressed: Joint motion, Tissue extensibility                    Pt Education: Additional Comments: Consider KT PRN       ASSESSMENT     Assessment: Assessment: Pt. demoing improvement with R knee AROM 9-113 deg this date post YANET and manual therapy.  VC for QS with SLR on ea rep.  Pt. denies inc in pain post session.  CP to R knee post to dec inflammation and assist with muscle recovery.  Body Structures, Functions, Activity Limitations Requiring Skilled Therapeutic Intervention: Decreased functional mobility , Decreased strength, Increased pain, Decreased ROM    Post-Treatment Pain Level:      Activity Tolerance: Patient limited by pain    Therapy Prognosis: Good       GOALS   Patient Goals : \"be able to get up and down from floor, walk further distance and lift and carry kids without increased pain\"  Short Term Goals Completed by 2 wks Current Status Goal Status   I wtih HEP and report compliance with HEP 5/7 days per wk       Improve AROM R knee 7-112 deg                                                                           Long Term Goals Completed by   Current Status Goal Status   Improve AROM R knee 2-120 deg       Improve strength R LE 4+/5 or greater to allow reciprocal stairclimbing and long distance walking       Improve MOD LEFS from 59% day of IE to less than 20% at time of D/C       Pt amb 500 feet or greater without AD demonstrating good balance and near equal step and stride length with 1/10 pain       Pt I with advanced HEP to further improve strength and overall functional mobility                                                    TREATMENT PLAN   Plan Frequency: 2x per wk  Plan weeks: 4-6  Current Treatment Recommendations: Strengthening, ROM, Functional mobility training, Gait training, Stair training, Safety education & training, Modalities, Therapeutic activities, Manual   Additional

## 2024-10-21 ENCOUNTER — HOSPITAL ENCOUNTER (OUTPATIENT)
Dept: PHYSICAL THERAPY | Age: 69
Setting detail: THERAPIES SERIES
Discharge: HOME | End: 2024-10-21
Payer: MEDICARE

## 2024-10-21 PROCEDURE — 97140 MANUAL THERAPY 1/> REGIONS: CPT

## 2024-10-21 PROCEDURE — 97110 THERAPEUTIC EXERCISES: CPT

## 2024-10-21 ASSESSMENT — PAIN SCALES - GENERAL: PAINLEVEL_OUTOF10: 4

## 2024-10-21 NOTE — PROGRESS NOTES
Strengthening, ROM, Functional mobility training, Gait training, Stair training, Safety education & training, Modalities, Therapeutic activities, Manual   Additional Comments: Consider KT PRN       Therapy Time  Individual Time In:       1100  Individual Time Out:  1145  Minutes:  45        Electronically signed by Fatimah Lyon PTA  on 10/21/2024 at 11:52 AM   POC NOTE

## 2024-10-23 ENCOUNTER — HOSPITAL ENCOUNTER (OUTPATIENT)
Dept: PHYSICAL THERAPY | Age: 69
Setting detail: THERAPIES SERIES
Discharge: HOME | End: 2024-10-23
Payer: MEDICARE

## 2024-10-23 PROCEDURE — 97116 GAIT TRAINING THERAPY: CPT

## 2024-10-23 PROCEDURE — 97110 THERAPEUTIC EXERCISES: CPT

## 2024-10-23 ASSESSMENT — PAIN DESCRIPTION - ORIENTATION: ORIENTATION: RIGHT

## 2024-10-23 ASSESSMENT — PAIN DESCRIPTION - DESCRIPTORS: DESCRIPTORS: ACHING;SORE

## 2024-10-23 ASSESSMENT — PAIN SCALES - GENERAL: PAINLEVEL_OUTOF10: 4

## 2024-10-23 ASSESSMENT — PAIN DESCRIPTION - PAIN TYPE: TYPE: ACUTE PAIN;SURGICAL PAIN

## 2024-10-23 ASSESSMENT — PAIN DESCRIPTION - LOCATION: LOCATION: KNEE

## 2024-10-28 ENCOUNTER — HOSPITAL ENCOUNTER (OUTPATIENT)
Dept: PHYSICAL THERAPY | Age: 69
Setting detail: THERAPIES SERIES
Discharge: HOME | End: 2024-10-28
Payer: MEDICARE

## 2024-10-28 NOTE — PROGRESS NOTES
Physical Therapy  Physical Therapy: Daily Note   Patient: Mariaelena Colby (69 y.o. female)   Examination Date: 10/28/2024  Plan of Care/Certification Expiration Date: 24    No data recorded   :  1955 # of Visits since SOC:   5   MRN: [id number]  CSN: [id number] Start of Care Date:   10/14/2024   Insurance: Payor: MEDICARE / Plan: MEDICARE PART A AND B / Product Type: *No Product type* /   Insurance ID: [id number] - (Medicare) Secondary Insurance (if applicable): [employer]   Referring Physician: Butch Penn PA Dan Basinski PA, Dr. George   PCP: Queenie Yost MD Visits to Date/Visits Approved: 4 / 10    No Show/Cancelled Appts:      Medical Diagnosis: No admission diagnoses are documented for this encounter. S/P R TKA  Treatment Diagnosis: limited ROM, weakness S/P R TKA        SUBJECTIVE EXAMINATION   Pain Level:      Patient Comments: Subjective: Pt. no show for appointment.    HEP Compliance:         OBJECTIVE EXAMINATION   Restrictions:  Restrictions/Precautions: Fall Risk   Required Braces or Orthoses?: No   Implants present? : Metal implants (R TKR 24)                TREATMENT         Pt Education:         ASSESSMENT     Assessment: Assessment: Pt. no show for appointment.       Post-Treatment Pain Level:      Activity Tolerance: Patient limited by pain    Therapy Prognosis: Good       GOALS      Short Term Goals Completed by   Current Status Goal Status                                                                                       Long Term Goals Completed by   Current Status Goal Status                                                                                        TREATMENT PLAN               Therapy Time  Individual Time In:  Waited 10 minutes. Pt. No show for appointment.       Individual Time Out:    Minutes:  5        Electronically signed by Osmar Gamboa PTA  on 10/28/2024 at 4:09 PM   POC NOTE

## 2024-10-29 RX ORDER — HYDROXYZINE HYDROCHLORIDE 25 MG/1
TABLET, FILM COATED ORAL
Qty: 60 TABLET | Refills: 0 | OUTPATIENT
Start: 2024-10-29

## 2024-11-04 ENCOUNTER — HOSPITAL ENCOUNTER (OUTPATIENT)
Dept: PHYSICAL THERAPY | Age: 69
Setting detail: THERAPIES SERIES
Discharge: HOME | End: 2024-11-04
Payer: MEDICARE

## 2024-11-04 NOTE — PROGRESS NOTES
Physical Therapy: Daily Note   Patient: Mariaelena Colby (69 y.o. female)   Examination Date: 2024  Plan of Care/Certification Expiration Date: 24    No data recorded   :  1955 # of Visits since SOC:   6   MRN: [id number]  CSN: [id number] Start of Care Date:   10/14/2024   Insurance: Payor: MEDICARE / Plan: MEDICARE PART A AND B / Product Type: *No Product type* /   Insurance ID: [id number] - (Medicare) Secondary Insurance (if applicable): [employer]   Referring Physician: Butch Penn PA Dan Basinski PA, Dr. Junior   PCP: Queenie Yost MD Visits to Date/Visits Approved: 4 / 10    No Show/Cancelled Appts:      Medical Diagnosis: No admission diagnoses are documented for this encounter.    Treatment Diagnosis: limited ROM, weakness S/P R TKA          Pt cancelled-\"repair man coming\".          Therapy Time  Individual Time In:         Individual Time Out:    Minutes:  0-cancelled        Electronically signed by Fatimah Lyon PTA  on 2024 at 10:12 AM   POC NOTE

## 2024-11-05 ENCOUNTER — OFFICE VISIT (OUTPATIENT)
Dept: ORTHOPEDIC SURGERY | Age: 69
End: 2024-11-05

## 2024-11-05 VITALS
HEIGHT: 60 IN | HEART RATE: 96 BPM | BODY MASS INDEX: 27.48 KG/M2 | OXYGEN SATURATION: 100 % | WEIGHT: 140 LBS | TEMPERATURE: 97.6 F

## 2024-11-05 DIAGNOSIS — Z96.651 STATUS POST TOTAL RIGHT KNEE REPLACEMENT: Primary | ICD-10-CM

## 2024-11-05 PROCEDURE — 99024 POSTOP FOLLOW-UP VISIT: CPT | Performed by: PHYSICIAN ASSISTANT

## 2024-11-05 NOTE — PROGRESS NOTES
Narrative Referring Provider:   Bebo Junior MD      PCP:   Queenie Yost MD    ============================  IMPRESSION/PLAN:  ============================  Mariaelena Colby is s/p right Total knee replacement completed on 2024.    IMPRESSION: At normal postoperative stage of recovery    PLAN:  Continue current conservative treatment., Rest, Ice, Compression, Elevation PRN., Continue physical therapy., and amitriptyline for sleep  Routine follow-up.  Ice compression and elevation  Patient Reassurance: Normal post-operative course discussed with patient.  Patient reassured and supported. All questions answered.    Follow [id number]  No X-Rays Needed    Patient presents today for a a routine 1st post-op visit    Status post op:  right Total knee replacement     BMI: There is no height or weight on file to calculate BMI.    Post-operative recovery was complicated by uneventful/none.    Patient rates their condition as improving.     Does the patient still experience pain? 1/10 pain, aching    Post Op discharge patient location: in home.   Functional Assessment is as follows: has commenced to begin outpatient PT.  Functional difficulties: None.  Pain Medication: Tylenol, Oxycodone  Currently Ambulating with: cane    =================================  EXAM: POST OP KNEE  =================================  RightPost-Operative Knee    Ambulates with a limp favoring the Right    SKIN: Appropriate postop appearance, No evidence of erythema, warmth, discharge or drainage and Incision clean/dry/intact.    Range of motion is 1 degrees in extension and   102 degrees of flexion.    Extension La degrees    Pain with ROM: Yes with deeper flexion    There is mild effusion.     Mal-alignment: No    Tender to the palpation of Medialfemoral condyle and Medial joint line    Neurovascular Status: Sensation Intact, Moves foot and ankle up & down, 2+ dorsalis pedis and negative homans

## 2024-11-06 ENCOUNTER — APPOINTMENT (OUTPATIENT)
Dept: PULMONOLOGY | Facility: CLINIC | Age: 69
End: 2024-11-06
Payer: MEDICARE

## 2024-11-07 ENCOUNTER — HOSPITAL ENCOUNTER (OUTPATIENT)
Dept: PHYSICAL THERAPY | Age: 69
Setting detail: THERAPIES SERIES
Discharge: HOME | End: 2024-11-07
Payer: MEDICARE

## 2024-11-07 PROCEDURE — 97110 THERAPEUTIC EXERCISES: CPT

## 2024-11-07 PROCEDURE — 97116 GAIT TRAINING THERAPY: CPT

## 2024-11-07 NOTE — PROGRESS NOTES
Physical Therapy  Physical Therapy: Daily Note   Patient: Mariaelena Colby (69 y.o. female)   Examination Date: 2024  Plan of Care/Certification Expiration Date: 24    No data recorded   :  1955 # of Visits since SOC:   6   MRN: [id number]  CSN: [id number] Start of Care Date:   10/14/2024   Insurance: Payor: MEDICARE / Plan: MEDICARE PART A AND B / Product Type: *No Product type* /   Insurance ID: [id number] - (Medicare) Secondary Insurance (if applicable): [employer]   Referring Physician: Butch Penn PA Dan Basinski PA, Dr. George   PCP: Queenie Yost MD Visits to Date/Visits Approved: 5 / 10    No Show/Cancelled Appts:      Medical Diagnosis: No admission diagnoses are documented for this encounter.    Treatment Diagnosis: limited ROM, weakness S/P R TKA        SUBJECTIVE EXAMINATION   Pain Level:  0    Patient Comments: Subjective: Pt. states she has no pain just alittle stiff.    HEP Compliance: Good        OBJECTIVE EXAMINATION   Restrictions:  Restrictions/Precautions: Fall Risk   Required Braces or Orthoses?: No   Implants present? : Metal implants (R TKR 24)        Left AROM  Right AROM        AROM RLE (degrees)  R Knee Flexion (0-145): 6-125 deg AROM R knee in supine           TREATMENT     Exercises:      Treatment Reasoning    Exercise 1: QS x 10 H 5  Exercise 3: SLR with QS x 10 H 3 VC for QS 1#  Exercise 5: supine 90/90 hamstring stretch H 20-30 deg x 3  Exercise 6: LAQ x 15 hold 5 sec 1#  Exercise 7: proneHS curl 1# x 10 ohld 3 sec  Exercise 8: Recumbant bike x 5 min pt. able to make full revolutions slowly  Exercise 9: sidelying hip ADD x 10 hold 3 sec 1#  Exercise 10: sidelying R hip abduction H3'' x 10 1#  Exercise 11: prone SLR RLE H3'' x 10 1#    Limitations addressed: Mobility, Strength  Functional ability(s) targeted: Ambulating community distances, Tolerance to age appropriate activities                     Gait: (CPT 54675)  Treatment Reasoning

## 2024-11-11 ENCOUNTER — OFFICE VISIT (OUTPATIENT)
Age: 69
End: 2024-11-11
Payer: MEDICARE

## 2024-11-11 ENCOUNTER — TELEPHONE (OUTPATIENT)
Dept: ORTHOPEDIC SURGERY | Age: 69
End: 2024-11-11

## 2024-11-11 VITALS
TEMPERATURE: 97.2 F | DIASTOLIC BLOOD PRESSURE: 84 MMHG | HEIGHT: 60 IN | WEIGHT: 140 LBS | SYSTOLIC BLOOD PRESSURE: 132 MMHG | BODY MASS INDEX: 27.48 KG/M2

## 2024-11-11 DIAGNOSIS — G89.4 CHRONIC PAIN SYNDROME: ICD-10-CM

## 2024-11-11 DIAGNOSIS — M70.61 TROCHANTERIC BURSITIS OF BOTH HIPS: ICD-10-CM

## 2024-11-11 DIAGNOSIS — Z98.1 S/P LUMBAR AND LUMBOSACRAL FUSION BY ANTERIOR TECHNIQUE: ICD-10-CM

## 2024-11-11 DIAGNOSIS — Z79.891 ENCOUNTER FOR LONG-TERM OPIATE ANALGESIC USE: ICD-10-CM

## 2024-11-11 DIAGNOSIS — M48.061 SPINAL STENOSIS, LUMBAR REGION, WITHOUT NEUROGENIC CLAUDICATION: ICD-10-CM

## 2024-11-11 DIAGNOSIS — M46.1 SACROILIITIS, NOT ELSEWHERE CLASSIFIED (HCC): ICD-10-CM

## 2024-11-11 DIAGNOSIS — Z96.651 STATUS POST RIGHT KNEE REPLACEMENT: ICD-10-CM

## 2024-11-11 DIAGNOSIS — M17.11 PRIMARY OSTEOARTHRITIS OF RIGHT KNEE: ICD-10-CM

## 2024-11-11 DIAGNOSIS — M47.817 LUMBOSACRAL SPONDYLOSIS WITHOUT MYELOPATHY: Primary | ICD-10-CM

## 2024-11-11 DIAGNOSIS — M70.62 TROCHANTERIC BURSITIS OF BOTH HIPS: ICD-10-CM

## 2024-11-11 PROCEDURE — 99215 OFFICE O/P EST HI 40 MIN: CPT | Performed by: NURSE PRACTITIONER

## 2024-11-11 RX ORDER — TIZANIDINE 2 MG/1
2 TABLET ORAL NIGHTLY PRN
Qty: 30 TABLET | Refills: 0 | Status: SHIPPED | OUTPATIENT
Start: 2024-11-11

## 2024-11-11 RX ORDER — OXYCODONE AND ACETAMINOPHEN 7.5; 325 MG/1; MG/1
1 TABLET ORAL EVERY 6 HOURS PRN
Qty: 107 TABLET | Refills: 0 | Status: SHIPPED | OUTPATIENT
Start: 2024-11-11 | End: 2024-12-11

## 2024-11-11 RX ORDER — GABAPENTIN 300 MG/1
300 CAPSULE ORAL 3 TIMES DAILY
Qty: 90 CAPSULE | Refills: 0 | Status: SHIPPED | OUTPATIENT
Start: 2024-11-11 | End: 2024-12-11

## 2024-11-11 ASSESSMENT — ENCOUNTER SYMPTOMS
CONSTIPATION: 0
DIARRHEA: 0
BACK PAIN: 1
RESPIRATORY NEGATIVE: 1

## 2024-11-11 NOTE — TELEPHONE ENCOUNTER
Pt called and stated she wanted to make us aware that the pharmacy continues to do automatic refills on her medication and that she sent a message in her My Chart to stop them, but they continue to request refills. Pt stated she will speak with the pharmacy as well.

## 2024-11-11 NOTE — PROGRESS NOTES
Affect: Mood normal.         Behavior: Behavior normal.         Thought Content: Thought content normal.         Judgment: Judgment normal.            Assessment:        Diagnosis Orders   1. Lumbosacral spondylosis without myelopathy  tiZANidine (ZANAFLEX) 2 MG tablet      2. S/P lumbar and lumbosacral fusion by anterior technique        3. Encounter for long-term opiate analgesic use        4. Primary osteoarthritis of right knee        5. Status post right knee replacement        6. Spinal stenosis, lumbar region, without neurogenic claudication        7. Chronic pain syndrome  oxyCODONE-acetaminophen (PERCOCET) 7.5-325 MG per tablet      8. Sacroiliitis, not elsewhere classified (HCC)  tiZANidine (ZANAFLEX) 2 MG tablet      9. Trochanteric bursitis of both hips  DRAIN/INJECT LARGE JOINT/BURSA          Plan:     Periodic Controlled Substance Monitoring: Possible medication side effects, risk of tolerance/dependence & alternative treatments discussed., No signs of potential drug abuse or diversion identified., Obtaining appropriate analgesic effect of treatment., Assessed functional status (ability to engage in work or other purposeful activities, the pain intensity and its interference with activities of daily living, quality of family life and social activities, and the physical activity) (Helen Draper, APRN - CNP)    Orders Placed This Encounter   Medications    oxyCODONE-acetaminophen (PERCOCET) 7.5-325 MG per tablet     Sig: Take 1 tablet by mouth every 6 hours as needed for Pain for up to 30 days. Reduce to #107 pills for 30d supply Max Daily Amount: 4 tablets     Dispense:  107 tablet     Refill:  0     Reduce doses taken as pain becomes manageable    tiZANidine (ZANAFLEX) 2 MG tablet     Sig: Take 1 tablet by mouth nightly as needed (spasm/pain)     Dispense:  30 tablet     Refill:  0    gabapentin (NEURONTIN) 300 MG capsule     Sig: Take 1 capsule by mouth 3 times daily for 30 days. Intended supply: 90

## 2024-11-12 ENCOUNTER — HOSPITAL ENCOUNTER (OUTPATIENT)
Dept: PHYSICAL THERAPY | Age: 69
Setting detail: THERAPIES SERIES
Discharge: HOME | End: 2024-11-12
Payer: MEDICARE

## 2024-11-12 ENCOUNTER — TELEPHONE (OUTPATIENT)
Age: 69
End: 2024-11-12

## 2024-11-12 PROCEDURE — 97116 GAIT TRAINING THERAPY: CPT

## 2024-11-12 PROCEDURE — 97110 THERAPEUTIC EXERCISES: CPT

## 2024-11-12 NOTE — TELEPHONE ENCOUNTER
Bilateral hip bursae  NO AUTH REQUIRED      OK to schedule procedure approved as above.   Please note sides/levels approved and date range.   (If applicable, sides/levels approved may differ from those ordered)       TO BE SCHEDULED WITH DR. PRESLEY

## 2024-11-12 NOTE — PROGRESS NOTES
Physical Therapy  Physical Therapy: Daily Note   Patient: Mariaelena Colby (69 y.o. female)   Examination Date: 2024  Plan of Care/Certification Expiration Date: 24    No data recorded   :  1955 # of Visits since SOC:   7   MRN: [id number]  CSN: [id number] Start of Care Date:   10/14/2024   Insurance: Payor: MEDICARE / Plan: MEDICARE PART A AND B / Product Type: *No Product type* /   Insurance ID: [id number] - (Medicare) Secondary Insurance (if applicable): [employer]   Referring Physician: Butch Penn PA     PCP: Queenie Yost MD Visits to Date/Visits Approved: 6  /   10   No Show/Cancelled Appts:       Medical Diagnosis: No admission diagnoses are documented for this encounter.    Treatment Diagnosis: limited ROM, weakness S/P R TKA        SUBJECTIVE EXAMINATION   Pain Level:  0    Patient Comments:  Pt. States she is doing well sometimes stairs reciprocal pattern.  Difficulty with knee ext doesn't feels like good stretch in the bed.      HEP Compliance: Good        OBJECTIVE EXAMINATION   Restrictions:  Restrictions/Precautions: Fall Risk   Required Braces or Orthoses?: No   Implants present? : Metal implants (R TKR 24)        Left AROM  Right AROM         5-120 deg seated AROM            TREATMENT     Exercises:      Treatment Reasoning    Exercise 1: seated QS with OP above knee x 10 hold 5-10 sec educated to do at home wiht foot propped on coffee table  Exercise 2: reviewed seated heel slides in chair  Exercise 3: seated gastroc and HS stretch hold 20-30 sec x 3  Exercise 4: standing heel and toe raises x 10 hold 3 sec  Exercise 5: standing alt march light UE support x 10 hold 3 sec 1#  Exercise 6: supported standing hip ABD x 5 hold 1-2 sec alt 1#  Exercise 7: LAQ x 15 hold 3 sec 1#  Exercise 8: seated HS curl GTB x 10 hold 3 sec                          Gait: (CPT 09090)  Treatment Reasoning    Gait Training 1: 24: Pt. ambulated 500' no AD with focus on

## 2024-11-14 NOTE — PROGRESS NOTES
Patient: Alysa Braun    [id number]  : 1955 -- AGE 69 y.o.    Provider: DEVORAH Nguyễn     Location [facility]   Service Date: 2024              [facility]  New Visit Note      HISTORY OF PRESENT ILLNESS     The patient's referring provider is: Chao Lal MD    HISTORY OF PRESENT ILLNESS   Alysa Braun is a 69 y.o. female who presents to a [facility] for an evaluation with concerns of acute bronchitis. I have independently interviewed and examined the patient in the office and reviewed available records.    Current History 2024    On today's visit, the patient reports she had wheezing and she told her primary. She has not had since. Occ cough with productive white to clear. She is pretty active she does own lawn work, house work, she walks and cycles. She is having knee pain and only tolerate 1/2 mile at a time.  She had knee replacement in Sept.  Denies dyspnea with stairs. At baseline,  has dyspnea on exertion, but none at rest. They are active in her everyday life and carries loads and does strenuous exercise. Smoked about 1 ppd x 30-35 years.  Denies orthopnea, pnd, or josh.  Has lost X 10 pounds in the last X 12 months.  Relates  chronic cough, c/o wheezing - one episode.  No night cough. No hemoptysis. No fever or shivering chills. Has a runny nose, and a tingling sensation in the back of his throat- on antihistamine and nasal spray prescribed by Dr. Hinkle.   Also complains of heartburn- controlled with pantoprazole. Denies chest pain.   Uses inhaler once a week     Previous pulmonary history:  no history of recurrent infections, or lung disease as a child.  No previous lung hx, never on oxygen or inhaler therapy.     Inhalers/nebulized medications: proair - once a week     Hospitalization History: Not been hospitalized over the last year for breathing related problem.    Sleep  history:  Does not sleep well. Sleeps about 6 hrs a night in fragments   Complains of snoring, apnea, feeling tired during the day, and taking naps during the day.     ALLERGIES AND MEDICATIONS     ALLERGIES  No Known Allergies    MEDICATIONS  Current Outpatient Medications   Medication Sig Dispense Refill    acetaminophen (TylenoL) 325 mg tablet Take by mouth every 6 hours.      albuterol (ProAir HFA) 90 mcg/actuation inhaler Inhale 2 puffs every 4 hours if needed for wheezing or shortness of breath. 8.5 g 0    buPROPion SR (Wellbutrin SR) 100 mg 12 hr tablet Take 1 tablet (100 mg) by mouth early in the morning..      busPIRone (Buspar) 15 mg tablet Take 1 tablet (15 mg) by mouth 3 times a day.      gabapentin (Neurontin) 300 mg capsule Take 1 capsule (300 mg) by mouth 3 times a day.      ipratropium (Atrovent) 21 mcg (0.03 %) nasal spray Administer 2 sprays into each nostril 2 times a day. 30 mL 3    L. acidophilus/Bifid. animalis 15.5 billion cell capsule Take by mouth.      lidocaine (Lidoderm) 5 % patch Place 1 patch onto the skin daily. Leave on for 12 hours, and keep off for 12 hours. 30 patch 0    loratadine (Claritin) 10 mg tablet Take by mouth.      magnesium oxide (Mag-Ox) 400 mg (241.3 mg magnesium) tablet Take 1 tablet (400 mg) by mouth once daily.      montelukast (Singulair) 10 mg tablet Take 1 tablet (10 mg) by mouth once daily at bedtime. 30 tablet 5    naloxone (Narcan) 4 mg/0.1 mL nasal spray Administer into affected nostril(s).      oxyCODONE-acetaminophen (Percocet) 5-325 mg tablet Take 1 tablet by mouth every 6 hours if needed for severe pain (7 - 10). 6 tablet 0    pramipexole (Mirapex) 0.25 mg tablet Take 1 tablet (0.25 mg) by mouth once daily. 90 tablet 1    tiZANidine (Zanaflex) 2 mg tablet Take by mouth.      Xanax 0.5 mg tablet Take by mouth 3 times a day as needed.      fluticasone propion-salmeteroL (Advair Diskus) 250-50 mcg/dose diskus inhaler Inhale 1 puff 2 times a day. Rinse mouth  with water after use to reduce aftertaste and incidence of candidiasis. Do not swallow. (Patient not taking: Reported on 11/25/2024) 60 each 0    lisinopril 10 mg tablet Take 1 tablet (10 mg) by mouth once daily. 90 tablet 3    mupirocin (Bactroban) 2 % ointment APPLY TO NOSE TID (Patient not taking: Reported on 11/25/2024)      pantoprazole (ProtoNix) 40 mg EC tablet Take 1 tablet (40 mg) by mouth once daily. Do not crush, chew, or split. 90 tablet 3    simvastatin (Zocor) 40 mg tablet Take 1 tablet (40 mg) by mouth once daily at bedtime. 90 tablet 3     No current facility-administered medications for this visit.         PAST HISTORY     PAST MEDICAL HISTORY  She  has a past medical history of Acute upper respiratory infection, unspecified, Acute upper respiratory infection, unspecified (05/11/2022), Contact with and (suspected) exposure to covid-19, Cough, unspecified, Discitis, unspecified, lumbar region, Elevated blood-pressure reading, without diagnosis of hypertension, Encounter for immunization, Enlarged lymph nodes, unspecified, Nicotine dependence, unspecified, uncomplicated (12/11/2018), Other chest pain, Other enthesopathies, not elsewhere classified, Pain in unspecified knee (06/09/2022), Pain, unspecified, Personal history of other diseases of the musculoskeletal system and connective tissue, Personal history of other diseases of the musculoskeletal system and connective tissue, Personal history of other diseases of the respiratory system, Personal history of other diseases of the respiratory system, Personal history of other diseases of the respiratory system, Personal history of other diseases of the respiratory system (03/31/2022), Personal history of other diseases of the respiratory system (11/16/2022), Personal history of other infectious and parasitic diseases, Personal history of other specified conditions, Personal history of other specified conditions, Rash and other nonspecific skin  eruption, Smoker (04/11/2017), and Vitamin D deficiency, unspecified.  Back, knee, arthritis     PAST SURGICAL HISTORY  Past Surgical History:   Procedure Laterality Date    OTHER SURGICAL HISTORY  08/16/2021    Lumbar laminectomy    OTHER SURGICAL HISTORY  08/11/2022    Knee surgery    OTHER SURGICAL HISTORY  01/24/2022    Spinal surgery    OTHER SURGICAL HISTORY  01/24/2022    Arthrodesis    OTHER SURGICAL HISTORY  01/24/2022    Colonoscopy    OTHER SURGICAL HISTORY  01/24/2022    Lumbar discectomy    OTHER SURGICAL HISTORY  01/24/2022    Lumbar vertebral fusion       IMMUNIZATION HISTORY  Immunization History   Administered Date(s) Administered    Flu vaccine (IIV4), preservative free *Check age/dose* 10/01/2017, 10/26/2017    Flu vaccine, quadrivalent, high-dose, preservative free, age 65y+ (FLUZONE) 10/12/2020, 09/23/2021, 10/12/2023    Flu vaccine, quadrivalent, recombinant, preservative free, adult (FLUBLOK) 11/07/2018    Flu vaccine, trivalent, preservative free, HIGH-DOSE, age 65y+ (Fluzone) 10/25/2019, 10/12/2020, 09/23/2021    Influenza, Unspecified 10/01/2013, 11/01/2014, 12/21/2015, 10/01/2016, 10/01/2018    Influenza, injectable, quadrivalent, preservative free, pediatric 11/08/2016    Pfizer Purple Cap SARS-CoV-2 03/19/2021, 04/09/2021, 01/31/2022    Pneumococcal conjugate vaccine, 13-valent (PREVNAR 13) 10/25/2019    Pneumococcal polysaccharide vaccine, 23-valent, age 2 years and older (PNEUMOVAX 23) 10/12/2020    Tdap vaccine, age 7 year and older (BOOSTRIX, ADACEL) 03/29/2022, 07/30/2024    Zoster vaccine, recombinant, adult (SHINGRIX) 11/12/2020, 01/14/2021       SOCIAL HISTORY  She  reports that she quit smoking about 6 years ago. Her smoking use included cigarettes. She started smoking about 16 years ago. She has a 10 pack-year smoking history. She has never used smokeless tobacco. She reports that she does not drink alcohol and does not use drugs. She Patient  1 ppd x 34  years    OCCUPATIONAL/ENVIRONMENTAL HISTORY  Previously worked as: sabrina has 3 kids and stayed at home  DOES/DOES NOT EC: does not have known exposure to asbestos, silica, beryllium or inhaled metals.  DOES/DOES NOT EC: does not have exposure to birds or exotic animals. cat    FAMILY HISTORY  Family History   Problem Relation Name Age of Onset    Depression Mother      Diabetes Mother      Fibromyalgia Mother      Other (High serum cholesterol sulfate) Father       DOES/DOES NOT EC: does not have a family history of pulmonary disease. Maternal grandmother had emphysema   DOES/DOES NOT EC: does not have a family history of cancer.  DOES/DOES NOT EC: does not have a family history of autoimmune disorders.    RESULTS/DATA     Pulmonary Function Test Results        Complete Pulmonary Function Test Pre/Post Bronchodialator (Spirometry Pre/Post/DLCO/Lung Volumes) 8/21/2024  Status: Final result     Study Result    Narrative & Impression   The FEV1/FVC (0.73)  is normal. The FEV1 (1.67L/78%)  is normal. The FVC is normal. Following administration of bronchodilators, there is no significant response. The TLC by body plethysmography is reduced, suggesting a moderate restrictive defect. The DLCO (79%)is normal; however, the diffusing capacity was not corrected for the patient's hemoglobin. The airways resistance is normal.   Conclusion: Normal spirometry. Lung volumes by plethysmography indicate a restrictive ventilatory impairment. The DLCO is normal.     Scans on Order [id number]      Pulmonary Function Test - Scan on 8/21/2024  5:50 PM                            Chest Radiograph     No testing done       Chest CT Scan     CT Chest, Abdomen, and Pelvis with IV Contrast, CT Thoracic Spine and  Lumbar Spine without IV Contrast; 07/30/2024,   INDICATION:  Trauma, unspecified.    CHEST:  MEDIASTINUM:  The heart is normal in size without pericardial effusion.  Central  vascular structures opacify normally.  Mild diffuse wall  thickening of  the esophagus noted which may represent esophagitis.  LUNGS/PLEURA:  There is no pleural effusion, pleural thickening, or pneumothorax.   The airways are patent.  Lungs are clear without consolidation, interstitial disease, or  suspicious nodules.  LYMPH NODES:  Thoracic lymph nodes are not enlarged.  ABDOMEN:     LIVER:  No hepatomegaly.  Smooth surface contour.  Normal attenuation.  Stable  cyst within hepatic segment 8.     BILE DUCTS:  No intrahepatic or extrahepatic biliary ductal dilatation.     GALLBLADDER:  The gallbladder is normal in appearance.  STOMACH:  Small hiatal hernia.     PANCREAS:  No masses or ductal dilatation.     SPLEEN:  No splenomegaly or focal splenic lesion.     ADRENAL GLANDS:  No thickening or nodules.     KIDNEYS AND URETERS:  Kidneys are normal in size and location.  No renal or ureteral  calculi.     PELVIS:     BLADDER:  No abnormalities identified.     REPRODUCTIVE ORGANS:  No abnormalities identified.     BOWEL:  No abnormalities identified. The appendix is visualized and is normal  in appearance.     VESSELS:  No abnormalities identified.  Abdominal aorta is normal in caliber.      PERITONEUM/RETROPERITONEUM/LYMPH NODES:  No free fluid.  No pneumoperitoneum.  No lymphadenopathy.     ABDOMINAL WALL:  Interval surgical repair of the lower anterior abdominal wall hernia.  SOFT TISSUES:   No abnormalities identified.     BONES:  No acute fracture or aggressive osseous lesion.  Chronic left rib  fractures.  THORACIC SPINE:  The alignment is anatomic.  There is no fracture or traumatic  subluxation.  The vertebral body heights are well maintained.  Mild multilevel disc  space narrowing and vertebral body hypertrophy.  No significant  central canal stenosis is demonstrated.  The neural foramina are  patent throughout.    The paravertebral soft tissues are within normal limits.   LUMBAR SPINE:  The alignment is anatomic.  There is no fracture or  traumatic  subluxation.  The vertebral body heights are well maintained.  Disc implantation  noted at L4-5 and L5-S1 with associated L4 laminectomy.  There are  prominent endplate sclerotic changes at L4-5 and L5-S1.  No  significant central canal stenosis is demonstrated.  The neural  foramina are patent throughout.    The paravertebral soft tissues are within normal limits.  IMPRESSION:  No evidence of acute intrathoracic or intra-abdominal injury. Mild  diffuse esophageal wall thickening suggestive of esophagitis. Small  hiatal hernia.        Echocardiogram     No testing done          REVIEW OF SYSTEMS     REVIEW OF SYSTEMS  Review of Systems   Constitutional:  Positive for fatigue.   HENT:  Positive for postnasal drip and rhinorrhea.    Respiratory:  Positive for cough.    Musculoskeletal:  Positive for arthralgias and back pain.         PHYSICAL EXAM     VITAL SIGNS: /78   Pulse 76   Resp 18   Ht 1.524 m (5')   Wt 70.9 kg (156 lb 3.2 oz)   SpO2 97%   BMI 30.51 kg/m²  /78   Pulse 76   Resp 18   Ht 1.524 m (5')   Wt 70.9 kg (156 lb 3.2 oz)   SpO2 97%   BMI 30.51 kg/m²      CURRENT WEIGHT: [unfilled]  BMI: [unfilled]  PREVIOUS WEIGHTS:  Wt Readings from Last 3 Encounters:   11/25/24 70.9 kg (156 lb 3.2 oz)   08/14/24 71.3 kg (157 lb 3.2 oz)   08/07/24 64 kg (141 lb)       Physical Exam  Constitutional:       Appearance: Normal appearance.   HENT:      Head: Normocephalic and atraumatic.      Comments: Hypopharynx      Right Ear: External ear normal.      Left Ear: External ear normal.      Nose: Nose normal.      Mouth/Throat:      Mouth: Mucous membranes are moist.      Pharynx: Oropharynx is clear.   Eyes:      Extraocular Movements: Extraocular movements intact.      Conjunctiva/sclera: Conjunctivae normal.      Pupils: Pupils are equal, round, and reactive to light.   Cardiovascular:      Rate and Rhythm: Normal rate and regular rhythm.      Pulses: Normal pulses.      Heart sounds: Normal  heart sounds.   Pulmonary:      Effort: Pulmonary effort is normal.      Breath sounds: Normal breath sounds.   Abdominal:      General: Bowel sounds are normal.      Palpations: Abdomen is soft.   Musculoskeletal:         General: Normal range of motion.      Cervical back: Normal range of motion and neck supple.   Skin:     General: Skin is warm and dry.   Neurological:      General: No focal deficit present.      Mental Status: She is alert and oriented to person, place, and time. Mental status is at baseline.   Psychiatric:         Mood and Affect: Mood normal.         Behavior: Behavior normal.         Thought Content: Thought content normal.         Judgment: Judgment normal.         ASSESSMENT/PLAN     Ms. Braun is a 69 y.o. female and  has a past medical history of Acute upper respiratory infection, unspecified, Acute upper respiratory infection, unspecified (05/11/2022), Contact with and (suspected) exposure to covid-19, Cough, unspecified, Discitis, unspecified, lumbar region, Elevated blood-pressure reading, without diagnosis of hypertension, Encounter for immunization, Enlarged lymph nodes, unspecified, Nicotine dependence, unspecified, uncomplicated (12/11/2018), Other chest pain, Other enthesopathies, not elsewhere classified, Pain in unspecified knee (06/09/2022), Pain, unspecified, Personal history of other diseases of the musculoskeletal system and connective tissue, Personal history of other diseases of the musculoskeletal system and connective tissue, Personal history of other diseases of the respiratory system, Personal history of other diseases of the respiratory system, Personal history of other diseases of the respiratory system, Personal history of other diseases of the respiratory system (03/31/2022), Personal history of other diseases of the respiratory system (11/16/2022), Personal history of other infectious and parasitic diseases, Personal history of other specified conditions,  Personal history of other specified conditions, Rash and other nonspecific skin eruption, Smoker (04/11/2017), and Vitamin D deficiency, unspecified. She was referred to the [facility] for evaluation of cough and wheeze, previous smoker with 30-35 ppd stopped smoking in 2019    Problem List and Orders      Assessment and Plan / Recommendations:  Problem List Items Addressed This Visit       Former smoker    Relevant Orders    CT lung screening low dose    Cough - Primary    Relevant Orders    CT lung screening low dose    Acute bronchitis      Chronic cough and one episode of wheezing - PFT with moderate restrictive disease and past smoker  PFT 8/21/2024  Conclusion: Normal spirometry. Lung volumes by plethysmography indicate a restrictive ventilatory impairment. The DLCO is normal.   - albuterol hfa 2 puffs or albuterol nebs every 4-6 hours as needed   - eosinophils 200, consider respiratory allergy panel and IG E given history of sinusitis     Seen by ENT - sinusitis - seen by ENT Dr. NÚÑEZ   Resolved rhinosinusitis. And  Bilateral vocal cord polyps  - Continue antihistamine  - eosinophils 200, consider respiratory allergy panel and IG E given history of sinusitis     Past smoker approx 30-35 pack year history   CT chest in 7/30/3034 reviewed no nodules  - repeat CT chest low dose after 7/30/2025     Fatigue and snoring - offered sleep test and declining     Thank you for visiting the Pulmonary clinic today!       Return to clinic after 6 Months and after  CT scan complete  or sooner if needed   Windy Kirby CNP  My office number is [phone] -     Call to schedule  for radiology - CT scans/PFTs etc at  [phone]  General scheduling  [phone]     Best way to get a hold of me is to call my office --> Please do not send me follow my health messages  Any test results will be discussed at next visit -- please make sure to make a follow up appt after testing.

## 2024-11-15 ENCOUNTER — HOSPITAL ENCOUNTER (OUTPATIENT)
Dept: PHYSICAL THERAPY | Age: 69
Setting detail: THERAPIES SERIES
Discharge: HOME | End: 2024-11-15
Payer: MEDICARE

## 2024-11-15 PROCEDURE — 97530 THERAPEUTIC ACTIVITIES: CPT

## 2024-11-15 PROCEDURE — 97116 GAIT TRAINING THERAPY: CPT

## 2024-11-15 NOTE — PROGRESS NOTES
4-120 deg R knee; During recheck 11/15/24 cold measure 5-123 degrees In progress   Improve strength R LE 4+/5 or greater to allow reciprocal stairclimbing and long distance walking 11/15/24: Pt. ambulated up and down 9 7\" steps with L HR reciprical pattern with increased effort ascending the steps with 4-5/10 right knee pain; Pt had better control on descend and demonstrated good eccentric control descending SBA with no reports of increased pain. In progress, Partially met   Improve MOD LEFS from 59% day of IE to less than 20% at time of D/C 11/15/24 Mod LEFS= 41% In progress   Pt amb 500 feet or greater without AD demonstrating good balance and near equal step and stride length with 1/10 pain 11/12/24: Pt. ambulated 500' no AD with focus on heel toe pattern for improved knee extension and heel strike. Pt demonstrated fairly equal step length B LE. In progress, Partially met   Pt I with advanced HEP to further improve strength and overall functional mobility Pt progress with her HEP In progress                                                TREATMENT PLAN   Plan Frequency: 1-2  Plan weeks: 4-6 additional  Current Treatment Recommendations: Strengthening, ROM, Functional mobility training, Gait training, Stair training, Safety education & training, Modalities, Therapeutic activities, Manual, Pain management, Endurance training  Modalities: Heat/Cold, E-stim - unattended   Additional Comments: KT tape       Therapy Time  Individual Time In:    10:30am   Individual Time Out:  11:15am  Minutes:  45 min        Electronically signed by Liliana Castaneda PT  on 11/15/2024 at 1:20 PM

## 2024-11-19 ENCOUNTER — HOSPITAL ENCOUNTER (OUTPATIENT)
Dept: PHYSICAL THERAPY | Age: 69
Setting detail: THERAPIES SERIES
Discharge: HOME | End: 2024-11-19
Payer: MEDICARE

## 2024-11-19 ENCOUNTER — PROCEDURE VISIT (OUTPATIENT)
Age: 69
End: 2024-11-19
Payer: MEDICARE

## 2024-11-19 VITALS
HEIGHT: 60 IN | DIASTOLIC BLOOD PRESSURE: 80 MMHG | SYSTOLIC BLOOD PRESSURE: 110 MMHG | BODY MASS INDEX: 27.48 KG/M2 | TEMPERATURE: 98.8 F | OXYGEN SATURATION: 100 % | HEART RATE: 88 BPM | WEIGHT: 140 LBS

## 2024-11-19 DIAGNOSIS — M47.817 LUMBOSACRAL SPONDYLOSIS WITHOUT MYELOPATHY: ICD-10-CM

## 2024-11-19 DIAGNOSIS — M70.61 TROCHANTERIC BURSITIS OF BOTH HIPS: Primary | ICD-10-CM

## 2024-11-19 DIAGNOSIS — M70.62 TROCHANTERIC BURSITIS OF BOTH HIPS: Primary | ICD-10-CM

## 2024-11-19 PROCEDURE — 97110 THERAPEUTIC EXERCISES: CPT

## 2024-11-19 PROCEDURE — 99213 OFFICE O/P EST LOW 20 MIN: CPT | Performed by: PAIN MEDICINE

## 2024-11-19 PROCEDURE — 77002 NEEDLE LOCALIZATION BY XRAY: CPT | Performed by: PAIN MEDICINE

## 2024-11-19 PROCEDURE — 20610 DRAIN/INJ JOINT/BURSA W/O US: CPT | Performed by: PAIN MEDICINE

## 2024-11-19 PROCEDURE — 97140 MANUAL THERAPY 1/> REGIONS: CPT

## 2024-11-19 RX ORDER — LIDOCAINE HYDROCHLORIDE 10 MG/ML
3 INJECTION, SOLUTION EPIDURAL; INFILTRATION; INTRACAUDAL; PERINEURAL ONCE
Status: COMPLETED | OUTPATIENT
Start: 2024-11-19 | End: 2024-11-19

## 2024-11-19 RX ORDER — BETAMETHASONE SODIUM PHOSPHATE AND BETAMETHASONE ACETATE 3; 3 MG/ML; MG/ML
6 INJECTION, SUSPENSION INTRA-ARTICULAR; INTRALESIONAL; INTRAMUSCULAR; SOFT TISSUE ONCE
Status: COMPLETED | OUTPATIENT
Start: 2024-11-19 | End: 2024-11-19

## 2024-11-19 RX ADMIN — BETAMETHASONE SODIUM PHOSPHATE AND BETAMETHASONE ACETATE 6 MG: 3; 3 INJECTION, SUSPENSION INTRA-ARTICULAR; INTRALESIONAL; INTRAMUSCULAR at 14:27

## 2024-11-19 RX ADMIN — LIDOCAINE HYDROCHLORIDE 3 ML: 10 INJECTION, SOLUTION EPIDURAL; INFILTRATION; INTRACAUDAL; PERINEURAL at 14:28

## 2024-11-19 ASSESSMENT — PAIN SCALES - GENERAL
PAINLEVEL_OUTOF10: 2
PAINLEVEL_OUTOF10: 3
PAINLEVEL_OUTOF10: 3

## 2024-11-19 ASSESSMENT — PAIN DESCRIPTION - DESCRIPTORS: DESCRIPTORS: ACHING;SORE

## 2024-11-19 ASSESSMENT — PAIN DESCRIPTION - LOCATION
LOCATION: KNEE
LOCATION: HIP
LOCATION: HIP

## 2024-11-19 ASSESSMENT — PAIN DESCRIPTION - ORIENTATION: ORIENTATION: RIGHT

## 2024-11-19 ASSESSMENT — PAIN DESCRIPTION - PAIN TYPE: TYPE: ACUTE PAIN;SURGICAL PAIN

## 2024-11-19 NOTE — PROGRESS NOTES
[facility]  [facility]  [address], [address]  [place], [place]  P: [phone]  F: [phone]                Provider: JULITA PRESLEY DO          Patient Name: Mariaelena Colby : 1955        Date: 2024         PROCEDURE: Bilateral hip injection under fluoroscopic guidance      Description of Procedure:  Patient is here for trochanteric hip bursa injection.  Area was cleaned with Betadine.  Sterile technique was used.  Fluoroscopic guidance was used for accurate needle placement.  Greater trochanter was identified.  And 26-gauge 3 and half inch spinal needle was directed toward the greater trochanter without difficulty.  After negative aspiration approximately 6 mg of Celestone along with 2 cc 1% preservative-free lidocaine was injected without difficulty.  Patient tolerated the procedure well.  No obvious complications occurred during the procedure.    Summary and Recommendations:  She will take it easy and use ice.                 JULITA PRESLEY DO

## 2024-11-19 NOTE — PROGRESS NOTES
Physical Therapy: Daily Note   Patient: Mariaelena Colby (69 y.o. female)   Examination Date: 2024  Plan of Care/Certification Expiration Date: 24    Progress Note Counter: Recheck completed on 11/15/22 with plans to continue with PT for 1-2x for addional  4-6 weeks to continue with PT; Pt will be out of town 24 to 24     :  1955 # of Visits since SOC:   9   MRN: [id number]  CSN: [id number] Start of Care Date:   10/14/2024   Insurance: Payor: MEDICARE / Plan: MEDICARE PART A AND B / Product Type: *No Product type* /   Insurance ID: [id number] - (Medicare) Secondary Insurance (if applicable): [employer]   Referring Physician: Butch Penn PA Dan Basinski PA, Dr. Junior   PCP: Queenie Yost MD Visits to Date/Visits Approved:     No Show/Cancelled Appts:      Medical Diagnosis: No admission diagnoses are documented for this encounter.    Treatment Diagnosis: limited ROM, weakness S/P R TKA        SUBJECTIVE EXAMINATION   Pain Level: Pain Screening  Patient Currently in Pain: Yes  Pain Assessment: 0-10  Pain Level: 2  Best Pain Level: 2  Worst Pain Level: 4  Post Treatment Pain Level: 2  Pain Type: Acute pain, Surgical pain  Pain Location: Knee  Pain Orientation: Right  Pain Descriptors: Aching, Sore    Patient Comments: Subjective: Pt reported that she has a little bit of pain in her knee today but not too bad.    HEP Compliance: Good        OBJECTIVE EXAMINATION   Restrictions:  Restrictions/Precautions: Fall Risk   Required Braces or Orthoses?: No   Implants present? : Metal implants (R TKR 24)            Right AROM  Right PROM         AROM RLE (degrees)  R Knee Flexion (0-145): 5-123 AROM post therex and manual  R Knee Extension (0): 5 degrees from full extension                TREATMENT     Exercises:      Treatment Reasoning    Exercise 3: seated gastroc and HS stretch hold 20-30 sec x 3  Exercise 4: standing heel and toe raises x 10 hold 3 sec  Exercise

## 2024-11-19 NOTE — PROGRESS NOTES
[facility]  Neurosurgery and Pain Management Center  P: [phone]  F: [phone]        Mariaelena Colby  (1955)    11/19/2024    Subjective:     Mariaelena Colby is 69 y.o. female who complains today of:     Chief complaint low back pain    Patient here today for follow-up.  Her back is flaring up his RF ablation done over 6 months ago helped over 50% for about 6 months.  Pain worse with activity bending achy sharp she gets spasms.  No weakness.  Tries to do home exercises.  Pain better with rest.    Assessment lumbar spondylosis    Plan: We discussed options in detail.  NRS pain level 7 out of 10 electrolytes authorization for bilateral L1-L2-L3 RF ablation under fluoroscopic guidance.  She has failed conservative treatment.  Previous ablation helped over 50% for over 6 months.    Allergies:  Patient has no known allergies.    Past Medical History:   Diagnosis Date    Anxiety     CHF (congestive heart failure) (HCC)     Chronic back pain 20 years ago    Coronary artery disease involving native coronary artery of native heart without angina pectoris 12/11/2018    Hyperlipidemia     meds > 5 yrs    Osteoarthritis     Restless legs syndrome 20 years ago     Past Surgical History:   Procedure Laterality Date    BACK SURGERY  2017    lumbar disc OR    COLONOSCOPY      COSMETIC SURGERY  Feb 2024    [place] (abdomen)    DILATION AND CURETTAGE OF UTERUS      at age 30s--miscarriage    HERNIA REPAIR  Feb 2023    LUMBAR FUSION Left 08/12/2020    ANTERIOR LEFT RETROPERITONEAL L5-S1 DISKECTOMY INTERBODY CAGE FUSION performed by Ines Mendiola MD at [facility] OR    TONSILLECTOMY      as child    TOTAL KNEE ARTHROPLASTY Right 9/9/2024    Right total knee arthroplasty performed by Bebo Junior MD at [facility] OR    TRACHEAL SURGERY  2010    in [place]--EMT attempted to intubate patient due to trouble breathing & then needed repair     Family History   Problem Relation Age of Onset

## 2024-11-20 ENCOUNTER — TELEPHONE (OUTPATIENT)
Age: 69
End: 2024-11-20

## 2024-11-20 NOTE — TELEPHONE ENCOUNTER
Called and spoke with patient today.  She rates pain before 5-7-2024 Lumbar RFA 7-8/10 on NRS Scale.  She rates pain before 4- Lumbar MBB 8/10 on NRS Scale, pain after MBB was 4/10 on NRS Scale.  She rates pain before 3- Lumbar MBB 8/10 on NRS Scale, pain after MBB was 4/10 on NRS Scale.

## 2024-11-21 ENCOUNTER — HOSPITAL ENCOUNTER (OUTPATIENT)
Dept: PHYSICAL THERAPY | Age: 69
Setting detail: THERAPIES SERIES
Discharge: HOME | End: 2024-11-21
Payer: MEDICARE

## 2024-11-21 PROCEDURE — 97110 THERAPEUTIC EXERCISES: CPT

## 2024-11-21 NOTE — PROGRESS NOTES
pattern for improved knee extension and heel strike. Pt demonstrated fairly equal step length B LE. In progress, Partially met   Pt I with advanced HEP to further improve strength and overall functional mobility Pt progress with her HEP                                                  TREATMENT PLAN   Plan Frequency: 1-2  Plan weeks: 4-6 additional  Current Treatment Recommendations: Strengthening, ROM, Functional mobility training, Gait training, Stair training, Safety education & training, Modalities, Therapeutic activities, Manual, Pain management, Endurance training  Modalities: Heat/Cold, E-stim - unattended   Additional Comments: KT tape       Therapy Time  Individual Time In:       1015  Individual Time Out:  1100  Minutes:  45        Electronically signed by Fatimah Lyon PTA  on 11/21/2024 at 10:59 AM   POC NOTE

## 2024-11-22 ENCOUNTER — TELEPHONE (OUTPATIENT)
Age: 69
End: 2024-11-22

## 2024-11-22 NOTE — TELEPHONE ENCOUNTER
Bilateral L123 RFA approved 12-5-2024 to 4-4-2025.  Referral #[id number]    OK to schedule procedure approved as above.   Please note sides/levels approved and date range.   (If applicable, sides/levels approved may differ from those ordered)    To be scheduled with Dr. Jaimes.

## 2024-11-25 ENCOUNTER — APPOINTMENT (OUTPATIENT)
Dept: PULMONOLOGY | Facility: CLINIC | Age: 69
End: 2024-11-25
Payer: MEDICARE

## 2024-11-25 ENCOUNTER — HOSPITAL ENCOUNTER (OUTPATIENT)
Dept: PHYSICAL THERAPY | Age: 69
Setting detail: THERAPIES SERIES
Discharge: HOME | End: 2024-11-25
Payer: MEDICARE

## 2024-11-25 VITALS
OXYGEN SATURATION: 97 % | RESPIRATION RATE: 18 BRPM | HEART RATE: 76 BPM | SYSTOLIC BLOOD PRESSURE: 122 MMHG | WEIGHT: 156.2 LBS | BODY MASS INDEX: 30.67 KG/M2 | DIASTOLIC BLOOD PRESSURE: 78 MMHG | HEIGHT: 60 IN

## 2024-11-25 DIAGNOSIS — Z87.891 FORMER SMOKER: ICD-10-CM

## 2024-11-25 DIAGNOSIS — J20.9 ACUTE BRONCHITIS, UNSPECIFIED ORGANISM: ICD-10-CM

## 2024-11-25 DIAGNOSIS — R05.3 CHRONIC COUGH: Primary | ICD-10-CM

## 2024-11-25 PROCEDURE — 3074F SYST BP LT 130 MM HG: CPT | Performed by: NURSE PRACTITIONER

## 2024-11-25 PROCEDURE — 97116 GAIT TRAINING THERAPY: CPT

## 2024-11-25 PROCEDURE — 1036F TOBACCO NON-USER: CPT | Performed by: NURSE PRACTITIONER

## 2024-11-25 PROCEDURE — 1123F ACP DISCUSS/DSCN MKR DOCD: CPT | Performed by: NURSE PRACTITIONER

## 2024-11-25 PROCEDURE — 3008F BODY MASS INDEX DOCD: CPT | Performed by: NURSE PRACTITIONER

## 2024-11-25 PROCEDURE — 3078F DIAST BP <80 MM HG: CPT | Performed by: NURSE PRACTITIONER

## 2024-11-25 PROCEDURE — 1159F MED LIST DOCD IN RCRD: CPT | Performed by: NURSE PRACTITIONER

## 2024-11-25 PROCEDURE — 99204 OFFICE O/P NEW MOD 45 MIN: CPT | Performed by: NURSE PRACTITIONER

## 2024-11-25 PROCEDURE — 97110 THERAPEUTIC EXERCISES: CPT

## 2024-11-25 ASSESSMENT — COPD QUESTIONNAIRES
QUESTION7_SLEEPQUALITY: 5 - I DON'T SLEEP SOUNDLY BECAUSE OF MY LUNG CONDITION
QUESTION1_COUGHFREQUENCY: 2
QUESTION4_WALKINCLINE: 1
QUESTION8_ENERGYLEVEL: 1
CAT_TOTALSCORE: 11
QUESTION6_LEAVINGHOUSE: 0 - I AM CONFIDENT LEAVING MY HOME DESPITE MY LUNG CONDITION
QUESTION2_CHESTPHLEGM: 2
QUESTION3_CHESTTIGHTNESS: 0 - MY CHEST DOES NOT FEEL TIGHT AT ALL
QUESTION5_HOMEACTIVITIES: 0 - I AM NOT LIMITED DOING ANY ACTIVITIES AT HOME

## 2024-11-25 ASSESSMENT — PAIN DESCRIPTION - DESCRIPTORS: DESCRIPTORS: SORE

## 2024-11-25 ASSESSMENT — ENCOUNTER SYMPTOMS
ARTHRALGIAS: 1
RHINORRHEA: 1
OCCASIONAL FEELINGS OF UNSTEADINESS: 0
LOSS OF SENSATION IN FEET: 0
DEPRESSION: 0
FATIGUE: 1
COUGH: 1
BACK PAIN: 1

## 2024-11-25 ASSESSMENT — PAIN DESCRIPTION - LOCATION: LOCATION: KNEE

## 2024-11-25 ASSESSMENT — COLUMBIA-SUICIDE SEVERITY RATING SCALE - C-SSRS
6. HAVE YOU EVER DONE ANYTHING, STARTED TO DO ANYTHING, OR PREPARED TO DO ANYTHING TO END YOUR LIFE?: NO
2. HAVE YOU ACTUALLY HAD ANY THOUGHTS OF KILLING YOURSELF?: NO
1. IN THE PAST MONTH, HAVE YOU WISHED YOU WERE DEAD OR WISHED YOU COULD GO TO SLEEP AND NOT WAKE UP?: NO

## 2024-11-25 ASSESSMENT — PAIN DESCRIPTION - ORIENTATION: ORIENTATION: RIGHT

## 2024-11-25 ASSESSMENT — PAIN SCALES - GENERAL: PAINLEVEL_OUTOF10: 4

## 2024-11-25 ASSESSMENT — PAIN DESCRIPTION - PAIN TYPE: TYPE: ACUTE PAIN;SURGICAL PAIN

## 2024-11-25 NOTE — PROGRESS NOTES
Physical Therapy  Physical Therapy: Daily Note   Patient: Mariaelena Colby (69 y.o. female)   Examination Date: 2024  Plan of Care/Certification Expiration Date: 24    Progress Note Counter: Recheck completed on 11/15/22 with plans to continue with PT for 1-2x for addional  4-6 weeks to continue with PT; Pt will be out of town 24 to 24     :  1955 # of Visits since SOC:   11   MRN: [id number]  CSN: [id number] Start of Care Date:   10/14/2024   Insurance: Payor: MEDICARE / Plan: MEDICARE PART A AND B / Product Type: *No Product type* /   Insurance ID: [id number] - (Medicare) Secondary Insurance (if applicable): [employer]   Referring Physician: Butch Penn PA Dan Basinski PA, Dr. Junior   PCP: Queenie Yost MD Visits to Date/Visits Approved:     No Show/Cancelled Appts:      Medical Diagnosis: No admission diagnoses are documented for this encounter.    Treatment Diagnosis: limited ROM, weakness S/P R TKA        SUBJECTIVE EXAMINATION   Pain Level: Pain Screening  Patient Currently in Pain: Yes  Pain Assessment: 0-10  Pain Level: 4  Pain Type: Acute pain, Surgical pain  Pain Location: Knee  Pain Orientation: Right  Pain Descriptors: Sore (Stinging)    Patient Comments: Subjective: Pt. states she is getting some stinging in her knee.  Pt. states she is unable to use NSAIDS as was on them for years.    HEP Compliance: Good        OBJECTIVE EXAMINATION   Restrictions:  Restrictions/Precautions: Fall Risk   Required Braces or Orthoses?: No   Implants present? : Metal implants (R TKR 24)        Left AROM  Right AROM        AROM RLE (degrees)  R Knee Flexion (0-145): 3-120 deg post ther ex           TREATMENT     Exercises:      Treatment Reasoning    Exercise 1: TKE; H5'' x 10 BTB RLE  Exercise 2: heel taps  4'' box x 10 BLE's  Exercise 3: mini wall squat H3'' x 10  Exercise 9: QS x 10 hold 5 sec  Exercise 12: step up and over  6\" 1 x 10 slow and controlled

## 2024-11-25 NOTE — PATIENT INSTRUCTIONS
Chronic cough and one episode of wheezing - PFT with moderate restrictive disease and past smoker  PFT 8/21/2024  Conclusion: Normal spirometry. Lung volumes by plethysmography indicate a restrictive ventilatory impairment. The DLCO is normal.   - albuterol hfa 2 puffs or albuterol nebs every 4-6 hours as needed   - eosinophils 200, consider respiratory allergy panel and IG E given history of sinusitis     Seen by ENT - sinusitis - seen by ENT Dr. NÚÑEZ   Resolved rhinosinusitis. And  Bilateral vocal cord polyps  - Continue antihistamine  - eosinophils 200, consider respiratory allergy panel and IG E given history of sinusitis     Past smoker approx 30-35 pack year history   CT chest in 7/30/3034 reviewed no nodules  - repeat CT chest low dose after 7/30/2025     Thank you for visiting the Pulmonary clinic today!       Return to clinic after 6 Months and after  CT scan complete  or sooner if needed   Windy Kirby CNP  My office number is [phone] -     Call to schedule  for radiology - CT scans/PFTs etc at  [phone]  General scheduling  [phone]     Best way to get a hold of me is to call my office --> Please do not send me follow my health messages  Any test results will be discussed at next visit -- please make sure to make a follow up appt after testing.

## 2024-11-26 ENCOUNTER — HOSPITAL ENCOUNTER (OUTPATIENT)
Dept: PHYSICAL THERAPY | Age: 69
Setting detail: THERAPIES SERIES
Discharge: HOME | End: 2024-11-26
Payer: MEDICARE

## 2024-11-26 PROCEDURE — 97116 GAIT TRAINING THERAPY: CPT

## 2024-11-26 PROCEDURE — 97110 THERAPEUTIC EXERCISES: CPT

## 2024-11-26 PROCEDURE — 97140 MANUAL THERAPY 1/> REGIONS: CPT

## 2024-12-09 DIAGNOSIS — G89.4 CHRONIC PAIN SYNDROME: ICD-10-CM

## 2024-12-09 DIAGNOSIS — M46.1 SACROILIITIS, NOT ELSEWHERE CLASSIFIED (HCC): ICD-10-CM

## 2024-12-09 DIAGNOSIS — M47.817 LUMBOSACRAL SPONDYLOSIS WITHOUT MYELOPATHY: ICD-10-CM

## 2024-12-09 RX ORDER — OXYCODONE AND ACETAMINOPHEN 7.5; 325 MG/1; MG/1
1 TABLET ORAL EVERY 6 HOURS PRN
Qty: 120 TABLET | Refills: 0 | Status: SHIPPED | OUTPATIENT
Start: 2024-12-09 | End: 2025-01-08

## 2024-12-09 RX ORDER — TIZANIDINE 2 MG/1
2 TABLET ORAL NIGHTLY PRN
Qty: 30 TABLET | Refills: 0 | Status: SHIPPED | OUTPATIENT
Start: 2024-12-09

## 2024-12-16 NOTE — PROGRESS NOTES
Narrative Referring Provider:   Bebo Junior Jr      PCP:   Queenie Yost MD    ============================  IMPRESSION/PLAN:  ============================  Mariaelena Colby is s/p right Total knee replacement completed on 24.    IMPRESSION: Excellent early outcome and No complaints or limitations    PLAN:  No new treatment indicated.  Routine follow-up.    Patient Reassurance: Normal post-operative course discussed with patient.  Patient reassured and supported. All questions answered.    Follow up 3 months no X-Rays Needed    Patient presents today for an intermediate post-op visit    Status post op:  right Total knee replacement     BMI: There is no height or weight on file to calculate BMI.    Post-operative recovery was complicated by uneventful/none.    Patient rates their condition as improving.     Does the patient still experience pain? 2/10 pain, aching    Post Op discharge patient location: in home.   Functional Assessment is as follows: Therapy is about completed.  Functional difficulties: None.  Pain Medication: None  Currently Ambulating with: Assistive devices    =================================  EXAM: POST OP KNEE  =================================  Right Post-Operative Knee    Ambulates with a normal gait.    SKIN: Appropriate postop appearance, No evidence of erythema, warmth, discharge or drainage and Incision clean/dry/intact.    Range of motion is to degrees in extension and   112 degrees of flexion.    Extension La degrees    Pain with ROM: No    There is minimal effusion.     Mal-alignment: No    Mild tenderness lateral joint line    Neurovascular Status: Sensation Intact, Moves foot and ankle up & down, 2+ dorsalis pedis and negative homans sign    Stability:Varus/Valgus- Yes, stable    Quad strength: improving    Imaging:  None    Provider:   Bebo Junior MD

## 2024-12-17 ENCOUNTER — OFFICE VISIT (OUTPATIENT)
Dept: ORTHOPEDIC SURGERY | Age: 69
End: 2024-12-17
Payer: MEDICARE

## 2024-12-17 VITALS
BODY MASS INDEX: 27.48 KG/M2 | HEART RATE: 68 BPM | WEIGHT: 140 LBS | TEMPERATURE: 98.7 F | OXYGEN SATURATION: 98 % | HEIGHT: 60 IN

## 2024-12-17 DIAGNOSIS — Z96.651 PRESENCE OF TOTAL KNEE JOINT PROSTHESIS, RIGHT: Primary | ICD-10-CM

## 2024-12-17 PROCEDURE — 1090F PRES/ABSN URINE INCON ASSESS: CPT | Performed by: ORTHOPAEDIC SURGERY

## 2024-12-17 PROCEDURE — G8400 PT W/DXA NO RESULTS DOC: HCPCS | Performed by: ORTHOPAEDIC SURGERY

## 2024-12-17 PROCEDURE — 99213 OFFICE O/P EST LOW 20 MIN: CPT | Performed by: ORTHOPAEDIC SURGERY

## 2024-12-17 PROCEDURE — 1123F ACP DISCUSS/DSCN MKR DOCD: CPT | Performed by: ORTHOPAEDIC SURGERY

## 2024-12-17 PROCEDURE — G8484 FLU IMMUNIZE NO ADMIN: HCPCS | Performed by: ORTHOPAEDIC SURGERY

## 2024-12-17 PROCEDURE — 3017F COLORECTAL CA SCREEN DOC REV: CPT | Performed by: ORTHOPAEDIC SURGERY

## 2024-12-17 PROCEDURE — G8417 CALC BMI ABV UP PARAM F/U: HCPCS | Performed by: ORTHOPAEDIC SURGERY

## 2024-12-17 PROCEDURE — 1036F TOBACCO NON-USER: CPT | Performed by: ORTHOPAEDIC SURGERY

## 2024-12-17 PROCEDURE — G8427 DOCREV CUR MEDS BY ELIG CLIN: HCPCS | Performed by: ORTHOPAEDIC SURGERY

## 2024-12-17 PROCEDURE — 1159F MED LIST DOCD IN RCRD: CPT | Performed by: ORTHOPAEDIC SURGERY

## 2024-12-24 DIAGNOSIS — Z96.651 STATUS POST TOTAL RIGHT KNEE REPLACEMENT: Primary | ICD-10-CM

## 2024-12-31 ENCOUNTER — OFFICE VISIT (OUTPATIENT)
Age: 69
End: 2024-12-31
Payer: MEDICARE

## 2024-12-31 VITALS
WEIGHT: 150 LBS | BODY MASS INDEX: 29.45 KG/M2 | HEART RATE: 69 BPM | SYSTOLIC BLOOD PRESSURE: 120 MMHG | OXYGEN SATURATION: 98 % | HEIGHT: 60 IN | DIASTOLIC BLOOD PRESSURE: 62 MMHG | TEMPERATURE: 98 F

## 2024-12-31 DIAGNOSIS — M47.817 LUMBOSACRAL SPONDYLOSIS WITHOUT MYELOPATHY: Primary | ICD-10-CM

## 2024-12-31 DIAGNOSIS — G89.4 CHRONIC PAIN SYNDROME: ICD-10-CM

## 2024-12-31 PROCEDURE — 64635 DESTROY LUMB/SAC FACET JNT: CPT | Performed by: PAIN MEDICINE

## 2024-12-31 PROCEDURE — 64636 DESTROY L/S FACET JNT ADDL: CPT | Performed by: PAIN MEDICINE

## 2024-12-31 RX ORDER — BETAMETHASONE SODIUM PHOSPHATE AND BETAMETHASONE ACETATE 3; 3 MG/ML; MG/ML
6 INJECTION, SUSPENSION INTRA-ARTICULAR; INTRALESIONAL; INTRAMUSCULAR; SOFT TISSUE ONCE
Status: COMPLETED | OUTPATIENT
Start: 2024-12-31 | End: 2024-12-31

## 2024-12-31 RX ORDER — OXYCODONE AND ACETAMINOPHEN 7.5; 325 MG/1; MG/1
1 TABLET ORAL EVERY 6 HOURS PRN
Qty: 120 TABLET | Refills: 0 | Status: SHIPPED | OUTPATIENT
Start: 2024-12-31 | End: 2025-01-30

## 2024-12-31 RX ORDER — LIDOCAINE HYDROCHLORIDE 10 MG/ML
3 INJECTION, SOLUTION EPIDURAL; INFILTRATION; INTRACAUDAL; PERINEURAL ONCE
Status: COMPLETED | OUTPATIENT
Start: 2024-12-31 | End: 2024-12-31

## 2024-12-31 RX ADMIN — LIDOCAINE HYDROCHLORIDE 3 ML: 10 INJECTION, SOLUTION EPIDURAL; INFILTRATION; INTRACAUDAL; PERINEURAL at 10:55

## 2024-12-31 RX ADMIN — BETAMETHASONE SODIUM PHOSPHATE AND BETAMETHASONE ACETATE 6 MG: 3; 3 INJECTION, SUSPENSION INTRA-ARTICULAR; INTRALESIONAL; INTRAMUSCULAR at 10:54

## 2024-12-31 ASSESSMENT — PAIN SCALES - GENERAL
PAINLEVEL_OUTOF10: 7
PAINLEVEL_OUTOF10: 6

## 2024-12-31 ASSESSMENT — PAIN DESCRIPTION - LOCATION
LOCATION: BACK
LOCATION: BACK

## 2024-12-31 NOTE — PROGRESS NOTES
[facility]  Neurosurgery and Pain Management Center  [address], [address]  [place], [place]  P: [phone]  F: [phone]      Lumbar Radio Frequency Ablation     Provider: JULITA PRESLEY DO          Patient Name: Mariaelena Colby : 1955        Date: 2024      Mariaelena Colby is here today for interventional pain management.  Standard ASI guidelines were followed and sterile technique used.  Area was cleaned with Betadine x3.  Informed consent was obtained.  Fluoroscopic guidance was used for this procedure. Multiple views of fluoroscopy were used during procedure to assist with needle placement. Appropriate sized RF 10mm active tip needle was used and advance to appropriate anatomic location.    There was appropriate multifidus contraction noted with motor stimulation at 2 Hz between 0.5-1.5 volts. No limb or gluteal contraction was noted taking it up to 3.5 volts. Prior to lesioning at 80 degrees Celsius for 90 seconds, approximately 0.75mg/1mg of Celestone and ½ cc of 1% preservative free Lidocaine was injected. Impedance was between 200-500 ohms during the procedure.     Patient tolerated the procedure well, no obvious complications occurred during the procedure.  Patient was appropriately monitored and discharged home in stable condition with their usual motor strength. Post Op instructions were given to patient.          [x] Bilateral [] T11 [x] L1 [] S1     [] T12 [x] L2 [] S2    [] Right  [x] L3 [] S3      [] L4 [] S4    [] Left  [] L5                              JULITA PRESLEY DO

## 2025-01-02 ENCOUNTER — OFFICE VISIT (OUTPATIENT)
Dept: PRIMARY CARE | Facility: CLINIC | Age: 70
End: 2025-01-02
Payer: MEDICARE

## 2025-01-02 VITALS
WEIGHT: 151 LBS | HEART RATE: 89 BPM | HEIGHT: 60 IN | DIASTOLIC BLOOD PRESSURE: 78 MMHG | TEMPERATURE: 97.6 F | BODY MASS INDEX: 29.64 KG/M2 | SYSTOLIC BLOOD PRESSURE: 114 MMHG

## 2025-01-02 DIAGNOSIS — R05.8 COUGH DUE TO ACE INHIBITOR: ICD-10-CM

## 2025-01-02 DIAGNOSIS — I10 BENIGN HYPERTENSION: ICD-10-CM

## 2025-01-02 DIAGNOSIS — T46.4X5A COUGH DUE TO ACE INHIBITOR: ICD-10-CM

## 2025-01-02 DIAGNOSIS — R05.2 SUBACUTE COUGH: ICD-10-CM

## 2025-01-02 DIAGNOSIS — Z87.891 FORMER SMOKER: ICD-10-CM

## 2025-01-02 DIAGNOSIS — R59.9 ADENOPATHY: ICD-10-CM

## 2025-01-02 DIAGNOSIS — Z91.199 NONCOMPLIANCE: ICD-10-CM

## 2025-01-02 DIAGNOSIS — J06.9 ACUTE UPPER RESPIRATORY INFECTION: Primary | ICD-10-CM

## 2025-01-02 DIAGNOSIS — R06.2 WHEEZING: ICD-10-CM

## 2025-01-02 PROCEDURE — 1160F RVW MEDS BY RX/DR IN RCRD: CPT | Performed by: INTERNAL MEDICINE

## 2025-01-02 PROCEDURE — 99214 OFFICE O/P EST MOD 30 MIN: CPT | Performed by: INTERNAL MEDICINE

## 2025-01-02 PROCEDURE — 3074F SYST BP LT 130 MM HG: CPT | Performed by: INTERNAL MEDICINE

## 2025-01-02 PROCEDURE — 3008F BODY MASS INDEX DOCD: CPT | Performed by: INTERNAL MEDICINE

## 2025-01-02 PROCEDURE — 1123F ACP DISCUSS/DSCN MKR DOCD: CPT | Performed by: INTERNAL MEDICINE

## 2025-01-02 PROCEDURE — 1036F TOBACCO NON-USER: CPT | Performed by: INTERNAL MEDICINE

## 2025-01-02 PROCEDURE — 1159F MED LIST DOCD IN RCRD: CPT | Performed by: INTERNAL MEDICINE

## 2025-01-02 PROCEDURE — G2211 COMPLEX E/M VISIT ADD ON: HCPCS | Performed by: INTERNAL MEDICINE

## 2025-01-02 PROCEDURE — 3078F DIAST BP <80 MM HG: CPT | Performed by: INTERNAL MEDICINE

## 2025-01-02 RX ORDER — AMLODIPINE BESYLATE 5 MG/1
5 TABLET ORAL DAILY
Qty: 30 TABLET | Refills: 11 | Status: SHIPPED | OUTPATIENT
Start: 2025-01-02 | End: 2026-01-02

## 2025-01-02 RX ORDER — ALBUTEROL SULFATE 90 UG/1
2 INHALANT RESPIRATORY (INHALATION) 3 TIMES DAILY PRN
Qty: 8.5 G | Refills: 0 | Status: SHIPPED | OUTPATIENT
Start: 2025-01-02 | End: 2026-01-02

## 2025-01-02 RX ORDER — AMOXICILLIN 500 MG/1
CAPSULE ORAL
COMMUNITY
Start: 2024-12-02

## 2025-01-02 RX ORDER — AZITHROMYCIN 500 MG/1
500 TABLET, FILM COATED ORAL DAILY
Qty: 5 TABLET | Refills: 0 | Status: SHIPPED | OUTPATIENT
Start: 2025-01-02 | End: 2025-01-07

## 2025-01-02 ASSESSMENT — LIFESTYLE VARIABLES
HAS A RELATIVE, FRIEND, DOCTOR, OR ANOTHER HEALTH PROFESSIONAL EXPRESSED CONCERN ABOUT YOUR DRINKING OR SUGGESTED YOU CUT DOWN: NO
HOW MANY STANDARD DRINKS CONTAINING ALCOHOL DO YOU HAVE ON A TYPICAL DAY: PATIENT DOES NOT DRINK
SKIP TO QUESTIONS 9-10: 1
AUDIT-C TOTAL SCORE: 0
HOW OFTEN DO YOU HAVE A DRINK CONTAINING ALCOHOL: NEVER
HAVE YOU OR SOMEONE ELSE BEEN INJURED AS A RESULT OF YOUR DRINKING: NO
HOW OFTEN DO YOU HAVE SIX OR MORE DRINKS ON ONE OCCASION: NEVER
AUDIT TOTAL SCORE: 0

## 2025-01-02 ASSESSMENT — PATIENT HEALTH QUESTIONNAIRE - PHQ9
SUM OF ALL RESPONSES TO PHQ9 QUESTIONS 1 AND 2: 0
2. FEELING DOWN, DEPRESSED OR HOPELESS: NOT AT ALL
1. LITTLE INTEREST OR PLEASURE IN DOING THINGS: NOT AT ALL

## 2025-01-02 NOTE — PROGRESS NOTES
Assessment/Plan   69 years old female who is a reformed smoker has chronic cough for which she has had multiple investigations including she had the PFT chest x-ray and pulmonary consult.  I had a lengthy discussion with the patient and at this time we will discontinue the lisinopril and the start her on amlodipine with possible clinical diagnosis of patient's chronic cough could be due to ACE inhibitor.  She also has some throat irritation with some discomfort in the lower part of the throat.  There is no evidence of any anaphylactic reaction or mucosal inflammation but I advised her to stop the lisinopril at this time.  The importance of starting the amlodipine was discussed which patient was very reluctant and says that she does not have hypertension.  Will start her on 5 mg of amlodipine and this may need to be increased if her blood pressure increases with stopping the lisinopril and the 5 mg of amlodipine.    Patient has some throat irritation and she is negative for COVID.  She has had flu vaccine.  At this time I explained to the patient doing a strep throat swab is not going to be helpful and she will be empirically treated with Zithromax.  He was advised to stay away from her father at least for 3 days specifically until the symptoms are improved.  Even after that I advised the patient to wash hands and if possible wear a mask when she is taking care of her father who is 90 years old.    Patient was seen by the pulmonologist and she was advised to use the albuterol nebulizer which she is not using it.  She says she does not like it and she does not want it.  I advised her at least she should use the Advair inhaler and the albuterol inhaler to stop her wheezing.  Patient verbalizes understanding.  The importance of the compliance was discussed.    I advised the patient that her last blood test in July 2024 shows her cholesterol is elevated and she should be compliant with the statin and also she should  follow-up with the PCP within next 3 to 4 months when he returns so that she can have the overall care.  She verbalizes the understanding.          Problem List Items Addressed This Visit       Benign hypertension    Relevant Medications    amLODIPine (Norvasc) 5 mg tablet    Wheezing    Relevant Medications    albuterol (ProAir HFA) 90 mcg/actuation inhaler    azithromycin (Zithromax) 500 mg tablet    Acute upper respiratory infection - Primary    Relevant Medications    azithromycin (Zithromax) 500 mg tablet       Subjective     Patient ID: Alysa Braun is a 69 y.o. female who presents for Sore Throat, Fatigue, and Cough (Couple weeks).    History of present illness  69 years old female who has chronic medical problems including history of hypertension hyperlipidemia and also has had recurrent cough came for an acute visit because of having cough and sore throat she also has some nonspecific symptoms such as tiredness.    Patient says she is taking care of her father who is 90 years old and she wants to be more careful.  She did the COVID testing this morning and was negative.    She decided to come to the office today because she thought that she has increased throat pain and cough.  She also has some discomfort in the upper neck area.    Patient has had history of cough and congestion and recurrent infections and she was advised to see a pulmonologist and I have reviewed the pulmonologist recommendation and the consult.  Patient says that she is not using the albuterol nebulizer because she does not like it and also she is not using the albuterol inhaler which was prescribed by me.    Patient has had the flu vaccine.  She says that she is not smoking at this time.    She denies any chest pain.  She denies any short of breath.  She denies any ill contacts and she says she is taking at home except visiting her father who has some help from a medical assistant to be living independently but close to her.    She  denies any problem with swallowing.  She denies any short of breath.    Rest of the review of systems no acute complaints.    Family History   Problem Relation Name Age of Onset    Depression Mother      Diabetes Mother      Fibromyalgia Mother      Other (High serum cholesterol sulfate) Father        Social History     Socioeconomic History    Marital status:      Spouse name: Not on file    Number of children: Not on file    Years of education: Not on file    Highest education level: Not on file   Occupational History    Not on file   Tobacco Use    Smoking status: Former     Current packs/day: 0.00     Average packs/day: 1 pack/day for 10.0 years (10.0 ttl pk-yrs)     Types: Cigarettes     Start date:      Quit date: 2018     Years since quittin.0    Smokeless tobacco: Never   Vaping Use    Vaping status: Never Used   Substance and Sexual Activity    Alcohol use: Never     Alcohol/week: 2.0 standard drinks of alcohol     Types: 2 Glasses of wine per week    Drug use: Never    Sexual activity: Not on file   Other Topics Concern    Not on file   Social History Narrative    Not on file     Social Drivers of Health     Financial Resource Strain: Low Risk  (9/3/2024)    Received from [facility]    Overall Financial Resource Strain (CARDIA)     Difficulty of Paying Living Expenses: Not hard at all   Food Insecurity: No Food Insecurity (2024)    Received from [facility]    Hunger Vital Sign     Worried About Running Out of Food in the Last Year: Never true     Ran Out of Food in the Last Year: Never true   Transportation Needs: No Transportation Needs (2024)    Received from [facility]    PRAPARE - Transportation     Lack of Transportation (Medical): No     Lack of Transportation (Non-Medical): No   Physical Activity: Not on file   Stress: Not on file   Social Connections: Not on file   Intimate Partner Violence: Not on file    Housing Stability: Low Risk  (9/9/2024)    Received from [facility]    Housing Stability Vital Sign     Unable to Pay for Housing in the Last Year: No     Number of Times Moved in the Last Year: 1     Homeless in the Last Year: No      Patient has no known allergies.   Current Outpatient Medications   Medication Sig Dispense Refill    acetaminophen (TylenoL) 325 mg tablet Take by mouth every 6 hours.      amoxicillin (Amoxil) 500 mg capsule Take one as needed every 12 hours until gone      buPROPion SR (Wellbutrin SR) 100 mg 12 hr tablet Take 1 tablet (100 mg) by mouth early in the morning..      busPIRone (Buspar) 15 mg tablet Take 1 tablet (15 mg) by mouth 3 times a day.      gabapentin (Neurontin) 300 mg capsule Take 1 capsule (300 mg) by mouth 3 times a day.      ipratropium (Atrovent) 21 mcg (0.03 %) nasal spray Administer 2 sprays into each nostril 2 times a day. 30 mL 3    L. acidophilus/Bifid. animalis 15.5 billion cell capsule Take by mouth.      lidocaine (Lidoderm) 5 % patch Place 1 patch onto the skin daily. Leave on for 12 hours, and keep off for 12 hours. 30 patch 0    loratadine (Claritin) 10 mg tablet Take by mouth.      magnesium oxide (Mag-Ox) 400 mg (241.3 mg magnesium) tablet Take 1 tablet (400 mg) by mouth once daily.      mupirocin (Bactroban) 2 % ointment       naloxone (Narcan) 4 mg/0.1 mL nasal spray Administer into affected nostril(s).      oxyCODONE-acetaminophen (Percocet) 5-325 mg tablet Take 1 tablet by mouth every 6 hours if needed for severe pain (7 - 10). 6 tablet 0    pantoprazole (ProtoNix) 40 mg EC tablet Take 1 tablet (40 mg) by mouth once daily. Do not crush, chew, or split. 90 tablet 3    pramipexole (Mirapex) 0.25 mg tablet Take 1 tablet (0.25 mg) by mouth once daily. 90 tablet 1    simvastatin (Zocor) 40 mg tablet Take 1 tablet (40 mg) by mouth once daily at bedtime. 90 tablet 3    tiZANidine (Zanaflex) 2 mg tablet Take by mouth.      Xanax 0.5 mg  tablet Take by mouth 3 times a day as needed.      albuterol (ProAir HFA) 90 mcg/actuation inhaler Inhale 2 puffs 3 times a day as needed for wheezing or shortness of breath. 8.5 g 0    amLODIPine (Norvasc) 5 mg tablet Take 1 tablet (5 mg) by mouth once daily. 30 tablet 11    azithromycin (Zithromax) 500 mg tablet Take 1 tablet (500 mg) by mouth once daily for 5 days. 5 tablet 0    fluticasone propion-salmeteroL (Advair Diskus) 250-50 mcg/dose diskus inhaler Inhale 1 puff 2 times a day. Rinse mouth with water after use to reduce aftertaste and incidence of candidiasis. Do not swallow. (Patient not taking: Reported on 1/2/2025) 60 each 0    montelukast (Singulair) 10 mg tablet Take 1 tablet (10 mg) by mouth once daily at bedtime. 30 tablet 5     No current facility-administered medications for this visit.       Objective     Vitals:    01/02/25 1037   BP: 114/78   Pulse: 89   Temp: 36.4 °C (97.6 °F)        Physical Exam  Alert, oriented x3, not in distress.  Not pale, no cyanosis, no icterus. No skin rash  Neck:  Supple.  No JVD. No thyromegaly, trachea in the midline  No clubbing.  Throat is very mild inflamed but no tonsillar exudates.  No tonsillar enlargement rare  Submandibular area patient has some discomfort in a tender lymph node which is about 2 mm in diameter just right to the midline.  Respiratory System:  Vesicular breathing moderate air entry and breath sounds.  No wheezing and no rales. No pleural rub.  Cardiovascular:  S1 and S2 positive.  No murmur.  Regular rate and rhythm.  Abdomen:  Soft.  Detailed exam deferred  Extremities:  Peripheral pulses present.  No edema. Gait is normal      Problem List Items Addressed This Visit       Benign hypertension    Relevant Medications    amLODIPine (Norvasc) 5 mg tablet    Wheezing    Relevant Medications    albuterol (ProAir HFA) 90 mcg/actuation inhaler    azithromycin (Zithromax) 500 mg tablet    Acute upper respiratory infection - Primary    Relevant  Medications    azithromycin (Zithromax) 500 mg tablet        No orders of the defined types were placed in this encounter.       Lab Results   Component Value Date    WBC 8.2 07/30/2024    HGB 11.1 (L) 07/30/2024    HCT 33.6 (L) 07/30/2024     07/30/2024    CHOL 240 (H) 05/03/2024    TRIG 247 (H) 05/03/2024    HDL 65.7 05/03/2024    ALT 22 07/30/2024    AST 30 07/30/2024     (L) 07/30/2024    K 5.1 07/30/2024    CL 98 07/30/2024    CREATININE 0.87 07/30/2024    BUN 39 (H) 07/30/2024    CO2 25 07/30/2024    TSH 3.89 10/18/2023    INR 1.0 12/16/2022    HGBA1C 5.7 09/05/2024     Lab Results   Component Value Date    CHOL 240 (H) 05/03/2024    LDLCALC 125 (H) 05/03/2024    CHHDL 3.7 05/03/2024     Patient's pulmonary consult assessment and plan from 11/25/2024 is as follows  Assessment and Plan / Recommendations:  Problem List Items Addressed This Visit         Former smoker     Relevant Orders     CT lung screening low dose     Cough - Primary     Relevant Orders     CT lung screening low dose     Acute bronchitis      Chronic cough and one episode of wheezing - PFT with moderate restrictive disease and past smoker  PFT 8/21/2024  Conclusion: Normal spirometry. Lung volumes by plethysmography indicate a restrictive ventilatory impairment. The DLCO is normal.   - albuterol hfa 2 puffs or albuterol nebs every 4-6 hours as needed   - eosinophils 200, consider respiratory allergy panel and IG E given history of sinusitis      Seen by ENT - sinusitis - seen by ENT Dr. NÚÑEZ   Resolved rhinosinusitis. And  Bilateral vocal cord polyps  - Continue antihistamine  - eosinophils 200, consider respiratory allergy panel and IG E given history of sinusitis      Past smoker approx 30-35 pack year history   CT chest in 7/30/3034 reviewed no nodules  - repeat CT chest low dose after 7/30/2025      Fatigue and snoring - offered sleep test and declining      Thank you for visiting the Pulmonary clinic today!         Return to  clinic after 6 Months and after  CT scan complete  or sooner if needed   Windy Kirby CNP  My office number is [phone] -      Call to schedule  for radiology - CT scans/PFTs etc at  [phone]  General scheduling  [phone]      Best way to get a hold of me is to call my office --> Please do not send me follow my health messages  Any test results will be discussed at next visit -- please make sure to make a follow up appt after testing.         Electronically signed by DEVORAH Nguyễn at 11/25/2024 10:12 AM

## 2025-01-06 ENCOUNTER — HOSPITAL ENCOUNTER (OUTPATIENT)
Dept: PHYSICAL THERAPY | Age: 70
Setting detail: THERAPIES SERIES
Discharge: HOME | End: 2025-01-06
Payer: MEDICARE

## 2025-01-06 PROCEDURE — 97162 PT EVAL MOD COMPLEX 30 MIN: CPT

## 2025-01-06 PROCEDURE — 97116 GAIT TRAINING THERAPY: CPT

## 2025-01-06 PROCEDURE — 97110 THERAPEUTIC EXERCISES: CPT

## 2025-01-06 PROCEDURE — 97530 THERAPEUTIC ACTIVITIES: CPT

## 2025-01-06 NOTE — PROGRESS NOTES
Physical Therapy: Initial Evaluation    Patient: Mariaelena Colby (69 y.o. female)   Examination Date: 2025  Plan of Care Certification Period: 2025 to 25  Progress Note Counter: Complete new evaluation due to pt last session was on 24 due to being out of town plus illness   :  1955 ;    Confirmed: Yes MRN: [id number]  CSN: [id number]   Insurance: Payor: MEDICARE / Plan: MEDICARE PART A AND B / Product Type: *No Product type* /   Insurance ID: [id number] - (Medicare) Secondary Insurance (if applicable): [employer]   Referring Physician: Bebo Junior MD Dan Basinski PA, Dr. Junior   PCP: Queenie Yost MD Visits to Date/Visits Approved:     No Show/Cancelled Appts: 0 / 0     Medical Diagnosis: Status post total right knee replacement [Z96.651] S/P R TKA  25  Treatment Diagnosis: weakness S/P R TKA 24     PERTINENT MEDICAL HISTORY      Self reported health status:: Good    Medical History: Chart Reviewed: Yes   Past Medical History:   Diagnosis Date    Anxiety     CHF (congestive heart failure) (HCC)     Chronic back pain 20 years ago    Coronary artery disease involving native coronary artery of native heart without angina pectoris 2018    Hyperlipidemia     meds > 5 yrs    Osteoarthritis     Restless legs syndrome 20 years ago     Surgical History:   Past Surgical History:   Procedure Laterality Date    BACK SURGERY  2017    lumbar disc OR    COLONOSCOPY      COSMETIC SURGERY  2024    Sonobello (abdomen)    DILATION AND CURETTAGE OF UTERUS      at age 30s--miscarriage    HERNIA REPAIR  2023    LUMBAR FUSION Left 2020    ANTERIOR LEFT RETROPERITONEAL L5-S1 DISKECTOMY INTERBODY CAGE FUSION [place] [facility] [place]    TONSILLECTOMY      as child    TOTAL KNEE ARTHROPLASTY Right 2024    Right total knee arthroplasty performed by Bebo Junior MD at [facility] OR    TRACHEAL SURGERY      in [place]--EMT attempted to

## 2025-01-08 ENCOUNTER — HOSPITAL ENCOUNTER (OUTPATIENT)
Dept: PHYSICAL THERAPY | Age: 70
Setting detail: THERAPIES SERIES
Discharge: HOME | End: 2025-01-08
Payer: MEDICARE

## 2025-01-08 PROCEDURE — 97110 THERAPEUTIC EXERCISES: CPT

## 2025-01-08 NOTE — PROGRESS NOTES
training  Modalities: Heat/Cold, E-stim - unattended   Additional Comments: KT tape       Therapy Time  Individual Time In:       1015  Individual Time Out:  1100  Minutes:  45        Electronically signed by Jacinda Bender PTA  on 1/8/2025 at 11:02 AM   POC NOTE

## 2025-01-13 ENCOUNTER — HOSPITAL ENCOUNTER (OUTPATIENT)
Dept: PHYSICAL THERAPY | Age: 70
Setting detail: THERAPIES SERIES
Discharge: HOME | End: 2025-01-13
Payer: MEDICARE

## 2025-01-13 DIAGNOSIS — M47.817 LUMBOSACRAL SPONDYLOSIS WITHOUT MYELOPATHY: ICD-10-CM

## 2025-01-13 DIAGNOSIS — M46.1 SACROILIITIS, NOT ELSEWHERE CLASSIFIED (HCC): ICD-10-CM

## 2025-01-13 PROCEDURE — 97110 THERAPEUTIC EXERCISES: CPT

## 2025-01-13 RX ORDER — TIZANIDINE 2 MG/1
2 TABLET ORAL NIGHTLY PRN
Qty: 30 TABLET | Refills: 0 | Status: SHIPPED | OUTPATIENT
Start: 2025-01-13

## 2025-01-13 ASSESSMENT — PAIN SCALES - GENERAL: PAINLEVEL_OUTOF10: 5

## 2025-01-13 NOTE — TELEPHONE ENCOUNTER
Requested Prescriptions     Pending Prescriptions Disp Refills    tiZANidine (ZANAFLEX) 2 MG tablet [Pharmacy Med Name: TIZANIDINE HCL 2 MG TABLET] 30 tablet 0     Sig: TAKE 1 TABLET BY MOUTH NIGHTLY AS NEEDED (SPASM/PAIN)       Patient last seen on:  12/31/2024    Date of last refill:  12/09/204    Reason for request:  Pharmacy    Next office visit date: 02/06/2025    Patient has no known allergies.

## 2025-01-13 NOTE — PROGRESS NOTES
Physical Therapy: Daily Note   Patient: Mariaelena Colby (69 y.o. female)   Examination Date: 2025  Plan of Care/Certification Expiration Date: 25    Progress Note Counter: Complete new evaluation due to pt last session was on 24 due to being out of town plus illness     :  1955 # of Visits since SOC:   3   MRN: [id number]  CSN: [id number] Start of Care Date:   2025   Insurance: Payor: MEDICARE / Plan: MEDICARE PART A AND B / Product Type: *No Product type* /   Insurance ID: [id number] - (Medicare) Secondary Insurance (if applicable): [employer]   Referring Physician: Bebo Junior MD Dan Basinski PA, Dr. Junior   PCP: Queenie Yost MD Visits to Date/Visits Approved: 3 / 12    No Show/Cancelled Appts: 0 / 0     Medical Diagnosis: No admission diagnoses are documented for this encounter.    Treatment Diagnosis: weakness S/P R TKA 24        SUBJECTIVE EXAMINATION   Pain Level: Pain Screening  Patient Currently in Pain: Yes  Pain Assessment: 0-10  Pain Level: 5 (R knee)    Patient Comments: Subjective: Pt states she feels like her knee aches and occasionally gets sharp pains both medially and laterally. Pt states she feels as though the supine exercises benefit her more d/t her back pain. Pt states she still has trouble going up stairs d/t weakness.    HEP Compliance: Fair         TREATMENT     Exercises:      Treatment Reasoning    Exercise 1: 3 way hip YTB tied at knees H3'' x 10  Exercise 2: heel taps 4\" RLE 2 x 10  Exercise 14: recumbent bike x 5 min  Exercise 15: LAQ with adduction H3'' x 10 3#    Limitations addressed: Mobility, Strength                     Pt Education: Additional Comments: KT tape       ASSESSMENT     Assessment: Assessment: Discussed routine for HEP that pt could be more consistant with (yoga, water). Pt performed standing ther ex this date with YTB with no c/o back pain. Pt experiencing brief medial/lateral knee pain upon sitting but subsided

## 2025-01-15 ENCOUNTER — HOSPITAL ENCOUNTER (OUTPATIENT)
Dept: PHYSICAL THERAPY | Age: 70
Setting detail: THERAPIES SERIES
Discharge: HOME | End: 2025-01-15
Payer: MEDICARE

## 2025-01-15 PROCEDURE — 97110 THERAPEUTIC EXERCISES: CPT

## 2025-01-15 ASSESSMENT — PAIN SCALES - GENERAL: PAINLEVEL_OUTOF10: 4

## 2025-01-15 NOTE — PROGRESS NOTES
taps to focus on eccectric control and strengthening of her thigh muscles. Pt needed cues for form and reviewed how she can do 4 way hip at home with Tband. Pt able to tolerate more challenging exercises. Pt had no increased pain post tx session. Continue to progress per POC.  Body Structures, Functions, Activity Limitations Requiring Skilled Therapeutic Intervention: Decreased functional mobility , Decreased strength, Increased pain, Decreased ROM    Post-Treatment Pain Level:  same    Activity Tolerance: Patient limited by pain    Therapy Prognosis: Good       GOALS   Patient Goals : To be able to get on/off the floor for doing yoga and be ready when time to do yard work  Short Term Goals Completed by 1-2 weeks from date of evaluation Current Status Goal Status   I wtih HEP and report compliance with HEP 5/7 days per wk performing HEP daily New   Pt able to perform her household chores with less rest breaks and less increased right knee pain   New                                                                       Long Term Goals Completed by 4-6 weeks from date of evalutions Current Status Goal Status   Improve AROM R knee 2-125 deg   New   Improve strength R LE 4+/5 or greater to allow reciprocal stairclimbing and long distance walking   New   Improve MOD LEFS 31% on day of IE to <10% by time of discharge   New   Pt amb 500 feet or greater without AD demonstrating good balance and near equal step and stride length with 1/10 pain   New   Pt I with advanced HEP to further improve strength and overall functional mobility   New                                                TREATMENT PLAN   Plan Frequency: 1-2  Plan weeks: 4-6 additional  Current Treatment Recommendations: Strengthening, ROM, Functional mobility training, Gait training, Stair training, Safety education & training, Modalities, Therapeutic activities, Manual, Pain management, Endurance training  Modalities: Heat/Cold, E-stim - unattended

## 2025-01-16 DIAGNOSIS — R07.89 BURNING CHEST PAIN: ICD-10-CM

## 2025-01-17 RX ORDER — PANTOPRAZOLE SODIUM 40 MG/1
40 TABLET, DELAYED RELEASE ORAL DAILY
Qty: 90 TABLET | Refills: 3 | Status: SHIPPED | OUTPATIENT
Start: 2025-01-17 | End: 2026-01-17

## 2025-01-20 ENCOUNTER — HOSPITAL ENCOUNTER (OUTPATIENT)
Dept: PHYSICAL THERAPY | Age: 70
Setting detail: THERAPIES SERIES
Discharge: HOME | End: 2025-01-20
Payer: MEDICARE

## 2025-01-20 PROCEDURE — 97140 MANUAL THERAPY 1/> REGIONS: CPT

## 2025-01-20 PROCEDURE — 97110 THERAPEUTIC EXERCISES: CPT

## 2025-01-20 NOTE — PROGRESS NOTES
Physical Therapy: Daily Note   Patient: Mariaelena Colby (69 y.o. female)   Examination Date: 2025  Plan of Care/Certification Expiration Date: 25    Progress Note Counter: Complete new evaluation due to pt last session was on 24 due to being out of town plus illness     :  1955 # of Visits since SOC:   5   MRN: [id number]  CSN: [id number] Start of Care Date:   2025   Insurance: Payor: MEDICARE / Plan: MEDICARE PART A AND B / Product Type: *No Product type* /   Insurance ID: [id number] - (Medicare) Secondary Insurance (if applicable): [employer]   Referring Physician: Bebo Junior MD Dan Basinski PA, Dr. Junior   PCP: Queenie Yost MD Visits to Date/Visits Approved:     No Show/Cancelled Appts:   / 0     Medical Diagnosis: No admission diagnoses are documented for this encounter.    Treatment Diagnosis: weakness S/P R TKA 24        SUBJECTIVE EXAMINATION   Pain Level: Pain Screening  Patient Currently in Pain: Yes  Pain Assessment: 0-10    Patient Comments: Subjective: Pt states \"the steps are getting better but the knee hurts\". Pt arrives to therapy nearly 10 min late.    HEP Compliance: Fair        OBJECTIVE EXAMINATION   Restrictions:  Restrictions/Precautions: Fall Risk   Required Braces or Orthoses?: No   Implants Present? : Metal implants (R TKR 24)            Left AROM  Right AROM        AROM RLE (degrees)  R Knee Flexion (0-145): 0-121 deg supine       TREATMENT     Exercises:      Treatment Reasoning    Exercise 5: Supine B HS stretch 60 sec x 3  Exercise 7: step up and over 6'' x 10 (VC's for slow and controlled)  Exercise 14: recumbent bike x 5 min  Exercise 16: SKTC H30'' x 3  Exercise 17: SLR with abdominal bracing H3'' x 10 (VC's for inc form)    Limitations addressed: Mobility, Strength                     Manual Therapy: (CPT 37561) Treatment Reasoning     Joint Mobilization: seated R knee distraction with oscillations to improve mobility.

## 2025-01-22 ENCOUNTER — HOSPITAL ENCOUNTER (OUTPATIENT)
Dept: PHYSICAL THERAPY | Age: 70
Setting detail: THERAPIES SERIES
Discharge: HOME | End: 2025-01-22
Payer: MEDICARE

## 2025-01-22 PROCEDURE — 97110 THERAPEUTIC EXERCISES: CPT

## 2025-01-22 PROCEDURE — 97140 MANUAL THERAPY 1/> REGIONS: CPT

## 2025-01-22 NOTE — PROGRESS NOTES
Physical Therapy  Physical Therapy: Daily Note   Patient: Mariaelena Colby (69 y.o. female)   Examination Date: 2025  Plan of Care/Certification Expiration Date: 25    Progress Note Counter: Complete new evaluation due to pt last session was on 24 due to being out of town plus illness     :  1955 # of Visits since SOC:   6   MRN: [id number]  CSN: [id number] Start of Care Date:   2025   Insurance: Payor: MEDICARE / Plan: MEDICARE PART A AND B / Product Type: *No Product type* /   Insurance ID: [id number] - (Medicare) Secondary Insurance (if applicable): [employer]   Referring Physician: Bebo Junior MD Dan Basinski PA, Dr. George   PCP: Queenie Yost MD Visits to Date/Visits Approved:     No Show/Cancelled Appts: 0 / 0     Medical Diagnosis: No admission diagnoses are documented for this encounter.    Treatment Diagnosis: weakness S/P R TKA 24        SUBJECTIVE EXAMINATION   Pain Level: Pain Screening  Patient Currently in Pain: No    Patient Comments: Subjective: Pt. states she wants to make today be her last day for  PT and will continue with HEP on her own.  Pt. also reports she wants to get back into water exercises.    HEP Compliance: Good        OBJECTIVE EXAMINATION   Restrictions:  Restrictions/Precautions: Fall Risk   Required Braces or Orthoses?: No   Implants Present? : Metal implants (R TKR 24)            Left AROM  Right AROM        AROM RLE (degrees)  R Knee Flexion (0-145): 0-121 deg supine       TREATMENT     Exercises:      Treatment Reasoning    Exercise 1: 4 way hip YTB B LE H3'' x 10  Exercise 6: Lateral heel taps x 10 B LE 4\"  Exercise 7: step up and over 6'' x 10 (VC's for slow and controlled)  Exercise 8: step ups forward, lateral, retro 6\" x 10 ea slow and controlled  Exercise 14: recumbent bike x 5 min                          Manual Therapy: (CPT 98848) Treatment Reasoning     Joint Mobilization: seated R knee distraction with

## 2025-01-24 ENCOUNTER — TELEPHONE (OUTPATIENT)
Dept: FAMILY MEDICINE CLINIC | Age: 70
End: 2025-01-24

## 2025-01-27 ENCOUNTER — APPOINTMENT (OUTPATIENT)
Dept: PHYSICAL THERAPY | Age: 70
End: 2025-01-27
Payer: MEDICARE

## 2025-01-29 ENCOUNTER — APPOINTMENT (OUTPATIENT)
Dept: PHYSICAL THERAPY | Age: 70
End: 2025-01-29
Payer: MEDICARE

## 2025-02-05 ENCOUNTER — OFFICE VISIT (OUTPATIENT)
Age: 70
End: 2025-02-05
Payer: MEDICARE

## 2025-02-05 ENCOUNTER — TELEPHONE (OUTPATIENT)
Age: 70
End: 2025-02-05

## 2025-02-05 VITALS
HEART RATE: 86 BPM | BODY MASS INDEX: 29.45 KG/M2 | WEIGHT: 150 LBS | OXYGEN SATURATION: 100 % | TEMPERATURE: 97.5 F | HEIGHT: 60 IN

## 2025-02-05 DIAGNOSIS — M54.16 LUMBAR RADICULOPATHY: ICD-10-CM

## 2025-02-05 DIAGNOSIS — M48.061 SPINAL STENOSIS, LUMBAR REGION, WITHOUT NEUROGENIC CLAUDICATION: Primary | ICD-10-CM

## 2025-02-05 DIAGNOSIS — M46.1 SACROILIITIS, NOT ELSEWHERE CLASSIFIED (HCC): ICD-10-CM

## 2025-02-05 DIAGNOSIS — G89.4 CHRONIC PAIN SYNDROME: ICD-10-CM

## 2025-02-05 DIAGNOSIS — M47.817 LUMBOSACRAL SPONDYLOSIS WITHOUT MYELOPATHY: ICD-10-CM

## 2025-02-05 PROCEDURE — 1123F ACP DISCUSS/DSCN MKR DOCD: CPT | Performed by: NURSE PRACTITIONER

## 2025-02-05 PROCEDURE — G8400 PT W/DXA NO RESULTS DOC: HCPCS | Performed by: NURSE PRACTITIONER

## 2025-02-05 PROCEDURE — 1036F TOBACCO NON-USER: CPT | Performed by: NURSE PRACTITIONER

## 2025-02-05 PROCEDURE — 1090F PRES/ABSN URINE INCON ASSESS: CPT | Performed by: NURSE PRACTITIONER

## 2025-02-05 PROCEDURE — 99214 OFFICE O/P EST MOD 30 MIN: CPT | Performed by: NURSE PRACTITIONER

## 2025-02-05 PROCEDURE — G8417 CALC BMI ABV UP PARAM F/U: HCPCS | Performed by: NURSE PRACTITIONER

## 2025-02-05 PROCEDURE — 1159F MED LIST DOCD IN RCRD: CPT | Performed by: NURSE PRACTITIONER

## 2025-02-05 PROCEDURE — G8427 DOCREV CUR MEDS BY ELIG CLIN: HCPCS | Performed by: NURSE PRACTITIONER

## 2025-02-05 PROCEDURE — 3017F COLORECTAL CA SCREEN DOC REV: CPT | Performed by: NURSE PRACTITIONER

## 2025-02-05 PROCEDURE — 1160F RVW MEDS BY RX/DR IN RCRD: CPT | Performed by: NURSE PRACTITIONER

## 2025-02-05 PROCEDURE — 1125F AMNT PAIN NOTED PAIN PRSNT: CPT | Performed by: NURSE PRACTITIONER

## 2025-02-05 RX ORDER — GABAPENTIN 300 MG/1
300 CAPSULE ORAL 2 TIMES DAILY
Qty: 60 CAPSULE | Refills: 0 | Status: SHIPPED | OUTPATIENT
Start: 2025-02-05 | End: 2025-03-07

## 2025-02-05 RX ORDER — TIZANIDINE 2 MG/1
2 TABLET ORAL NIGHTLY PRN
Qty: 30 TABLET | Refills: 0 | Status: SHIPPED | OUTPATIENT
Start: 2025-02-05

## 2025-02-05 RX ORDER — OXYCODONE AND ACETAMINOPHEN 7.5; 325 MG/1; MG/1
1 TABLET ORAL EVERY 6 HOURS PRN
Qty: 120 TABLET | Refills: 0 | Status: SHIPPED | OUTPATIENT
Start: 2025-02-06 | End: 2025-03-08

## 2025-02-05 ASSESSMENT — ENCOUNTER SYMPTOMS
CONSTIPATION: 0
EYES NEGATIVE: 1
DIARRHEA: 0
COUGH: 0
BACK PAIN: 1
GASTROINTESTINAL NEGATIVE: 1
SHORTNESS OF BREATH: 0
TROUBLE SWALLOWING: 0

## 2025-02-05 NOTE — TELEPHONE ENCOUNTER
PATIENT IS ASKING IF SHE CAN GET HER OXYCODONE TODAY   SHE DOES NOT WANT TO GO OUT IN THE ICE TOMORROW DUE TO JUST HAVING KNEE SURGERY

## 2025-02-05 NOTE — PROGRESS NOTES
Mariaelena Colby  (1955)    2/5/2025    Subjective:     Mariaelena Colby is 69 y.o. female who complains today of:    Chief Complaint   Patient presents with    Follow-up    Back Pain    Knee Pain     Right         Allergies:  Patient has no known allergies.    Past Medical History:   Diagnosis Date    Anxiety     CHF (congestive heart failure) (HCC)     Chronic back pain 20 years ago    Coronary artery disease involving native coronary artery of native heart without angina pectoris 12/11/2018    Hyperlipidemia     meds > 5 yrs    Osteoarthritis     Restless legs syndrome 20 years ago     Past Surgical History:   Procedure Laterality Date    BACK SURGERY  2017    lumbar disc OR    COLONOSCOPY      COSMETIC SURGERY  Feb 2024    Sonobello (abdomen)    DILATION AND CURETTAGE OF UTERUS      at age 30s--miscarriage    HERNIA REPAIR  Feb 2023    LUMBAR FUSION Left 08/12/2020    ANTERIOR LEFT RETROPERITONEAL L5-S1 DISKECTOMY INTERBODY CAGE FUSION performed by Ines Mendiola MD at [facility] [place]    TONSILLECTOMY      as child    TOTAL KNEE ARTHROPLASTY Right 9/9/2024    Right total knee arthroplasty performed by Bebo Junior MD at [facility] OR    TRACHEAL SURGERY  2010    in [place]--EMT attempted to intubate patient due to trouble breathing & then needed repair     Family History   Problem Relation Age of Onset    Diabetes Mother     Obesity Mother     High Cholesterol Mother         She’s passed    High Cholesterol Father     Other Sister         GSW to spine & paralysis    No Known Problems Brother     No Known Problems Son     No Known Problems Daughter     Other Brother         as infant     Social History     Socioeconomic History    Marital status: Single     Spouse name: Not on file    Number of children: Not on file    Years of education: Not on file    Highest education level: Not on file   Occupational History    Not on file   Tobacco Use    Smoking status: Former     Current packs/day: 0.00     Average packs/day: 1

## 2025-02-06 NOTE — TELEPHONE ENCOUNTER
I called and spoke with patient to apologize for not getting back with her yesterday, but her meds are due today.

## 2025-02-07 ENCOUNTER — APPOINTMENT (OUTPATIENT)
Dept: PAIN MEDICINE | Facility: CLINIC | Age: 70
End: 2025-02-07
Payer: MEDICARE

## 2025-02-12 ENCOUNTER — OFFICE VISIT (OUTPATIENT)
Dept: PAIN MEDICINE | Facility: CLINIC | Age: 70
End: 2025-02-12
Payer: MEDICARE

## 2025-02-12 DIAGNOSIS — M46.1 SACROILIITIS (CMS-HCC): Primary | ICD-10-CM

## 2025-02-12 DIAGNOSIS — E78.5 HYPERLIPIDEMIA, UNSPECIFIED HYPERLIPIDEMIA TYPE: ICD-10-CM

## 2025-02-12 PROCEDURE — 99204 OFFICE O/P NEW MOD 45 MIN: CPT | Performed by: PAIN MEDICINE

## 2025-02-12 PROCEDURE — 1125F AMNT PAIN NOTED PAIN PRSNT: CPT | Performed by: PAIN MEDICINE

## 2025-02-12 PROCEDURE — 1123F ACP DISCUSS/DSCN MKR DOCD: CPT | Performed by: PAIN MEDICINE

## 2025-02-12 PROCEDURE — 1159F MED LIST DOCD IN RCRD: CPT | Performed by: PAIN MEDICINE

## 2025-02-12 PROCEDURE — G2211 COMPLEX E/M VISIT ADD ON: HCPCS | Performed by: PAIN MEDICINE

## 2025-02-12 PROCEDURE — 99214 OFFICE O/P EST MOD 30 MIN: CPT | Performed by: PAIN MEDICINE

## 2025-02-12 RX ORDER — MELOXICAM 15 MG/1
15 TABLET ORAL DAILY
Qty: 30 TABLET | Refills: 11 | Status: SHIPPED | OUTPATIENT
Start: 2025-02-12 | End: 2026-02-12

## 2025-02-12 ASSESSMENT — PAIN SCALES - GENERAL: PAINLEVEL_OUTOF10: 6

## 2025-02-12 NOTE — PROGRESS NOTES
"Lower back pain for a \"long time\"  does relay multiple surgeries to the back  At times it is hard to get up after sitting or laying  Does take gabapentin and percocet in the past was on mobic but that has not been renewed  In the past has had injections in the back and hips and knees  "

## 2025-02-12 NOTE — H&P
"History Of Present Illness  Alysa Braun is a 69 y.o. female presenting with   Lower back pain for a \"long time\"  does relay multiple surgeries to the back by DR Curran But despite the surgeries she continued to have pain .  At times it is hard to get up after sitting or laying  Does take gabapentin and percocet in the past was on mobic but that has not been renewed  In the past has had injections in the back and hips and knees   Was under the care of Dr. Jonas who performed for her a radiofrequency ablation bilaterally at the level of the L1-L2 L2-L3 and December 2024 and she believes that the radiofrequency ablation has helped her prior ablation has lasted close to 6 months .  Currently rating her pain between 4-5 out of 10 but if she stand up and do activities after 20 minutes she has to stop because that seems to make her pain worse graded as a shooting sensation.  Being currently on the Percocet 7.5/325 4 times per day and gabapentin 300 mg twice per day she used to be on the Mobic and described as being beneficial and currently she is off the Mobic and she is requesting to be placed back on it .  Completed multiple sessions of PT in January 2025 and she continues to do the exercises at home.  Describing the Percocet as being beneficial allowing her to participate in day-to-day activity and she confirmed that if she does not take the Percocet she will not be able to participate in activity and she has to sit down all the time secondary to the exacerbation of her pain.  She is confirming that she still have enough pills of the Percocet to last her until the beginning of March 2025.  Past Medical History  Past Medical History:   Diagnosis Date    Acute upper respiratory infection, unspecified     Acute URI    Acute upper respiratory infection, unspecified 05/11/2022    URTI (acute upper respiratory infection)    Contact with and (suspected) exposure to covid-19     Suspected COVID-19 virus infection    Cough, " unspecified     Cough in adult    Discitis, unspecified, lumbar region     Septic discitis of lumbar region    Elevated blood-pressure reading, without diagnosis of hypertension     Elevated blood pressure reading    Encounter for immunization     Encounter for immunization    Enlarged lymph nodes, unspecified     Swollen gland    Nicotine dependence, unspecified, uncomplicated 12/11/2018    Other chest pain     Chest pressure    Other enthesopathies, not elsewhere classified     Tendinitis of wrist    Pain in unspecified knee 06/09/2022    Knee pain, acute    Pain, unspecified     Generalized body aches    Personal history of other diseases of the musculoskeletal system and connective tissue     History of arthritis    Personal history of other diseases of the musculoskeletal system and connective tissue     History of spinal stenosis    Personal history of other diseases of the respiratory system     History of allergic rhinitis    Personal history of other diseases of the respiratory system     History of bronchitis    Personal history of other diseases of the respiratory system     History of acute sinusitis    Personal history of other diseases of the respiratory system 03/31/2022    History of acute pharyngitis    Personal history of other diseases of the respiratory system 11/16/2022    History of acute sinusitis    Personal history of other infectious and parasitic diseases     History of viral infection    Personal history of other specified conditions     History of fatigue    Personal history of other specified conditions     History of urinary frequency    Rash and other nonspecific skin eruption     Rash    Smoker 04/11/2017    Vitamin D deficiency, unspecified     Vitamin D insufficiency     Surgical History  Past Surgical History:   Procedure Laterality Date    OTHER SURGICAL HISTORY  08/16/2021    Lumbar laminectomy    OTHER SURGICAL HISTORY  08/11/2022    Knee surgery    OTHER SURGICAL HISTORY   01/24/2022    Spinal surgery    OTHER SURGICAL HISTORY  01/24/2022    Arthrodesis    OTHER SURGICAL HISTORY  01/24/2022    Colonoscopy    OTHER SURGICAL HISTORY  01/24/2022    Lumbar discectomy    OTHER SURGICAL HISTORY  01/24/2022    Lumbar vertebral fusion    TOTAL KNEE ARTHROPLASTY Right      Social History  She reports that she quit smoking about 7 years ago. Her smoking use included cigarettes. She started smoking about 17 years ago. She has a 10 pack-year smoking history. She has never used smokeless tobacco. She reports that she does not drink alcohol and does not use drugs.    Family History  Family History   Problem Relation Name Age of Onset    Depression Mother      Diabetes Mother      Fibromyalgia Mother      Other (High serum cholesterol sulfate) Father          Allergies  No Known Allergies  Review of Systems   12 Systems have been reviewed as follows.   Constitutional: Fever, weight gain, weight loss, appetite change, night sweats, fatigue, chills.  Eyes : blurry, double vision, vision, loss, tearing, redness, pain, sensitivity to light, glaucoma.  Ears, nose, mouth, and throat: Hearing loss, ringing in the ears, ear pain, nasal congestion, nasal drainage, nosebleeds, mouth, throat, irritation tooth problem.  Cardiovascular :chest pain, pressure, heart tracing,palpaitations , sweating, leg swelling, high or low blood pressure  Pulmonary: Cough, yellow or green sputum, blood and sputum, shortness of breath, wheezing  Gastrointestinal: Nause, vomiting, diarrhea, constipation, pain, blood in stool, or vomitus, heartburn, difficulty swallowing  Genitourinary: incontinence, abnormal bleeding, abnormal discharge, urinary frequency, urinary hesitancy, pain, impotence sexual problem, infection, urinary retention  Musculoskeletal: Pain, stiffness, joint, redness or warmth, arthritis, back pain, weakness, muscle wasting, sprain or fracture  Neuro: Weight weakness, dizziness, change in voice, change in taste  change in vision, change in hearing, loss, or change of sensation, trouble walking, balance problems coordination problems, shaking, speech problem  Endocrine , cold or heat intolerance, blood sugar problem, weight gain or loss missed periods hot flashes, sweats, change in body hair, change in libido, increased thirst, increased urination  Heme/lymph: Swelling, bleeding, problem anemia, bruising, enlarged lymph nodes  Allergic/immunologic: H. plus nasal drip, watery itchy eyes, nasal drainage, immunosuppressed  The above, were reviewed and noted negative except as noted.     Physical Exam   Vital signs reviewed, documented in chart     General:  Appears well, does not look in any major distress  Alert    HEENT:  Head atraumatic  Eyes normal inspection  PERRL  Normal ENT inspection  No signs of dehydration    NECK:  Normal inspection  Range of motion within normal     RESPIRATORY:  No respiratory distress    CVS:  Heart rate and rhythm regular    ABDOMEN/GI  Soft  Non-tender  No distention  No organomegaly      BACK:  Normal inspection, flexion and extension within normal limit   Positive  tenderness upon the palpation of the facet joint  Si joints  tender to palpations right    Positive Real sign ,positive SI distraction test, positive compression test and positive thigh thrush test     EXTREMITIES:  Non-Tender  Full ROM  Normal appearance  No Pedal edema  Power symmetrical , sensory examination preserved.    NEURO:  Alert and oriented X 3  CNS normal as tested without focal neurological deficit   Sensation normal  Motor normal  reflexes normal    PSYCH:  Mood normal  Affect normal    SKIN:  Color normal  No rash  Warm  Dry  no sign of skin marking supportive of IV drug usage /abuse.     Last Recorded Vitals  There were no vitals taken for this visit.  CT thoracic spine wo IV contrast    Result Date: 7/30/2024  STUDY: CT Chest, Abdomen, and Pelvis with IV Contrast, CT Thoracic Spine and Lumbar Spine without IV  Contrast; 07/30/2024, 12:36 PM. INDICATION: Trauma, unspecified. COMPARISON: CT lumbar: 03/02/24; CT AP: 11/25/22; XR lumbosacral: 12/05/20, 08/31/20,. 06/27/20; MR lumbar: 12/05/20. ACCESSION NUMBER(S): [id number], [id number], [id number], [id number] ORDERING CLINICIAN: CAMILA VAZQUEZ TECHNIQUE: CT of the chest, abdomen, and pelvis was performed.  Contiguous axial images were obtained at 3 mm slice thickness through the chest, abdomen, and pelvis.  Coronal and sagittal reconstructions at 3 mm slice thickness were performed.  Omnipaque 350 100 mL was administered intravenously.  Please note that spinal images were generated from the original CT abdomen and pelvis imaging. FINDINGS: CHEST: MEDIASTINUM: The heart is normal in size without pericardial effusion.  Central vascular structures opacify normally.  Mild diffuse wall thickening of the esophagus noted which may represent esophagitis. LUNGS/PLEURA: There is no pleural effusion, pleural thickening, or pneumothorax. The airways are patent. Lungs are clear without consolidation, interstitial disease, or suspicious nodules. LYMPH NODES: Thoracic lymph nodes are not enlarged. ABDOMEN:  LIVER: No hepatomegaly.  Smooth surface contour.  Normal attenuation.  Stable cyst within hepatic segment 8.  BILE DUCTS: No intrahepatic or extrahepatic biliary ductal dilatation.  GALLBLADDER: The gallbladder is normal in appearance. STOMACH: Small hiatal hernia.  PANCREAS: No masses or ductal dilatation.  SPLEEN: No splenomegaly or focal splenic lesion.  ADRENAL GLANDS: No thickening or nodules.  KIDNEYS AND URETERS: Kidneys are normal in size and location.  No renal or ureteral calculi.  PELVIS:  BLADDER: No abnormalities identified.  REPRODUCTIVE ORGANS: No abnormalities identified.  BOWEL: No abnormalities identified. The appendix is visualized and is normal in appearance.  VESSELS: No abnormalities identified.  Abdominal aorta is normal in caliber.   PERITONEUM/RETROPERITONEUM/LYMPH NODES: No free fluid.  No pneumoperitoneum. No lymphadenopathy.  ABDOMINAL WALL: Interval surgical repair of the lower anterior abdominal wall hernia. SOFT TISSUES: No abnormalities identified.  BONES: No acute fracture or aggressive osseous lesion.  Chronic left rib fractures. THORACIC SPINE: The alignment is anatomic.  There is no fracture or traumatic subluxation. The vertebral body heights are well maintained.  Mild multilevel disc space narrowing and vertebral body hypertrophy.  No significant central canal stenosis is demonstrated.  The neural foramina are patent throughout.  The paravertebral soft tissues are within normal limits. LUMBAR SPINE: The alignment is anatomic.  There is no fracture or traumatic subluxation. The vertebral body heights are well maintained.  Disc implantation noted at L4-5 and L5-S1 with associated L4 laminectomy.  There are prominent endplate sclerotic changes at L4-5 and L5-S1.  No significant central canal stenosis is demonstrated.  The neural foramina are patent throughout.  The paravertebral soft tissues are within normal limits.    No evidence of acute intrathoracic or intra-abdominal injury. Mild diffuse esophageal wall thickening suggestive of esophagitis. Small hiatal hernia. Signed by Logan Govea MD    CT lumbar spine wo IV contrast    Result Date: 7/30/2024  STUDY: CT Chest, Abdomen, and Pelvis with IV Contrast, CT Thoracic Spine and Lumbar Spine without IV Contrast; 07/30/2024, 12:36 PM. INDICATION: Trauma, unspecified. COMPARISON: CT lumbar: 03/02/24; CT AP: 11/25/22; XR lumbosacral: 12/05/20, 08/31/20,. 06/27/20; MR lumbar: 12/05/20. ACCESSION NUMBER(S): [id number], [id number], [id number], [id number] ORDERING CLINICIAN: CAMILA VAZQUEZ TECHNIQUE: CT of the chest, abdomen, and pelvis was performed.  Contiguous axial images were obtained at 3 mm slice thickness through the chest, abdomen, and pelvis.  Coronal and sagittal  reconstructions at 3 mm slice thickness were performed.  Omnipaque 350 100 mL was administered intravenously.  Please note that spinal images were generated from the original CT abdomen and pelvis imaging. FINDINGS: CHEST: MEDIASTINUM: The heart is normal in size without pericardial effusion.  Central vascular structures opacify normally.  Mild diffuse wall thickening of the esophagus noted which may represent esophagitis. LUNGS/PLEURA: There is no pleural effusion, pleural thickening, or pneumothorax. The airways are patent. Lungs are clear without consolidation, interstitial disease, or suspicious nodules. LYMPH NODES: Thoracic lymph nodes are not enlarged. ABDOMEN:  LIVER: No hepatomegaly.  Smooth surface contour.  Normal attenuation.  Stable cyst within hepatic segment 8.  BILE DUCTS: No intrahepatic or extrahepatic biliary ductal dilatation.  GALLBLADDER: The gallbladder is normal in appearance. STOMACH: Small hiatal hernia.  PANCREAS: No masses or ductal dilatation.  SPLEEN: No splenomegaly or focal splenic lesion.  ADRENAL GLANDS: No thickening or nodules.  KIDNEYS AND URETERS: Kidneys are normal in size and location.  No renal or ureteral calculi.  PELVIS:  BLADDER: No abnormalities identified.  REPRODUCTIVE ORGANS: No abnormalities identified.  BOWEL: No abnormalities identified. The appendix is visualized and is normal in appearance.  VESSELS: No abnormalities identified.  Abdominal aorta is normal in caliber.  PERITONEUM/RETROPERITONEUM/LYMPH NODES: No free fluid.  No pneumoperitoneum. No lymphadenopathy.  ABDOMINAL WALL: Interval surgical repair of the lower anterior abdominal wall hernia. SOFT TISSUES: No abnormalities identified.  BONES: No acute fracture or aggressive osseous lesion.  Chronic left rib fractures. THORACIC SPINE: The alignment is anatomic.  There is no fracture or traumatic subluxation. The vertebral body heights are well maintained.  Mild multilevel disc space narrowing and vertebral  body hypertrophy.  No significant central canal stenosis is demonstrated.  The neural foramina are patent throughout.  The paravertebral soft tissues are within normal limits. LUMBAR SPINE: The alignment is anatomic.  There is no fracture or traumatic subluxation. The vertebral body heights are well maintained.  Disc implantation noted at L4-5 and L5-S1 with associated L4 laminectomy.  There are prominent endplate sclerotic changes at L4-5 and L5-S1.  No significant central canal stenosis is demonstrated.  The neural foramina are patent throughout.  The paravertebral soft tissues are within normal limits.    No evidence of acute intrathoracic or intra-abdominal injury. Mild diffuse esophageal wall thickening suggestive of esophagitis. Small hiatal hernia. Signed by Logan oGvea MD    CT cervical spine wo IV contrast    Result Date: 7/30/2024  Interpreted By:  Gray Cowan, STUDY: CT CERVICAL SPINE WO IV CONTRAST;  7/30/2024 12:36 pm   INDICATION: Fall and injury   COMPARISON: None.   ACCESSION NUMBER(S): [id number]   ORDERING CLINICIAN: CAMILA VAZQUEZ   TECHNIQUE: Transaxial images with orthogonal reconstructions   FINDINGS: VERTEBRAL ALIGNMENT: Within normal limits.   CRANIOCERVICAL JUNCTION: Unremarkable   BONY STRUCTURES: No fracture or dislocation are evident.   THE CERVICAL LEVELS INCLUDING DISC SPACES, APOPHYSEAL AND UNCOVERTEBRAL JOINTS:  Mild-to-moderate multilevel spondylosis. Namely there are mild anterolisthesis at the C3-4 and C4-5 levels. There is moderate-to-marked narrowing of the C5-6 and C6-7 disc interspaces with moderate marginal osteophyte formation, and mild posterior osteophytic lipping-disc complex at the C5-6 level. There is also left uncovertebral hypertrophy with moderate foraminal impingement at this level. There is multilevel apophyseal hypertrophy, greatest and moderate to marked on the left at the C3-4 and on the right at the C4-5 levels..   ADDITIONAL FINDINGS: None.        Mild-to-moderate multilevel spondylosis, otherwise without acute findings.   Signed by: Gray Cowan 7/30/2024 1:31 PM Dictation workstation:   [id number]    CT lumbar spine wo IV contrast    Result Date: 3/2/2024  Interpreted By:  Ruba Montes De Oca, STUDY: CT LUMBAR SPINE WO IV CONTRAST  3/2/2024 10:04 am   INDICATION: Signs/Symptoms:fall 1 week ago   COMPARISON: CT abdomen pelvis dated 11/25/2022   ACCESSION NUMBER(S): [id number]   ORDERING CLINICIAN: ANDRY DOHERTY   TECHNIQUE: Axial CT images of the lumbar spine are obtained. Axial, coronal and sagittal reconstructions are provided for review.   FINDINGS: Postsurgical changes of posterior decompression with bipedicular rosie and screw fixation at L4-L5 level are noted. There is also intervertebral disc prosthesis with screws extending into the L5-S1 level. There is mild residual anterolisthesis of L5 over S1 vertebral body. These changes are similar compared to prior CT examination dating back to 2022. No evidence of hardware fractures or perihardware lucency to suggest hardware loosening.   Lumbar vertebral body heights are maintained without acute compression fracture deformities. Posterior elements are intact. Transverse processes are intact. Alignment of lumbar vertebral bodies is intact without traumatic malalignment. There are mild discogenic degenerative changes of the lumbar spine. There is mild facet arthropathy at L3-L4 level.   There are few nonobstructing calculi in the right kidney, largest measuring approximately 5 x 3 mm. Otherwise visualized intra-abdominal soft tissues appear grossly unremarkable within limits of evaluation. Visualized paraspinal soft tissues appear grossly unremarkable.       1. No acute fracture or traumatic malalignment in the lumbar spine. 2. Status post bipedicular rosie and screw fixation at L4-L5 level and intervertebral disc prosthesis at L5-S1 level without hardware related complications. 3. Incidental note of  nonobstructing right renal calculi.   MACRO: None   Signed by: Ruba Montes De Oca 3/2/2024 10:52 AM Dictation workstation:   [id number]     Assessment/Plan   69 years old with history and physical examination supportive of postlaminectomy syndrome status post 2 surgeries with persistent back pain and positive right sacroiliitis currently requiring maintenance on opioid therapy    Plan  Had a discussion with the patient about her different modalities available for the treatment of her condition currently the patient is confirming to me that she gave up the usage of the alcohol and that she continues to describe the Percocet as being beneficial and without the Percocet she would not be able to function on a day-to-day basis I informed her about the risks and the benefit of being on the Percocet and she understand the rules and regulation of being on an opiate controlled substance and she is in agreement to sign an opioid agreement with me abiding by the rules not using any alcohol or any recreational drugs including marijuana cocaine or heroin while she is on the pills and not overusing her prescribed medication or sharing it with anyone else she understand that we will be performing occasional pill count and toxicology screening on as needed basis to confirm her compliance  In terms of her continuation of the back pain and since she have tenderness around the SI joint and is symptomatically she is showing signs of sacroiliitis I would recommend to proceed with a right sacroiliac joint injection to be performed under fluoroscopic guidance with injection of contrast material to confirm needle placement I would also offer the patient on as needed basis repeating the radiofrequency ablation and targeting other areas on as needed basis patient understand that we cannot take all the measures to lower the dose of her opiate by offering her a comprehensive approach to her problem she is in agreement with the plan benefits and  risk of the injections were discussed and she would like to proceed    I believe the continuation of the opiate has more benefit than risk I took into consideration the risk of diversion dependency and overdose  I will be restarting the patient on the Mobic 15 mg once per day and I will be following up with her after the performance of her sacroiliac joint injection to titrate her medication to the lowest effective dosage  The above clinical summary has been dictated with voice recognition software. It has not been proofread for grammatical errors, typographical mistakes, or other semantic inconsistencies.    Thank you for visiting our office today. It was our pleasure to take part in your healthcare.     Please do not hesitate to contact the pain clinic after your visit with any questions or concerns at  M-F 8-4 pm       Nam Page M.D.  Medical Director , Division of Pain Medicine [facility]   of Anesthesiology and Pain Medicine  [facility] [employer]     [place] [address] [address] [place] [address]     Office: [phone]  Fax: [phone]      Nam Page MD

## 2025-02-13 RX ORDER — SIMVASTATIN 40 MG/1
40 TABLET, FILM COATED ORAL NIGHTLY
Qty: 90 TABLET | Refills: 0 | Status: SHIPPED | OUTPATIENT
Start: 2025-02-13 | End: 2026-02-13

## 2025-02-26 ENCOUNTER — APPOINTMENT (OUTPATIENT)
Dept: PAIN MEDICINE | Facility: CLINIC | Age: 70
End: 2025-02-26
Payer: MEDICARE

## 2025-03-03 ENCOUNTER — TELEPHONE (OUTPATIENT)
Dept: OTOLARYNGOLOGY | Facility: CLINIC | Age: 70
End: 2025-03-03
Payer: MEDICARE

## 2025-03-03 DIAGNOSIS — J31.0 CHRONIC RHINITIS: ICD-10-CM

## 2025-03-03 RX ORDER — IPRATROPIUM BROMIDE 21 UG/1
2 SPRAY, METERED NASAL 2 TIMES DAILY
Qty: 30 ML | Refills: 3 | Status: SHIPPED | OUTPATIENT
Start: 2025-03-03

## 2025-03-06 ENCOUNTER — HOSPITAL ENCOUNTER (OUTPATIENT)
Dept: PAIN MEDICINE | Facility: CLINIC | Age: 70
Discharge: HOME | End: 2025-03-06
Payer: MEDICARE

## 2025-03-06 VITALS
DIASTOLIC BLOOD PRESSURE: 72 MMHG | TEMPERATURE: 97.3 F | RESPIRATION RATE: 18 BRPM | OXYGEN SATURATION: 96 % | SYSTOLIC BLOOD PRESSURE: 143 MMHG | HEART RATE: 68 BPM

## 2025-03-06 DIAGNOSIS — M46.1 SACROILIITIS (CMS-HCC): ICD-10-CM

## 2025-03-06 DIAGNOSIS — S39.92XA INJURY OF COCCYX, INITIAL ENCOUNTER: ICD-10-CM

## 2025-03-06 PROCEDURE — 2550000001 HC RX 255 CONTRASTS: Performed by: PAIN MEDICINE

## 2025-03-06 PROCEDURE — 27096 INJECT SACROILIAC JOINT: CPT | Performed by: PAIN MEDICINE

## 2025-03-06 PROCEDURE — 2500000004 HC RX 250 GENERAL PHARMACY W/ HCPCS (ALT 636 FOR OP/ED): Mod: JZ | Performed by: PAIN MEDICINE

## 2025-03-06 RX ORDER — BUPIVACAINE HYDROCHLORIDE 5 MG/ML
INJECTION, SOLUTION EPIDURAL; INTRACAUDAL AS NEEDED
Status: DISCONTINUED | OUTPATIENT
Start: 2025-03-06 | End: 2025-03-07 | Stop reason: HOSPADM

## 2025-03-06 RX ORDER — OXYCODONE AND ACETAMINOPHEN 5; 325 MG/1; MG/1
1 TABLET ORAL EVERY 6 HOURS PRN
Qty: 6 TABLET | Refills: 0 | Status: SHIPPED | OUTPATIENT
Start: 2025-03-06 | End: 2025-03-07 | Stop reason: SDUPTHER

## 2025-03-06 RX ORDER — GABAPENTIN 300 MG/1
300 CAPSULE ORAL 3 TIMES DAILY
Qty: 90 CAPSULE | Refills: 3 | Status: SHIPPED | OUTPATIENT
Start: 2025-03-06

## 2025-03-06 RX ORDER — TIZANIDINE 2 MG/1
2 TABLET ORAL EVERY 8 HOURS PRN
Qty: 90 TABLET | Refills: 11 | Status: SHIPPED | OUTPATIENT
Start: 2025-03-06

## 2025-03-06 RX ORDER — METHYLPREDNISOLONE ACETATE 80 MG/ML
INJECTION, SUSPENSION INTRA-ARTICULAR; INTRALESIONAL; INTRAMUSCULAR; SOFT TISSUE AS NEEDED
Status: DISCONTINUED | OUTPATIENT
Start: 2025-03-06 | End: 2025-03-07 | Stop reason: HOSPADM

## 2025-03-06 RX ADMIN — METHYLPREDNISOLONE ACETATE 80 MG: 80 INJECTION, SUSPENSION INTRA-ARTICULAR; INTRALESIONAL; INTRAMUSCULAR; SOFT TISSUE at 14:04

## 2025-03-06 RX ADMIN — BUPIVACAINE HYDROCHLORIDE 10 ML: 5 INJECTION, SOLUTION EPIDURAL; INTRACAUDAL; PERINEURAL at 14:04

## 2025-03-06 RX ADMIN — IOHEXOL 4 ML: 300 INJECTION, SOLUTION INTRAVENOUS at 14:04

## 2025-03-06 ASSESSMENT — PAIN SCALES - GENERAL: PAINLEVEL_OUTOF10: 6

## 2025-03-06 ASSESSMENT — PAIN DESCRIPTION - DESCRIPTORS: DESCRIPTORS: ACHING;SHARP

## 2025-03-06 ASSESSMENT — PAIN - FUNCTIONAL ASSESSMENT: PAIN_FUNCTIONAL_ASSESSMENT: 0-10

## 2025-03-06 NOTE — DISCHARGE INSTRUCTIONS
Sacroiliac Joint Pain    About this topic  The spine ends in a set of 5 fused bones. They are called the sacrum. They join the pelvis at the sacroiliac joint. This is also called the SI joint. Strong bands of tissue called ligaments hold this joint together. Normally, there is very little movement at this joint. Its job is to absorb shock and take stress off of the spine. You can have SI joint pain if this joint is irritated.  What are the causes?  Sometimes, the exact cause of SI pain is unknown. It is not always known if the pain is in the joint or in the ligaments around the joint. The pain may be caused by wear and tear on the joint from arthritis. Pregnancy causes this joint to become looser. Sometimes, the pain may be caused by swelling from problems like gout or an injury. Infection or a fracture can also cause SI pain.  What can make this more likely to happen?  You are more likely to have this problem if you have had an injury to your spine, pelvis, or buttocks. Someone with a history of being pregnant a few times is more likely to have problems with her SI joint. Legs that are not the same length can cause pain as well. Some infections may cause problems with the SI joint.  What are the main signs?  Pain in the lower back or buttocks. It may also be felt in the hips or pelvis. Some people feel the pain in the groin or back of the legs. This pain is often worse with activity like climbing stairs or standing for a long time.  Numbness or tingling in the upper leg  Feeling of weakness in the leg  Stiffness  Feeling unstable when moving  How does the doctor diagnose this health problem?  The doctor will do an exam and feel around your lower back. Your doctor will have you bend and twist at the waist to see what makes the pain worse. Your doctor may have you move and push and pull on your legs to test your motion and strength. Your doctor will check if the muscles in the back or legs are tight. Your doctor may  check the feeling and reflexes in your legs.  The doctor may order:  X-ray  CT or MRI scan  Bone scan  Lab tests  Diagnostic injection ? injecting a drug into the joint to see if relief is given  How does the doctor treat this health problem?  Rest from activities that make the problem worse  Ice  Heat may be used later but not right away. Heat can make swelling worse.  Special belt worn low on the hips called an SI belt. It helps to hold the joint tightly together.  Electrical stimulation  Exercises  Chiropractic manipulation  Radiofrequency ablation ? A treatment where small nerves around the joint are burned so they are numb. This does not always work. It may only last for a few years.  Surgery may be needed if other treatment plans do not work.  Injections (cortisone)  Are there other health problems to treat?  If the problem is caused by an infection, inflammatory arthritis, or ankylosing spondylosis, these problems will need to be treated.  What drugs may be needed?  The doctor may order drugs to:  Help with pain and swelling  Fight an infection  The doctor may give you a shot to help with pain and swelling. Talk with your doctor about possible risks with this shot.  What problems could happen?  Long-term back pain  Weight gain, less muscle strength and flexibility, weaker bones  Arthritis  Need for surgery  Infection  What can be done to prevent this health problem?  Stay active and work out to keep your muscles strong and flexible. Warm up slowly and stretch before you exercise.  Use good posture.  Use proper ways to lift and bend:  Spread your feet apart so you have a good base of support. Then, bend with your knees when you  something from the ground.  When lifting and moving an object, keep your back straight. Keep the object as close to your body as possible. Do not twist. Instead, move your feet to the direction you are going.  Follow your doctor's orders about weight lifting.      Your pain may  not be gone immediately after the procedure--it usually takes the steroid 3-5 days to start working.   It may take several weeks for the medicine to reach its' full effect.   Pay attention to how much pain relief (what percentage compared to before the procedure) you get and for how long it lasts.     Activity: Avoid strenuous activity for 24 hours. After that return to your normal activity level.     Bandages: Remove after 24 hours     Showering/Bathing: You may shower after bandage is removed   Follow up: CALL OFFICE IN 7 DAYS [phone] LEAVE MESSAGE ABOUT THE RELIEF THAT WAS OBTAINED

## 2025-03-07 DIAGNOSIS — M54.17 LUMBOSACRAL RADICULITIS: Primary | ICD-10-CM

## 2025-03-07 DIAGNOSIS — S39.92XA INJURY OF COCCYX, INITIAL ENCOUNTER: ICD-10-CM

## 2025-03-07 RX ORDER — OXYCODONE AND ACETAMINOPHEN 7.5; 325 MG/1; MG/1
1 TABLET ORAL EVERY 6 HOURS PRN
Qty: 120 TABLET | Refills: 0 | Status: SHIPPED | OUTPATIENT
Start: 2025-03-07 | End: 2025-04-06

## 2025-03-07 RX ORDER — OXYCODONE AND ACETAMINOPHEN 5; 325 MG/1; MG/1
1 TABLET ORAL EVERY 6 HOURS PRN
Qty: 120 TABLET | Refills: 0 | Status: SHIPPED | OUTPATIENT
Start: 2025-03-07 | End: 2025-03-07 | Stop reason: WASHOUT

## 2025-03-10 DIAGNOSIS — G25.81 RESTLESS LEG SYNDROME: ICD-10-CM

## 2025-03-10 NOTE — TELEPHONE ENCOUNTER
Patient left voicemail stating [facility] did not have one of the Rx's that were prescribed to her on 7/24, but did not say which medication.    T/c to pharmacy. Was informed it was the Breyna that wasn't covered; however, when the Pharmacist attempted to see what was covered, he stated that the Breyna should be covered and will give patient's insurance a call to find out why and will fill the inhaler for the patient.  
70.3

## 2025-03-12 RX ORDER — PRAMIPEXOLE DIHYDROCHLORIDE 0.25 MG/1
0.25 TABLET ORAL DAILY
Qty: 90 TABLET | Refills: 1 | Status: SHIPPED | OUTPATIENT
Start: 2025-03-12

## 2025-03-13 ENCOUNTER — TELEPHONE (OUTPATIENT)
Dept: PAIN MEDICINE | Facility: CLINIC | Age: 70
End: 2025-03-13
Payer: MEDICARE

## 2025-03-14 ASSESSMENT — ENCOUNTER SYMPTOMS
COUGH: 1
HEADACHES: 1
FEVER: 1

## 2025-03-17 ENCOUNTER — APPOINTMENT (OUTPATIENT)
Dept: PRIMARY CARE | Facility: CLINIC | Age: 70
End: 2025-03-17
Payer: MEDICARE

## 2025-03-17 DIAGNOSIS — R06.00 DYSPNEA, UNSPECIFIED TYPE: Primary | ICD-10-CM

## 2025-03-17 DIAGNOSIS — R50.9 FEVER, UNSPECIFIED FEVER CAUSE: ICD-10-CM

## 2025-03-17 PROBLEM — Z96.651 STATUS POST TOTAL KNEE REPLACEMENT, RIGHT: Status: ACTIVE | Noted: 2024-09-09

## 2025-03-17 PROCEDURE — 1123F ACP DISCUSS/DSCN MKR DOCD: CPT | Performed by: INTERNAL MEDICINE

## 2025-03-17 PROCEDURE — 99213 OFFICE O/P EST LOW 20 MIN: CPT | Performed by: INTERNAL MEDICINE

## 2025-03-17 NOTE — PROGRESS NOTES
Assessment/Plan   Problem List Items Addressed This Visit       Dyspnea - Primary    Fever     Most likely viral syndrome  No shortness of breath can be associated with underlying pneumonia or other factors  Advised to go to ER  She will think about it  On visual impression she looked well and that is reassuring  Subjective   Patient ID: Alysa Braun is a 69 y.o. female who presents for No chief complaint on file..    Past Surgical History:   Procedure Laterality Date    OTHER SURGICAL HISTORY  2021    Lumbar laminectomy    OTHER SURGICAL HISTORY  2022    Knee surgery    OTHER SURGICAL HISTORY  2022    Spinal surgery    OTHER SURGICAL HISTORY  2022    Arthrodesis    OTHER SURGICAL HISTORY  2022    Colonoscopy    OTHER SURGICAL HISTORY  2022    Lumbar discectomy    OTHER SURGICAL HISTORY  2022    Lumbar vertebral fusion    TOTAL KNEE ARTHROPLASTY Right       Family History   Problem Relation Name Age of Onset    Depression Mother      Diabetes Mother      Fibromyalgia Mother      Other (High serum cholesterol sulfate) Father        Social History     Socioeconomic History    Marital status:      Spouse name: Not on file    Number of children: Not on file    Years of education: Not on file    Highest education level: Not on file   Occupational History    Not on file   Tobacco Use    Smoking status: Former     Current packs/day: 0.00     Average packs/day: 1 pack/day for 10.0 years (10.0 ttl pk-yrs)     Types: Cigarettes     Start date:      Quit date: 2018     Years since quittin.2    Smokeless tobacco: Never   Vaping Use    Vaping status: Never Used   Substance and Sexual Activity    Alcohol use: Never     Alcohol/week: 2.0 standard drinks of alcohol     Types: 2 Glasses of wine per week    Drug use: Never    Sexual activity: Not on file   Other Topics Concern    Not on file   Social History Narrative    Not on file     Social Drivers of Health     Financial  Resource Strain: Low Risk  (9/3/2024)    Received from [email or web address]    Overall Financial Resource Strain (CARDIA)     Difficulty of Paying Living Expenses: Not hard at all   Food Insecurity: No Food Insecurity (9/9/2024)    Received from [facility]    Hunger Vital Sign     Worried About Running Out of Food in the Last Year: Never true     Ran Out of Food in the Last Year: Never true   Transportation Needs: No Transportation Needs (9/9/2024)    Received from [facility] [employer] [facility]    PRAPARE - Transportation     Lack of Transportation (Medical): No     Lack of Transportation (Non-Medical): No   Physical Activity: Not on file   Stress: Not on file   Social Connections: Not on file   Intimate Partner Violence: Not on file   Housing Stability: Low Risk  (9/9/2024)    Received from [facility]    Housing Stability Vital Sign     Unable to Pay for Housing in the Last Year: No     Number of Times Moved in the Last Year: 1     Homeless in the Last Year: No      Patient has no known allergies.   Current Outpatient Medications   Medication Sig Dispense Refill    pramipexole (Mirapex) 0.25 mg tablet TAKE 1 TABLET (0.25 MG) BY MOUTH ONCE DAILY. 90 tablet 1    acetaminophen (TylenoL) 325 mg tablet Take by mouth every 6 hours.      albuterol (ProAir HFA) 90 mcg/actuation inhaler Inhale 2 puffs 3 times a day as needed for wheezing or shortness of breath. 8.5 g 0    amLODIPine (Norvasc) 5 mg tablet Take 1 tablet (5 mg) by mouth once daily. 30 tablet 11    amoxicillin (Amoxil) 500 mg capsule Take one as needed every 12 hours until gone      buPROPion SR (Wellbutrin SR) 100 mg 12 hr tablet Take 1 tablet (100 mg) by mouth early in the morning..      busPIRone (Buspar) 15 mg tablet Take 1 tablet (15 mg) by mouth 3 times a day.      gabapentin (Neurontin) 300 mg capsule Take 1 capsule (300 mg) by mouth 3 times a day. 90 capsule 3    ipratropium (Atrovent) 21  mcg (0.03 %) nasal spray Administer 2 sprays into each nostril 2 times a day. 30 mL 3    L. acidophilus/Bifid. animalis 15.5 billion cell capsule Take by mouth.      lidocaine (Lidoderm) 5 % patch Place 1 patch onto the skin daily. Leave on for 12 hours, and keep off for 12 hours. 30 patch 0    loratadine (Claritin) 10 mg tablet Take by mouth.      magnesium oxide (Mag-Ox) 400 mg (241.3 mg magnesium) tablet Take 1 tablet (400 mg) by mouth once daily.      meloxicam (Mobic) 15 mg tablet Take 1 tablet (15 mg) by mouth once daily. 30 tablet 11    montelukast (Singulair) 10 mg tablet Take 1 tablet (10 mg) by mouth once daily at bedtime. 30 tablet 5    mupirocin (Bactroban) 2 % ointment       naloxone (Narcan) 4 mg/0.1 mL nasal spray Administer into affected nostril(s).      oxyCODONE-acetaminophen (Percocet) 7.5-325 mg tablet Take 1 tablet by mouth every 6 hours if needed for severe pain (7 - 10). 120 tablet 0    pantoprazole (ProtoNix) 40 mg EC tablet Take 1 tablet (40 mg) by mouth once daily. Do not crush, chew, or split. 90 tablet 3    simvastatin (Zocor) 40 mg tablet Take 1 tablet (40 mg) by mouth once daily at bedtime. 90 tablet 0    tiZANidine (Zanaflex) 2 mg tablet Take 1 tablet (2 mg) by mouth every 8 hours if needed for muscle spasms. 90 tablet 11    Xanax 0.5 mg tablet Take by mouth 3 times a day as needed.       No current facility-administered medications for this visit.      There were no vitals filed for this visit.   Problem List Items Addressed This Visit       Dyspnea - Primary    Fever      No orders of the defined types were placed in this encounter.       HPI this is a 69-year-old female who presented through the video that since Thursday she has been feverish she had shortness of breath though breathing has improved and did the testing for the COVID that was negative  She has an appointment with orthopedic physician tomorrow  She could not check pulse ox did not have the blood pressure taken do not  "know the temperature  But she was talking without being short of breath    ROS other systemic inquiry is negative  Past medical history reviewed  Social and family history reviewed  Allergies and medications reviewed  Recent labs reviewed  Vital signs reviewed    PHYSICAL EXAM  Exam is limited by visual impression she was not short of breath she was not tachypneic she looked well otherwise she was talking well  We discussed that any shortness of breath must go to the ER we do not know the pulse ox either  She is thinking about it and she said she will get examined by orthopedic when she has an appointment tomorrow  I advised that this will not work very well and he may not do so rather he will refer back to me  I discussed the options though I feel that most likely it was a viral syndrome nevertheless full assessment is required  She understood      No results found for: \"PR1\", \"BMPR1A\", \"CMPLAS\", \"II5POELM\", \"KPSAT\"   Lab Results   Component Value Date    CHOL 240 (H) 05/03/2024    LDLCALC 125 (H) 05/03/2024    CHHDL 3.7 05/03/2024                Answers submitted by the patient for this visit:  Fever Questionnaire (Submitted on 3/14/2025)  Chief Complaint: Fever  Chronicity: new  Onset: in the past 7 days  contaminated food: No  contaminated water: No  history of cancer: No  occupational exposure: No  recent travel: No  immunosuppression: No  recent illness: No  sick contacts: No  Frequency: constantly  Progression since onset: waxing and waning  Max temp prior to arrival: unmeasured  Temperature source: an axillary reading  congestion: Yes  cough: Yes  headaches: Yes  muscle aches: Yes    "

## 2025-03-17 NOTE — PROGRESS NOTES
Narrative Referring Provider:   Bebo Junior Jr      PCP:   Queenie Yost MD    ============================  IMPRESSION/PLAN:  ============================  Mariaelena Colby is s/p right Total knee replacement completed on 9/9/24.     IMPRESSION: At normal postoperative stage of recovery; she has some weakness going up and down stairs.  We are going to get her into physical therapy to work on quad strengthening.  She also reports that she has some balance issues.  Will get her into therapy to work on that as well.  She has arthritis in the left knee.  We discussed treatment options and she opted for an intra-articular steroid injection into the left knee.    The patient has osteoarthritis of the left knee.  We discussed arthritis.  We discussed its progressive nature.  Treatment options were discussed.  We discussed anti-inflammatories, steroid injections, viscosupplementation and knee replacement surgery.  We discussed surgery, recovery, and risks of the procedure.  The patient declined surgery for the present time.  Ultimately, the patient opted to proceed with an intra-articular steroid injection into the left knee.  The patient will go about their activity as tolerated and follow-up as needed as the pain recurs.  All of their questions were answered.    Time Out  [x] Patient Verified  [x] Site Verified  [x] Laterality Verified  [x] Procedure Verified    Before aspiration/injection risks/benefits of a cortisone injection including infection, local skin irritation, skin atrophy, continued pain/discomfort, elevated blood sugar, burning, failure to relieve pain, possible late infection were discussed with patient.   Patient verbalized understanding and wanted to proceed with planned procedure.    After prepping and draping the left knee in the usual sterile fashion, 2 cc of 40 mg/ml kenalog with 8 cc of 1% lidocaine were injected intra-articularly without any complications.  Patient tolerated this

## 2025-03-18 ENCOUNTER — APPOINTMENT (OUTPATIENT)
Dept: PAIN MEDICINE | Facility: CLINIC | Age: 70
End: 2025-03-18
Payer: MEDICARE

## 2025-03-19 ENCOUNTER — OFFICE VISIT (OUTPATIENT)
Dept: ORTHOPEDIC SURGERY | Age: 70
End: 2025-03-19

## 2025-03-19 DIAGNOSIS — Z96.651 PRESENCE OF TOTAL KNEE JOINT PROSTHESIS, RIGHT: Primary | ICD-10-CM

## 2025-03-19 DIAGNOSIS — R26.89 BALANCE DISORDER: ICD-10-CM

## 2025-03-19 DIAGNOSIS — M17.12 PRIMARY OSTEOARTHRITIS OF LEFT KNEE: ICD-10-CM

## 2025-03-19 RX ORDER — TRIAMCINOLONE ACETONIDE 40 MG/ML
80 INJECTION, SUSPENSION INTRA-ARTICULAR; INTRAMUSCULAR ONCE
Status: COMPLETED | OUTPATIENT
Start: 2025-03-19 | End: 2025-03-19

## 2025-03-19 RX ORDER — LIDOCAINE HYDROCHLORIDE 10 MG/ML
8 INJECTION, SOLUTION INFILTRATION; PERINEURAL ONCE
Status: COMPLETED | OUTPATIENT
Start: 2025-03-19 | End: 2025-03-19

## 2025-03-19 RX ADMIN — LIDOCAINE HYDROCHLORIDE 8 ML: 10 INJECTION, SOLUTION INFILTRATION; PERINEURAL at 13:34

## 2025-03-19 RX ADMIN — TRIAMCINOLONE ACETONIDE 80 MG: 40 INJECTION, SUSPENSION INTRA-ARTICULAR; INTRAMUSCULAR at 13:35

## 2025-03-23 ENCOUNTER — APPOINTMENT (OUTPATIENT)
Dept: CARDIOLOGY | Facility: HOSPITAL | Age: 70
End: 2025-03-23
Payer: MEDICARE

## 2025-03-23 ENCOUNTER — HOSPITAL ENCOUNTER (EMERGENCY)
Facility: HOSPITAL | Age: 70
Discharge: LEFT BEFORE DISCHARGE | End: 2025-03-23
Attending: EMERGENCY MEDICINE
Payer: MEDICARE

## 2025-03-23 ENCOUNTER — APPOINTMENT (OUTPATIENT)
Dept: RADIOLOGY | Facility: HOSPITAL | Age: 70
End: 2025-03-23
Payer: MEDICARE

## 2025-03-23 VITALS
SYSTOLIC BLOOD PRESSURE: 152 MMHG | BODY MASS INDEX: 29.45 KG/M2 | TEMPERATURE: 96.8 F | OXYGEN SATURATION: 95 % | DIASTOLIC BLOOD PRESSURE: 77 MMHG | HEIGHT: 60 IN | WEIGHT: 150 LBS | RESPIRATION RATE: 20 BRPM | HEART RATE: 82 BPM

## 2025-03-23 DIAGNOSIS — E87.6 HYPOKALEMIA: ICD-10-CM

## 2025-03-23 DIAGNOSIS — K20.90 ESOPHAGITIS: ICD-10-CM

## 2025-03-23 DIAGNOSIS — R07.9 CHEST PAIN, UNSPECIFIED TYPE: ICD-10-CM

## 2025-03-23 DIAGNOSIS — R11.2 NAUSEA AND VOMITING, UNSPECIFIED VOMITING TYPE: Primary | ICD-10-CM

## 2025-03-23 DIAGNOSIS — R93.5 ABNORMAL COMPUTED TOMOGRAPHY OF ABDOMEN AND PELVIS: ICD-10-CM

## 2025-03-23 LAB
ALBUMIN SERPL BCP-MCNC: 4.7 G/DL (ref 3.4–5)
ALP SERPL-CCNC: 93 U/L (ref 33–136)
ALT SERPL W P-5'-P-CCNC: 25 U/L (ref 7–45)
ANION GAP SERPL CALC-SCNC: 12 MMOL/L (ref 10–20)
AST SERPL W P-5'-P-CCNC: 24 U/L (ref 9–39)
BASOPHILS # BLD AUTO: 0.04 X10*3/UL (ref 0–0.1)
BASOPHILS NFR BLD AUTO: 0.4 %
BILIRUB SERPL-MCNC: 0.4 MG/DL (ref 0–1.2)
BNP SERPL-MCNC: 92 PG/ML (ref 0–99)
BUN SERPL-MCNC: 25 MG/DL (ref 6–23)
CALCIUM SERPL-MCNC: 10.5 MG/DL (ref 8.6–10.3)
CARDIAC TROPONIN I PNL SERPL HS: 7 NG/L (ref 0–13)
CARDIAC TROPONIN I PNL SERPL HS: 8 NG/L (ref 0–13)
CHLORIDE SERPL-SCNC: 98 MMOL/L (ref 98–107)
CO2 SERPL-SCNC: 30 MMOL/L (ref 21–32)
CREAT SERPL-MCNC: 0.56 MG/DL (ref 0.5–1.05)
D DIMER PPP FEU-MCNC: 795 NG/ML FEU
EGFRCR SERPLBLD CKD-EPI 2021: >90 ML/MIN/1.73M*2
EOSINOPHIL # BLD AUTO: 0.02 X10*3/UL (ref 0–0.7)
EOSINOPHIL NFR BLD AUTO: 0.2 %
ERYTHROCYTE [DISTWIDTH] IN BLOOD BY AUTOMATED COUNT: 14.9 % (ref 11.5–14.5)
FLUAV RNA RESP QL NAA+PROBE: NOT DETECTED
FLUBV RNA RESP QL NAA+PROBE: NOT DETECTED
GLUCOSE SERPL-MCNC: 105 MG/DL (ref 74–99)
HCT VFR BLD AUTO: 38.5 % (ref 36–46)
HGB BLD-MCNC: 12.3 G/DL (ref 12–16)
IMM GRANULOCYTES # BLD AUTO: 0.02 X10*3/UL (ref 0–0.7)
IMM GRANULOCYTES NFR BLD AUTO: 0.2 % (ref 0–0.9)
LACTATE SERPL-SCNC: 1 MMOL/L (ref 0.4–2)
LIPASE SERPL-CCNC: 17 U/L (ref 9–82)
LYMPHOCYTES # BLD AUTO: 1.93 X10*3/UL (ref 1.2–4.8)
LYMPHOCYTES NFR BLD AUTO: 21.2 %
MAGNESIUM SERPL-MCNC: 2.08 MG/DL (ref 1.6–2.4)
MCH RBC QN AUTO: 27.6 PG (ref 26–34)
MCHC RBC AUTO-ENTMCNC: 31.9 G/DL (ref 32–36)
MCV RBC AUTO: 86 FL (ref 80–100)
MONOCYTES # BLD AUTO: 0.66 X10*3/UL (ref 0.1–1)
MONOCYTES NFR BLD AUTO: 7.3 %
NEUTROPHILS # BLD AUTO: 6.43 X10*3/UL (ref 1.2–7.7)
NEUTROPHILS NFR BLD AUTO: 70.7 %
NRBC BLD-RTO: 0 /100 WBCS (ref 0–0)
PLATELET # BLD AUTO: 348 X10*3/UL (ref 150–450)
POTASSIUM SERPL-SCNC: 3.3 MMOL/L (ref 3.5–5.3)
PROT SERPL-MCNC: 9.1 G/DL (ref 6.4–8.2)
RBC # BLD AUTO: 4.46 X10*6/UL (ref 4–5.2)
SARS-COV-2 RNA RESP QL NAA+PROBE: NOT DETECTED
SODIUM SERPL-SCNC: 137 MMOL/L (ref 136–145)
WBC # BLD AUTO: 9.1 X10*3/UL (ref 4.4–11.3)

## 2025-03-23 PROCEDURE — 74177 CT ABD & PELVIS W/CONTRAST: CPT

## 2025-03-23 PROCEDURE — 71045 X-RAY EXAM CHEST 1 VIEW: CPT

## 2025-03-23 PROCEDURE — 96374 THER/PROPH/DIAG INJ IV PUSH: CPT | Mod: 59

## 2025-03-23 PROCEDURE — 2500000002 HC RX 250 W HCPCS SELF ADMINISTERED DRUGS (ALT 637 FOR MEDICARE OP, ALT 636 FOR OP/ED): Mod: MUE | Performed by: EMERGENCY MEDICINE

## 2025-03-23 PROCEDURE — 2500000004 HC RX 250 GENERAL PHARMACY W/ HCPCS (ALT 636 FOR OP/ED): Performed by: EMERGENCY MEDICINE

## 2025-03-23 PROCEDURE — 71045 X-RAY EXAM CHEST 1 VIEW: CPT | Performed by: RADIOLOGY

## 2025-03-23 PROCEDURE — 84484 ASSAY OF TROPONIN QUANT: CPT | Performed by: EMERGENCY MEDICINE

## 2025-03-23 PROCEDURE — 85025 COMPLETE CBC W/AUTO DIFF WBC: CPT | Performed by: EMERGENCY MEDICINE

## 2025-03-23 PROCEDURE — 36415 COLL VENOUS BLD VENIPUNCTURE: CPT | Performed by: EMERGENCY MEDICINE

## 2025-03-23 PROCEDURE — 83690 ASSAY OF LIPASE: CPT | Performed by: EMERGENCY MEDICINE

## 2025-03-23 PROCEDURE — 99285 EMERGENCY DEPT VISIT HI MDM: CPT | Mod: 25 | Performed by: EMERGENCY MEDICINE

## 2025-03-23 PROCEDURE — 83880 ASSAY OF NATRIURETIC PEPTIDE: CPT | Performed by: EMERGENCY MEDICINE

## 2025-03-23 PROCEDURE — 96361 HYDRATE IV INFUSION ADD-ON: CPT

## 2025-03-23 PROCEDURE — 87636 SARSCOV2 & INF A&B AMP PRB: CPT | Performed by: EMERGENCY MEDICINE

## 2025-03-23 PROCEDURE — 80053 COMPREHEN METABOLIC PANEL: CPT | Performed by: EMERGENCY MEDICINE

## 2025-03-23 PROCEDURE — 71275 CT ANGIOGRAPHY CHEST: CPT

## 2025-03-23 PROCEDURE — 93005 ELECTROCARDIOGRAM TRACING: CPT

## 2025-03-23 PROCEDURE — 85379 FIBRIN DEGRADATION QUANT: CPT | Performed by: EMERGENCY MEDICINE

## 2025-03-23 PROCEDURE — 2550000001 HC RX 255 CONTRASTS: Performed by: EMERGENCY MEDICINE

## 2025-03-23 PROCEDURE — 96375 TX/PRO/DX INJ NEW DRUG ADDON: CPT | Mod: 59

## 2025-03-23 PROCEDURE — 83605 ASSAY OF LACTIC ACID: CPT | Performed by: EMERGENCY MEDICINE

## 2025-03-23 PROCEDURE — 83735 ASSAY OF MAGNESIUM: CPT | Performed by: EMERGENCY MEDICINE

## 2025-03-23 RX ORDER — CEPHALEXIN 500 MG/1
500 CAPSULE ORAL 2 TIMES DAILY
Qty: 14 CAPSULE | Refills: 0 | Status: SHIPPED | OUTPATIENT
Start: 2025-03-23 | End: 2025-03-30

## 2025-03-23 RX ORDER — PANTOPRAZOLE SODIUM 40 MG/10ML
40 INJECTION, POWDER, LYOPHILIZED, FOR SOLUTION INTRAVENOUS ONCE
Status: COMPLETED | OUTPATIENT
Start: 2025-03-23 | End: 2025-03-23

## 2025-03-23 RX ORDER — POTASSIUM CHLORIDE 20 MEQ/1
40 TABLET, EXTENDED RELEASE ORAL ONCE
Status: COMPLETED | OUTPATIENT
Start: 2025-03-23 | End: 2025-03-23

## 2025-03-23 RX ORDER — ONDANSETRON HYDROCHLORIDE 2 MG/ML
4 INJECTION, SOLUTION INTRAVENOUS ONCE
Status: COMPLETED | OUTPATIENT
Start: 2025-03-23 | End: 2025-03-23

## 2025-03-23 RX ORDER — MORPHINE SULFATE 4 MG/ML
4 INJECTION, SOLUTION INTRAMUSCULAR; INTRAVENOUS ONCE
Status: COMPLETED | OUTPATIENT
Start: 2025-03-23 | End: 2025-03-23

## 2025-03-23 RX ORDER — ONDANSETRON 4 MG/1
4 TABLET, ORALLY DISINTEGRATING ORAL EVERY 8 HOURS PRN
Qty: 9 TABLET | Refills: 0 | Status: SHIPPED | OUTPATIENT
Start: 2025-03-23 | End: 2025-03-26

## 2025-03-23 RX ADMIN — PANTOPRAZOLE SODIUM 40 MG: 40 INJECTION, POWDER, FOR SOLUTION INTRAVENOUS at 20:48

## 2025-03-23 RX ADMIN — ONDANSETRON 4 MG: 2 INJECTION INTRAMUSCULAR; INTRAVENOUS at 17:03

## 2025-03-23 RX ADMIN — POTASSIUM CHLORIDE 40 MEQ: 1500 TABLET, EXTENDED RELEASE ORAL at 19:19

## 2025-03-23 RX ADMIN — SODIUM CHLORIDE 1000 ML: 9 INJECTION, SOLUTION INTRAVENOUS at 17:03

## 2025-03-23 RX ADMIN — IOHEXOL 75 ML: 350 INJECTION, SOLUTION INTRAVENOUS at 17:31

## 2025-03-23 RX ADMIN — MORPHINE SULFATE 4 MG: 4 INJECTION, SOLUTION INTRAMUSCULAR; INTRAVENOUS at 17:03

## 2025-03-23 ASSESSMENT — PAIN DESCRIPTION - PROGRESSION: CLINICAL_PROGRESSION: GRADUALLY IMPROVING

## 2025-03-23 ASSESSMENT — PAIN SCALES - GENERAL
PAINLEVEL_OUTOF10: 0 - NO PAIN
PAINLEVEL_OUTOF10: 7
PAINLEVEL_OUTOF10: 4

## 2025-03-23 ASSESSMENT — PAIN - FUNCTIONAL ASSESSMENT
PAIN_FUNCTIONAL_ASSESSMENT: 0-10
PAIN_FUNCTIONAL_ASSESSMENT: 0-10

## 2025-03-23 ASSESSMENT — PAIN DESCRIPTION - FREQUENCY: FREQUENCY: INTERMITTENT

## 2025-03-23 ASSESSMENT — PAIN DESCRIPTION - ONSET: ONSET: ONGOING

## 2025-03-23 ASSESSMENT — PAIN DESCRIPTION - PAIN TYPE: TYPE: ACUTE PAIN

## 2025-03-23 NOTE — ED PROVIDER NOTES
69-year-old female presents emergency department with chief complaint of nausea and vomiting.  Patient reports her symptoms have been going on for the past 3 days.  She also reports cough, congestion, and chest pain with coughing.  Denies any significant difficulty breathing.  No dysuria.  Patient does admit to diarrhea.  No black or bloody stools.  No hematemesis.  Patient reports subjective fevers. She describes her abdominal pain as aching in her mid and upper abdomen diffusely.  Chart review shows history of panic attacks, restless leg syndrome, hyperlipidemia, anxiety, depression, hypertension, osteoporosis, restless leg syndrome, lumbar disc herniation, and previous tobacco use.  No reported illicit drug use or regular alcohol use.  She is not on any anticoagulants.      History provided by:  Patient   used: No           ------------------------------------------------------------------------------------------------------------------------------------------    VS: As documented in the triage note and EMR flowsheet from this visit were reviewed.    Review of Systems  Constitutional: Reports subjective fevers and malaise  Eyes: no redness, discharge, pain  HENT: no sore throat, nose bleeds, admits to congestion  Cardiovascular: Reports chest pain with coughing no leg edema, palpitations  Respiratory: Mitts to shortness of breath and cough  GI: Reports nausea, vomiting, diarrhea, and upper abdominal pain no constipation, BRBPR, melena  : no dysuria, frequency, hematuria  Musculoskeletal: no neck pain, stiffness,  no joint deformity, swelling  Skin: no rash, erythema, wounds  Neurological: no headache, dizziness, lightheadedness, weakness, numbness, or tingling  Psychiatric: no suicidal thoughts, confusion, agitation  Metabolic: no polyuria or polydipsia  Hematologic: no increased bleeding or bruising  Immunology: No immunocompromise state    [facility]  Nursing notes reviewed and confirmed by me.   Chart review performed including medications, allergies, and medical, surgical, and family history  Visit Vitals  /77   Pulse 82   Temp 36 °C (96.8 °F) (Temporal)   Resp 20     Physical Exam  Vitals and nursing note reviewed.   Constitutional:       Appearance: She is not ill-appearing.      Comments: Patient is tearful on my initial exam   HENT:      Head: Normocephalic and atraumatic.      Right Ear: External ear normal.      Left Ear: External ear normal.      Nose: Nose normal. No congestion or rhinorrhea.      Mouth/Throat:      Mouth: Mucous membranes are dry.      Pharynx: No oropharyngeal exudate or posterior oropharyngeal erythema.   Eyes:      Extraocular Movements: Extraocular movements intact.      Conjunctiva/sclera: Conjunctivae normal.      Pupils: Pupils are equal, round, and reactive to light.   Cardiovascular:      Rate and Rhythm: Normal rate and regular rhythm.      Pulses: Normal pulses.      Heart sounds: Normal heart sounds.   Pulmonary:      Effort: Pulmonary effort is normal. No respiratory distress.      Breath sounds: No stridor. No wheezing, rhonchi or rales.      Comments: Coarse breath sounds bilaterally  Abdominal:      General: There is no distension.      Palpations: Abdomen is soft.      Tenderness: There is abdominal tenderness (Mild diffuse abdominal tenderness difficult to localize). There is no guarding or rebound.   Musculoskeletal:         General: No swelling or deformity. Normal range of motion.      Cervical back: Normal range of motion and neck supple. No rigidity.      Right lower leg: No edema.      Left lower leg: No edema.      Comments: No calf tenderness    Skin:     General: Skin is warm and dry.      Capillary Refill: Capillary refill takes less than 2 seconds.      Coloration: Skin is not jaundiced.      Findings: No rash.   Neurological:      General: No focal deficit present.      Mental Status: She is alert and oriented to person, place, and time.       Sensory: No sensory deficit.      Motor: No weakness.   Psychiatric:         Mood and Affect: Mood normal.         Behavior: Behavior normal.        Past Medical History:   Diagnosis Date    Acute upper respiratory infection, unspecified     Acute URI    Acute upper respiratory infection, unspecified 05/11/2022    URTI (acute upper respiratory infection)    Contact with and (suspected) exposure to covid-19     Suspected COVID-19 virus infection    Cough, unspecified     Cough in adult    Discitis, unspecified, lumbar region     Septic discitis of lumbar region    Elevated blood-pressure reading, without diagnosis of hypertension     Elevated blood pressure reading    Encounter for immunization     Encounter for immunization    Enlarged lymph nodes, unspecified     Swollen gland    Nicotine dependence, unspecified, uncomplicated 12/11/2018    Other chest pain     Chest pressure    Other enthesopathies, not elsewhere classified     Tendinitis of wrist    Pain in unspecified knee 06/09/2022    Knee pain, acute    Pain, unspecified     Generalized body aches    Personal history of other diseases of the musculoskeletal system and connective tissue     History of arthritis    Personal history of other diseases of the musculoskeletal system and connective tissue     History of spinal stenosis    Personal history of other diseases of the respiratory system     History of allergic rhinitis    Personal history of other diseases of the respiratory system     History of bronchitis    Personal history of other diseases of the respiratory system     History of acute sinusitis    Personal history of other diseases of the respiratory system 03/31/2022    History of acute pharyngitis    Personal history of other diseases of the respiratory system 11/16/2022    History of acute sinusitis    Personal history of other infectious and parasitic diseases     History of viral infection    Personal history of other specified conditions      History of fatigue    Personal history of other specified conditions     History of urinary frequency    Rash and other nonspecific skin eruption     Rash    Smoker 2017    Vitamin D deficiency, unspecified     Vitamin D insufficiency      Past Surgical History:   Procedure Laterality Date    OTHER SURGICAL HISTORY  2021    Lumbar laminectomy    OTHER SURGICAL HISTORY  2022    Knee surgery    OTHER SURGICAL HISTORY  2022    Spinal surgery    OTHER SURGICAL HISTORY  2022    Arthrodesis    OTHER SURGICAL HISTORY  2022    Colonoscopy    OTHER SURGICAL HISTORY  2022    Lumbar discectomy    OTHER SURGICAL HISTORY  2022    Lumbar vertebral fusion    TOTAL KNEE ARTHROPLASTY Right       Social History     Socioeconomic History    Marital status:    Tobacco Use    Smoking status: Former     Current packs/day: 0.00     Average packs/day: 1 pack/day for 10.0 years (10.0 ttl pk-yrs)     Types: Cigarettes     Start date:      Quit date:      Years since quittin.2    Smokeless tobacco: Never   Vaping Use    Vaping status: Never Used   Substance and Sexual Activity    Alcohol use: Never     Alcohol/week: 2.0 standard drinks of alcohol     Types: 2 Glasses of wine per week    Drug use: Never     Social Drivers of Health     Financial Resource Strain: Low Risk  (9/3/2024)    Received from [facility]    Overall Financial Resource Strain (CARDIA)     Difficulty of Paying Living Expenses: Not hard at all   Food Insecurity: No Food Insecurity (2024)    Received from [facility]    Hunger Vital Sign     Worried About Running Out of Food in the Last Year: Never true     Ran Out of Food in the Last Year: Never true   Transportation Needs: No Transportation Needs (2024)    Received from [facility]    PRAPARE - Transportation     Lack of Transportation (Medical): No     Lack of Transportation  (Non-Medical): No   Housing Stability: Low Risk  (9/9/2024)    Received from [facility]    Housing Stability Vital Sign     Unable to Pay for Housing in the Last Year: No     Number of Times Moved in the Last Year: 1     Homeless in the Last Year: No      ------------------------------------------------------------------------------------------------------------------------------------------  CT angio chest for pulmonary embolism   Final Result   1. No pulmonary embolism.   2. There is moderate to severe distal esophageal wall thickening   concerning for esophagitis. Small hernia. Appearance is similar to   07/30/2024. Endoscopic correlation is suggested.   3. Gas within the urinary bladder lumen. Please correlate for recent   instrumentation and consider correlation with urinalysis.   4. 3.1 by 2.2 cm fluid density lesion within the right hepatic lobe   is unchanged from 07/30/2024 and minimally increased in size from   11/25/2022, likely cyst.   5. Postsurgical change related to L3-S1 spinal fusion. Similar   lucency surrounding the L5-S1 interbody spacer/fusion device when   compared to 07/30/2024, though increased from 11/25/2022 concerning   for motion/loosening. Surgical follow-up is suggested.        Signed by: Gregorio Mercado 3/23/2025 6:48 PM   Dictation workstation:   [id number]      CT abdomen pelvis w IV contrast   Final Result   1. No pulmonary embolism.   2. There is moderate to severe distal esophageal wall thickening   concerning for esophagitis. Small hernia. Appearance is similar to   07/30/2024. Endoscopic correlation is suggested.   3. Gas within the urinary bladder lumen. Please correlate for recent   instrumentation and consider correlation with urinalysis.   4. 3.1 by 2.2 cm fluid density lesion within the right hepatic lobe   is unchanged from 07/30/2024 and minimally increased in size from   11/25/2022, likely cyst.   5. Postsurgical change related to L3-S1  spinal fusion. Similar   lucency surrounding the L5-S1 interbody spacer/fusion device when   compared to 07/30/2024, though increased from 11/25/2022 concerning   for motion/loosening. Surgical follow-up is suggested.        Signed by: Gregorio Mercado 3/23/2025 6:48 PM   Dictation workstation:   [id number]      XR chest 1 view   Final Result   No evidence of acute intrathoracic abnormality.        Signed by: Marty Garcia 3/23/2025 5:21 PM   Dictation workstation:   [id number]         Labs Reviewed   CBC WITH AUTO DIFFERENTIAL - Abnormal       Result Value    WBC 9.1      nRBC 0.0      RBC 4.46      Hemoglobin 12.3      Hematocrit 38.5      MCV 86      MCH 27.6      MCHC 31.9 (*)     RDW 14.9 (*)     Platelets 348      Neutrophils % 70.7      Immature Granulocytes %, Automated 0.2      Lymphocytes % 21.2      Monocytes % 7.3      Eosinophils % 0.2      Basophils % 0.4      Neutrophils Absolute 6.43      Immature Granulocytes Absolute, Automated 0.02      Lymphocytes Absolute 1.93      Monocytes Absolute 0.66      Eosinophils Absolute 0.02      Basophils Absolute 0.04     COMPREHENSIVE METABOLIC PANEL - Abnormal    Glucose 105 (*)     Sodium 137      Potassium 3.3 (*)     Chloride 98      Bicarbonate 30      Anion Gap 12      Urea Nitrogen 25 (*)     Creatinine 0.56      eGFR >90      Calcium 10.5 (*)     Albumin 4.7      Alkaline Phosphatase 93      Total Protein 9.1 (*)     AST 24      Bilirubin, Total 0.4      ALT 25     D-DIMER, VTE EXCLUSION - Abnormal    D-Dimer, Quantitative VTE Exclusion 795 (*)     Narrative:     The VTE Exclusion D-Dimer assay is reported in ng/mL Fibrinogen Equivalent Units (FEU).    Per 's instructions for use, a value of less than 500 ng/mL (FEU) may help to exclude DVT or PE in outpatients when the assay is used with a clinical pretest probability assessment.(A[facility] must utilize and document eCalc 'Wells Score Deep Vein Thrombosis Risk' for DVT exclusion only. Emergency  Department should utilize  Guidelines for Emergency Department Use of the VTE Exclusion D-Dimer and Clinical Pretest probability assessment model for DVT or PE exclusion.)   MAGNESIUM - Normal    Magnesium 2.08     B-TYPE NATRIURETIC PEPTIDE - Normal    BNP 92      Narrative:        <100 pg/mL - Heart failure unlikely  100-299 pg/mL - Intermediate probability of acute heart                  failure exacerbation. Correlate with clinical                  context and patient history.    >=300 pg/mL - Heart Failure likely. Correlate with clinical                  context and patient history.    BNP testing is performed using different testing methodology at [facility] than at other [facility]. Direct result comparisons should only be made within the same method.      LACTATE - Normal    Lactate 1.0      Narrative:     Venipuncture immediately after or during the administration of Metamizole may lead to falsely low results. Testing should be performed immediately prior to Metamizole dosing.   LIPASE - Normal    Lipase 17      Narrative:     Venipuncture immediately after or during the administration of Metamizole may lead to falsely low results. Testing should be performed immediately prior to Metamizole dosing.   SARS-COV-2 AND INFLUENZA A/B PCR - Normal    Flu A Result Not Detected      Flu B Result Not Detected      Coronavirus 2019, PCR Not Detected      Narrative:     This assay is an FDA-cleared, in vitro diagnostic nucleic acid amplification test for the qualitative detection and differentiation of SARS CoV-2/ Influenza A/B from nasopharyngeal specimens collected from individuals with signs and symptoms of respiratory tract infections, and has been validated for use at [facility]. Negative results do not preclude COVID-19/ Influenza A/B infections and should not be used as the sole basis for diagnosis, treatment, or other management decisions. Testing for SARS CoV-2 is  recommended only for patients who meet current clinical and/or epidemiological criteria defined by federal, state, or local public health directives.   SERIAL TROPONIN-INITIAL - Normal    Troponin I, High Sensitivity 8      Narrative:     Less than 99th percentile of normal range cutoff-  Female and children under 18 years old <14 ng/L; Male <21 ng/L: Negative  Repeat testing should be performed if clinically indicated.     Female and children under 18 years old 14-50 ng/L; Male 21-50 ng/L:  Consistent with possible cardiac damage and possible increased clinical   risk. Serial measurements may help to assess extent of myocardial damage.     >50 ng/L: Consistent with cardiac damage, increased clinical risk and  myocardial infarction. Serial measurements may help assess extent of   myocardial damage.      NOTE: Children less than 1 year old may have higher baseline troponin   levels and results should be interpreted in conjunction with the overall   clinical context.     NOTE: Troponin I testing is performed using a different   testing methodology at [facility] than at [facility]. Direct result comparisons should only   be made within the same method.   SERIAL TROPONIN, 1 HOUR - Normal    Troponin I, High Sensitivity 7      Narrative:     Less than 99th percentile of normal range cutoff-  Female and children under 18 years old <14 ng/L; Male <21 ng/L: Negative  Repeat testing should be performed if clinically indicated.     Female and children under 18 years old 14-50 ng/L; Male 21-50 ng/L:  Consistent with possible cardiac damage and possible increased clinical   risk. Serial measurements may help to assess extent of myocardial damage.     >50 ng/L: Consistent with cardiac damage, increased clinical risk and  myocardial infarction. Serial measurements may help assess extent of   myocardial damage.      NOTE: Children less than 1 year old may have higher baseline troponin   levels and  results should be interpreted in conjunction with the overall   clinical context.     NOTE: Troponin I testing is performed using a different   testing methodology at [facility] than at other   [facility]. Direct result comparisons should only   be made within the same method.   TROPONIN SERIES- (INITIAL, 1 HR)    Narrative:     The following orders were created for panel order Troponin I Series, High Sensitivity (0, 1 HR).  Procedure                               Abnormality         Status                     ---------                               -----------         ------                     Troponin I, High Sensiti...[[id number]]  Normal              Final result               Troponin, High Sensitivi...[[id number]]  Normal              Final result                 Please view results for these tests on the individual orders.   URINALYSIS WITH REFLEX CULTURE AND MICROSCOPIC    Narrative:     The following orders were created for panel order Urinalysis with Reflex Culture and Microscopic.  Procedure                               Abnormality         Status                     ---------                               -----------         ------                     Urinalysis with Reflex C...[[id number]]                                                 Extra Urine Gray Tube[[id number]]                                                         Please view results for these tests on the individual orders.   URINALYSIS WITH REFLEX CULTURE AND MICROSCOPIC   EXTRA URINE GRAY TUBE        Medical Decision Making  Repeat EKG interpreted by ED physician: Normal sinus rhythm rate of 78.  SC, QRS, QTc intervals all within normal limits.  No significant ST elevations or depressions.  No significant Q waves.  Good R wave progression.  Left axis deviation.    69-year-old female presents emergency department with complaints of nausea and vomiting as well as abdominal pain for the past 3 days.  She also reports cough,  congestion, and chest discomfort.  On my exam patient is afebrile and nontoxic.  Given her presenting complaints a thorough workup is obtained.  Flu and COVID testing are negative.  Cardiac enzymes x 2 and EKG showed no acute ACS.  Patient's abdomen is benign without rebound, rigidity, guarding, or distention.  Repeat abdominal exams are performed during patient's stay and remained benign.  BNP is not significantly elevated patient does not have findings of decompensated heart failure.  Lipase within normal range I do not suspect pancreatitis.  CBC shows no significant leukocytosis or anemia.  Patient does not have findings of sepsis.  CMP shows findings of mild dehydration patient given IV fluids.  She also has mild hypokalemia which is repleted orally.  D-dimer is elevated.  Subsequent CT PE study as well as CT abdomen pelvis did not show pulmonary embolus.  Patient did have findings of possible esophagitis, findings concerning for cystitis with gas in the urinary bladder, stable hepatic lobe lesion thought to be cyst, and postsurgical changes from spinal fusion with possibility concerning for hardware failure.  I discussed these incidental findings with the patient.  Advised her on need for follow-up with primary care as well as orthopedics.  Provided her with information to do so.  Chest x-ray showed no acute cardiopulmonary process such as pneumonia, pleural effusion, or pulmonary edema.  Patient was treated symptomatically with morphine, Zofran, and Protonix.  On reevaluation she does report she is feeling better.  We are still awaiting urine study.  I discussed with her concern that she may have a urinary tract infection.  In addition, she reported to me feeling more weak and living alone.  I advised her that urinary tract infections can cause weakness, confusion, and sometimes can require hospitalization.  Patient states she does not wish to stay in the hospital and she wants to go home.  Patient reports  that she does not want to provide us with a urine specimen as she is incontinent of urine and does not feel she can give us urine at this time.  Discussed possibility of admission as well as awaiting urine specimen versus straight cath.  Patient declined these options stating that she just wanted to go home and was feeling better.  She did complain of burning with urination and given abnormal CT we will treat her with antibiotics for presumed UTI.  Patient given Keflex for home.  She is advised of the diagnostic uncertainty regarding this.  Patient also given Zofran for home for further symptomatic treatment.  Patient wishes to leave AMA.  He/she appeared rational, alert, appropriate, and exhibited no signs/symptoms of psychosis or suicidal ideation.  Patient has the capacity to make this medical decision.  Patient was aware of his/her suspected diagnosis as suggested by the initial screening exam and acknowledged their understanding for my recommendations (hospitalization, transfer, further testing, medical treatment).  Risks of refusal of recommended care were disclosed to the patient including, but not limited to death, permanent mental or physical impairment, loss of current lifestyle or paralysis.  Welcomed to return to the ED at any time regardless of their ability to pay and was provided follow up instructions.  The patient will leave AMA at this time.          Diagnoses as of 03/23/25 2145   Nausea and vomiting, unspecified vomiting type   Esophagitis   Abnormal computed tomography of abdomen and pelvis   Chest pain, unspecified type   Hypokalemia      1. Nausea and vomiting, unspecified vomiting type  ondansetron ODT (Zofran-ODT) 4 mg disintegrating tablet      2. Esophagitis        3. Abnormal computed tomography of abdomen and pelvis  cephalexin (Keflex) 500 mg capsule      4. Chest pain, unspecified type        5. Hypokalemia           Procedures     This note was dictated using dragon software and may  contain errors related to dictation interpretation errors.      Sonido Sims, DO  03/23/25 [id number]

## 2025-03-24 ENCOUNTER — APPOINTMENT (OUTPATIENT)
Dept: PAIN MEDICINE | Facility: CLINIC | Age: 70
End: 2025-03-24
Payer: MEDICARE

## 2025-03-24 LAB
ATRIAL RATE: 78 BPM
P AXIS: 53 DEGREES
P OFFSET: 205 MS
P ONSET: 145 MS
PR INTERVAL: 160 MS
Q ONSET: 225 MS
QRS COUNT: 13 BEATS
QRS DURATION: 90 MS
QT INTERVAL: 404 MS
QTC CALCULATION(BAZETT): 460 MS
QTC FREDERICIA: 441 MS
R AXIS: -19 DEGREES
T AXIS: 55 DEGREES
T OFFSET: 427 MS
VENTRICULAR RATE: 78 BPM

## 2025-03-24 NOTE — DISCHARGE INSTRUCTIONS
Follow-up with your primary care doctor and orthopedics.  Return to the emergency department if symptoms worsen or change.  Take medications as prescribed.  We discussed the need for obtaining urine analysis and possible need for admission regarding your symptoms today.  However, you have chosen to leave AGAINST MEDICAL ADVICE.  Risk of leaving includes death, disability, delay diagnosis, and worsening symptoms.  If you change your mind at any time you may return to the ED for further evaluation and treatment.    You had incidental findings on your CT scan of liver cyst, and questionable loosening of the hardware in your back.  You should follow-up with your primary care doctor regarding these findings.    Your CT imaging as well as symptoms are concerning for urinary tract infection.  However, you are unable to provide us with a urine sample and this diagnosis is unclear.

## 2025-03-25 ENCOUNTER — OFFICE VISIT (OUTPATIENT)
Dept: PAIN MEDICINE | Facility: CLINIC | Age: 70
End: 2025-03-25
Payer: MEDICARE

## 2025-03-25 VITALS — TEMPERATURE: 97.2 F | SYSTOLIC BLOOD PRESSURE: 129 MMHG | DIASTOLIC BLOOD PRESSURE: 76 MMHG | HEART RATE: 65 BPM

## 2025-03-25 DIAGNOSIS — M54.42 CHRONIC MIDLINE LOW BACK PAIN WITH LEFT-SIDED SCIATICA: Primary | ICD-10-CM

## 2025-03-25 DIAGNOSIS — G89.29 CHRONIC MIDLINE LOW BACK PAIN WITH LEFT-SIDED SCIATICA: Primary | ICD-10-CM

## 2025-03-25 PROCEDURE — 1123F ACP DISCUSS/DSCN MKR DOCD: CPT | Performed by: NURSE PRACTITIONER

## 2025-03-25 PROCEDURE — 99204 OFFICE O/P NEW MOD 45 MIN: CPT | Performed by: NURSE PRACTITIONER

## 2025-03-25 PROCEDURE — 3078F DIAST BP <80 MM HG: CPT | Performed by: NURSE PRACTITIONER

## 2025-03-25 PROCEDURE — 3074F SYST BP LT 130 MM HG: CPT | Performed by: NURSE PRACTITIONER

## 2025-03-25 PROCEDURE — 99214 OFFICE O/P EST MOD 30 MIN: CPT | Performed by: NURSE PRACTITIONER

## 2025-03-25 RX ORDER — OXYCODONE AND ACETAMINOPHEN 7.5; 325 MG/1; MG/1
1 TABLET ORAL EVERY 6 HOURS PRN
Qty: 120 TABLET | Refills: 0 | Status: SHIPPED | OUTPATIENT
Start: 2025-06-05 | End: 2025-07-05

## 2025-03-25 RX ORDER — OXYCODONE AND ACETAMINOPHEN 7.5; 325 MG/1; MG/1
1 TABLET ORAL EVERY 6 HOURS PRN
Qty: 120 TABLET | Refills: 0 | Status: SHIPPED | OUTPATIENT
Start: 2025-05-06 | End: 2025-06-05

## 2025-03-25 RX ORDER — OXYCODONE AND ACETAMINOPHEN 7.5; 325 MG/1; MG/1
1 TABLET ORAL EVERY 6 HOURS PRN
Qty: 120 TABLET | Refills: 0 | Status: SHIPPED | OUTPATIENT
Start: 2025-04-06 | End: 2025-05-06

## 2025-03-25 ASSESSMENT — PAIN - FUNCTIONAL ASSESSMENT: PAIN_FUNCTIONAL_ASSESSMENT: 0-10

## 2025-03-25 ASSESSMENT — PAIN DESCRIPTION - DESCRIPTORS: DESCRIPTORS: ACHING

## 2025-03-25 ASSESSMENT — PAIN SCALES - GENERAL: PAINLEVEL_OUTOF10: 5 - MODERATE PAIN

## 2025-03-25 NOTE — H&P
"History Of Present Illness  Alysa Braun is a 69 y.o. female presenting with follow up after a procedure, and refill of medications.  She is known in this clinic because of chronic back pain. She is maintained on Percocet 7.5/325 every 6 hours as needed for pain, Gabapentin 300 mg TID, and Mobic 15 mg once daily for pain. She confirms that they are moderately beneficial in controlling her pain issues. She recently had right SI injection done on 3/6/2025, she states that she had a significant improvement in pain that still persist at this time.  Her level of pain at this time is about 5/10 mostly in her back, described as constant, aching .  OARRS obtained and reviewed, no abuse or misuse with prescribed medication noted.  She is still able to perform activities of daily living independently. She lives by herself.  Today she states of both hips pain/ache she feels she has \"bursitis\".     Past Medical History  Past Medical History:   Diagnosis Date    Acute upper respiratory infection, unspecified     Acute URI    Acute upper respiratory infection, unspecified 05/11/2022    URTI (acute upper respiratory infection)    Contact with and (suspected) exposure to covid-19     Suspected COVID-19 virus infection    Cough, unspecified     Cough in adult    Discitis, unspecified, lumbar region     Septic discitis of lumbar region    Elevated blood-pressure reading, without diagnosis of hypertension     Elevated blood pressure reading    Encounter for immunization     Encounter for immunization    Enlarged lymph nodes, unspecified     Swollen gland    Nicotine dependence, unspecified, uncomplicated 12/11/2018    Other chest pain     Chest pressure    Other enthesopathies, not elsewhere classified     Tendinitis of wrist    Pain in unspecified knee 06/09/2022    Knee pain, acute    Pain, unspecified     Generalized body aches    Personal history of other diseases of the musculoskeletal system and connective tissue     History " of arthritis    Personal history of other diseases of the musculoskeletal system and connective tissue     History of spinal stenosis    Personal history of other diseases of the respiratory system     History of allergic rhinitis    Personal history of other diseases of the respiratory system     History of bronchitis    Personal history of other diseases of the respiratory system     History of acute sinusitis    Personal history of other diseases of the respiratory system 03/31/2022    History of acute pharyngitis    Personal history of other diseases of the respiratory system 11/16/2022    History of acute sinusitis    Personal history of other infectious and parasitic diseases     History of viral infection    Personal history of other specified conditions     History of fatigue    Personal history of other specified conditions     History of urinary frequency    Rash and other nonspecific skin eruption     Rash    Smoker 04/11/2017    Vitamin D deficiency, unspecified     Vitamin D insufficiency       Surgical History  Past Surgical History:   Procedure Laterality Date    OTHER SURGICAL HISTORY  08/16/2021    Lumbar laminectomy    OTHER SURGICAL HISTORY  08/11/2022    Knee surgery    OTHER SURGICAL HISTORY  01/24/2022    Spinal surgery    OTHER SURGICAL HISTORY  01/24/2022    Arthrodesis    OTHER SURGICAL HISTORY  01/24/2022    Colonoscopy    OTHER SURGICAL HISTORY  01/24/2022    Lumbar discectomy    OTHER SURGICAL HISTORY  01/24/2022    Lumbar vertebral fusion    TOTAL KNEE ARTHROPLASTY Right         Social History  She reports that she quit smoking about 7 years ago. Her smoking use included cigarettes. She started smoking about 17 years ago. She has a 10 pack-year smoking history. She has never used smokeless tobacco. She reports that she does not drink alcohol and does not use drugs.    Family History  Family History   Problem Relation Name Age of Onset    Depression Mother      Diabetes Mother       Fibromyalgia Mother      Other (High serum cholesterol sulfate) Father          Allergies  Patient has no known allergies.    Review of Systems   Review of systems x 10 is negative.   No recent injury or falls reported.   No recent change in medical condition reported.   No recent weakness reported.   Still able to control bowel and bladder function.  Denies any problem with constipation.   Denies fever, cough, shortness of breath recently.   No interval change with medication/health issues reported.  Denies opioids diversion and abuse. Denies overuse of pain medications.    Physical Exam  Awake,alert, no acute distress, appropriate.  Spine is of normal curvature.  Full ROM on all 4 extremities, sensation and motor intact, no vascular compromise.  No pedal edema, normal gait.  Skin warm, dry, intact, turgor is normal.  Denies any numbness, tingling.  Occasional left thigh numbness.     Last Recorded Vitals  Blood pressure 129/76, pulse 65, temperature 36.2 °C (97.2 °F).    Relevant Results  No recent imaging noted.     Assessment/Plan     69 years old, female with history and physical examination supportive of chronic back pain associated with postlaminectomy syndrome, s/p 2 surgeries in the past with persistent back pain and positive sacroilitis requiring maintenance on opioid therapy.    I have personally reviewed the OARRS report for this patient. I have considered the risk of abuse, dependence, addiction and diversion.  I believe that it is clinically appropriate for this patient  to be prescribed this medication based on documented diagnosis.  Based on the favorable effect in your quality of life and the positive effect in the ability to do daily activities of daily living, I feel feel that the benefits outweighs the risk, and I will continue the current regimen of medications.  Continue Percocet as prescribed, at this time she still has Gabapentin and Mobic.  Obtain xray both hips for further evaluation of  hips pain.  Follow up in 3 months time or as needed basis  Explained plan to this patient, and patient verbalized understanding and agreement with the plan. If there is questions or concerns, please feel free to contact me to clarify at [phone], M-F 8-4 PM.       I spent 50 minutes in the professional and overall care of this patient.      Coco Anderson, APRN-CNP

## 2025-05-02 ENCOUNTER — APPOINTMENT (OUTPATIENT)
Dept: PRIMARY CARE | Facility: CLINIC | Age: 70
End: 2025-05-02
Payer: MEDICARE

## 2025-05-05 ENCOUNTER — TELEMEDICINE (OUTPATIENT)
Dept: PRIMARY CARE | Facility: CLINIC | Age: 70
End: 2025-05-05
Payer: MEDICARE

## 2025-05-05 DIAGNOSIS — J01.10 ACUTE FRONTAL SINUSITIS, RECURRENCE NOT SPECIFIED: Primary | ICD-10-CM

## 2025-05-05 DIAGNOSIS — J20.9 ACUTE BRONCHITIS, UNSPECIFIED ORGANISM: ICD-10-CM

## 2025-05-05 RX ORDER — DOXYCYCLINE 100 MG/1
100 CAPSULE ORAL 2 TIMES DAILY
Qty: 20 CAPSULE | Refills: 0 | Status: SHIPPED | OUTPATIENT
Start: 2025-05-05 | End: 2025-05-15

## 2025-05-05 ASSESSMENT — ENCOUNTER SYMPTOMS
COUGH: 1
RHINORRHEA: 1

## 2025-05-05 NOTE — PROGRESS NOTES
"Subjective   Patient ID: Alysa Braun is a 69 y.o. female who presents for No chief complaint on file..    HPI Pt is a pleasant 70 yo CF who was seen and evaluated during the VV. Pt states that she did not feel good for the last 5-6 days. Pt reports congested sinuses, \"congested head\" green nasal discharge and cough with colored sputum. PT denies any sick contacts/COVID exposure. During the clinical encounter pt denies fever, chills, no SOB, no chest pain/tightness, no abdominal pain, no N/V/D reported, no change of urination reported. Pt denies any other health concerns during this visit.  Sohx: reviewed  List of the medications and allergies: reviewed  PMHx and Fhx: reviewed      Review of Systems  The systems have been reviewed as follows:   Constitutional: no fever, no chills  Eyes: no eyesight problems, no eye redness, no eye pain, no blurred vision  ENT: no ear pain or sore throat, no hearing loss, but as mentioned at HPI  Cardiovascular: no chest pain or tightness, no SOB, the heart rate was not fast or slow  Respiratory: pt reports productive cough, no dyspnea with exertion or with rest, no wheezing  Gastrointestinal: no abdominal pain, no constipation or diarrhea, no heartburn, no nausea or vomiting  Genitourinary: no urine frequency, no dysuria, no urinary urgency, no urinary incontinence or retention  Musculoskeletal: no back pain, no arthralgia, no joint swelling or stiffness, no muscle weakness  Integumentary: no skin lesions or rash  Neurological: no headaches, no dizziness or fainting, no limb weakness  Psychiatric: no mood changes, no anxiety or depression, no SI/HI  Endocrine: no weight change, no temperature intolerance, no   change of appetite  Hematologic/Lymphatic: no easy bruising or bleeding  Objective   There were no vitals taken for this visit.    Physical Exam  PE was limited due to the virtual settings of the visit  Pt appears comfortable, not in acute distress  Pt is Ax0x3, pleasant, " follows the commands  Skin color is normal, clear conjunctiva  Breathing unlabored  No neck lymphadenopathy noticed   Vitals: were not reported    Assessment/Plan   Acute bronchitis, Acute sinusitis:  Hx as mentioned  Will start on 10 day TX course with Doxycycline 100 mg PO BID. Pt was counseled about possible SE including photosensitivity  Pt was instructed to contact PCP if pt will have no improvement of the condition/worsening of the sxs/new sxs on the current treatment  I discussed every sxs with the pt in detail. Pt verbalized understanding of the health  condition and agreed with the clinical approach as discussed as mentioned above  Please FU with PCP as planned or sooner if needed.  Virtual or Telephone Consent    An interactive audio and video telecommunication system which permits real time communications between the patient (at the originating site) and provider (at the distant site) was utilized to provide this telehealth service.   Verbal consent was requested and obtained from Alysa Braun on this date, 05/05/25 for a telehealth visit and the patient's location was confirmed at the time of the visit.   Pt confirm identity and physical location in [facility]

## 2025-05-11 DIAGNOSIS — E78.5 HYPERLIPIDEMIA, UNSPECIFIED HYPERLIPIDEMIA TYPE: ICD-10-CM

## 2025-05-14 RX ORDER — SIMVASTATIN 10 MG/1
10 TABLET, FILM COATED ORAL NIGHTLY
Qty: 90 TABLET | Refills: 1 | Status: SHIPPED | OUTPATIENT
Start: 2025-05-14 | End: 2025-11-10

## 2025-05-20 ENCOUNTER — APPOINTMENT (OUTPATIENT)
Dept: RADIOLOGY | Facility: HOSPITAL | Age: 70
End: 2025-05-20
Payer: MEDICARE

## 2025-05-20 ENCOUNTER — HOSPITAL ENCOUNTER (OUTPATIENT)
Facility: HOSPITAL | Age: 70
Setting detail: OBSERVATION
Discharge: HOME | End: 2025-05-21
Attending: EMERGENCY MEDICINE | Admitting: FAMILY MEDICINE
Payer: MEDICARE

## 2025-05-20 ENCOUNTER — APPOINTMENT (OUTPATIENT)
Dept: PRIMARY CARE | Facility: CLINIC | Age: 70
End: 2025-05-20
Payer: MEDICARE

## 2025-05-20 ENCOUNTER — APPOINTMENT (OUTPATIENT)
Dept: CARDIOLOGY | Facility: HOSPITAL | Age: 70
End: 2025-05-20
Payer: MEDICARE

## 2025-05-20 DIAGNOSIS — R42 DIZZINESS: Primary | ICD-10-CM

## 2025-05-20 DIAGNOSIS — R00.0 TACHYCARDIA: ICD-10-CM

## 2025-05-20 DIAGNOSIS — J20.9 ACUTE BRONCHITIS, UNSPECIFIED ORGANISM: ICD-10-CM

## 2025-05-20 DIAGNOSIS — R06.00 ACUTE DYSPNEA: ICD-10-CM

## 2025-05-20 DIAGNOSIS — D64.9 ANEMIA, UNSPECIFIED TYPE: ICD-10-CM

## 2025-05-20 LAB
ALBUMIN SERPL BCP-MCNC: 4.5 G/DL (ref 3.4–5)
ALP SERPL-CCNC: 107 U/L (ref 33–136)
ALT SERPL W P-5'-P-CCNC: 19 U/L (ref 7–45)
ANION GAP SERPL CALC-SCNC: 15 MMOL/L (ref 10–20)
AST SERPL W P-5'-P-CCNC: 19 U/L (ref 9–39)
ATRIAL RATE: 121 BPM
BASOPHILS # BLD AUTO: 0.06 X10*3/UL (ref 0–0.1)
BASOPHILS NFR BLD AUTO: 0.8 %
BILIRUB SERPL-MCNC: 0.2 MG/DL (ref 0–1.2)
BNP SERPL-MCNC: 42 PG/ML (ref 0–99)
BUN SERPL-MCNC: 29 MG/DL (ref 6–23)
CALCIUM SERPL-MCNC: 9.4 MG/DL (ref 8.6–10.3)
CARDIAC TROPONIN I PNL SERPL HS: 5 NG/L (ref 0–13)
CARDIAC TROPONIN I PNL SERPL HS: 6 NG/L (ref 0–13)
CHLORIDE SERPL-SCNC: 108 MMOL/L (ref 98–107)
CO2 SERPL-SCNC: 24 MMOL/L (ref 21–32)
CREAT SERPL-MCNC: 0.64 MG/DL (ref 0.5–1.05)
EGFRCR SERPLBLD CKD-EPI 2021: >90 ML/MIN/1.73M*2
EOSINOPHIL # BLD AUTO: 0.11 X10*3/UL (ref 0–0.7)
EOSINOPHIL NFR BLD AUTO: 1.5 %
ERYTHROCYTE [DISTWIDTH] IN BLOOD BY AUTOMATED COUNT: 16.5 % (ref 11.5–14.5)
FLUAV RNA RESP QL NAA+PROBE: NOT DETECTED
FLUBV RNA RESP QL NAA+PROBE: NOT DETECTED
GLUCOSE BLD MANUAL STRIP-MCNC: 133 MG/DL (ref 74–99)
GLUCOSE SERPL-MCNC: 111 MG/DL (ref 74–99)
HCT VFR BLD AUTO: 36.5 % (ref 36–46)
HGB BLD-MCNC: 11.6 G/DL (ref 12–16)
IMM GRANULOCYTES # BLD AUTO: 0.03 X10*3/UL (ref 0–0.7)
IMM GRANULOCYTES NFR BLD AUTO: 0.4 % (ref 0–0.9)
INR PPP: 0.9 (ref 0.9–1.1)
LACTATE SERPL-SCNC: 1.8 MMOL/L (ref 0.4–2)
LYMPHOCYTES # BLD AUTO: 2.16 X10*3/UL (ref 1.2–4.8)
LYMPHOCYTES NFR BLD AUTO: 30.3 %
MAGNESIUM SERPL-MCNC: 2.17 MG/DL (ref 1.6–2.4)
MCH RBC QN AUTO: 28.4 PG (ref 26–34)
MCHC RBC AUTO-ENTMCNC: 31.8 G/DL (ref 32–36)
MCV RBC AUTO: 90 FL (ref 80–100)
MONOCYTES # BLD AUTO: 0.53 X10*3/UL (ref 0.1–1)
MONOCYTES NFR BLD AUTO: 7.4 %
NEUTROPHILS # BLD AUTO: 4.23 X10*3/UL (ref 1.2–7.7)
NEUTROPHILS NFR BLD AUTO: 59.6 %
NRBC BLD-RTO: 0 /100 WBCS (ref 0–0)
P AXIS: 57 DEGREES
P OFFSET: 188 MS
P ONSET: 134 MS
PLATELET # BLD AUTO: 411 X10*3/UL (ref 150–450)
POTASSIUM SERPL-SCNC: 3.9 MMOL/L (ref 3.5–5.3)
PR INTERVAL: 156 MS
PROT SERPL-MCNC: 8 G/DL (ref 6.4–8.2)
PROTHROMBIN TIME: 10.1 SECONDS (ref 9.8–12.4)
Q ONSET: 212 MS
QRS COUNT: 20 BEATS
QRS DURATION: 86 MS
QT INTERVAL: 320 MS
QTC CALCULATION(BAZETT): 454 MS
QTC FREDERICIA: 404 MS
R AXIS: -58 DEGREES
RBC # BLD AUTO: 4.08 X10*6/UL (ref 4–5.2)
RSV RNA RESP QL NAA+PROBE: NOT DETECTED
SARS-COV-2 RNA RESP QL NAA+PROBE: NOT DETECTED
SODIUM SERPL-SCNC: 143 MMOL/L (ref 136–145)
T AXIS: 37 DEGREES
T OFFSET: 372 MS
VENTRICULAR RATE: 121 BPM
WBC # BLD AUTO: 7.1 X10*3/UL (ref 4.4–11.3)

## 2025-05-20 PROCEDURE — 84484 ASSAY OF TROPONIN QUANT: CPT | Performed by: EMERGENCY MEDICINE

## 2025-05-20 PROCEDURE — 83605 ASSAY OF LACTIC ACID: CPT | Performed by: EMERGENCY MEDICINE

## 2025-05-20 PROCEDURE — 83880 ASSAY OF NATRIURETIC PEPTIDE: CPT | Performed by: EMERGENCY MEDICINE

## 2025-05-20 PROCEDURE — 71045 X-RAY EXAM CHEST 1 VIEW: CPT

## 2025-05-20 PROCEDURE — 93005 ELECTROCARDIOGRAM TRACING: CPT

## 2025-05-20 PROCEDURE — 83735 ASSAY OF MAGNESIUM: CPT | Performed by: EMERGENCY MEDICINE

## 2025-05-20 PROCEDURE — 94640 AIRWAY INHALATION TREATMENT: CPT

## 2025-05-20 PROCEDURE — 96372 THER/PROPH/DIAG INJ SC/IM: CPT | Performed by: FAMILY MEDICINE

## 2025-05-20 PROCEDURE — 82947 ASSAY GLUCOSE BLOOD QUANT: CPT | Mod: 59

## 2025-05-20 PROCEDURE — 87637 SARSCOV2&INF A&B&RSV AMP PRB: CPT | Performed by: EMERGENCY MEDICINE

## 2025-05-20 PROCEDURE — G0378 HOSPITAL OBSERVATION PER HR: HCPCS

## 2025-05-20 PROCEDURE — 80053 COMPREHEN METABOLIC PANEL: CPT | Performed by: EMERGENCY MEDICINE

## 2025-05-20 PROCEDURE — 71275 CT ANGIOGRAPHY CHEST: CPT

## 2025-05-20 PROCEDURE — 2550000001 HC RX 255 CONTRASTS: Mod: JZ | Performed by: EMERGENCY MEDICINE

## 2025-05-20 PROCEDURE — 2500000002 HC RX 250 W HCPCS SELF ADMINISTERED DRUGS (ALT 637 FOR MEDICARE OP, ALT 636 FOR OP/ED): Performed by: FAMILY MEDICINE

## 2025-05-20 PROCEDURE — 96361 HYDRATE IV INFUSION ADD-ON: CPT

## 2025-05-20 PROCEDURE — 96374 THER/PROPH/DIAG INJ IV PUSH: CPT | Mod: 59

## 2025-05-20 PROCEDURE — 85025 COMPLETE CBC W/AUTO DIFF WBC: CPT | Performed by: EMERGENCY MEDICINE

## 2025-05-20 PROCEDURE — 71045 X-RAY EXAM CHEST 1 VIEW: CPT | Performed by: RADIOLOGY

## 2025-05-20 PROCEDURE — 99285 EMERGENCY DEPT VISIT HI MDM: CPT | Mod: 25 | Performed by: EMERGENCY MEDICINE

## 2025-05-20 PROCEDURE — 99222 1ST HOSP IP/OBS MODERATE 55: CPT | Performed by: FAMILY MEDICINE

## 2025-05-20 PROCEDURE — 2500000004 HC RX 250 GENERAL PHARMACY W/ HCPCS (ALT 636 FOR OP/ED): Performed by: FAMILY MEDICINE

## 2025-05-20 PROCEDURE — 36415 COLL VENOUS BLD VENIPUNCTURE: CPT | Performed by: EMERGENCY MEDICINE

## 2025-05-20 PROCEDURE — 2500000004 HC RX 250 GENERAL PHARMACY W/ HCPCS (ALT 636 FOR OP/ED): Mod: JZ | Performed by: EMERGENCY MEDICINE

## 2025-05-20 PROCEDURE — 2500000001 HC RX 250 WO HCPCS SELF ADMINISTERED DRUGS (ALT 637 FOR MEDICARE OP): Performed by: FAMILY MEDICINE

## 2025-05-20 PROCEDURE — 85610 PROTHROMBIN TIME: CPT | Performed by: EMERGENCY MEDICINE

## 2025-05-20 PROCEDURE — 87040 BLOOD CULTURE FOR BACTERIA: CPT | Mod: ELYLAB | Performed by: EMERGENCY MEDICINE

## 2025-05-20 RX ORDER — PANTOPRAZOLE SODIUM 40 MG/1
40 TABLET, DELAYED RELEASE ORAL DAILY
Status: DISCONTINUED | OUTPATIENT
Start: 2025-05-21 | End: 2025-05-21 | Stop reason: HOSPADM

## 2025-05-20 RX ORDER — AZITHROMYCIN 250 MG/1
500 TABLET, FILM COATED ORAL
Status: DISCONTINUED | OUTPATIENT
Start: 2025-05-20 | End: 2025-05-21 | Stop reason: HOSPADM

## 2025-05-20 RX ORDER — GUAIFENESIN 600 MG/1
600 TABLET, EXTENDED RELEASE ORAL 2 TIMES DAILY PRN
Status: DISCONTINUED | OUTPATIENT
Start: 2025-05-20 | End: 2025-05-21 | Stop reason: HOSPADM

## 2025-05-20 RX ORDER — AMLODIPINE BESYLATE 5 MG/1
5 TABLET ORAL DAILY
Status: DISCONTINUED | OUTPATIENT
Start: 2025-05-20 | End: 2025-05-21 | Stop reason: HOSPADM

## 2025-05-20 RX ORDER — BUPROPION HYDROCHLORIDE 100 MG/1
100 TABLET, EXTENDED RELEASE ORAL
Status: DISCONTINUED | OUTPATIENT
Start: 2025-05-21 | End: 2025-05-21 | Stop reason: HOSPADM

## 2025-05-20 RX ORDER — GABAPENTIN 300 MG/1
300 CAPSULE ORAL 3 TIMES DAILY
Status: DISCONTINUED | OUTPATIENT
Start: 2025-05-20 | End: 2025-05-21 | Stop reason: HOSPADM

## 2025-05-20 RX ORDER — BUSPIRONE HYDROCHLORIDE 5 MG/1
15 TABLET ORAL 3 TIMES DAILY
Status: DISCONTINUED | OUTPATIENT
Start: 2025-05-20 | End: 2025-05-21 | Stop reason: HOSPADM

## 2025-05-20 RX ORDER — ENOXAPARIN SODIUM 100 MG/ML
40 INJECTION SUBCUTANEOUS DAILY
Status: DISCONTINUED | OUTPATIENT
Start: 2025-05-20 | End: 2025-05-21 | Stop reason: HOSPADM

## 2025-05-20 RX ORDER — PREDNISONE 20 MG/1
40 TABLET ORAL DAILY
Status: DISCONTINUED | OUTPATIENT
Start: 2025-05-20 | End: 2025-05-21 | Stop reason: HOSPADM

## 2025-05-20 RX ORDER — TIZANIDINE 4 MG/1
2 TABLET ORAL EVERY 8 HOURS PRN
Status: DISCONTINUED | OUTPATIENT
Start: 2025-05-20 | End: 2025-05-21 | Stop reason: HOSPADM

## 2025-05-20 RX ORDER — ALPRAZOLAM 0.25 MG/1
0.25 TABLET ORAL 3 TIMES DAILY PRN
Status: DISCONTINUED | OUTPATIENT
Start: 2025-05-20 | End: 2025-05-21 | Stop reason: HOSPADM

## 2025-05-20 RX ORDER — SIMVASTATIN 10 MG/1
10 TABLET, FILM COATED ORAL NIGHTLY
Status: DISCONTINUED | OUTPATIENT
Start: 2025-05-20 | End: 2025-05-21 | Stop reason: HOSPADM

## 2025-05-20 RX ORDER — PRAMIPEXOLE DIHYDROCHLORIDE 0.25 MG/1
0.25 TABLET ORAL DAILY
Status: DISCONTINUED | OUTPATIENT
Start: 2025-05-20 | End: 2025-05-21 | Stop reason: HOSPADM

## 2025-05-20 RX ORDER — IPRATROPIUM BROMIDE AND ALBUTEROL SULFATE 2.5; .5 MG/3ML; MG/3ML
3 SOLUTION RESPIRATORY (INHALATION) EVERY 2 HOUR PRN
Status: DISCONTINUED | OUTPATIENT
Start: 2025-05-20 | End: 2025-05-21 | Stop reason: HOSPADM

## 2025-05-20 RX ORDER — MONTELUKAST SODIUM 10 MG/1
10 TABLET ORAL NIGHTLY
Status: DISCONTINUED | OUTPATIENT
Start: 2025-05-20 | End: 2025-05-21 | Stop reason: HOSPADM

## 2025-05-20 RX ORDER — MORPHINE SULFATE 4 MG/ML
4 INJECTION, SOLUTION INTRAMUSCULAR; INTRAVENOUS ONCE
Status: COMPLETED | OUTPATIENT
Start: 2025-05-20 | End: 2025-05-20

## 2025-05-20 RX ORDER — OXYCODONE AND ACETAMINOPHEN 7.5; 325 MG/1; MG/1
1 TABLET ORAL EVERY 6 HOURS PRN
Status: DISCONTINUED | OUTPATIENT
Start: 2025-05-20 | End: 2025-05-21 | Stop reason: HOSPADM

## 2025-05-20 RX ORDER — IPRATROPIUM BROMIDE AND ALBUTEROL SULFATE 2.5; .5 MG/3ML; MG/3ML
3 SOLUTION RESPIRATORY (INHALATION)
Status: DISCONTINUED | OUTPATIENT
Start: 2025-05-20 | End: 2025-05-20

## 2025-05-20 RX ADMIN — SIMVASTATIN 10 MG: 10 TABLET, FILM COATED ORAL at 20:52

## 2025-05-20 RX ADMIN — IOHEXOL 75 ML: 350 INJECTION, SOLUTION INTRAVENOUS at 14:17

## 2025-05-20 RX ADMIN — GABAPENTIN 300 MG: 300 CAPSULE ORAL at 20:52

## 2025-05-20 RX ADMIN — BUSPIRONE HYDROCHLORIDE 15 MG: 5 TABLET ORAL at 20:52

## 2025-05-20 RX ADMIN — TIZANIDINE 2 MG: 4 TABLET ORAL at 18:49

## 2025-05-20 RX ADMIN — PREDNISONE 40 MG: 20 TABLET ORAL at 17:29

## 2025-05-20 RX ADMIN — MORPHINE SULFATE 4 MG: 4 INJECTION, SOLUTION INTRAMUSCULAR; INTRAVENOUS at 15:14

## 2025-05-20 RX ADMIN — IPRATROPIUM BROMIDE AND ALBUTEROL SULFATE 3 ML: .5; 3 SOLUTION RESPIRATORY (INHALATION) at 19:38

## 2025-05-20 RX ADMIN — GUAIFENESIN 600 MG: 600 TABLET, EXTENDED RELEASE ORAL at 17:29

## 2025-05-20 RX ADMIN — OXYCODONE AND ACETAMINOPHEN 1 TABLET: 7.5; 325 TABLET ORAL at 18:48

## 2025-05-20 RX ADMIN — MONTELUKAST SODIUM 10 MG: 10 TABLET, FILM COATED ORAL at 20:52

## 2025-05-20 RX ADMIN — SODIUM CHLORIDE 1000 ML: 0.9 INJECTION, SOLUTION INTRAVENOUS at 15:14

## 2025-05-20 RX ADMIN — AMLODIPINE BESYLATE 5 MG: 5 TABLET ORAL at 18:48

## 2025-05-20 RX ADMIN — ENOXAPARIN SODIUM 40 MG: 40 INJECTION SUBCUTANEOUS at 17:29

## 2025-05-20 RX ADMIN — PRAMIPEXOLE DIHYDROCHLORIDE 0.25 MG: 0.25 TABLET ORAL at 18:48

## 2025-05-20 RX ADMIN — AZITHROMYCIN DIHYDRATE 500 MG: 250 TABLET, FILM COATED ORAL at 17:29

## 2025-05-20 RX ADMIN — ALPRAZOLAM 0.25 MG: 0.25 TABLET ORAL at 22:18

## 2025-05-20 SDOH — ECONOMIC STABILITY: FOOD INSECURITY: WITHIN THE PAST 12 MONTHS, YOU WORRIED THAT YOUR FOOD WOULD RUN OUT BEFORE YOU GOT THE MONEY TO BUY MORE.: NEVER TRUE

## 2025-05-20 SDOH — ECONOMIC STABILITY: INCOME INSECURITY: IN THE PAST 12 MONTHS HAS THE ELECTRIC, GAS, OIL, OR WATER COMPANY THREATENED TO SHUT OFF SERVICES IN YOUR HOME?: NO

## 2025-05-20 SDOH — SOCIAL STABILITY: SOCIAL INSECURITY
WITHIN THE LAST YEAR, HAVE YOU BEEN KICKED, HIT, SLAPPED, OR OTHERWISE PHYSICALLY HURT BY YOUR PARTNER OR EX-PARTNER?: NO

## 2025-05-20 SDOH — SOCIAL STABILITY: SOCIAL INSECURITY: HAVE YOU HAD THOUGHTS OF HARMING ANYONE ELSE?: NO

## 2025-05-20 SDOH — ECONOMIC STABILITY: HOUSING INSECURITY: AT ANY TIME IN THE PAST 12 MONTHS, WERE YOU HOMELESS OR LIVING IN A SHELTER (INCLUDING NOW)?: YES

## 2025-05-20 SDOH — ECONOMIC STABILITY: HOUSING INSECURITY: IN THE LAST 12 MONTHS, WAS THERE A TIME WHEN YOU WERE NOT ABLE TO PAY THE MORTGAGE OR RENT ON TIME?: NO

## 2025-05-20 SDOH — SOCIAL STABILITY: SOCIAL INSECURITY: WITHIN THE LAST YEAR, HAVE YOU BEEN AFRAID OF YOUR PARTNER OR EX-PARTNER?: NO

## 2025-05-20 SDOH — ECONOMIC STABILITY: FOOD INSECURITY: HOW HARD IS IT FOR YOU TO PAY FOR THE VERY BASICS LIKE FOOD, HOUSING, MEDICAL CARE, AND HEATING?: NOT VERY HARD

## 2025-05-20 SDOH — SOCIAL STABILITY: SOCIAL INSECURITY: DO YOU FEEL ANYONE HAS EXPLOITED OR TAKEN ADVANTAGE OF YOU FINANCIALLY OR OF YOUR PERSONAL PROPERTY?: NO

## 2025-05-20 SDOH — SOCIAL STABILITY: SOCIAL INSECURITY: WERE YOU ABLE TO COMPLETE ALL THE BEHAVIORAL HEALTH SCREENINGS?: YES

## 2025-05-20 SDOH — SOCIAL STABILITY: SOCIAL INSECURITY: WITHIN THE LAST YEAR, HAVE YOU BEEN HUMILIATED OR EMOTIONALLY ABUSED IN OTHER WAYS BY YOUR PARTNER OR EX-PARTNER?: NO

## 2025-05-20 SDOH — SOCIAL STABILITY: SOCIAL INSECURITY: DO YOU FEEL UNSAFE GOING BACK TO THE PLACE WHERE YOU ARE LIVING?: NO

## 2025-05-20 SDOH — SOCIAL STABILITY: SOCIAL INSECURITY
WITHIN THE LAST YEAR, HAVE YOU BEEN RAPED OR FORCED TO HAVE ANY KIND OF SEXUAL ACTIVITY BY YOUR PARTNER OR EX-PARTNER?: NO

## 2025-05-20 SDOH — ECONOMIC STABILITY: FOOD INSECURITY: WITHIN THE PAST 12 MONTHS, THE FOOD YOU BOUGHT JUST DIDN'T LAST AND YOU DIDN'T HAVE MONEY TO GET MORE.: NEVER TRUE

## 2025-05-20 SDOH — SOCIAL STABILITY: SOCIAL INSECURITY: ABUSE: ADULT

## 2025-05-20 SDOH — SOCIAL STABILITY: SOCIAL INSECURITY: ARE YOU OR HAVE YOU BEEN THREATENED OR ABUSED PHYSICALLY, EMOTIONALLY, OR SEXUALLY BY ANYONE?: NO

## 2025-05-20 SDOH — ECONOMIC STABILITY: HOUSING INSECURITY: IN THE PAST 12 MONTHS, HOW MANY TIMES HAVE YOU MOVED WHERE YOU WERE LIVING?: 1

## 2025-05-20 SDOH — ECONOMIC STABILITY: TRANSPORTATION INSECURITY: IN THE PAST 12 MONTHS, HAS LACK OF TRANSPORTATION KEPT YOU FROM MEDICAL APPOINTMENTS OR FROM GETTING MEDICATIONS?: NO

## 2025-05-20 SDOH — SOCIAL STABILITY: SOCIAL INSECURITY: ARE THERE ANY APPARENT SIGNS OF INJURIES/BEHAVIORS THAT COULD BE RELATED TO ABUSE/NEGLECT?: NO

## 2025-05-20 SDOH — SOCIAL STABILITY: SOCIAL INSECURITY: DOES ANYONE TRY TO KEEP YOU FROM HAVING/CONTACTING OTHER FRIENDS OR DOING THINGS OUTSIDE YOUR HOME?: NO

## 2025-05-20 SDOH — SOCIAL STABILITY: SOCIAL INSECURITY: HAS ANYONE EVER THREATENED TO HURT YOUR FAMILY OR YOUR PETS?: NO

## 2025-05-20 SDOH — SOCIAL STABILITY: SOCIAL INSECURITY: HAVE YOU HAD ANY THOUGHTS OF HARMING ANYONE ELSE?: NO

## 2025-05-20 ASSESSMENT — COGNITIVE AND FUNCTIONAL STATUS - GENERAL
MOVING TO AND FROM BED TO CHAIR: A LITTLE
DRESSING REGULAR UPPER BODY CLOTHING: A LITTLE
MOBILITY SCORE: 17
MOBILITY SCORE: 18
TOILETING: A LITTLE
PATIENT BASELINE BEDBOUND: NO
PERSONAL GROOMING: A LITTLE
HELP NEEDED FOR BATHING: A LITTLE
WALKING IN HOSPITAL ROOM: A LITTLE
STANDING UP FROM CHAIR USING ARMS: A LITTLE
TOILETING: A LITTLE
STANDING UP FROM CHAIR USING ARMS: A LITTLE
DRESSING REGULAR LOWER BODY CLOTHING: A LITTLE
MOVING TO AND FROM BED TO CHAIR: A LITTLE
MOVING FROM LYING ON BACK TO SITTING ON SIDE OF FLAT BED WITH BEDRAILS: A LITTLE
TURNING FROM BACK TO SIDE WHILE IN FLAT BAD: A LITTLE
HELP NEEDED FOR BATHING: A LITTLE
WALKING IN HOSPITAL ROOM: A LITTLE
DRESSING REGULAR LOWER BODY CLOTHING: A LITTLE
DAILY ACTIVITIY SCORE: 19
DAILY ACTIVITIY SCORE: 21
TURNING FROM BACK TO SIDE WHILE IN FLAT BAD: A LITTLE
CLIMB 3 TO 5 STEPS WITH RAILING: A LOT
CLIMB 3 TO 5 STEPS WITH RAILING: A LOT

## 2025-05-20 ASSESSMENT — PAIN SCALES - GENERAL
PAINLEVEL_OUTOF10: 9
PAINLEVEL_OUTOF10: 3
PAINLEVEL_OUTOF10: 0 - NO PAIN
PAINLEVEL_OUTOF10: 8
PAINLEVEL_OUTOF10: 6

## 2025-05-20 ASSESSMENT — LIFESTYLE VARIABLES
AUDIT-C TOTAL SCORE: 1
HOW OFTEN DO YOU HAVE A DRINK CONTAINING ALCOHOL: MONTHLY OR LESS
HOW MANY STANDARD DRINKS CONTAINING ALCOHOL DO YOU HAVE ON A TYPICAL DAY: 1 OR 2
SKIP TO QUESTIONS 9-10: 1
AUDIT-C TOTAL SCORE: 1
HOW OFTEN DO YOU HAVE 6 OR MORE DRINKS ON ONE OCCASION: NEVER

## 2025-05-20 ASSESSMENT — PAIN DESCRIPTION - LOCATION
LOCATION: BACK

## 2025-05-20 ASSESSMENT — ACTIVITIES OF DAILY LIVING (ADL)
HEARING - LEFT EAR: FUNCTIONAL
LACK_OF_TRANSPORTATION: NO
TOILETING: INDEPENDENT
LACK_OF_TRANSPORTATION: NO
GROOMING: INDEPENDENT
FEEDING YOURSELF: INDEPENDENT
DRESSING YOURSELF: INDEPENDENT
JUDGMENT_ADEQUATE_SAFELY_COMPLETE_DAILY_ACTIVITIES: NO
WALKS IN HOME: INDEPENDENT
ADEQUATE_TO_COMPLETE_ADL: NO
BATHING: INDEPENDENT
HEARING - RIGHT EAR: FUNCTIONAL
PATIENT'S MEMORY ADEQUATE TO SAFELY COMPLETE DAILY ACTIVITIES?: NO

## 2025-05-20 ASSESSMENT — PAIN - FUNCTIONAL ASSESSMENT
PAIN_FUNCTIONAL_ASSESSMENT: 0-10

## 2025-05-20 ASSESSMENT — PATIENT HEALTH QUESTIONNAIRE - PHQ9
1. LITTLE INTEREST OR PLEASURE IN DOING THINGS: NOT AT ALL
2. FEELING DOWN, DEPRESSED OR HOPELESS: NOT AT ALL
SUM OF ALL RESPONSES TO PHQ9 QUESTIONS 1 & 2: 0

## 2025-05-20 ASSESSMENT — PAIN DESCRIPTION - PAIN TYPE
TYPE: ACUTE PAIN

## 2025-05-20 ASSESSMENT — PAIN DESCRIPTION - PROGRESSION: CLINICAL_PROGRESSION: NOT CHANGED

## 2025-05-20 NOTE — H&P
History Of Present Illness  Alysa Braun is a 69 y.o. female who presents with cough and shortness of breath for the past month.  Patient also complains of lightheadedness.  Denies chest pain fever chills.  She fell at home when her cat ran in front of her and she lost her balance and hit her right side of the face.  Patient states she was treated with antibiotics for a bronchitis/pneumonia by her primary care doctor.  This made her productive cough a dry cough.      Past Medical History  She has a past medical history of Acute upper respiratory infection, unspecified, Acute upper respiratory infection, unspecified (05/11/2022), Contact with and (suspected) exposure to covid-19, Cough, unspecified, Discitis, unspecified, lumbar region, Elevated blood-pressure reading, without diagnosis of hypertension, Encounter for immunization, Enlarged lymph nodes, unspecified, Nicotine dependence, unspecified, uncomplicated (12/11/2018), Other chest pain, Other enthesopathies, not elsewhere classified, Pain in unspecified knee (06/09/2022), Pain, unspecified, Personal history of other diseases of the musculoskeletal system and connective tissue, Personal history of other diseases of the musculoskeletal system and connective tissue, Personal history of other diseases of the respiratory system, Personal history of other diseases of the respiratory system, Personal history of other diseases of the respiratory system, Personal history of other diseases of the respiratory system (03/31/2022), Personal history of other diseases of the respiratory system (11/16/2022), Personal history of other infectious and parasitic diseases, Personal history of other specified conditions, Personal history of other specified conditions, Rash and other nonspecific skin eruption, Smoker (04/11/2017), and Vitamin D deficiency, unspecified.    Surgical History  She has a past surgical history that includes Other surgical history (08/16/2021); Other  surgical history (08/11/2022); Other surgical history (01/24/2022); Other surgical history (01/24/2022); Other surgical history (01/24/2022); Other surgical history (01/24/2022); Other surgical history (01/24/2022); and Total knee arthroplasty (Right).     Social History  She reports that she quit smoking about 7 years ago. Her smoking use included cigarettes. She started smoking about 17 years ago. She has a 10 pack-year smoking history. She has never used smokeless tobacco. She reports that she does not drink alcohol and does not use drugs.    Family History  Noncontributory due to advanced age     Allergies  Patient has no known allergies.    Review of Systems  As in history of present illness, otherwise negative    Physical Exam  Alert oriented x 3 she has ecchymosis on the right upper face  Lungs clear to auscultation bilaterally  Heart regular rhythm  Abdomen soft nontender  Extremities no leg edema  CNS no focal deficit    Last Recorded Vitals  /80   Pulse (!) 104   Temp 36.6 °C (97.9 °F)   Resp 18   Wt 68 kg (150 lb)   SpO2 100%     Relevant Results  Results for orders placed or performed during the hospital encounter of 05/20/25 (from the past 24 hours)   CBC and Auto Differential   Result Value Ref Range    WBC 7.1 4.4 - 11.3 x10*3/uL    nRBC 0.0 0.0 - 0.0 /100 WBCs    RBC 4.08 4.00 - 5.20 x10*6/uL    Hemoglobin 11.6 (L) 12.0 - 16.0 g/dL    Hematocrit 36.5 36.0 - 46.0 %    MCV 90 80 - 100 fL    MCH 28.4 26.0 - 34.0 pg    MCHC 31.8 (L) 32.0 - 36.0 g/dL    RDW 16.5 (H) 11.5 - 14.5 %    Platelets 411 150 - 450 x10*3/uL    Neutrophils % 59.6 40.0 - 80.0 %    Immature Granulocytes %, Automated 0.4 0.0 - 0.9 %    Lymphocytes % 30.3 13.0 - 44.0 %    Monocytes % 7.4 2.0 - 10.0 %    Eosinophils % 1.5 0.0 - 6.0 %    Basophils % 0.8 0.0 - 2.0 %    Neutrophils Absolute 4.23 1.20 - 7.70 x10*3/uL    Immature Granulocytes Absolute, Automated 0.03 0.00 - 0.70 x10*3/uL    Lymphocytes Absolute 2.16 1.20 - 4.80  x10*3/uL    Monocytes Absolute 0.53 0.10 - 1.00 x10*3/uL    Eosinophils Absolute 0.11 0.00 - 0.70 x10*3/uL    Basophils Absolute 0.06 0.00 - 0.10 x10*3/uL   Comprehensive Metabolic Panel   Result Value Ref Range    Glucose 111 (H) 74 - 99 mg/dL    Sodium 143 136 - 145 mmol/L    Potassium 3.9 3.5 - 5.3 mmol/L    Chloride 108 (H) 98 - 107 mmol/L    Bicarbonate 24 21 - 32 mmol/L    Anion Gap 15 10 - 20 mmol/L    Urea Nitrogen 29 (H) 6 - 23 mg/dL    Creatinine 0.64 0.50 - 1.05 mg/dL    eGFR >90 >60 mL/min/1.73m*2    Calcium 9.4 8.6 - 10.3 mg/dL    Albumin 4.5 3.4 - 5.0 g/dL    Alkaline Phosphatase 107 33 - 136 U/L    Total Protein 8.0 6.4 - 8.2 g/dL    AST 19 9 - 39 U/L    Bilirubin, Total 0.2 0.0 - 1.2 mg/dL    ALT 19 7 - 45 U/L   Magnesium   Result Value Ref Range    Magnesium 2.17 1.60 - 2.40 mg/dL   B-Type Natriuretic Peptide   Result Value Ref Range    BNP 42 0 - 99 pg/mL   Lactate   Result Value Ref Range    Lactate 1.8 0.4 - 2.0 mmol/L   Protime-INR   Result Value Ref Range    Protime 10.1 9.8 - 12.4 seconds    INR 0.9 0.9 - 1.1   Troponin I, High Sensitivity, Initial   Result Value Ref Range    Troponin I, High Sensitivity 6 0 - 13 ng/L   ECG 12 lead   Result Value Ref Range    Ventricular Rate 121 BPM    Atrial Rate 121 BPM    TX Interval 156 ms    QRS Duration 86 ms    QT Interval 320 ms    QTC Calculation(Bazett) 454 ms    P Axis 57 degrees    R Axis -58 degrees    T Axis 37 degrees    QRS Count 20 beats    Q Onset 212 ms    P Onset 134 ms    P Offset 188 ms    T Offset 372 ms    QTC Fredericia 404 ms   Sars-CoV-2, Influenza A/B and RSV PCR   Result Value Ref Range    Coronavirus 2019, PCR Not Detected Not Detected    Flu A Result Not Detected Not Detected    Flu B Result Not Detected Not Detected    RSV PCR Not Detected Not Detected   ECG 12 lead   Result Value Ref Range    Ventricular Rate 111 BPM    Atrial Rate 111 BPM    TX Interval 154 ms    QRS Duration 82 ms    QT Interval 324 ms    QTC  Calculation(Bazett) 440 ms    P Axis 57 degrees    R Axis -51 degrees    T Axis 34 degrees    QRS Count 18 beats    Q Onset 212 ms    P Onset 135 ms    P Offset 187 ms    T Offset 374 ms    QTC Fredericia 397 ms   Troponin, High Sensitivity, 1 Hour   Result Value Ref Range    Troponin I, High Sensitivity 5 0 - 13 ng/L     ECG 12 lead  Result Date: 5/20/2025  Sinus tachycardia with Premature ventricular complexes or Fusion complexes Left anterior fascicular block Inferior infarct (cited on or before 20-JUN-2017) Anterolateral infarct (cited on or before 20-JUN-2017) Abnormal ECG When compared with ECG of 20-MAY-2025 13:36, Fusion complexes are now Present Premature ventricular complexes are now Present    CT angio chest for pulmonary embolism  Result Date: 5/20/2025  Interpreted By:  Jj Otoole, STUDY: CT ANGIO CHEST FOR PULMONARY EMBOLISM;  5/20/2025 2:20 pm   INDICATION: Signs/Symptoms:SOB, tachy.     COMPARISON: None.   ACCESSION NUMBER(S): [id number]   ORDERING CLINICIAN: DAWIT RIVERO   TECHNIQUE: Helical data acquisition of the chest was obtained with 75 mL Omnipaque 350. Images were reformatted in coronal and sagittal planes. Axial and coronal MIP images were created and reviewed.   FINDINGS: POTENTIAL LIMITATIONS OF THE STUDY: None   HEART AND VESSELS: No discrete filling defects within the main pulmonary artery or its branches.   Main pulmonary artery and its branches are normal in caliber.   The thoracic aorta is of normal course and caliber without vascular calcifications.   No coronary artery calcifications are seen.The study is not optimized for evaluation of coronary arteries.   The cardiac chambers are not enlarged.   No evidence of pericardial effusion.   MEDIASTINUM AND CELESTE, LOWER NECK AND AXILLA: The visualized thyroid gland is within normal limits.   No evidence of thoracic lymphadenopathy by CT criteria.   Diffuse thickening of the esophagus.   LUNGS AND AIRWAYS: The trachea and central  airways are patent. No endobronchial lesion.   Lungs are clear.   UPPER ABDOMEN: The visualized subdiaphragmatic structures demonstrate no remarkable findings.   CHEST WALL AND OSSEOUS STRUCTURES: There are no suspicious osseous lesions. Multilevel degenerative changes are present       1.  No pulmonary emboli or confluent airspace disease. 2. Diffuse thickening of the esophagus throughout.     MACRO: None   Signed by: Jj Otoole 5/20/2025 2:30 PM Dictation workstation:   [id number]    ECG 12 lead  Result Date: 5/20/2025  Sinus tachycardia Possible Left atrial enlargement Left axis deviation Inferior infarct (cited on or before 20-JUN-2017) Anterolateral infarct , age undetermined Abnormal ECG When compared with ECG of 23-MAR-2025 16:58, Significant changes have occurred See ED provider note for full interpretation and clinical correlation Confirmed by Lis Izaguirre ([id number]) on 5/20/2025 1:58:14 PM    XR chest 1 view  Result Date: 5/20/2025  Interpreted By:  Brendan Ibrahim, STUDY: XR CHEST 1 VIEW; 5/20/2025 1:31 pm   INDICATION: CLINICAL INFORMATION: Signs/Symptoms:Chest Pain.   COMPARISON: 03/23/2025   ACCESSION NUMBER(S): [id number]   ORDERING CLINICIAN: DAWIT RIVERO   TECHNIQUE: Portable chest one view.   FINDINGS: The cardiac size is indeterminate in view of the AP projection.  The aorta is tortuous. There is mild thoracic dextroscoliosis. No infiltrates or effusions are identified.       No acute pathologic findings are identified. There is no interval change when compared to the previous examination.   MACRO: none   Signed by: Brendan Ibrahim 5/20/2025 1:57 PM Dictation workstation:   [id number]      Assessment/Plan   Intractable cough with some shortness of breath for a month  Mild tachycardia  Mechanical fall  Hypertension, dyslipidemia, GERD, anxiety, depression    Plan:  The patient presents with cough and some shortness of breath for the past month.  She looks frail and has fallen recently  and landed on her right side of the face.  She looks anxious  Initial workup in the emergency room was unremarkable.  She was slightly tachycardic, did not improve with IV fluid bolus  She reports polyuria  Will admit to observation, check urinalysis  Gentle IV hydration overnight  I will order DuoNeb treatments, low-dose prednisone, Mucinex, and oral Zithromax  PT OT evaluation and treatment  DVT prophylaxis        Martín Wiley MD

## 2025-05-20 NOTE — ED PROVIDER NOTES
HPI   Chief Complaint   Patient presents with    Dizziness         History provided by:  Patient    Chief Complaint   Patient presents with    Dizziness       History of Present Illness:  Alysa Braun is a 69 y.o. female presents with cough and shortness of breath for the past couple of weeks.  Patient also complains of lightheadedness.  No chest pain.  No back or flank pain.  No abdominal pain.  No nausea, vomiting or diarrhea.  No hematemesis, hematochezia or melena.  No dysuria or hematuria.  No leg swelling.  Patient states she was treated with antibiotics for a bronchitis/pneumonia by her primary care doctor.  This made her productive cough though to a dry cough.      PMFSH:   As per [facility], otherwise nurses notes reviewed in EMR.    Past Medical History: Medical History[1]   Past Surgical History: Surgical History[2]   Family History: Family History[3]   Social History:  Social History[4]  Allergies: Allergies[5]  Current Outpatient Medications   Medication Instructions    acetaminophen (TylenoL) 325 mg tablet Every 6 hours    albuterol (ProAir HFA) 90 mcg/actuation inhaler 2 puffs, inhalation, 3 times daily PRN    amLODIPine (NORVASC) 5 mg, oral, Daily    amoxicillin (Amoxil) 500 mg capsule Take one as needed every 12 hours until gone    buPROPion SR (Wellbutrin SR) 100 mg 12 hr tablet 1 tablet, Daily (0630)    busPIRone (Buspar) 15 mg tablet 1 tablet, 3 times daily    gabapentin (NEURONTIN) 300 mg, oral, 3 times daily    ipratropium (Atrovent) 21 mcg (0.03 %) nasal spray 2 sprays, Each Nostril, 2 times daily    L. acidophilus/Bifid. animalis 15.5 billion cell capsule Take by mouth.    lidocaine (Lidoderm) 5 % patch Place 1 patch onto the skin daily. Leave on for 12 hours, and keep off for 12 hours.    loratadine (Claritin) 10 mg tablet Take by mouth.    magnesium oxide (Mag-Ox) 400 mg (241.3 mg magnesium) tablet 1 tablet, Daily    meloxicam (MOBIC) 15 mg, oral, Daily    montelukast (SINGULAIR) 10 mg, oral,  Nightly    mupirocin (Bactroban) 2 % ointment     naloxone (Narcan) 4 mg/0.1 mL nasal spray Administer into affected nostril(s).    oxyCODONE-acetaminophen (Percocet) 7.5-325 mg tablet 1 tablet, oral, Every 6 hours PRN    [START ON 6/5/2025] oxyCODONE-acetaminophen (Percocet) 7.5-325 mg tablet 1 tablet, oral, Every 6 hours PRN    pantoprazole (PROTONIX) 40 mg, oral, Daily, Do not crush, chew, or split.    pramipexole (MIRAPEX) 0.25 mg, oral, Daily    simvastatin (ZOCOR) 10 mg, oral, Nightly    tiZANidine (ZANAFLEX) 2 mg, oral, Every 8 hours PRN    Xanax 0.5 mg tablet 3 times daily PRN              Patient History   Medical History[6]  Surgical History[7]  Family History[8]  Social History[9]    Physical Exam   ED Triage Vitals   Temperature Heart Rate Respirations BP   05/20/25 1257 05/20/25 1257 05/20/25 1257 05/20/25 1257   36.6 °C (97.9 °F) (!) 135 20 165/82      Pulse Ox Temp src Heart Rate Source Patient Position   05/20/25 1257 -- 05/20/25 1306 05/20/25 1306   (!) 91 %  Monitor Sitting      BP Location FiO2 (%)     05/20/25 1306 --     Right arm        Physical Exam  Physical Exam:    ED Triage Vitals   Temperature Heart Rate Respirations BP   05/20/25 1257 05/20/25 1257 05/20/25 1257 05/20/25 1257   36.6 °C (97.9 °F) (!) 135 20 165/82      Pulse Ox Temp src Heart Rate Source Patient Position   05/20/25 1257 -- 05/20/25 1306 05/20/25 1306   (!) 91 %  Monitor Sitting      BP Location FiO2 (%)     05/20/25 1306 --     Right arm          Patient Vitals for the past 24 hrs:   BP Temp Pulse Resp SpO2 Height Weight   05/20/25 1606 160/80 -- (!) 104 18 100 % -- --   05/20/25 1600 -- -- (!) 104 -- 98 % -- --   05/20/25 1506 146/77 -- (!) 116 20 98 % -- --   05/20/25 1400 -- -- (!) 118 -- 97 % -- --   05/20/25 1336 138/83 -- (!) 120 (!) 22 97 % -- --   05/20/25 1318 -- -- -- -- -- 1.524 m (5') 68 kg (150 lb)   05/20/25 1306 (!) 140/100 -- (!) 124 20 96 % -- --   05/20/25 1257 165/82 36.6 °C (97.9 °F) (!) 135 20 (!) 91  % 1.524 m (5') 68 kg (150 lb)       Constitutional: Vital signs per nursing notes.  Well developed, well nourished.  Mild acute distress.    Psychiatric: alert and oriented to person, place, and time; no abnormalities of mood or affect; memory intact  Eyes: PERRL; conjunctivae and lids normal; EOMI  ENT: otoscopic exam of external canal and TM´s normal; nasal mucosa, turbinates, and septum normal; mouth, tongue, and pharynx normal; pharynx without edema, exudate, or injection  Respiratory: normal respiratory effort and excursion; no rales, rhonchi, or wheezes; equal air entry  Cardiovascular: Tachycardic, regular rate and rhythm; no murmurs, rubs or gallops; symmetric pulses; no edema; normal capillary refill; distal pulses present  Neurological: normal speech; CN II-XII grossly intact; normal motor and sensory function; no nystagmus; no pronator drift; normal finger to nose  GI: no masses, tenderness, rebound or guarding; no palpable, pulsatile mass; no organomegaly; no hernia; normal bowel sounds; (-) Riojas´s sign; (-) McBurney´s sign; (-) CVA tenderness  Lymphatic: no adenopathy of neck  Musculoskeletal: normal gait and station; normal digits and nails; no gross tendon or ligament injury; normal to palpation; normal strength/tone; neurovascular status intact; (-) Rhoda´s sign; (-) straight leg raise; no palpable cords; calf circumference equal bilaterally  Skin: normal to inspection; normal to palpation; no rash  GCS: 15      ED Course & MDM   Diagnoses as of 05/20/25 1632   Dizziness   Acute dyspnea   Tachycardia   Anemia, unspecified type                 No data recorded     Blackwell Coma Scale Score: 15 (05/20/25 1315 : Hui Sharma RN)                           Medical Decision Making  Medical Decision Making:    EKG: My interpretation of EKG is sinus tachycardia 120 bpm with left axis deviation nonspecific ST-T changes              My interpretation of second EKG is sinus tachycardia 111 bpm with left  inter fascicular block and nonspecific ST-T changes with occasional PVC    Labs:   Labs Reviewed   CBC WITH AUTO DIFFERENTIAL - Abnormal       Result Value    WBC 7.1      nRBC 0.0      RBC 4.08      Hemoglobin 11.6 (*)     Hematocrit 36.5      MCV 90      MCH 28.4      MCHC 31.8 (*)     RDW 16.5 (*)     Platelets 411      Neutrophils % 59.6      Immature Granulocytes %, Automated 0.4      Lymphocytes % 30.3      Monocytes % 7.4      Eosinophils % 1.5      Basophils % 0.8      Neutrophils Absolute 4.23      Immature Granulocytes Absolute, Automated 0.03      Lymphocytes Absolute 2.16      Monocytes Absolute 0.53      Eosinophils Absolute 0.11      Basophils Absolute 0.06     COMPREHENSIVE METABOLIC PANEL - Abnormal    Glucose 111 (*)     Sodium 143      Potassium 3.9      Chloride 108 (*)     Bicarbonate 24      Anion Gap 15      Urea Nitrogen 29 (*)     Creatinine 0.64      eGFR >90      Calcium 9.4      Albumin 4.5      Alkaline Phosphatase 107      Total Protein 8.0      AST 19      Bilirubin, Total 0.2      ALT 19     MAGNESIUM - Normal    Magnesium 2.17     B-TYPE NATRIURETIC PEPTIDE - Normal    BNP 42      Narrative:        <100 pg/mL - Heart failure unlikely  100-299 pg/mL - Intermediate probability of acute heart                  failure exacerbation. Correlate with clinical                  context and patient history.    >=300 pg/mL - Heart Failure likely. Correlate with clinical                  context and patient history.    BNP testing is performed using different testing methodology at [facility] than at other [facility] hospitals. Direct result comparisons should only be made within the same method.      LACTATE - Normal    Lactate 1.8      Narrative:     Venipuncture immediately after or during the administration of Metamizole may lead to falsely low results. Testing should be performed immediately prior to Metamizole dosing.   PROTIME-INR - Normal    Protime 10.1      INR 0.9     SERIAL  TROPONIN-INITIAL - Normal    Troponin I, High Sensitivity 6      Narrative:     Less than 99th percentile of normal range cutoff-  Female and children under 18 years old <14 ng/L; Male <21 ng/L: Negative  Repeat testing should be performed if clinically indicated.     Female and children under 18 years old 14-50 ng/L; Male 21-50 ng/L:  Consistent with possible cardiac damage and possible increased clinical   risk. Serial measurements may help to assess extent of myocardial damage.     >50 ng/L: Consistent with cardiac damage, increased clinical risk and  myocardial infarction. Serial measurements may help assess extent of   myocardial damage.      NOTE: Children less than 1 year old may have higher baseline troponin   levels and results should be interpreted in conjunction with the overall   clinical context.     NOTE: Troponin I testing is performed using a different   testing methodology at [facility] than at other   [facility]. Direct result comparisons should only   be made within the same method.   SERIAL TROPONIN, 1 HOUR - Normal    Troponin I, High Sensitivity 5      Narrative:     Less than 99th percentile of normal range cutoff-  Female and children under 18 years old <14 ng/L; Male <21 ng/L: Negative  Repeat testing should be performed if clinically indicated.     Female and children under 18 years old 14-50 ng/L; Male 21-50 ng/L:  Consistent with possible cardiac damage and possible increased clinical   risk. Serial measurements may help to assess extent of myocardial damage.     >50 ng/L: Consistent with cardiac damage, increased clinical risk and  myocardial infarction. Serial measurements may help assess extent of   myocardial damage.      NOTE: Children less than 1 year old may have higher baseline troponin   levels and results should be interpreted in conjunction with the overall   clinical context.     NOTE: Troponin I testing is performed using a different   testing methodology  at [facility] than at [facility]. Direct result comparisons should only   be made within the same method.   SARS-COV-2, INFLUENZA A/B AND RSV PCR - Normal    Coronavirus 2019, PCR Not Detected      Flu A Result Not Detected      Flu B Result Not Detected      RSV PCR Not Detected      Narrative:     This assay is an FDA-cleared, in vitro diagnostic nucleic acid amplification test for the qualitative detection and differentiation of SARS CoV-2/ Influenza A/B/ RSV from nasopharyngeal specimens collected from individuals with signs and symptoms of respiratory tract infections, and has been validated for use at . Negative results do not preclude COVID-19/ Influenza A/B/ RSV infections and should not be used as the sole basis for diagnosis, treatment, or other management decisions. Testing for SARS CoV-2 is recommended only for patients who meet current clinical and/or epidemiological criteria defined by federal, state, or local public health directives.   BLOOD CULTURE   BLOOD CULTURE   TROPONIN SERIES- (INITIAL, 1 HR)    Narrative:     The following orders were created for panel order Troponin I Series, High Sensitivity (0, 1 HR).  Procedure                               Abnormality         Status                     ---------                               -----------         ------                     Troponin I, High Sensiti...[[id number]]  Normal              Final result               Troponin, High Sensitivi...[[id number]]  Normal              Final result                 Please view results for these tests on the individual orders.       Diagnostic Imaging:   CT angio chest for pulmonary embolism   Final Result   1.  No pulmonary emboli or confluent airspace disease.   2. Diffuse thickening of the esophagus throughout.             MACRO:   None        Signed by: Jj Otoole 5/20/2025 2:30 PM   Dictation workstation:   [id number]      XR chest 1 view    Final Result   No acute pathologic findings are identified.   There is no interval change when compared to the previous examination.        MACRO:   none        Signed by: Brendan Ibrahim 5/20/2025 1:57 PM   Dictation workstation:   [id number]          ED Medication Administration:   Medications   iohexol (OMNIPaque) 350 mg iodine/mL solution 75 mL (75 mL intravenous Given 5/20/25 1417)   morphine injection 4 mg (4 mg intravenous Given 5/20/25 1514)   sodium chloride 0.9 % bolus 1,000 mL (1,000 mL intravenous New Bag 5/20/25 1514)       ED Course:     Alysa Braun is a 69 y.o. female presents with shortness of breath.    Differential Diagnoses Considered:  ACS, arrhythmia, electrolyte disorder, pneumonia, PE    Patient presents with shortness of breath.    Chest x-ray to evaluate for evidence of pneumonia/pneumothorax/CHF/COPD.     EKG/troponin to evaluate for evidence of arrhythmia/ACS.    Lab work to evaluate for evidence of anemia or significant electrolyte abnormality including hypokalemia, hyperkalemia, hyponatremia, hypernatremia, hyperglycemia or hypoglycemia.     RSV/Influenza/COVID-19 swabs to evaluate for evidence of respective infections.    Blood culture/lactic acid to evaluate for possibility of sepsis.    CTA chest to evaluate for aortic dissection/pulmonary embolus.        Diagnoses as of 05/20/25 1632   Dizziness   Acute dyspnea   Tachycardia   Anemia, unspecified type       Abnormal Labs Reviewed   CBC WITH AUTO DIFFERENTIAL - Abnormal; Notable for the following components:       Result Value    Hemoglobin 11.6 (*)     MCHC 31.8 (*)     RDW 16.5 (*)     All other components within normal limits   COMPREHENSIVE METABOLIC PANEL - Abnormal; Notable for the following components:    Glucose 111 (*)     Chloride 108 (*)     Urea Nitrogen 29 (*)     All other components within normal limits       /80 (05/20/25 1606)    Temp      Pulse (!) 104 (05/20/25 1606)   Resp 18 (05/20/25 1606)    SpO2 100  % (05/20/25 4545)          Diagnostic evaluation was completed.  COVID, influenza and RSV are negative.  Troponin is in the normal range.  Repeat troponin is also in the normal range.  Therefore I do not suspect acute coronary syndrome.  BNP is in the normal range.  Metabolic panel shows an elevated glucose of 111.  Sodium potassium in the normal range.  BUN is elevated at 29 with a normal creatinine.  Liver functions are in the normal range.  Lactate is in the normal range.  INR 0.9.  CBC shows normal white blood cell count with mild anemia with a hemoglobin of 11.6.  Platelets are in the normal range.  CT of the chest shows no PE or confluent airspace disease.  Diffuse thickening of the esophagus throughout.  Chest x-ray shows no acute pathologic findings.    Re-evaluation: Repeat evaluation at 3:55 PM shows the patient still has intermittent pain.  Vital signs are stable except she does remain tachycardic.    Patient was treated with IV normal saline and IV morphine.    Medications administered improved the patient's condition.    The exact etiology of the patient's symptoms is unclear at this time.  However, considering that she is still having intermittent pain that is persistently tachycardic, she will be hospitalized for further workup and evaluation.    The patient's condition requires ongoing treatment and evaluation necessitating hospital admission.  I have reviewed the patient's history, physical exam, and test information with the admitting physician,    Dr. Wiley               , who agrees to hospitalize the patient.     I discussed the results and plan for hospitalization with the patient and/or family/friend if present.  Questions were addressed.  Patient and/or family/friend expressed understanding.    Shared decision making made with patient, and/or family, who agrees with plan.          Diagnosis:   1. Dizziness    2. Acute dyspnea    3. Tachycardia    4. Anemia, unspecified type               Procedure  Procedures         [1]   Past Medical History:  Diagnosis Date    Acute upper respiratory infection, unspecified     Acute URI    Acute upper respiratory infection, unspecified 05/11/2022    URTI (acute upper respiratory infection)    Contact with and (suspected) exposure to covid-19     Suspected COVID-19 virus infection    Cough, unspecified     Cough in adult    Discitis, unspecified, lumbar region     Septic discitis of lumbar region    Elevated blood-pressure reading, without diagnosis of hypertension     Elevated blood pressure reading    Encounter for immunization     Encounter for immunization    Enlarged lymph nodes, unspecified     Swollen gland    Nicotine dependence, unspecified, uncomplicated 12/11/2018    Other chest pain     Chest pressure    Other enthesopathies, not elsewhere classified     Tendinitis of wrist    Pain in unspecified knee 06/09/2022    Knee pain, acute    Pain, unspecified     Generalized body aches    Personal history of other diseases of the musculoskeletal system and connective tissue     History of arthritis    Personal history of other diseases of the musculoskeletal system and connective tissue     History of spinal stenosis    Personal history of other diseases of the respiratory system     History of allergic rhinitis    Personal history of other diseases of the respiratory system     History of bronchitis    Personal history of other diseases of the respiratory system     History of acute sinusitis    Personal history of other diseases of the respiratory system 03/31/2022    History of acute pharyngitis    Personal history of other diseases of the respiratory system 11/16/2022    History of acute sinusitis    Personal history of other infectious and parasitic diseases     History of viral infection    Personal history of other specified conditions     History of fatigue    Personal history of other specified conditions     History of urinary frequency     Rash and other nonspecific skin eruption     Rash    Smoker 2017    Vitamin D deficiency, unspecified     Vitamin D insufficiency   [2]   Past Surgical History:  Procedure Laterality Date    OTHER SURGICAL HISTORY  2021    Lumbar laminectomy    OTHER SURGICAL HISTORY  2022    Knee surgery    OTHER SURGICAL HISTORY  2022    Spinal surgery    OTHER SURGICAL HISTORY  2022    Arthrodesis    OTHER SURGICAL HISTORY  2022    Colonoscopy    OTHER SURGICAL HISTORY  2022    Lumbar discectomy    OTHER SURGICAL HISTORY  2022    Lumbar vertebral fusion    TOTAL KNEE ARTHROPLASTY Right    [3]   Family History  Problem Relation Name Age of Onset    Depression Mother      Diabetes Mother      Fibromyalgia Mother      Other (High serum cholesterol sulfate) Father     [4]   Social History  Tobacco Use    Smoking status: Former     Current packs/day: 0.00     Average packs/day: 1 pack/day for 10.0 years (10.0 ttl pk-yrs)     Types: Cigarettes     Start date:      Quit date:      Years since quittin.3    Smokeless tobacco: Never   Vaping Use    Vaping status: Never Used   Substance Use Topics    Alcohol use: Never     Alcohol/week: 2.0 standard drinks of alcohol     Types: 2 Glasses of wine per week    Drug use: Never   [5] No Known Allergies  [6]   Past Medical History:  Diagnosis Date    Acute upper respiratory infection, unspecified     Acute URI    Acute upper respiratory infection, unspecified 2022    URTI (acute upper respiratory infection)    Contact with and (suspected) exposure to covid-19     Suspected COVID-19 virus infection    Cough, unspecified     Cough in adult    Discitis, unspecified, lumbar region     Septic discitis of lumbar region    Elevated blood-pressure reading, without diagnosis of hypertension     Elevated blood pressure reading    Encounter for immunization     Encounter for immunization    Enlarged lymph nodes, unspecified     Swollen  gland    Nicotine dependence, unspecified, uncomplicated 12/11/2018    Other chest pain     Chest pressure    Other enthesopathies, not elsewhere classified     Tendinitis of wrist    Pain in unspecified knee 06/09/2022    Knee pain, acute    Pain, unspecified     Generalized body aches    Personal history of other diseases of the musculoskeletal system and connective tissue     History of arthritis    Personal history of other diseases of the musculoskeletal system and connective tissue     History of spinal stenosis    Personal history of other diseases of the respiratory system     History of allergic rhinitis    Personal history of other diseases of the respiratory system     History of bronchitis    Personal history of other diseases of the respiratory system     History of acute sinusitis    Personal history of other diseases of the respiratory system 03/31/2022    History of acute pharyngitis    Personal history of other diseases of the respiratory system 11/16/2022    History of acute sinusitis    Personal history of other infectious and parasitic diseases     History of viral infection    Personal history of other specified conditions     History of fatigue    Personal history of other specified conditions     History of urinary frequency    Rash and other nonspecific skin eruption     Rash    Smoker 04/11/2017    Vitamin D deficiency, unspecified     Vitamin D insufficiency   [7]   Past Surgical History:  Procedure Laterality Date    OTHER SURGICAL HISTORY  08/16/2021    Lumbar laminectomy    OTHER SURGICAL HISTORY  08/11/2022    Knee surgery    OTHER SURGICAL HISTORY  01/24/2022    Spinal surgery    OTHER SURGICAL HISTORY  01/24/2022    Arthrodesis    OTHER SURGICAL HISTORY  01/24/2022    Colonoscopy    OTHER SURGICAL HISTORY  01/24/2022    Lumbar discectomy    OTHER SURGICAL HISTORY  01/24/2022    Lumbar vertebral fusion    TOTAL KNEE ARTHROPLASTY Right    [8]   Family History  Problem Relation Name Age  of Onset    Depression Mother      Diabetes Mother      Fibromyalgia Mother      Other (High serum cholesterol sulfate) Father     [9]   Social History  Tobacco Use    Smoking status: Former     Current packs/day: 0.00     Average packs/day: 1 pack/day for 10.0 years (10.0 ttl pk-yrs)     Types: Cigarettes     Start date:      Quit date:      Years since quittin.3    Smokeless tobacco: Never   Vaping Use    Vaping status: Never Used   Substance Use Topics    Alcohol use: Never     Alcohol/week: 2.0 standard drinks of alcohol     Types: 2 Glasses of wine per week    Drug use: Never        Jez PIERCE MD  [phone]

## 2025-05-21 VITALS
TEMPERATURE: 98.1 F | HEART RATE: 92 BPM | BODY MASS INDEX: 32.42 KG/M2 | OXYGEN SATURATION: 93 % | RESPIRATION RATE: 18 BRPM | HEIGHT: 60 IN | WEIGHT: 165.12 LBS | DIASTOLIC BLOOD PRESSURE: 85 MMHG | SYSTOLIC BLOOD PRESSURE: 163 MMHG

## 2025-05-21 LAB
ATRIAL RATE: 111 BPM
P AXIS: 57 DEGREES
P OFFSET: 187 MS
P ONSET: 135 MS
PR INTERVAL: 154 MS
Q ONSET: 212 MS
QRS COUNT: 18 BEATS
QRS DURATION: 82 MS
QT INTERVAL: 324 MS
QTC CALCULATION(BAZETT): 440 MS
QTC FREDERICIA: 397 MS
R AXIS: -51 DEGREES
T AXIS: 34 DEGREES
T OFFSET: 374 MS
VENTRICULAR RATE: 111 BPM

## 2025-05-21 PROCEDURE — 2500000001 HC RX 250 WO HCPCS SELF ADMINISTERED DRUGS (ALT 637 FOR MEDICARE OP): Performed by: FAMILY MEDICINE

## 2025-05-21 PROCEDURE — 97165 OT EVAL LOW COMPLEX 30 MIN: CPT | Mod: GO

## 2025-05-21 PROCEDURE — 2500000004 HC RX 250 GENERAL PHARMACY W/ HCPCS (ALT 636 FOR OP/ED): Mod: JZ | Performed by: FAMILY MEDICINE

## 2025-05-21 PROCEDURE — 97161 PT EVAL LOW COMPLEX 20 MIN: CPT | Mod: GP | Performed by: PHYSICAL THERAPIST

## 2025-05-21 PROCEDURE — 99239 HOSP IP/OBS DSCHRG MGMT >30: CPT | Performed by: HOSPITALIST

## 2025-05-21 PROCEDURE — 96372 THER/PROPH/DIAG INJ SC/IM: CPT | Performed by: FAMILY MEDICINE

## 2025-05-21 PROCEDURE — 2500000002 HC RX 250 W HCPCS SELF ADMINISTERED DRUGS (ALT 637 FOR MEDICARE OP, ALT 636 FOR OP/ED): Performed by: FAMILY MEDICINE

## 2025-05-21 PROCEDURE — G0378 HOSPITAL OBSERVATION PER HR: HCPCS

## 2025-05-21 RX ORDER — METHYLPREDNISOLONE 4 MG/1
TABLET ORAL
Qty: 21 TABLET | Refills: 0 | Status: SHIPPED | OUTPATIENT
Start: 2025-05-21 | End: 2025-05-27

## 2025-05-21 RX ORDER — AZITHROMYCIN 250 MG/1
TABLET, FILM COATED ORAL
Qty: 6 TABLET | Refills: 0 | Status: SHIPPED | OUTPATIENT
Start: 2025-05-22 | End: 2025-05-26

## 2025-05-21 RX ORDER — GUAIFENESIN 600 MG/1
600 TABLET, EXTENDED RELEASE ORAL 2 TIMES DAILY
Qty: 14 TABLET | Refills: 0 | Status: SHIPPED | OUTPATIENT
Start: 2025-05-21

## 2025-05-21 RX ADMIN — PRAMIPEXOLE DIHYDROCHLORIDE 0.25 MG: 0.25 TABLET ORAL at 08:06

## 2025-05-21 RX ADMIN — BUSPIRONE HYDROCHLORIDE 15 MG: 5 TABLET ORAL at 08:05

## 2025-05-21 RX ADMIN — ALPRAZOLAM 0.25 MG: 0.25 TABLET ORAL at 11:38

## 2025-05-21 RX ADMIN — OXYCODONE AND ACETAMINOPHEN 1 TABLET: 7.5; 325 TABLET ORAL at 01:06

## 2025-05-21 RX ADMIN — PREDNISONE 40 MG: 20 TABLET ORAL at 08:05

## 2025-05-21 RX ADMIN — GABAPENTIN 300 MG: 300 CAPSULE ORAL at 08:06

## 2025-05-21 RX ADMIN — ENOXAPARIN SODIUM 40 MG: 40 INJECTION SUBCUTANEOUS at 08:06

## 2025-05-21 RX ADMIN — BUPROPION HYDROCHLORIDE 100 MG: 100 TABLET, EXTENDED RELEASE ORAL at 06:29

## 2025-05-21 RX ADMIN — AZITHROMYCIN DIHYDRATE 500 MG: 250 TABLET, FILM COATED ORAL at 08:05

## 2025-05-21 RX ADMIN — PANTOPRAZOLE SODIUM 40 MG: 40 TABLET, DELAYED RELEASE ORAL at 06:29

## 2025-05-21 RX ADMIN — OXYCODONE AND ACETAMINOPHEN 1 TABLET: 7.5; 325 TABLET ORAL at 07:50

## 2025-05-21 RX ADMIN — AMLODIPINE BESYLATE 5 MG: 5 TABLET ORAL at 08:06

## 2025-05-21 ASSESSMENT — PAIN DESCRIPTION - LOCATION: LOCATION: BACK

## 2025-05-21 ASSESSMENT — COGNITIVE AND FUNCTIONAL STATUS - GENERAL
DAILY ACTIVITIY SCORE: 24
MOBILITY SCORE: 22
CLIMB 3 TO 5 STEPS WITH RAILING: A LITTLE
WALKING IN HOSPITAL ROOM: A LITTLE

## 2025-05-21 ASSESSMENT — PAIN SCALES - GENERAL
PAINLEVEL_OUTOF10: 0 - NO PAIN
PAINLEVEL_OUTOF10: 0 - NO PAIN
PAINLEVEL_OUTOF10: 9

## 2025-05-21 ASSESSMENT — PAIN - FUNCTIONAL ASSESSMENT
PAIN_FUNCTIONAL_ASSESSMENT: 0-10
PAIN_FUNCTIONAL_ASSESSMENT: 0-10

## 2025-05-21 ASSESSMENT — ACTIVITIES OF DAILY LIVING (ADL): BATHING_ASSISTANCE: STAND BY

## 2025-05-21 ASSESSMENT — PAIN DESCRIPTION - ORIENTATION: ORIENTATION: LOWER

## 2025-05-21 NOTE — DISCHARGE SUMMARY
Discharge Diagnosis  Acute bronchitis with acute bronchospasm  Dizziness       Issues Requiring Follow-Up  Follow-up with primary physician 1 to 2-week    Discharge Meds     Medication List      START taking these medications     albuterol 90 mcg/actuation inhaler; Commonly known as: ProAir HFA;   Inhale 2 puffs 3 times a day as needed for wheezing or shortness of   breath.   azithromycin 250 mg tablet; Commonly known as: Zithromax; Take 2 tabs   (500 mg) by mouth today, then take 1 tab daily for 4 days. Do not fill   before May 22, 2025.; Start taking on: May 22, 2025   guaiFENesin 600 mg 12 hr tablet; Commonly known as: Mucinex; Take 1   tablet (600 mg) by mouth 2 times a day. Do not crush, chew, or split.   methylPREDNISolone 4 mg tablets; Commonly known as: Medrol Dospak; Take   as directed on package.     CHANGE how you take these medications     gabapentin 300 mg capsule; Commonly known as: Neurontin; Take 1 capsule   (300 mg) by mouth 3 times a day.; What changed: when to take this   lidocaine 5 % patch; Commonly known as: Lidoderm; Place 1 patch onto the   skin daily. Leave on for 12 hours, and keep off for 12 hours.   pramipexole 0.25 mg tablet; Commonly known as: Mirapex; TAKE 1 TABLET   (0.25 MG) BY MOUTH ONCE DAILY.; What changed: when to take this     CONTINUE taking these medications     amLODIPine 5 mg tablet; Commonly known as: Norvasc; Take 1 tablet (5 mg)   by mouth once daily.   buPROPion  mg 12 hr tablet; Commonly known as: Wellbutrin SR   busPIRone 15 mg tablet; Commonly known as: Buspar   Claritin 10 mg tablet; Generic drug: loratadine   ipratropium 21 mcg (0.03 %) nasal spray; Commonly known as: Atrovent;   Administer 2 sprays into each nostril 2 times a day.   L. acidophilus/Bifid. animalis 15.5 billion cell capsule   magnesium oxide 400 mg (241.3 mg elemental) tablet; Commonly known as:   Mag-Ox   meloxicam 15 mg tablet; Commonly known as: Mobic; Take 1 tablet (15 mg)   by mouth once  daily.   Narcan 4 mg/0.1 mL nasal spray; Generic drug: naloxone   * oxyCODONE-acetaminophen 7.5-325 mg tablet; Commonly known as:   Percocet; Take 1 tablet by mouth every 6 hours if needed for severe pain   (7 - 10). Do not fill before May 6, 2025.   * oxyCODONE-acetaminophen 7.5-325 mg tablet; Commonly known as:   Percocet; Take 1 tablet by mouth every 6 hours if needed for severe pain   (7 - 10). Do not fill before June 5, 2025.; Start taking on: June 5, 2025   pantoprazole 40 mg EC tablet; Commonly known as: ProtoNix; Take 1 tablet   (40 mg) by mouth once daily. Do not crush, chew, or split.   simvastatin 10 mg tablet; Commonly known as: Zocor; Take 1 tablet (10   mg) by mouth once daily at bedtime.   tiZANidine 2 mg tablet; Commonly known as: Zanaflex; Take 1 tablet (2   mg) by mouth every 8 hours if needed for muscle spasms.   TylenoL 325 mg tablet; Generic drug: acetaminophen   Xanax 0.5 mg tablet; Generic drug: ALPRAZolam  * This list has 2 medication(s) that are the same as other medications   prescribed for you. Read the directions carefully, and ask your doctor or   other care provider to review them with you.     STOP taking these medications     doxycycline 100 mg capsule; Commonly known as: Vibramycin   montelukast 10 mg tablet; Commonly known as: Singulair       Test Results Pending At Discharge  Pending Labs       Order Current Status    Blood Culture Preliminary result    Blood Culture Preliminary result            Hospital Course     Alysa Braun is a 69 y.o. female who presents with cough and shortness of breath for the past month.  Patient also complains of lightheadedness.  Denies chest pain fever chills.  She fell at home when her cat ran in front of her and she lost her balance and hit her right side of the face.  Patient states she was treated with antibiotics for a bronchitis/pneumonia by her primary care doctor.  This made her productive cough a dry cough.     Patient was admitted to the  hospital for observation.  She was given breathing treatment albuterol Atrovent nebulizer.  She was started on azithromycin 500 mg daily.  She was given Mucinex p.o. twice daily.  She was given prednisone p.o. daily.  Her symptoms improved.  Patient discharged home on a course of azithromycin 5 days and Mucinex p.o. twice daily and Medrol Dosepak.  Follow-up with primary physician 1 to 2 weeks.    Pertinent Physical Exam At Time of Discharge  Physical Exam  HENT:      Head: Normocephalic and atraumatic.      Mouth/Throat:      Mouth: Mucous membranes are moist.   Eyes:      Extraocular Movements: Extraocular movements intact.      Pupils: Pupils are equal, round, and reactive to light.   Cardiovascular:      Rate and Rhythm: Normal rate and regular rhythm.      Pulses: Normal pulses.      Heart sounds: Normal heart sounds.   Pulmonary:      Breath sounds: Wheezing present.   Abdominal:      General: Abdomen is flat.      Palpations: Abdomen is soft.   Musculoskeletal:         General: Normal range of motion.      Cervical back: Normal range of motion.   Skin:     General: Skin is warm.   Neurological:      General: No focal deficit present.      Mental Status: She is alert and oriented to person, place, and time.         Outpatient Follow-Up  No future appointments.    Discharge time more than 30-minute  Lynn Burnett MD

## 2025-05-21 NOTE — CARE PLAN
The patient's goals for the shift include      The clinical goals for the shift include Patiet to express improvement in cough and remain free of falls throughout the shift.    Over the shift, the patient will continue to progress toward her goals.

## 2025-05-21 NOTE — PROGRESS NOTES
Occupational Therapy    Evaluation    Patient Name: Alysa Braun  MRN: [id number]  : 1955  Today's Date: 25  Time Calculation  Start Time: 943  Stop Time: 953  Time Calculation (min): 10 min     903/903-A    Assessment:  OT Assessment: pt completing ADLS and functional mobility at indep to supervised level, no additional OT indicated at this time  Prognosis: Good  Barriers to Discharge Home: No anticipated barriers  End of Session Communication: Care Coordinator  End of Session Patient Position: Alarm on, Up in chair  Prognosis: Good    Plan:  No Skilled OT: No acute OT goals identified  OT Frequency: OT eval only  OT Discharge Recommendations: No OT needed after discharge, No further acute OT  OT Recommended Transfer Status: Independent  OT - OK to Discharge: Yes     Subjective     Current Problem:  1. Dizziness        2. Acute dyspnea        3. Tachycardia        4. Anemia, unspecified type        5. Acute bronchitis, unspecified organism  azithromycin (Zithromax) 250 mg tablet    guaiFENesin (Mucinex) 600 mg 12 hr tablet    methylPREDNISolone (Medrol Dospak) 4 mg tablets        Medical History[1]  Surgical History[2]    General:   OT Received On: 25  General  Reason for Referral: ADL impairment  Referred By: PT/OT 25 Inez  Past Medical History Relevant to Rehab: HTN, HLD, Depression, Anxiety Anemia, CAD, GERD, RLS, Panic Attack, neurogenic claudication, lumbar fusion, lumbosacral spondylosis, PTSD, dysphonia  Co-Treatment: PT  Co-Treatment Reason: to maximize patient/staff safety  Patient Position Received: Bed, 3 rail up, Alarm on  General Comment: To ED 25 with cough and SOB. Also c/o lightheadedness. Fell when cat ran in front of her with LOB; Flu A/B (-); C19 (-); RSV (-); CTA  (-) PE    Precautions:  Medical Precautions: Fall precautions           Pain:  Pain Assessment  Pain Assessment: 0-10  0-10 (Numeric) Pain Score: 0 - No pain  Objective     Cognition:  Overall  Cognitive Status: Within Functional Limits             Home Living:  Home Living Comments: Per patient report resides alone in manufactured home 5 steps with handrail to enter. Walk in shower with seat and grab bar. Raised toilet, owns ww and cane.    Prior Function:  Prior Function Comments: Per patient report independent in mobility, ADLs and IADLs without assistive device. Denies any other falls. Does not drive.           Activities of Daily Living:   Eating Assistance: Independent  Grooming Assistance: Independent  Bathing Assistance: Stand by  UE Dressing Assistance: Independent  LE Dressing Assistance: Independent  Toileting Assistance with Device: Independent  Functional Assistance:  (pt ambulated with supervision with [facility] 20')                         Activity Tolerance:  Endurance:  (good)           Bed Mobility/Transfers: Bed Mobility  Bed Mobility:  (Supine <> sit modified independent)  Transfers  Transfer:  (Sit > stand at bed level modified independent. Denies any lightheadedness.)                Balance:  Static Sitting: good  Dynamic Sitting: good  Static Standing: fair +  Dynamic Standing: fair +         Vision:Vision - Basic Assessment  Current Vision: No visual deficits        Sensation:  Light Touch: No apparent deficits    Strength:  Strength Comments: B UE 4/5 throughout            Extremities: RUE   RUE : Within Functional Limits and LUE   LUE: Within Functional Limits    Outcome Measures: [facility] Daily Activity  Putting on and taking off regular lower body clothing: None  Bathing (including washing, rinsing, drying): None  Putting on and taking off regular upper body clothing: None  Toileting, which includes using toilet, bedpan or urinal: None  Taking care of personal grooming such as brushing teeth: None  Eating Meals: None  Daily Activity - Total Score: 24             [1]   Past Medical History:  Diagnosis Date    Acute upper respiratory infection, unspecified     Acute URI    Acute upper  respiratory infection, unspecified 05/11/2022    URTI (acute upper respiratory infection)    Contact with and (suspected) exposure to covid-19     Suspected COVID-19 virus infection    Cough, unspecified     Cough in adult    Discitis, unspecified, lumbar region     Septic discitis of lumbar region    Elevated blood-pressure reading, without diagnosis of hypertension     Elevated blood pressure reading    Encounter for immunization     Encounter for immunization    Enlarged lymph nodes, unspecified     Swollen gland    Nicotine dependence, unspecified, uncomplicated 12/11/2018    Other chest pain     Chest pressure    Other enthesopathies, not elsewhere classified     Tendinitis of wrist    Pain in unspecified knee 06/09/2022    Knee pain, acute    Pain, unspecified     Generalized body aches    Personal history of other diseases of the musculoskeletal system and connective tissue     History of arthritis    Personal history of other diseases of the musculoskeletal system and connective tissue     History of spinal stenosis    Personal history of other diseases of the respiratory system     History of allergic rhinitis    Personal history of other diseases of the respiratory system     History of bronchitis    Personal history of other diseases of the respiratory system     History of acute sinusitis    Personal history of other diseases of the respiratory system 03/31/2022    History of acute pharyngitis    Personal history of other diseases of the respiratory system 11/16/2022    History of acute sinusitis    Personal history of other infectious and parasitic diseases     History of viral infection    Personal history of other specified conditions     History of fatigue    Personal history of other specified conditions     History of urinary frequency    Rash and other nonspecific skin eruption     Rash    Smoker 04/11/2017    Vitamin D deficiency, unspecified     Vitamin D insufficiency   [2]   Past Surgical  History:  Procedure Laterality Date    OTHER SURGICAL HISTORY  08/16/2021    Lumbar laminectomy    OTHER SURGICAL HISTORY  08/11/2022    Knee surgery    OTHER SURGICAL HISTORY  01/24/2022    Spinal surgery    OTHER SURGICAL HISTORY  01/24/2022    Arthrodesis    OTHER SURGICAL HISTORY  01/24/2022    Colonoscopy    OTHER SURGICAL HISTORY  01/24/2022    Lumbar discectomy    OTHER SURGICAL HISTORY  01/24/2022    Lumbar vertebral fusion    TOTAL KNEE ARTHROPLASTY Right

## 2025-05-21 NOTE — PROGRESS NOTES
Physical Therapy    Physical Therapy    Physical Therapy Evaluation    Patient Name: Alysa Braun  MRN: [id number]  Today's Date: 5/21/2025   Time Calculation  Start Time: 0944  Stop Time: 0955  Time Calculation (min): 11 min  903/903-A    Assessment/Plan   PT Assessment  Barriers to Discharge Home:  (Lives alone, steps to enter)  Evaluation/Treatment Tolerance: Patient tolerated treatment well  End of Session Communication: Care Coordinator  Assessment Comment: Patient will benefit from use of cane to promote greater stability. Patient familiar with use due to previous use post TKA and able to demosntrate good sequencing. No further PT at this point  End of Session Patient Position: Alarm on, Up in chair  IP OR SWING BED PT PLAN  Inpatient or Swing Bed: Inpatient  PT Plan  PT Plan: PT Eval only  PT Eval Only Reason: Only single session needed  PT Discharge Recommendations: No PT needed after discharge  PT Recommended Transfer Status: Stand by assist  PT - OK to Discharge:  (once deeemed medically appropriate)    Subjective     General Visit Information:  General  Reason for Referral: Impaired mobilitly  Referred By: PT/OT 5/2/0/25 Inez  Past Medical History Relevant to Rehab: HTN, HLD, Depression, Anxiety Anemia, CAD, GERD, RLS, Panic Attack, neurognic claudicaton, lumbar fusion, lumbosacral spondylosis, PTSD, dysphonia,  Co-Treatment: OT  Co-Treatment Reason: to maximize patient/staff safety  Patient Position Received: Bed, 3 rail up, Alarm on  General Comment: To ED 5/20/25 with cough and SOB. Also c/o lightheadedness. Fell when cat ran in front of her with LOB; Flu A/B (-); C19 (-); RSV (-); CTA 5/2/0/25 (-) PE    Home Living:  Home Living  Home Living Comments: Per patient report resides alone in manufactured home 5 stesp with handrail to enter. Walk in shower with seat and grab bar. Raised toilet Owns ww and cane.    Prior Level of Function:  Prior Function Per Pt/Caregiver Report  Prior Function Comments:  Per patient report independent in mobilty, ADLs and IADLs without assistive device. Patient had TKA but has completed her rehab. Denies any other falls. Does not drive.    Precautions:  Precautions  Medical Precautions: Fall precautions       Objective     Pain:  Pain Assessment  0-10 (Numeric) Pain Score: 0 - No pain    Cognition:  Cognition  Overall Cognitive Status: Within Functional Limits  Following Commands:  (Follows simple commands 80% of time)    General Assessments:  General Observation  General Observation: Patient lying in bed. Agreeable to PT/OT.   Activity Tolerance  Endurance:  (Good)                 Static Sitting Balance: Good  Dynamic Sitting Balance: Fair +  Static Standing Balance Fair +  Dynamic Standing Balance: Fair    Functional Assessments:     Bed Mobility  Bed Mobility:  (Supine <> sit modified independent)  Transfers  Transfer:  (Sit > stand at bed level modified independent Denies any lightheadedness)  Ambulation/Gait Training  Ambulation/Gait Training Performed:  (Patient ambulated without assistive device 40' note significant lateral veer to right requiring assist to recover. CGA.. Provided patient with SPC, able to demonstrate good sequencing with no futher episodes of lateral veer supervised. Discussed using SPC to improve ambulation and balance.           Extremity/Trunk Assessments:  RUE   RUE : Within Functional Limits  LUE   LUE: Within Functional Limits  RLE   RLE :  (AROM Hip/knee/ankle WFL MMT 4/5 grossly)  LLE   LLE :  (AROM Hip/knee/ankle WFL MMT 4/5 grossly)    Outcome Measures:     [facility] Basic Mobility  Turning from your back to your side while in a flat bed without using bedrails: None  Moving from lying on your back to sitting on the side of a flat bed without using bedrails: None  Moving to and from bed to chair (including a wheelchair): None  Standing up from a chair using your arms (e.g. wheelchair or bedside chair): None  To walk in hospital room: A little  Climbing  3-5 steps with railing: A little  Basic Mobility - Total Score: 22           Goals:    Education Documentation  Mobility Training, taught by Alina Terrazas PT at 5/21/2025  1:18 PM.  Learner: Patient  Readiness: Acceptance  Method: Explanation, Demonstration  Response: Verbalizes Understanding  Comment: use of cane to promote improved gait

## 2025-05-21 NOTE — CARE PLAN
The patient's goals for the shift include  pain control    The clinical goals for the shift include maintain patient safety and comfort

## 2025-05-22 ENCOUNTER — PATIENT OUTREACH (OUTPATIENT)
Dept: PRIMARY CARE | Facility: CLINIC | Age: 70
End: 2025-05-22
Payer: MEDICARE

## 2025-05-22 NOTE — PROGRESS NOTES
Discharge Facility:  [facility]  Discharge Diagnosis:  Dizziness   Admission Date:  5/20/25   Discharge Date: 5/21/25     PCP Appointment Date:  5/28/25   Specialist Appointment Date:   Hospital Encounter and Summary Linked: Yes    ED to Hosp-Admission (Discharged) with Lynn Burnett MD; Jez PIERCE MD (05/20/2025)     Wrap Up  Wrap Up Additional Comments: Successful transition of care outreach with patient. Pt reports doing well at home since discharge. New meds reviewed. pt states she didnt have a script for an inhaler. secure chat sent to inquire about inhaler with discharging provider. pt also requesting cough syrup script.  Pt denies CP and SOB. Patient denies any further discharge questions/needs at this time. Pt aware of my availability for non-emergent concerns. Contact info provided to patient (5/22/2025  2:13 PM)    Medications  Medications reviewed with patient/caregiver?: Yes (5/22/2025  2:13 PM)  Is the patient having any side effects they believe may be caused by any medication additions or changes?: No (5/22/2025  2:13 PM)  Does the patient have all medications ordered at discharge?: No (5/22/2025  2:13 PM)  What is keeping the patient from filling the prescriptions?: Lost script/didn't receive (Secure chat sent to discharging provider for inhaler script.) (5/22/2025  2:13 PM)  Prescription Comments: New scripts given at discharge :  ProAir HFA;  azithromycin  guaiFENesin  methylPREDNISolone (5/22/2025  2:13 PM)  Is the patient taking all medications as directed (includes completed medication regime)?: No (5/22/2025  2:13 PM)  Care Management Interventions: Provided patient education (5/22/2025  2:13 PM)  Medication Comments: Pt denies problems obtaining or affording medication. No medication-related issues identified (5/22/2025  2:13 PM)    Appointments  Does the patient have a primary care provider?: Yes (5/22/2025  2:13 PM)  Care Management Interventions: Verified appointment date/time/provider  (5/22/2025  2:13 PM)  Has the patient kept scheduled appointments due by today?: Yes (5/22/2025  2:13 PM)    Self Management  What is the home health agency?: DENIES NEED (5/22/2025  2:13 PM)  What Durable Medical Equipment (DME) was ordered?: N/A (5/22/2025  2:13 PM)    Patient Teaching  Does the patient have access to their discharge instructions?: Yes (5/22/2025  2:13 PM)  Care Management Interventions: Reviewed instructions with patient (5/22/2025  2:13 PM)  What is the patient's perception of their health status since discharge?: Improving (5/22/2025  2:13 PM)  Is the patient/caregiver able to teach back the hierarchy of who to call/visit for symptoms/problems? PCP, Specialist, Home Health nurse, Urgent Care, ED, 911: Yes (5/22/2025  2:13 PM)

## 2025-05-24 LAB
BACTERIA BLD CULT: NORMAL
BACTERIA BLD CULT: NORMAL

## 2025-05-27 ENCOUNTER — TELEPHONE (OUTPATIENT)
Dept: OTOLARYNGOLOGY | Facility: CLINIC | Age: 70
End: 2025-05-27
Payer: MEDICARE

## 2025-05-27 DIAGNOSIS — J31.0 CHRONIC RHINITIS: ICD-10-CM

## 2025-05-27 RX ORDER — IPRATROPIUM BROMIDE 21 UG/1
2 SPRAY, METERED NASAL 2 TIMES DAILY
Qty: 30 ML | Refills: 11 | Status: SHIPPED | OUTPATIENT
Start: 2025-05-27

## 2025-05-27 NOTE — TELEPHONE ENCOUNTER
Patient is requesting refill of Ipratropium [place] sent to [facility] on [address] in [place].

## 2025-05-28 ENCOUNTER — APPOINTMENT (OUTPATIENT)
Dept: PRIMARY CARE | Facility: CLINIC | Age: 70
End: 2025-05-28
Payer: MEDICARE

## 2025-05-28 VITALS
DIASTOLIC BLOOD PRESSURE: 95 MMHG | BODY MASS INDEX: 31.29 KG/M2 | HEIGHT: 60 IN | WEIGHT: 159.4 LBS | HEART RATE: 76 BPM | SYSTOLIC BLOOD PRESSURE: 161 MMHG

## 2025-05-28 DIAGNOSIS — K22.9 ESOPHAGEAL DISORDER: ICD-10-CM

## 2025-05-28 DIAGNOSIS — F11.20 OPIOID DEPENDENCE WITH CURRENT USE (MULTI): ICD-10-CM

## 2025-05-28 DIAGNOSIS — Z09 HOSPITAL DISCHARGE FOLLOW-UP: Primary | ICD-10-CM

## 2025-05-28 DIAGNOSIS — E78.5 HYPERLIPIDEMIA, UNSPECIFIED HYPERLIPIDEMIA TYPE: ICD-10-CM

## 2025-05-28 DIAGNOSIS — J40 BRONCHITIS: ICD-10-CM

## 2025-05-28 DIAGNOSIS — E66.9 OBESITY (BMI 30-39.9): ICD-10-CM

## 2025-05-28 DIAGNOSIS — I10 BENIGN HYPERTENSION: ICD-10-CM

## 2025-05-28 DIAGNOSIS — Z00.00 WELL ADULT HEALTH CHECK: ICD-10-CM

## 2025-05-28 DIAGNOSIS — K22.89 ESOPHAGEAL THICKENING: ICD-10-CM

## 2025-05-28 PROCEDURE — 1159F MED LIST DOCD IN RCRD: CPT | Performed by: INTERNAL MEDICINE

## 2025-05-28 PROCEDURE — 1036F TOBACCO NON-USER: CPT | Performed by: INTERNAL MEDICINE

## 2025-05-28 PROCEDURE — 3077F SYST BP >= 140 MM HG: CPT | Performed by: INTERNAL MEDICINE

## 2025-05-28 PROCEDURE — G8433 SCR FOR DEP NOT CPT DOC RSN: HCPCS | Performed by: INTERNAL MEDICINE

## 2025-05-28 PROCEDURE — 99495 TRANSJ CARE MGMT MOD F2F 14D: CPT | Performed by: INTERNAL MEDICINE

## 2025-05-28 PROCEDURE — 3080F DIAST BP >= 90 MM HG: CPT | Performed by: INTERNAL MEDICINE

## 2025-05-28 PROCEDURE — 3008F BODY MASS INDEX DOCD: CPT | Performed by: INTERNAL MEDICINE

## 2025-05-28 RX ORDER — ROSUVASTATIN CALCIUM 5 MG/1
5 TABLET, COATED ORAL DAILY
Qty: 90 TABLET | Refills: 0 | Status: SHIPPED | OUTPATIENT
Start: 2025-05-28 | End: 2025-08-26

## 2025-05-28 RX ORDER — AMLODIPINE BESYLATE 5 MG/1
5 TABLET ORAL 2 TIMES DAILY
Qty: 60 TABLET | Refills: 2 | Status: SHIPPED | OUTPATIENT
Start: 2025-05-28 | End: 2025-08-26

## 2025-05-28 NOTE — PROGRESS NOTES
"Patient: Alysa Braun  : 1955  PCP: Chao Lal MD  MRN: [id number]  Program: Transitional Care Management  Status: Enrolled  Effective Dates: 2025 - present  Responsible Staff: Sherry Corley RN  Social Drivers to be Addressed: No information to display         Alysa Braun is a 69 y.o. female presenting today for follow-up after being discharged from the hospital  7 days ago. The main problem requiring admission was 2025. The discharge summary and/or Transitional Care Management documentation was reviewed. Medication reconciliation was performed as indicated via the \"[place] as Reviewed\" timestamp.     Alysa Braun was contacted by Transitional Care Management services two days after her discharge. This encounter and supporting documentation was reviewed.    Review of Systems  He still has some sore throat  But much better  Also has some reflux symptoms  He was not aware of the CT finding of esophageal thickening      BP (!) 161/95 (BP Location: Left arm, Patient Position: Sitting)   Pulse 76   Ht 1.524 m (5')   Wt 72.3 kg (159 lb 6.4 oz)   BMI 31.13 kg/m²     Physical Exam  Heart sounds regular  Chest clear  Abdomen soft nontender  Neuro awake  Hospital information charts imaging studies reviewed  CT of the chest has shown evidence of esophageal thickening    The complexity of medical decision making for this patient's transitional care is moderate.    Assessment/Plan   Diagnoses and all orders for this visit:  Hospital discharge follow-up  -     Referral to Gastroenterology; Future  Bronchitis  Opioid dependence with current use (Multi)  Benign hypertension  -     amLODIPine (Norvasc) 5 mg tablet; Take 1 tablet (5 mg) by mouth 2 times a day.  Hyperlipidemia, unspecified hyperlipidemia type  -     rosuvastatin (Crestor) 5 mg tablet; Take 1 tablet (5 mg) by mouth once daily.  -     Lipid Panel; Future  Obesity (BMI 30-39.9)  Esophageal disorder  -     Referral to Gastroenterology; " Future  Esophageal thickening  -     Referral to Gastroenterology; Future  Well adult health check  -     Comprehensive Metabolic Panel; Future  Bronchitis has been resolving well  CT of the chest showing esophageal thickening and therefore needs endoscopy assessment  Advised to see gastroenterologist  Also increase the dose of amlodipine for better control of blood pressure  Stop simvastatin and changed to rosuvastatin  Labs in 8 weeks and follow-up in 12 weeks

## 2025-06-05 ENCOUNTER — TELEPHONE (OUTPATIENT)
Dept: PAIN MEDICINE | Facility: CLINIC | Age: 70
End: 2025-06-05
Payer: MEDICARE

## 2025-06-05 ENCOUNTER — HOSPITAL ENCOUNTER (OUTPATIENT)
Dept: RADIOLOGY | Facility: HOSPITAL | Age: 70
Discharge: HOME | End: 2025-06-05
Payer: MEDICARE

## 2025-06-05 ENCOUNTER — PATIENT OUTREACH (OUTPATIENT)
Dept: PRIMARY CARE | Facility: CLINIC | Age: 70
End: 2025-06-05
Payer: MEDICARE

## 2025-06-05 DIAGNOSIS — G89.29 CHRONIC MIDLINE LOW BACK PAIN WITH LEFT-SIDED SCIATICA: ICD-10-CM

## 2025-06-05 DIAGNOSIS — M54.42 CHRONIC MIDLINE LOW BACK PAIN WITH LEFT-SIDED SCIATICA: ICD-10-CM

## 2025-06-05 PROCEDURE — 73522 X-RAY EXAM HIPS BI 3-4 VIEWS: CPT

## 2025-06-05 PROCEDURE — 73522 X-RAY EXAM HIPS BI 3-4 VIEWS: CPT | Mod: BILATERAL PROCEDURE | Performed by: RADIOLOGY

## 2025-06-05 RX ORDER — OXYCODONE AND ACETAMINOPHEN 7.5; 325 MG/1; MG/1
1 TABLET ORAL EVERY 6 HOURS PRN
Qty: 120 TABLET | Refills: 0 | Status: SHIPPED | OUTPATIENT
Start: 2025-06-05 | End: 2025-07-05

## 2025-06-05 NOTE — PROGRESS NOTES
Confirmation of at least 2 patient identifiers.    Completed telephonic follow-up with patient after recent visit with Dr Lal.  Spoke to patient during outreach call.    Patient reports feeling: Improved  Patient has questions or concerns about medications: No  Have all prescribed medications been filled? Yes  Patient has necessary resources to manage their care? Yes  Patient has questions or concerns? No    Next care management follow-up approximately within one month. Ortho appt 6/5/25.   Care  information provided to patient.

## 2025-06-05 NOTE — TELEPHONE ENCOUNTER
Patient called office, unable to fill percocet prescription today. Verified with pharmacy that patient needs a new prescription due to [place]OR[place] number on current one. Can you please resubmit Juana's prescription to [facility] in [place]?

## 2025-06-23 ENCOUNTER — APPOINTMENT (OUTPATIENT)
Dept: PAIN MEDICINE | Facility: CLINIC | Age: 70
End: 2025-06-23
Payer: MEDICARE

## 2025-07-03 ENCOUNTER — APPOINTMENT (OUTPATIENT)
Dept: PAIN MEDICINE | Facility: CLINIC | Age: 70
End: 2025-07-03
Payer: MEDICARE

## 2025-07-03 VITALS
RESPIRATION RATE: 16 BRPM | SYSTOLIC BLOOD PRESSURE: 140 MMHG | OXYGEN SATURATION: 100 % | HEART RATE: 78 BPM | TEMPERATURE: 96.4 F | DIASTOLIC BLOOD PRESSURE: 71 MMHG

## 2025-07-03 DIAGNOSIS — G89.29 CHRONIC MIDLINE LOW BACK PAIN, UNSPECIFIED WHETHER SCIATICA PRESENT: Primary | ICD-10-CM

## 2025-07-03 DIAGNOSIS — M54.50 CHRONIC MIDLINE LOW BACK PAIN, UNSPECIFIED WHETHER SCIATICA PRESENT: Primary | ICD-10-CM

## 2025-07-03 DIAGNOSIS — Z79.891 ENCOUNTER FOR LONG-TERM USE OF OPIATE ANALGESIC: ICD-10-CM

## 2025-07-03 PROCEDURE — 99214 OFFICE O/P EST MOD 30 MIN: CPT | Performed by: NURSE PRACTITIONER

## 2025-07-03 PROCEDURE — 3078F DIAST BP <80 MM HG: CPT | Performed by: NURSE PRACTITIONER

## 2025-07-03 PROCEDURE — 1125F AMNT PAIN NOTED PAIN PRSNT: CPT | Performed by: NURSE PRACTITIONER

## 2025-07-03 PROCEDURE — 3077F SYST BP >= 140 MM HG: CPT | Performed by: NURSE PRACTITIONER

## 2025-07-03 PROCEDURE — 1036F TOBACCO NON-USER: CPT | Performed by: NURSE PRACTITIONER

## 2025-07-03 RX ORDER — OXYCODONE AND ACETAMINOPHEN 7.5; 325 MG/1; MG/1
1 TABLET ORAL EVERY 6 HOURS PRN
Qty: 120 TABLET | Refills: 0 | Status: SHIPPED | OUTPATIENT
Start: 2025-09-03 | End: 2025-10-03

## 2025-07-03 RX ORDER — OXYCODONE AND ACETAMINOPHEN 7.5; 325 MG/1; MG/1
1 TABLET ORAL EVERY 6 HOURS PRN
Qty: 120 TABLET | Refills: 0 | Status: SHIPPED | OUTPATIENT
Start: 2025-07-05 | End: 2025-08-04

## 2025-07-03 RX ORDER — OXYCODONE AND ACETAMINOPHEN 7.5; 325 MG/1; MG/1
1 TABLET ORAL EVERY 6 HOURS PRN
Qty: 120 TABLET | Refills: 0 | Status: SHIPPED | OUTPATIENT
Start: 2025-08-04 | End: 2025-09-03

## 2025-07-03 ASSESSMENT — PAIN SCALES - GENERAL
PAINLEVEL_OUTOF10: 6
PAINLEVEL_OUTOF10: 6

## 2025-07-03 ASSESSMENT — PAIN - FUNCTIONAL ASSESSMENT: PAIN_FUNCTIONAL_ASSESSMENT: 0-10

## 2025-07-03 ASSESSMENT — PAIN DESCRIPTION - DESCRIPTORS: DESCRIPTORS: NUMBNESS;ACHING

## 2025-07-03 NOTE — PROGRESS NOTES
Subjective   Patient ID: Alysa Braun is a 70 y.o. female.    HPI MEDICATION REFILL, XRAY REVIEW.    Review of Systems    Objective   Physical Exam    Assessment/Plan   There are no diagnoses linked to this encounter.

## 2025-07-03 NOTE — H&P
Subjective   Patient ID: Alysa Braun is a 70 y.o. female who presents for Med Refill (PAIN MEDICATION REFILL).  HPI  70 years old, female known in this clinic because of chronic back pain. She is maintained on Percocet 7.5/325 every 6 hours as needed for pain, Gabapentin 300 mg TID, and Mobic 15 mg once daily for pain. She confirms that they are moderately beneficial in controlling her pain issues. Her level of pain at this time is about 6/10 mostly in her back, described as constant, aching . She described positive response to the present medication therapy. Denies any side effects associated with the usage of the medication.   OARRS obtained and reviewed, no abuse or misuse with prescribed medication noted.  She is still able to perform activities of daily living independently. She lives by herself.    Review of Systems   Review of systems x 10 is negative.   No recent injury or falls reported.   No recent change in medical condition reported.   No recent weakness reported.   Still able to control bowel and bladder function.  Denies any problem with constipation.   Denies fever, cough, shortness of breath recently.   No interval change with medication/health issues reported.  Denies opioids diversion and abuse. Denies overuse of pain medications.    Objective   Physical Exam  Awake,alert, no acute distress, appropriate.  Spine is of normal curvature.  Full ROM on all 4 extremities, sensation and motor intact, no vascular compromise.  No pedal edema, normal gait.  Skin warm, dry, intact, turgor is normal.  Occasional 4 left thigh numbness.    Xray of hips bilateral done on 6/6/2025  FINDINGS:  Mild osteoarthritis of the bilateral hips. Small trochanteric spurs.  No evidence of fracture.      IMPRESSION:  Mild degenerative changes bilateral hips.  Assessment/Plan     70 years old, female with history and physical examination supportive of chronic back pain associated with postlaminectomy syndrome, s/p 2 surgeries  in the past with persistent back pain and positive sacroilitis requiring maintenance on opioid therapy.    I have personally reviewed the OARRS report for this patient. I have considered the risk of abuse, dependence, addiction and diversion.  I believe that it is clinically appropriate for this patient  to be prescribed this medication based on documented diagnosis.  Based on the favorable effect in your quality of life and the positive effect in the ability to do daily activities of daily living, I feel feel that the benefits outweighs the risk, and I will continue the current regimen of medications.  Continue Percocet as prescribed, at this time she still has Gabapentin and Mobic.  Drug screen done today.  Follow up in 3 months time or as needed basis  Explained plan to this patient, and patient verbalized understanding and agreement with the plan. If there is questions or concerns, please feel free to contact me to clarify at [phone], M-F 8-4 PM.         DEVORAH Nguyen 07/03/25 10:55 AM

## 2025-07-13 LAB — QUEST FLEXITEST1 RESULTS:: NORMAL

## 2025-07-28 DIAGNOSIS — Z00.00 WELL ADULT HEALTH CHECK: ICD-10-CM

## 2025-07-28 DIAGNOSIS — E78.5 HYPERLIPIDEMIA, UNSPECIFIED HYPERLIPIDEMIA TYPE: ICD-10-CM

## 2025-08-19 DIAGNOSIS — Z12.31 ENCOUNTER FOR SCREENING MAMMOGRAM FOR BREAST CANCER: ICD-10-CM

## 2025-08-20 ENCOUNTER — APPOINTMENT (OUTPATIENT)
Dept: PRIMARY CARE | Facility: CLINIC | Age: 70
End: 2025-08-20
Payer: MEDICARE

## 2025-08-20 VITALS
SYSTOLIC BLOOD PRESSURE: 104 MMHG | HEART RATE: 78 BPM | WEIGHT: 163 LBS | DIASTOLIC BLOOD PRESSURE: 70 MMHG | BODY MASS INDEX: 32 KG/M2 | HEIGHT: 60 IN

## 2025-08-20 DIAGNOSIS — R60.9 EDEMA, UNSPECIFIED TYPE: ICD-10-CM

## 2025-08-20 DIAGNOSIS — R52 GENERALIZED PAIN: ICD-10-CM

## 2025-08-20 DIAGNOSIS — I10 BENIGN HYPERTENSION: ICD-10-CM

## 2025-08-20 DIAGNOSIS — E66.9 OBESITY (BMI 30-39.9): ICD-10-CM

## 2025-08-20 DIAGNOSIS — I11.9 HYPERTENSIVE HEART DISEASE WITHOUT HEART FAILURE: ICD-10-CM

## 2025-08-20 DIAGNOSIS — M85.80 AGE-RELATED BONE LOSS: ICD-10-CM

## 2025-08-20 DIAGNOSIS — D64.9 ANEMIA, UNSPECIFIED TYPE: ICD-10-CM

## 2025-08-20 DIAGNOSIS — Z00.00 ROUTINE GENERAL MEDICAL EXAMINATION AT HEALTH CARE FACILITY: Primary | ICD-10-CM

## 2025-08-20 DIAGNOSIS — E55.9 VITAMIN D DEFICIENCY: ICD-10-CM

## 2025-08-20 DIAGNOSIS — R73.9 ELEVATED BLOOD SUGAR: ICD-10-CM

## 2025-08-20 DIAGNOSIS — R79.89 ELEVATED TSH: ICD-10-CM

## 2025-08-20 DIAGNOSIS — E78.5 HYPERLIPIDEMIA, UNSPECIFIED HYPERLIPIDEMIA TYPE: ICD-10-CM

## 2025-08-20 DIAGNOSIS — Z09 ENCOUNTER FOR FOLLOW-UP EXAMINATION AFTER COMPLETED TREATMENT FOR CONDITIONS OTHER THAN MALIGNANT NEOPLASM: ICD-10-CM

## 2025-08-20 DIAGNOSIS — G89.4 CHRONIC PAIN DISORDER: ICD-10-CM

## 2025-08-20 DIAGNOSIS — Z12.31 SCREENING MAMMOGRAM FOR BREAST CANCER: ICD-10-CM

## 2025-08-20 PROCEDURE — G0439 PPPS, SUBSEQ VISIT: HCPCS | Performed by: INTERNAL MEDICINE

## 2025-08-20 PROCEDURE — 3078F DIAST BP <80 MM HG: CPT | Performed by: INTERNAL MEDICINE

## 2025-08-20 PROCEDURE — 1158F ADVNC CARE PLAN TLK DOCD: CPT | Performed by: INTERNAL MEDICINE

## 2025-08-20 PROCEDURE — 90677 PCV20 VACCINE IM: CPT | Performed by: INTERNAL MEDICINE

## 2025-08-20 PROCEDURE — 99213 OFFICE O/P EST LOW 20 MIN: CPT | Performed by: INTERNAL MEDICINE

## 2025-08-20 PROCEDURE — 3008F BODY MASS INDEX DOCD: CPT | Performed by: INTERNAL MEDICINE

## 2025-08-20 PROCEDURE — G0009 ADMIN PNEUMOCOCCAL VACCINE: HCPCS | Performed by: INTERNAL MEDICINE

## 2025-08-20 PROCEDURE — 1160F RVW MEDS BY RX/DR IN RCRD: CPT | Performed by: INTERNAL MEDICINE

## 2025-08-20 PROCEDURE — 1159F MED LIST DOCD IN RCRD: CPT | Performed by: INTERNAL MEDICINE

## 2025-08-20 PROCEDURE — 1170F FXNL STATUS ASSESSED: CPT | Performed by: INTERNAL MEDICINE

## 2025-08-20 PROCEDURE — 3074F SYST BP LT 130 MM HG: CPT | Performed by: INTERNAL MEDICINE

## 2025-08-20 RX ORDER — BUPROPION HYDROCHLORIDE 200 MG/1
1 TABLET, EXTENDED RELEASE ORAL
COMMUNITY
Start: 2025-05-30

## 2025-08-20 ASSESSMENT — ACTIVITIES OF DAILY LIVING (ADL)
BATHING: INDEPENDENT
DOING_HOUSEWORK: INDEPENDENT
GROCERY_SHOPPING: INDEPENDENT
TAKING_MEDICATION: INDEPENDENT
MANAGING_FINANCES: INDEPENDENT
DRESSING: INDEPENDENT

## 2025-08-23 DIAGNOSIS — E78.5 HYPERLIPIDEMIA, UNSPECIFIED HYPERLIPIDEMIA TYPE: ICD-10-CM

## 2025-08-26 RX ORDER — ROSUVASTATIN CALCIUM 5 MG/1
5 TABLET, COATED ORAL DAILY
Qty: 90 TABLET | Refills: 1 | Status: SHIPPED | OUTPATIENT
Start: 2025-08-26

## 2025-08-28 ENCOUNTER — OFFICE VISIT (OUTPATIENT)
Dept: ORTHOPEDIC SURGERY | Age: 70
End: 2025-08-28

## 2025-08-28 VITALS
OXYGEN SATURATION: 98 % | DIASTOLIC BLOOD PRESSURE: 70 MMHG | HEIGHT: 60 IN | TEMPERATURE: 97.7 F | HEART RATE: 109 BPM | WEIGHT: 150 LBS | SYSTOLIC BLOOD PRESSURE: 140 MMHG | BODY MASS INDEX: 29.45 KG/M2

## 2025-08-28 DIAGNOSIS — M17.12 PRIMARY OSTEOARTHRITIS OF LEFT KNEE: Primary | ICD-10-CM

## 2025-08-28 RX ORDER — LIDOCAINE HYDROCHLORIDE 10 MG/ML
8 INJECTION, SOLUTION INFILTRATION; PERINEURAL ONCE
Status: COMPLETED | OUTPATIENT
Start: 2025-08-28 | End: 2025-08-28

## 2025-08-28 RX ORDER — TRIAMCINOLONE ACETONIDE 40 MG/ML
80 INJECTION, SUSPENSION INTRA-ARTICULAR; INTRAMUSCULAR ONCE
Status: COMPLETED | OUTPATIENT
Start: 2025-08-28 | End: 2025-08-28

## 2025-08-28 RX ADMIN — LIDOCAINE HYDROCHLORIDE 8 ML: 10 INJECTION, SOLUTION INFILTRATION; PERINEURAL at 14:51

## 2025-08-28 RX ADMIN — TRIAMCINOLONE ACETONIDE 80 MG: 40 INJECTION, SUSPENSION INTRA-ARTICULAR; INTRAMUSCULAR at 14:52

## 2025-08-29 ENCOUNTER — APPOINTMENT (OUTPATIENT)
Dept: RADIOLOGY | Facility: HOSPITAL | Age: 70
End: 2025-08-29
Payer: MEDICARE

## 2025-09-03 ENCOUNTER — APPOINTMENT (OUTPATIENT)
Dept: GASTROENTEROLOGY | Facility: CLINIC | Age: 70
End: 2025-09-03
Payer: MEDICARE

## 2025-10-15 ENCOUNTER — APPOINTMENT (OUTPATIENT)
Dept: PRIMARY CARE | Facility: CLINIC | Age: 70
End: 2025-10-15
Payer: MEDICARE

## (undated) DEVICE — SUTURE ETHIBOND EXCEL SZ 1 L30IN NONABSORBABLE GRN L48MM CTX X865H

## (undated) DEVICE — GOWN,AURORA,NONREINFORCED,LARGE: Brand: MEDLINE

## (undated) DEVICE — 3M™ STERI-DRAPE™ INSTRUMENT POUCH 1018: Brand: STERI-DRAPE™

## (undated) DEVICE — 4-PORT MANIFOLD: Brand: NEPTUNE 2

## (undated) DEVICE — PACK,LAPAROTOMY,NO GOWNS: Brand: MEDLINE

## (undated) DEVICE — INTENDED FOR TISSUE SEPARATION, AND OTHER PROCEDURES THAT REQUIRE A SHARP SURGICAL BLADE TO PUNCTURE OR CUT.: Brand: BARD-PARKER ® CARBON RIB-BACK BLADES

## (undated) DEVICE — SUTURE VICRYL SZ 1 L36IN ABSRB UD L36MM CT-1 1/2 CIR J947H

## (undated) DEVICE — SYRINGE MED 30ML STD CLR PLAS LUERLOCK TIP N CTRL DISP

## (undated) DEVICE — SUTURE VCRL SZ 0 L36IN ABSRB UD L36MM CT-1 1/2 CIR J946H

## (undated) DEVICE — SYRINGE IRRIG 60ML SFT PLIABLE BLB EZ TO GRP 1 HND USE W/

## (undated) DEVICE — SECTO® DISSECTOR, KITTNER, 5/16 IN DIAMETER, (5 EA/POUCH, 24 POUCHES/PK, 4 PK/BX): Brand: SYMMETRY SURGICAL

## (undated) DEVICE — GLOVE ORANGE PI 8   MSG9080

## (undated) DEVICE — SUTURE MONOCRYL STRATAFIX SPRL + SZ 3-0 L18IN ABSRB UD PS-2 SXMP1B107

## (undated) DEVICE — TTL1LYR 16FR10ML 100%SIL TMPST TR: Brand: MEDLINE

## (undated) DEVICE — 3M™ IOBAN™ 2 ANTIMICROBIAL INCISE DRAPE 6650EZ: Brand: IOBAN™ 2

## (undated) DEVICE — Z DISCONTINUED PER MEDLINE USE 2741943 DRESSING AQUACEL 10 IN ALG W9XL25CM SIL CVR WTRPRF VIR BACT BARR ANTIMIC

## (undated) DEVICE — CORD,CAUTERY,BIPOLAR,STERILE: Brand: MEDLINE

## (undated) DEVICE — TOWEL,OR,DSP,ST,BLUE,STD,4/PK,20PK/CS: Brand: MEDLINE

## (undated) DEVICE — MAT FLR ABSRB ECODRI-SAFE

## (undated) DEVICE — HYPODERMIC SAFETY NEEDLE: Brand: MAGELLAN

## (undated) DEVICE — Device: Brand: STABLECUT®

## (undated) DEVICE — TUBING, SUCTION, 9/32" X 20', STRAIGHT: Brand: MEDLINE INDUSTRIES, INC.

## (undated) DEVICE — 3.0MM PRECISION NEURO (MATCH HEAD)

## (undated) DEVICE — GLOVE ORANGE PI 8 1/2   MSG9085

## (undated) DEVICE — ZINACTIVE USE 2539609 APPLICATOR MEDICATED 10.5 CC SOLUTION HI LT ORNG CHLORAPREP

## (undated) DEVICE — SUTURE VCRL SZ 2-0 L36IN ABSRB UD L36MM CT-1 1/2 CIR J945H

## (undated) DEVICE — LABEL MED MINI W/ MARKER

## (undated) DEVICE — DRESSING HYDROFIBER AQUACEL AG ADVANTAGE 3.5X12 IN

## (undated) DEVICE — SYRINGE MED 10ML LUERLOCK TIP W/O SFTY DISP

## (undated) DEVICE — SYRINGE MED 50ML LUERLOCK TIP

## (undated) DEVICE — SPONGE,LAP,18"X18",DLX,XR,ST,5/PK,40/PK: Brand: MEDLINE

## (undated) DEVICE — NEEDLE SPNL 20GA L3 1 2IN YEL S STL POLYCARB HUB MTL STYL

## (undated) DEVICE — GLOVE ORANGE PI 7 1/2   MSG9075

## (undated) DEVICE — SUTURE MCRYL SZ 4-0 L27IN ABSRB UD L19MM PS-2 1/2 CIR PRIM Y426H

## (undated) DEVICE — SUTURE PDS II SZ 1 L36IN ABSRB VLT CT L40MM 1/2 CIR TAPR Z359T

## (undated) DEVICE — MARKER SURG SKIN GENTIAN VLT REG TIP W/ 6IN RUL

## (undated) DEVICE — SUTURE VICRYL SZ 2-0 L36IN ABSRB UD L36MM CT-1 1/2 CIR J945H

## (undated) DEVICE — SYRINGE MED 3ML CLR PLAS STD N CTRL LUERLOCK TIP DISP

## (undated) DEVICE — CATHETER IV 16 GAX2 IN STR INTROCAN SAFETY

## (undated) DEVICE — SHEET,DRAPE,53X77,STERILE: Brand: MEDLINE

## (undated) DEVICE — APPLIER LIG CLP M L11IN TI STR RNG HNDL FOR 20 CLP DISP

## (undated) DEVICE — COUNTER NDL 40 COUNT HLD 70 FOAM BLK ADH W/ MAG

## (undated) DEVICE — E-Z CLEAN, NON-STICK, PTFE COATED, ELECTROSURGICAL BLADE ELECTRODE, 6.5 INCH (16.5 CM): Brand: MEGADYNE

## (undated) DEVICE — GOWN,AURORA,NONRNF,XL,30/CS: Brand: MEDLINE

## (undated) DEVICE — DRAPE SURG W41XL74IN CLR FULL SZ C ARM 3 ADH POLY STRP E

## (undated) DEVICE — BANDAGE COBAN 6 IN WND 6INX5YD FOAM

## (undated) DEVICE — SUTURE PERMAHAND SZ 2-0 L30IN NONABSORBABLE BLK SILK W/O A305H

## (undated) DEVICE — ELECTRODE ES L275IN 275IN BLDE TIP COAT PTFE TEF W EVAC

## (undated) DEVICE — TOTAL KNEE: Brand: MEDLINE INDUSTRIES, INC.

## (undated) DEVICE — PENCIL SMK EVAC 10 FT BLADE ELECTRD ROCKER FOR TELSCP

## (undated) DEVICE — DRAPE EQUIP TRNSPRT CONTAINMENT FOR BK TAB

## (undated) DEVICE — APPLICATOR MEDICATED 26 CC SOLUTION HI LT ORNG CHLORAPREP

## (undated) DEVICE — SUTURE PERMAHAND SZ 3-0 L30IN NONABSORBABLE BLK SILK BRAID A304H

## (undated) DEVICE — ELECTRODE PT RET AD L9FT HI MOIST COND ADH HYDRGEL CORDED

## (undated) DEVICE — HOOD: Brand: T7PLUS

## (undated) DEVICE — SUTURE VCRL SZ 3-0 L27IN ABSRB UD L26MM SH 1/2 CIR J416H

## (undated) DEVICE — GLOVE SURG SZ 9 THK91MIL LTX FREE SYN POLYISOPRENE ANTI

## (undated) DEVICE — GAUZE,SPONGE,4"X4",16PLY,XRAY,STRL,LF: Brand: MEDLINE

## (undated) DEVICE — PACK,SET UP,DRAPE: Brand: MEDLINE